# Patient Record
Sex: FEMALE | Race: WHITE | NOT HISPANIC OR LATINO | Employment: FULL TIME | URBAN - METROPOLITAN AREA
[De-identification: names, ages, dates, MRNs, and addresses within clinical notes are randomized per-mention and may not be internally consistent; named-entity substitution may affect disease eponyms.]

---

## 2017-03-30 ENCOUNTER — LAB VISIT (OUTPATIENT)
Dept: LAB | Facility: HOSPITAL | Age: 37
End: 2017-03-30
Attending: INTERNAL MEDICINE
Payer: COMMERCIAL

## 2017-03-30 DIAGNOSIS — E23.7: Primary | ICD-10-CM

## 2017-03-30 DIAGNOSIS — E27.9 UNSPECIFIED DISORDER OF ADRENAL GLANDS: ICD-10-CM

## 2017-03-30 LAB
ALBUMIN SERPL BCP-MCNC: 3.5 G/DL
ALP SERPL-CCNC: 83 U/L
ALT SERPL W/O P-5'-P-CCNC: 15 U/L
ANION GAP SERPL CALC-SCNC: 12 MMOL/L
AST SERPL-CCNC: 15 U/L
BILIRUB SERPL-MCNC: 0.3 MG/DL
BUN SERPL-MCNC: 11 MG/DL
CALCIUM SERPL-MCNC: 9.1 MG/DL
CHLORIDE SERPL-SCNC: 109 MMOL/L
CO2 SERPL-SCNC: 21 MMOL/L
CREAT SERPL-MCNC: 0.8 MG/DL
DHEA-S SERPL-MCNC: 190.3 UG/DL
EST. GFR  (AFRICAN AMERICAN): >60 ML/MIN/1.73 M^2
EST. GFR  (NON AFRICAN AMERICAN): >60 ML/MIN/1.73 M^2
GLUCOSE SERPL-MCNC: 81 MG/DL
POTASSIUM SERPL-SCNC: 4.2 MMOL/L
PROGEST SERPL-MCNC: 0.4 NG/ML
PROLACTIN SERPL IA-MCNC: 23.6 NG/ML
PROT SERPL-MCNC: 7.5 G/DL
SODIUM SERPL-SCNC: 142 MMOL/L
T3 SERPL-MCNC: 114 NG/DL
T4 FREE SERPL-MCNC: 0.99 NG/DL
TSH SERPL DL<=0.005 MIU/L-ACNC: 2.29 UIU/ML

## 2017-03-30 PROCEDURE — 83003 ASSAY GROWTH HORMONE (HGH): CPT

## 2017-03-30 PROCEDURE — 84144 ASSAY OF PROGESTERONE: CPT

## 2017-03-30 PROCEDURE — 82627 DEHYDROEPIANDROSTERONE: CPT

## 2017-03-30 PROCEDURE — 84443 ASSAY THYROID STIM HORMONE: CPT

## 2017-03-30 PROCEDURE — 84480 ASSAY TRIIODOTHYRONINE (T3): CPT

## 2017-03-30 PROCEDURE — 36415 COLL VENOUS BLD VENIPUNCTURE: CPT

## 2017-03-30 PROCEDURE — 84439 ASSAY OF FREE THYROXINE: CPT

## 2017-03-30 PROCEDURE — 84146 ASSAY OF PROLACTIN: CPT

## 2017-03-30 PROCEDURE — 82024 ASSAY OF ACTH: CPT

## 2017-03-30 PROCEDURE — 80053 COMPREHEN METABOLIC PANEL: CPT

## 2017-03-31 LAB
ACTH PLAS-MCNC: 17 PG/ML
GH SERPL-MCNC: 0.5 NG/ML

## 2017-11-06 ENCOUNTER — LAB VISIT (OUTPATIENT)
Dept: LAB | Facility: OTHER | Age: 37
End: 2017-11-06
Attending: OBSTETRICS & GYNECOLOGY
Payer: COMMERCIAL

## 2017-11-06 DIAGNOSIS — Z11.9 SCREENING EXAMINATION FOR INFECTIOUS DISEASE: ICD-10-CM

## 2017-11-06 DIAGNOSIS — Z01.812 ENCOUNTER FOR PREPROCEDURAL LABORATORY EXAMINATION: Primary | ICD-10-CM

## 2017-11-06 DIAGNOSIS — Z11.8 SPECIAL SCREENING EXAMINATION FOR CHLAMYDIAL DISEASE: ICD-10-CM

## 2017-11-06 DIAGNOSIS — Z11.3 SCREENING EXAMINATION FOR VENEREAL DISEASE: ICD-10-CM

## 2017-11-06 DIAGNOSIS — Z11.59 SPECIAL SCREENING EXAMINATION FOR VIRAL DISEASE: ICD-10-CM

## 2017-11-06 DIAGNOSIS — Z01.812 ENCOUNTER FOR PREPROCEDURAL LABORATORY EXAMINATION: ICD-10-CM

## 2017-11-06 DIAGNOSIS — Z31.49 INVESTIGATION AND TESTING FOR PROCREATION MANAGEMENT: ICD-10-CM

## 2017-11-06 LAB
ABO + RH BLD: NORMAL
BASOPHILS # BLD AUTO: 0.04 K/UL
BASOPHILS NFR BLD: 0.6 %
BLD GP AB SCN CELLS X3 SERPL QL: NORMAL
DIFFERENTIAL METHOD: ABNORMAL
EOSINOPHIL # BLD AUTO: 0 K/UL
EOSINOPHIL NFR BLD: 0.6 %
ERYTHROCYTE [DISTWIDTH] IN BLOOD BY AUTOMATED COUNT: 14.6 %
HCT VFR BLD AUTO: 38.9 %
HGB BLD-MCNC: 11.8 G/DL
LYMPHOCYTES # BLD AUTO: 2.1 K/UL
LYMPHOCYTES NFR BLD: 29.9 %
MCH RBC QN AUTO: 25.5 PG
MCHC RBC AUTO-ENTMCNC: 30.3 G/DL
MCV RBC AUTO: 84 FL
MONOCYTES # BLD AUTO: 0.4 K/UL
MONOCYTES NFR BLD: 5.5 %
NEUTROPHILS # BLD AUTO: 4.4 K/UL
NEUTROPHILS NFR BLD: 63.3 %
PLATELET # BLD AUTO: 370 K/UL
PMV BLD AUTO: 10.2 FL
PROLACTIN SERPL IA-MCNC: 7.4 NG/ML
RBC # BLD AUTO: 4.62 M/UL
TSH SERPL DL<=0.005 MIU/L-ACNC: 1.17 UIU/ML
WBC # BLD AUTO: 6.93 K/UL

## 2017-11-06 PROCEDURE — 83520 IMMUNOASSAY QUANT NOS NONAB: CPT

## 2017-11-06 PROCEDURE — 86703 HIV-1/HIV-2 1 RESULT ANTBDY: CPT

## 2017-11-06 PROCEDURE — 86704 HEP B CORE ANTIBODY TOTAL: CPT

## 2017-11-06 PROCEDURE — 87591 N.GONORRHOEAE DNA AMP PROB: CPT

## 2017-11-06 PROCEDURE — 85025 COMPLETE CBC W/AUTO DIFF WBC: CPT

## 2017-11-06 PROCEDURE — 84443 ASSAY THYROID STIM HORMONE: CPT

## 2017-11-06 PROCEDURE — 86762 RUBELLA ANTIBODY: CPT

## 2017-11-06 PROCEDURE — 87340 HEPATITIS B SURFACE AG IA: CPT

## 2017-11-06 PROCEDURE — 86592 SYPHILIS TEST NON-TREP QUAL: CPT

## 2017-11-06 PROCEDURE — 86803 HEPATITIS C AB TEST: CPT

## 2017-11-06 PROCEDURE — 36415 COLL VENOUS BLD VENIPUNCTURE: CPT

## 2017-11-06 PROCEDURE — 86901 BLOOD TYPING SEROLOGIC RH(D): CPT

## 2017-11-06 PROCEDURE — 86900 BLOOD TYPING SEROLOGIC ABO: CPT

## 2017-11-06 PROCEDURE — 86787 VARICELLA-ZOSTER ANTIBODY: CPT

## 2017-11-06 PROCEDURE — 84146 ASSAY OF PROLACTIN: CPT

## 2017-11-07 LAB
C TRACH DNA SPEC QL NAA+PROBE: NOT DETECTED
HBV CORE AB SERPL QL IA: NEGATIVE
HBV SURFACE AG SERPL QL IA: NEGATIVE
HCV AB SERPL QL IA: NEGATIVE
HIV 1+2 AB+HIV1 P24 AG SERPL QL IA: NEGATIVE
N GONORRHOEA DNA SPEC QL NAA+PROBE: NOT DETECTED
RPR SER QL: NORMAL
RUBV IGG SER-ACNC: 32.2 IU/ML
RUBV IGG SER-IMP: REACTIVE
VARICELLA INTERPRETATION: POSITIVE
VARICELLA ZOSTER IGG: 4.38 ISR

## 2017-11-09 LAB — MIS SERPL-MCNC: 2.1 NG/ML (ref 0.9–9.5)

## 2018-01-04 ENCOUNTER — PATIENT MESSAGE (OUTPATIENT)
Dept: OBSTETRICS AND GYNECOLOGY | Facility: CLINIC | Age: 38
End: 2018-01-04

## 2018-01-23 ENCOUNTER — OFFICE VISIT (OUTPATIENT)
Dept: OBSTETRICS AND GYNECOLOGY | Facility: CLINIC | Age: 38
End: 2018-01-23
Payer: COMMERCIAL

## 2018-01-23 VITALS
DIASTOLIC BLOOD PRESSURE: 80 MMHG | WEIGHT: 182.44 LBS | BODY MASS INDEX: 31.15 KG/M2 | HEIGHT: 64 IN | SYSTOLIC BLOOD PRESSURE: 130 MMHG

## 2018-01-23 DIAGNOSIS — Z01.419 ENCOUNTER FOR GYNECOLOGICAL EXAMINATION WITHOUT ABNORMAL FINDING: Primary | ICD-10-CM

## 2018-01-23 DIAGNOSIS — Z31.9 PROCREATIVE MANAGEMENT: ICD-10-CM

## 2018-01-23 DIAGNOSIS — F43.21 SITUATIONAL DEPRESSION: ICD-10-CM

## 2018-01-23 DIAGNOSIS — F41.9 ANXIETY: ICD-10-CM

## 2018-01-23 PROCEDURE — 99395 PREV VISIT EST AGE 18-39: CPT | Mod: S$GLB,,, | Performed by: OBSTETRICS & GYNECOLOGY

## 2018-01-23 PROCEDURE — 99999 PR PBB SHADOW E&M-EST. PATIENT-LVL III: CPT | Mod: PBBFAC,,, | Performed by: OBSTETRICS & GYNECOLOGY

## 2018-01-23 RX ORDER — SERTRALINE HYDROCHLORIDE 50 MG/1
50 TABLET, FILM COATED ORAL DAILY
Qty: 30 TABLET | Refills: 12 | Status: SHIPPED | OUTPATIENT
Start: 2018-01-23 | End: 2018-09-06 | Stop reason: ALTCHOICE

## 2018-01-23 RX ORDER — CLOMIPHENE CITRATE 50 MG/1
50 TABLET ORAL DAILY
Qty: 5 TABLET | Refills: 3 | Status: SHIPPED | OUTPATIENT
Start: 2018-01-23 | End: 2018-01-28

## 2018-01-23 NOTE — PROGRESS NOTES
"CC: Well woman exam    Angel Mosquera is a 37 y.o. female  presents for a well woman exam.  She has been trying to conceive with donor sperm and IUI with Dr Perez/ and one session with Dr Quispe.  She is dealing with the loss of her mother in Oct.  She is requesting medication    Past Medical History:   Diagnosis Date    Rosacea        Past Surgical History:   Procedure Laterality Date    Breast Augmentation      mischarr         OB History    Para Term  AB Living   2       2 0   SAB TAB Ectopic Multiple Live Births   1              # Outcome Date GA Lbr Unruly/2nd Weight Sex Delivery Anes PTL Lv   2 SAB            1 AB                   Family History   Problem Relation Age of Onset    Diabetes Mother     Hypertension Mother     Heart failure Mother      viral?    Cancer Neg Hx     Ovarian cancer Neg Hx     Melanoma Neg Hx     Heart attack Neg Hx     Stroke Neg Hx     Colon cancer Neg Hx     Breast cancer Neg Hx     Blindness Neg Hx     Amblyopia Neg Hx     Cataracts Neg Hx     Glaucoma Neg Hx     Macular degeneration Neg Hx     Retinal detachment Neg Hx     Strabismus Neg Hx        Social History   Substance Use Topics    Smoking status: Never Smoker    Smokeless tobacco: Never Used    Alcohol use No       /80   Ht 5' 4" (1.626 m)   Wt 82.7 kg (182 lb 6.9 oz)   LMP 2018 (Approximate)   BMI 31.31 kg/m²     ROS:  GENERAL: Denies weight gain or weight loss. Feeling well overall.   SKIN: Denies rash or lesions.   HEAD: Denies head injury or headache.   NODES: Denies enlarged lymph nodes.   CHEST: Denies chest pain or shortness of breath.   CARDIOVASCULAR: Denies palpitations or left sided chest pain.   ABDOMEN: No abdominal pain, constipation, diarrhea, nausea, vomiting or rectal bleeding.   URINARY: No frequency, dysuria, hematuria, or burning on urination.  REPRODUCTIVE: See HPI.   BREASTS: The patient performs breast self-examination and denies pain, " lumps, or nipple discharge.   HEMATOLOGIC: No easy bruisability or excessive bleeding.  MUSCULOSKELETAL: Denies joint pain or swelling.   NEUROLOGIC: Denies syncope or weakness.   PSYCHIATRIC: Denies depression, anxiety or mood swings.    Physical Exam:    APPEARANCE: Well nourished, well developed, in no acute distress.  AFFECT: WNL, alert and oriented x 3  SKIN: No acne or hirsutism  NECK: Neck symmetric without masses or thyromegaly  NODES: No inguinal, cervical, axillary, or femoral lymph node enlargement  CHEST: Good respiratory effect  ABDOMEN: Soft.  No tenderness or masses.  No hepatosplenomegaly.  No hernias.  BREASTS: Symmetrical, no skin changes or visible lesions.  No palpable masses, nipple discharge bilaterally.  PELVIC: Normal external genitalia without lesions.  Normal hair distribution.  Adequate perineal body, normal urethral meatus.  Vagina moist and well rugated without lesions or discharge.  Cervix pink, without lesions, discharge or tenderness.  No significant cystocele or rectocele.  Bimanual exam shows uterus to be normal size, regular, mobile and nontender.  Adnexa without masses or tenderness.    EXTREMITIES: No edema.      ASSESSMENT AND PLAN  1. Encounter for gynecological examination without abnormal finding     2. Procreative management  clomiPHENE (CLOMID) 50 mg tablet   3. Anxiety  sertraline (ZOLOFT) 50 MG tablet   4. Situational depression  sertraline (ZOLOFT) 50 MG tablet     offerred therapy referral   discussed grief counseling.  Meditation and other spiritual guidance given  Will start on zoloft.  Consider pregnancy in a few months  PNV daily    Patient was counseled today on A.C.S. Pap guidelines and recommendations for yearly pelvic exams, mammograms and monthly self breast exams; to see her PCP for other health maintenance.     F/U PRN

## 2018-02-10 ENCOUNTER — TELEPHONE (OUTPATIENT)
Dept: OBSTETRICS AND GYNECOLOGY | Facility: HOSPITAL | Age: 38
End: 2018-02-10

## 2018-02-10 RX ORDER — METRONIDAZOLE 500 MG/1
500 TABLET ORAL 2 TIMES DAILY
Qty: 14 TABLET | Refills: 0 | Status: SHIPPED | OUTPATIENT
Start: 2018-02-10 | End: 2018-02-24

## 2018-02-15 ENCOUNTER — TELEPHONE (OUTPATIENT)
Dept: OBSTETRICS AND GYNECOLOGY | Facility: CLINIC | Age: 38
End: 2018-02-15

## 2018-02-15 NOTE — TELEPHONE ENCOUNTER
----- Message from Natalia Rosario sent at 2/15/2018 11:20 AM CST -----  Contact: pt  x_ 1st Request  _ 2nd Request  _ 3rd Request    Who: pt    Why: is returning a call    What Number to Call Back: [phone]    When to Expect a call back: (Before the end of the day)  -- if call after 3:00 call back will be tomorrow.

## 2018-02-15 NOTE — TELEPHONE ENCOUNTER
Patient advised that the call was only to notify her of her prescription. Patient stated that she was aware and is almost done.

## 2018-09-06 ENCOUNTER — OFFICE VISIT (OUTPATIENT)
Dept: DERMATOLOGY | Facility: CLINIC | Age: 38
End: 2018-09-06
Payer: COMMERCIAL

## 2018-09-06 DIAGNOSIS — L71.9 ROSACEA: Primary | ICD-10-CM

## 2018-09-06 DIAGNOSIS — D22.9 NEVUS: ICD-10-CM

## 2018-09-06 PROCEDURE — 99999 PR PBB SHADOW E&M-EST. PATIENT-LVL II: CPT | Mod: PBBFAC,,, | Performed by: DERMATOLOGY

## 2018-09-06 PROCEDURE — 99202 OFFICE O/P NEW SF 15 MIN: CPT | Mod: S$GLB,,, | Performed by: DERMATOLOGY

## 2018-09-06 RX ORDER — SODIUM SULFACETAMIDE AND SULFUR 80; 40 MG/ML; MG/ML
SOLUTION TOPICAL
Qty: 473 ML | Refills: 3 | Status: ON HOLD | OUTPATIENT
Start: 2018-09-06 | End: 2019-10-19 | Stop reason: HOSPADM

## 2018-09-06 RX ORDER — IVERMECTIN 10 MG/G
CREAM TOPICAL
Qty: 45 G | Refills: 3 | Status: SHIPPED | OUTPATIENT
Start: 2018-09-06 | End: 2019-06-19 | Stop reason: SDUPTHER

## 2018-09-06 NOTE — PROGRESS NOTES
Subjective:       Patient ID:  Angel Mosquera is a 38 y.o. female who presents for   Chief Complaint   Patient presents with    Lesion    Rosacea     Pt presents for lesion to nasal bridge x 2 months.  Was growing but has stopped growing.  Denies pain or bleeding. No tx.  Defers check of other areas.  Also c/o rosacea, very frustrated with it.  Feels she gets pustules and has background redness.  Sun and heat flare as well as alcohol.   Has seen many derms that have not helped.  Has tried many different things that she finds are all too harsh.  Does get pustules associated.     oracea gave pt rash. finacea burned her skin       Lesion     Rosacea         Review of Systems   Constitutional: Negative for fever, chills, fatigue and malaise.   Skin: Positive for daily sunscreen use and activity-related sunscreen use.   Hematologic/Lymphatic: Does not bruise/bleed easily.        Objective:    Physical Exam   Constitutional: She appears well-developed and well-nourished. No distress.   Eyes:       Neurological: She is alert and oriented to person, place, and time. She is not disoriented.   Psychiatric: She has a normal mood and affect.   Skin:   Areas Examined (abnormalities noted in diagram):   Head / Face Inspection Performed  Neck Inspection Performed              Diagram Legend     Erythematous scaling macule/papule c/w actinic keratosis       Vascular papule c/w angioma      Pigmented verrucoid papule/plaque c/w seborrheic keratosis      Yellow umbilicated papule c/w sebaceous hyperplasia      Irregularly shaped tan macule c/w lentigo     1-2 mm smooth white papules consistent with Milia      Movable subcutaneous cyst with punctum c/w epidermal inclusion cyst      Subcutaneous movable cyst c/w pilar cyst      Firm pink to brown papule c/w dermatofibroma      Pedunculated fleshy papule(s) c/w skin tag(s)      Evenly pigmented macule c/w junctional nevus     Mildly variegated pigmented, slightly  irregular-bordered macule c/w mildly atypical nevus      Flesh colored to evenly pigmented papule c/w intradermal nevus       Pink pearly papule/plaque c/w basal cell carcinoma      Erythematous hyperkeratotic cursted plaque c/w SCC      Surgical scar with no sign of skin cancer recurrence      Open and closed comedones      Inflammatory papules and pustules      Verrucoid papule consistent consistent with wart     Erythematous eczematous patches and plaques     Dystrophic onycholytic nail with subungual debris c/w onychomycosis     Umbilicated papule    Erythematous-base heme-crusted tan verrucoid plaque consistent with inflamed seborrheic keratosis     Erythematous Silvery Scaling Plaque c/w Psoriasis     See annotation      Assessment / Plan:        Rosacea  -     ivermectin (SOOLANTRA) 1 % Crea; Apply thin film to face qhs  Dispense: 45 g; Refill: 3  -     sulfacetamide sodium-sulfur (SULFACLEANSE 8-4) 8-4 % Susp; Wash face qhs  Dispense: 473 mL; Refill: 3  Cetaphil redness wash qam   cerave am and pm or elta daily    Nevus  Discussed ABCDE's of nevi.  Monitor for new mole or moles that are becoming bigger, darker, irritated, or developing irregular borders. Brochure provided.             Follow-up in about 2 months (around 11/6/2018).

## 2019-05-02 ENCOUNTER — LAB VISIT (OUTPATIENT)
Dept: LAB | Facility: HOSPITAL | Age: 39
End: 2019-05-02
Attending: OBSTETRICS & GYNECOLOGY
Payer: COMMERCIAL

## 2019-05-02 ENCOUNTER — OFFICE VISIT (OUTPATIENT)
Dept: OBSTETRICS AND GYNECOLOGY | Facility: CLINIC | Age: 39
End: 2019-05-02
Payer: COMMERCIAL

## 2019-05-02 VITALS
WEIGHT: 199.44 LBS | BODY MASS INDEX: 34.05 KG/M2 | SYSTOLIC BLOOD PRESSURE: 126 MMHG | HEIGHT: 64 IN | DIASTOLIC BLOOD PRESSURE: 84 MMHG

## 2019-05-02 DIAGNOSIS — Z31.9 PROCREATIVE MANAGEMENT: ICD-10-CM

## 2019-05-02 DIAGNOSIS — Z01.419 ENCOUNTER FOR GYNECOLOGICAL EXAMINATION WITHOUT ABNORMAL FINDING: Primary | ICD-10-CM

## 2019-05-02 LAB
ABO + RH BLD: NORMAL
BASOPHILS # BLD AUTO: 0.07 K/UL (ref 0–0.2)
BASOPHILS NFR BLD: 0.7 % (ref 0–1.9)
BLD GP AB SCN CELLS X3 SERPL QL: NORMAL
DIFFERENTIAL METHOD: ABNORMAL
EOSINOPHIL # BLD AUTO: 0.1 K/UL (ref 0–0.5)
EOSINOPHIL NFR BLD: 0.7 % (ref 0–8)
ERYTHROCYTE [DISTWIDTH] IN BLOOD BY AUTOMATED COUNT: 13.3 % (ref 11.5–14.5)
HCT VFR BLD AUTO: 37.4 % (ref 37–48.5)
HGB BLD-MCNC: 11.2 G/DL (ref 12–16)
IMM GRANULOCYTES # BLD AUTO: 0.04 K/UL (ref 0–0.04)
IMM GRANULOCYTES NFR BLD AUTO: 0.4 % (ref 0–0.5)
LYMPHOCYTES # BLD AUTO: 2.9 K/UL (ref 1–4.8)
LYMPHOCYTES NFR BLD: 29.3 % (ref 18–48)
MCH RBC QN AUTO: 25.7 PG (ref 27–31)
MCHC RBC AUTO-ENTMCNC: 29.9 G/DL (ref 32–36)
MCV RBC AUTO: 86 FL (ref 82–98)
MONOCYTES # BLD AUTO: 0.6 K/UL (ref 0.3–1)
MONOCYTES NFR BLD: 6.1 % (ref 4–15)
NEUTROPHILS # BLD AUTO: 6.2 K/UL (ref 1.8–7.7)
NEUTROPHILS NFR BLD: 62.8 % (ref 38–73)
NRBC BLD-RTO: 0 /100 WBC
PLATELET # BLD AUTO: 411 K/UL (ref 150–350)
PMV BLD AUTO: 10.3 FL (ref 9.2–12.9)
PROLACTIN SERPL IA-MCNC: 11.4 NG/ML (ref 5.2–26.5)
RBC # BLD AUTO: 4.36 M/UL (ref 4–5.4)
TSH SERPL DL<=0.005 MIU/L-ACNC: 1.25 UIU/ML (ref 0.4–4)
WBC # BLD AUTO: 9.81 K/UL (ref 3.9–12.7)

## 2019-05-02 PROCEDURE — 84443 ASSAY THYROID STIM HORMONE: CPT

## 2019-05-02 PROCEDURE — 86703 HIV-1/HIV-2 1 RESULT ANTBDY: CPT

## 2019-05-02 PROCEDURE — 36415 COLL VENOUS BLD VENIPUNCTURE: CPT | Mod: PO

## 2019-05-02 PROCEDURE — 80074 ACUTE HEPATITIS PANEL: CPT

## 2019-05-02 PROCEDURE — 83520 IMMUNOASSAY QUANT NOS NONAB: CPT

## 2019-05-02 PROCEDURE — 99999 PR PBB SHADOW E&M-EST. PATIENT-LVL III: ICD-10-PCS | Mod: PBBFAC,,, | Performed by: OBSTETRICS & GYNECOLOGY

## 2019-05-02 PROCEDURE — 86592 SYPHILIS TEST NON-TREP QUAL: CPT

## 2019-05-02 PROCEDURE — 86901 BLOOD TYPING SEROLOGIC RH(D): CPT

## 2019-05-02 PROCEDURE — 88175 CYTOPATH C/V AUTO FLUID REDO: CPT

## 2019-05-02 PROCEDURE — 99395 PREV VISIT EST AGE 18-39: CPT | Mod: S$GLB,,, | Performed by: OBSTETRICS & GYNECOLOGY

## 2019-05-02 PROCEDURE — 86762 RUBELLA ANTIBODY: CPT

## 2019-05-02 PROCEDURE — 85025 COMPLETE CBC W/AUTO DIFF WBC: CPT

## 2019-05-02 PROCEDURE — 84146 ASSAY OF PROLACTIN: CPT

## 2019-05-02 PROCEDURE — 86645 CMV ANTIBODY IGM: CPT

## 2019-05-02 PROCEDURE — 99395 PR PREVENTIVE VISIT,EST,18-39: ICD-10-PCS | Mod: S$GLB,,, | Performed by: OBSTETRICS & GYNECOLOGY

## 2019-05-02 PROCEDURE — 86644 CMV ANTIBODY: CPT

## 2019-05-02 PROCEDURE — 99999 PR PBB SHADOW E&M-EST. PATIENT-LVL III: CPT | Mod: PBBFAC,,, | Performed by: OBSTETRICS & GYNECOLOGY

## 2019-05-02 PROCEDURE — 86631 CHLAMYDIA ANTIBODY: CPT

## 2019-05-02 NOTE — PROGRESS NOTES
"CC: Well woman exam    Angel Mosquera is a 39 y.o. female  presents for a well woman exam  Normal cycles.   Cramps are increasing and more painful.  Slightly heavier.  US scheduled with Dr Bridges.  Planning stimulation and IVF with donor sperm on May 29..         Past Medical History:   Diagnosis Date    Rosacea        Past Surgical History:   Procedure Laterality Date    Breast Augmentation      mischarr         OB History    Para Term  AB Living   2       2 0   SAB TAB Ectopic Multiple Live Births   2              # Outcome Date GA Lbr Unruly/2nd Weight Sex Delivery Anes PTL Lv   2 SAB 2008           1 SAB                Family History   Problem Relation Age of Onset    Diabetes Mother     Hypertension Mother     Heart failure Mother         viral?    Cancer Neg Hx     Ovarian cancer Neg Hx     Melanoma Neg Hx     Heart attack Neg Hx     Stroke Neg Hx     Colon cancer Neg Hx     Breast cancer Neg Hx     Blindness Neg Hx     Amblyopia Neg Hx     Cataracts Neg Hx     Glaucoma Neg Hx     Macular degeneration Neg Hx     Retinal detachment Neg Hx     Strabismus Neg Hx        Social History     Tobacco Use    Smoking status: Never Smoker    Smokeless tobacco: Never Used   Substance Use Topics    Alcohol use: No    Drug use: No       /84 (BP Location: Left arm, Patient Position: Sitting)   Ht 5' 4" (1.626 m)   Wt 90.5 kg (199 lb 6.5 oz)   LMP 2019 (Exact Date)   BMI 34.23 kg/m²     ROS:  GENERAL: Denies weight gain or weight loss. Feeling well overall.   SKIN: Denies rash or lesions.   HEAD: Denies head injury or headache.   NODES: Denies enlarged lymph nodes.   CHEST: Denies chest pain or shortness of breath.   CARDIOVASCULAR: Denies palpitations or left sided chest pain.   ABDOMEN: No abdominal pain, constipation, diarrhea, nausea, vomiting or rectal bleeding.   URINARY: No frequency, dysuria, hematuria, or burning on urination.  REPRODUCTIVE: See HPI. "   BREASTS: The patient performs breast self-examination and denies pain, lumps, or nipple discharge.   HEMATOLOGIC: No easy bruisability or excessive bleeding.  MUSCULOSKELETAL: Denies joint pain or swelling.   NEUROLOGIC: Denies syncope or weakness.   PSYCHIATRIC: Denies depression, anxiety or mood swings.    Physical Exam:    APPEARANCE: Well nourished, well developed, in no acute distress.  AFFECT: WNL, alert and oriented x 3  SKIN: No acne or hirsutism  NECK: Neck symmetric without masses or thyromegaly  NODES: No inguinal, cervical, axillary, or femoral lymph node enlargement  CHEST: Good respiratory effect  ABDOMEN: Soft.  No tenderness or masses.  No hepatosplenomegaly.  No hernias.  BREASTS: Symmetrical, no skin changes or visible lesions.  No palpable masses, nipple discharge bilaterally.  PELVIC: Normal external genitalia without lesions.  Normal hair distribution.  Adequate perineal body, normal urethral meatus.  Vagina moist and well rugated without lesions or discharge.  Cervix pink, without lesions, discharge or tenderness.  No significant cystocele or rectocele.  Bimanual exam shows uterus to be normal size, regular, mobile and nontender.  Adnexa without masses or tenderness.    EXTREMITIES: No edema.    ASSESSMENT AND PLAN  1. Encounter for gynecological examination without abnormal finding  Liquid-based pap smear, screening    Liquid-based pap smear, screening   2. Procreative management  Cytomegalovirus (Cmv) Ab, Igm    CYTOMEGALOVIRUS ANTIBODY, IGG    HIV 1/2 Ag/Ab (4th Gen)    Hepatitis panel, acute    HEPATITIS B CORE ANTIBODY, IGM    RPR    Chlamydia Serology, Serum    ABO/Rh    Post Partum Type and Screen    CBC auto differential    Rubella antibody, IgG    Antimullerian hormone (AMH)    Prolactin    TSH     Patient will plan for IVF with donor sperm at the end of May    Patient was counseled today on A.C.S. Pap guidelines and recommendations for yearly pelvic exams, mammograms and monthly  self breast exams; to see her PCP for other health maintenance.     Follow up in about 1 year (around 5/2/2020).

## 2019-05-03 LAB
C PNEUM IGG TITR SER IF: NORMAL TITER
C PNEUM IGM TITR SER IF: NORMAL TITER
C PSITTACI IGG TITR SER IF: NORMAL TITER
C PSITTACI IGM TITR SER IF: NORMAL TITER
C TRACH IGG TITR SER IF: NORMAL TITER
C TRACH IGM TITR SER IF: NORMAL TITER
CMV IGG SERPL QL IA: REACTIVE
HAV IGM SERPL QL IA: NEGATIVE
HBV CORE IGM SERPL QL IA: NEGATIVE
HBV CORE IGM SERPL QL IA: NEGATIVE
HBV SURFACE AG SERPL QL IA: NEGATIVE
HCV AB SERPL QL IA: NEGATIVE
HIV 1+2 AB+HIV1 P24 AG SERPL QL IA: NEGATIVE
RPR SER QL: NORMAL
RUBV IGG SER-ACNC: 30.1 IU/ML
RUBV IGG SER-IMP: REACTIVE

## 2019-05-06 LAB
CMV IGM SERPL IA-ACNC: <8 U/ML
MIS SERPL-MCNC: 2.5 NG/ML (ref 0.9–9.5)

## 2019-06-19 ENCOUNTER — OFFICE VISIT (OUTPATIENT)
Dept: DERMATOLOGY | Facility: CLINIC | Age: 39
End: 2019-06-19
Payer: COMMERCIAL

## 2019-06-19 DIAGNOSIS — L71.9 ROSACEA: Primary | ICD-10-CM

## 2019-06-19 PROCEDURE — 99213 OFFICE O/P EST LOW 20 MIN: CPT | Mod: S$GLB,,, | Performed by: DERMATOLOGY

## 2019-06-19 PROCEDURE — 99999 PR PBB SHADOW E&M-EST. PATIENT-LVL II: ICD-10-PCS | Mod: PBBFAC,,, | Performed by: DERMATOLOGY

## 2019-06-19 PROCEDURE — 99213 PR OFFICE/OUTPT VISIT, EST, LEVL III, 20-29 MIN: ICD-10-PCS | Mod: S$GLB,,, | Performed by: DERMATOLOGY

## 2019-06-19 PROCEDURE — 99999 PR PBB SHADOW E&M-EST. PATIENT-LVL II: CPT | Mod: PBBFAC,,, | Performed by: DERMATOLOGY

## 2019-06-19 RX ORDER — IVERMECTIN 10 MG/G
CREAM TOPICAL
Qty: 45 G | Refills: 3 | Status: ON HOLD | OUTPATIENT
Start: 2019-06-19 | End: 2019-10-19 | Stop reason: HOSPADM

## 2019-06-19 NOTE — PROGRESS NOTES
Subjective:       Patient ID:  Angel Mosquera is a 39 y.o. female who presents for   Chief Complaint   Patient presents with    Rosacea     Pt here today for a follow up on rosacea tx with soolantra and sulfa wash. Tx helps. Uses sunscreen as well.  No longer gets the pus bumps .  Used to have many more.  Uses cetaphil redness wash helps as well.       Review of Systems   HENT: Negative for headaches.    Eyes: Negative for itching, eye watering, eye irritation and eyelid inflammation.   Gastrointestinal: Negative for nausea, vomiting, diarrhea and Sensitivity to oral antibiotics.        Vascular instability syndrome: rosacea associated with GI sx and HA's   Skin: Positive for daily sunscreen use and activity-related sunscreen use. Negative for recent sunburn.   Neurological: Negative for headaches.        Objective:    Physical Exam   Constitutional: She appears well-developed and well-nourished. No distress.   Neurological: She is alert and oriented to person, place, and time. She is not disoriented.   Psychiatric: She has a normal mood and affect.   Skin:   Areas Examined (abnormalities noted in diagram):   Head / Face Inspection Performed              Diagram Legend     Erythematous scaling macule/papule c/w actinic keratosis       Vascular papule c/w angioma      Pigmented verrucoid papule/plaque c/w seborrheic keratosis      Yellow umbilicated papule c/w sebaceous hyperplasia      Irregularly shaped tan macule c/w lentigo     1-2 mm smooth white papules consistent with Milia      Movable subcutaneous cyst with punctum c/w epidermal inclusion cyst      Subcutaneous movable cyst c/w pilar cyst      Firm pink to brown papule c/w dermatofibroma      Pedunculated fleshy papule(s) c/w skin tag(s)      Evenly pigmented macule c/w junctional nevus     Mildly variegated pigmented, slightly irregular-bordered macule c/w mildly atypical nevus      Flesh colored to evenly pigmented papule c/w intradermal nevus        Pink pearly papule/plaque c/w basal cell carcinoma      Erythematous hyperkeratotic cursted plaque c/w SCC      Surgical scar with no sign of skin cancer recurrence      Open and closed comedones      Inflammatory papules and pustules      Verrucoid papule consistent consistent with wart     Erythematous eczematous patches and plaques     Dystrophic onycholytic nail with subungual debris c/w onychomycosis     Umbilicated papule    Erythematous-base heme-crusted tan verrucoid plaque consistent with inflamed seborrheic keratosis     Erythematous Silvery Scaling Plaque c/w Psoriasis     See annotation      Assessment / Plan:        Rosacea  Sulfacleanser,  cerave am and pm   cetaphil antiredness wash   -     ivermectin (SOOLANTRA) 1 % Crea; Apply thin film to face qhs  Dispense: 45 g; Refill: 3             Follow up in about 1 year (around 6/19/2020).

## 2019-10-08 ENCOUNTER — TELEPHONE (OUTPATIENT)
Dept: OBSTETRICS AND GYNECOLOGY | Facility: CLINIC | Age: 39
End: 2019-10-08

## 2019-10-11 ENCOUNTER — PATIENT MESSAGE (OUTPATIENT)
Dept: DERMATOLOGY | Facility: CLINIC | Age: 39
End: 2019-10-11

## 2019-10-19 ENCOUNTER — ANESTHESIA EVENT (OUTPATIENT)
Dept: SURGERY | Facility: OTHER | Age: 39
End: 2019-10-19
Payer: COMMERCIAL

## 2019-10-19 ENCOUNTER — ANESTHESIA (OUTPATIENT)
Dept: SURGERY | Facility: OTHER | Age: 39
End: 2019-10-19
Payer: COMMERCIAL

## 2019-10-19 ENCOUNTER — HOSPITAL ENCOUNTER (EMERGENCY)
Facility: OTHER | Age: 39
Discharge: HOME | End: 2019-10-19
Attending: EMERGENCY MEDICINE
Payer: COMMERCIAL

## 2019-10-19 VITALS
SYSTOLIC BLOOD PRESSURE: 110 MMHG | WEIGHT: 204 LBS | TEMPERATURE: 98 F | DIASTOLIC BLOOD PRESSURE: 76 MMHG | OXYGEN SATURATION: 97 % | RESPIRATION RATE: 16 BRPM | BODY MASS INDEX: 33.99 KG/M2 | HEART RATE: 80 BPM | HEIGHT: 65 IN

## 2019-10-19 DIAGNOSIS — Z98.890 STATUS POST DILATION AND CURETTAGE: ICD-10-CM

## 2019-10-19 DIAGNOSIS — O02.1 MISSED ABORTION: ICD-10-CM

## 2019-10-19 DIAGNOSIS — O03.4 INCOMPLETE ABORTION: Primary | ICD-10-CM

## 2019-10-19 LAB
ALBUMIN SERPL BCP-MCNC: 3.5 G/DL (ref 3.5–5.2)
ALP SERPL-CCNC: 65 U/L (ref 55–135)
ALT SERPL W/O P-5'-P-CCNC: 17 U/L (ref 10–44)
ANION GAP SERPL CALC-SCNC: 12 MMOL/L (ref 8–16)
AST SERPL-CCNC: 17 U/L (ref 10–40)
B-HCG UR QL: POSITIVE
BASOPHILS # BLD AUTO: 0.08 K/UL (ref 0–0.2)
BASOPHILS NFR BLD: 0.5 % (ref 0–1.9)
BILIRUB SERPL-MCNC: 0.2 MG/DL (ref 0.1–1)
BUN SERPL-MCNC: 12 MG/DL (ref 6–20)
CALCIUM SERPL-MCNC: 9.5 MG/DL (ref 8.7–10.5)
CHLORIDE SERPL-SCNC: 105 MMOL/L (ref 95–110)
CO2 SERPL-SCNC: 22 MMOL/L (ref 23–29)
CREAT SERPL-MCNC: 0.8 MG/DL (ref 0.5–1.4)
CTP QC/QA: YES
DIFFERENTIAL METHOD: ABNORMAL
EOSINOPHIL # BLD AUTO: 0.4 K/UL (ref 0–0.5)
EOSINOPHIL NFR BLD: 2.6 % (ref 0–8)
ERYTHROCYTE [DISTWIDTH] IN BLOOD BY AUTOMATED COUNT: 14 % (ref 11.5–14.5)
EST. GFR  (AFRICAN AMERICAN): >60 ML/MIN/1.73 M^2
EST. GFR  (NON AFRICAN AMERICAN): >60 ML/MIN/1.73 M^2
GLUCOSE SERPL-MCNC: 106 MG/DL (ref 70–110)
HCG INTACT+B SERPL-ACNC: NORMAL MIU/ML
HCT VFR BLD AUTO: 38.9 % (ref 37–48.5)
HGB BLD-MCNC: 12.3 G/DL (ref 12–16)
IMM GRANULOCYTES # BLD AUTO: 0.04 K/UL (ref 0–0.04)
IMM GRANULOCYTES NFR BLD AUTO: 0.3 % (ref 0–0.5)
LYMPHOCYTES # BLD AUTO: 3.4 K/UL (ref 1–4.8)
LYMPHOCYTES NFR BLD: 21.5 % (ref 18–48)
MCH RBC QN AUTO: 28 PG (ref 27–31)
MCHC RBC AUTO-ENTMCNC: 31.6 G/DL (ref 32–36)
MCV RBC AUTO: 88 FL (ref 82–98)
MONOCYTES # BLD AUTO: 0.9 K/UL (ref 0.3–1)
MONOCYTES NFR BLD: 5.9 % (ref 4–15)
NEUTROPHILS # BLD AUTO: 10.8 K/UL (ref 1.8–7.7)
NEUTROPHILS NFR BLD: 69.2 % (ref 38–73)
NRBC BLD-RTO: 0 /100 WBC
PLATELET # BLD AUTO: 383 K/UL (ref 150–350)
PMV BLD AUTO: 9.6 FL (ref 9.2–12.9)
POTASSIUM SERPL-SCNC: 3.5 MMOL/L (ref 3.5–5.1)
PROT SERPL-MCNC: 7.6 G/DL (ref 6–8.4)
RBC # BLD AUTO: 4.4 M/UL (ref 4–5.4)
SODIUM SERPL-SCNC: 139 MMOL/L (ref 136–145)
WBC # BLD AUTO: 15.63 K/UL (ref 3.9–12.7)

## 2019-10-19 PROCEDURE — 76998 PR  ULTRASONIC GUIDANCE, INTRAOPERATIVE: ICD-10-PCS | Mod: 26,51,, | Performed by: OBSTETRICS & GYNECOLOGY

## 2019-10-19 PROCEDURE — 63600175 PHARM REV CODE 636 W HCPCS: Performed by: EMERGENCY MEDICINE

## 2019-10-19 PROCEDURE — 96374 THER/PROPH/DIAG INJ IV PUSH: CPT

## 2019-10-19 PROCEDURE — 88305 TISSUE SPECIMEN TO PATHOLOGY - SURGERY: ICD-10-PCS | Mod: 26,,, | Performed by: PATHOLOGY

## 2019-10-19 PROCEDURE — 81025 URINE PREGNANCY TEST: CPT | Performed by: EMERGENCY MEDICINE

## 2019-10-19 PROCEDURE — 63600175 PHARM REV CODE 636 W HCPCS: Performed by: ANESTHESIOLOGY

## 2019-10-19 PROCEDURE — 76998 US GUIDE INTRAOP: CPT | Mod: 26,51,, | Performed by: OBSTETRICS & GYNECOLOGY

## 2019-10-19 PROCEDURE — 85025 COMPLETE CBC W/AUTO DIFF WBC: CPT

## 2019-10-19 PROCEDURE — 84702 CHORIONIC GONADOTROPIN TEST: CPT

## 2019-10-19 PROCEDURE — 71000033 HC RECOVERY, INTIAL HOUR: Performed by: OBSTETRICS & GYNECOLOGY

## 2019-10-19 PROCEDURE — 63600175 PHARM REV CODE 636 W HCPCS: Performed by: NURSE ANESTHETIST, CERTIFIED REGISTERED

## 2019-10-19 PROCEDURE — 59812 PR SURG RX INCOMPLETE ABORTN: ICD-10-PCS | Mod: ,,, | Performed by: OBSTETRICS & GYNECOLOGY

## 2019-10-19 PROCEDURE — 25000003 PHARM REV CODE 250: Performed by: NURSE ANESTHETIST, CERTIFIED REGISTERED

## 2019-10-19 PROCEDURE — 36000704 HC OR TIME LEV I 1ST 15 MIN: Performed by: OBSTETRICS & GYNECOLOGY

## 2019-10-19 PROCEDURE — 96361 HYDRATE IV INFUSION ADD-ON: CPT

## 2019-10-19 PROCEDURE — 88305 TISSUE EXAM BY PATHOLOGIST: CPT | Mod: 26,,, | Performed by: PATHOLOGY

## 2019-10-19 PROCEDURE — 99283 EMERGENCY DEPT VISIT LOW MDM: CPT | Mod: ,,, | Performed by: OBSTETRICS & GYNECOLOGY

## 2019-10-19 PROCEDURE — 71000016 HC POSTOP RECOV ADDL HR: Performed by: OBSTETRICS & GYNECOLOGY

## 2019-10-19 PROCEDURE — 96375 TX/PRO/DX INJ NEW DRUG ADDON: CPT

## 2019-10-19 PROCEDURE — 99283 PR EMERGENCY DEPT VISIT,LEVEL III: ICD-10-PCS | Mod: ,,, | Performed by: OBSTETRICS & GYNECOLOGY

## 2019-10-19 PROCEDURE — 37000009 HC ANESTHESIA EA ADD 15 MINS: Performed by: OBSTETRICS & GYNECOLOGY

## 2019-10-19 PROCEDURE — 88305 TISSUE EXAM BY PATHOLOGIST: CPT | Performed by: PATHOLOGY

## 2019-10-19 PROCEDURE — 37000008 HC ANESTHESIA 1ST 15 MINUTES: Performed by: OBSTETRICS & GYNECOLOGY

## 2019-10-19 PROCEDURE — 59812 TREATMENT OF MISCARRIAGE: CPT | Mod: ,,, | Performed by: OBSTETRICS & GYNECOLOGY

## 2019-10-19 PROCEDURE — 71000015 HC POSTOP RECOV 1ST HR: Performed by: OBSTETRICS & GYNECOLOGY

## 2019-10-19 PROCEDURE — 36000705 HC OR TIME LEV I EA ADD 15 MIN: Performed by: OBSTETRICS & GYNECOLOGY

## 2019-10-19 PROCEDURE — 80053 COMPREHEN METABOLIC PANEL: CPT

## 2019-10-19 PROCEDURE — 99285 EMERGENCY DEPT VISIT HI MDM: CPT | Mod: 25

## 2019-10-19 RX ORDER — MUPIROCIN 20 MG/G
OINTMENT TOPICAL
Status: DISCONTINUED | OUTPATIENT
Start: 2019-10-19 | End: 2019-10-19 | Stop reason: HOSPADM

## 2019-10-19 RX ORDER — SODIUM CHLORIDE, SODIUM LACTATE, POTASSIUM CHLORIDE, CALCIUM CHLORIDE 600; 310; 30; 20 MG/100ML; MG/100ML; MG/100ML; MG/100ML
INJECTION, SOLUTION INTRAVENOUS CONTINUOUS
Status: DISCONTINUED | OUTPATIENT
Start: 2019-10-19 | End: 2019-10-19 | Stop reason: HOSPADM

## 2019-10-19 RX ORDER — ONDANSETRON 2 MG/ML
INJECTION INTRAMUSCULAR; INTRAVENOUS
Status: DISCONTINUED | OUTPATIENT
Start: 2019-10-19 | End: 2019-10-19

## 2019-10-19 RX ORDER — SUCCINYLCHOLINE CHLORIDE 20 MG/ML
INJECTION INTRAMUSCULAR; INTRAVENOUS
Status: DISCONTINUED | OUTPATIENT
Start: 2019-10-19 | End: 2019-10-19

## 2019-10-19 RX ORDER — HYDROCODONE BITARTRATE AND ACETAMINOPHEN 5; 325 MG/1; MG/1
1 TABLET ORAL EVERY 6 HOURS PRN
Qty: 8 TABLET | Refills: 0 | Status: SHIPPED | OUTPATIENT
Start: 2019-10-19 | End: 2019-10-23

## 2019-10-19 RX ORDER — DEXAMETHASONE SODIUM PHOSPHATE 4 MG/ML
INJECTION, SOLUTION INTRA-ARTICULAR; INTRALESIONAL; INTRAMUSCULAR; INTRAVENOUS; SOFT TISSUE
Status: DISCONTINUED | OUTPATIENT
Start: 2019-10-19 | End: 2019-10-19

## 2019-10-19 RX ORDER — OXYCODONE HYDROCHLORIDE 5 MG/1
5 TABLET ORAL
Status: DISCONTINUED | OUTPATIENT
Start: 2019-10-19 | End: 2019-10-19 | Stop reason: HOSPADM

## 2019-10-19 RX ORDER — PROPOFOL 10 MG/ML
VIAL (ML) INTRAVENOUS
Status: DISCONTINUED | OUTPATIENT
Start: 2019-10-19 | End: 2019-10-19

## 2019-10-19 RX ORDER — IBUPROFEN 800 MG/1
800 TABLET ORAL EVERY 8 HOURS PRN
Qty: 30 TABLET | Refills: 1 | Status: SHIPPED | OUTPATIENT
Start: 2019-10-19 | End: 2019-10-23

## 2019-10-19 RX ORDER — SODIUM CHLORIDE 0.9 % (FLUSH) 0.9 %
3 SYRINGE (ML) INJECTION
Status: DISCONTINUED | OUTPATIENT
Start: 2019-10-19 | End: 2019-10-19 | Stop reason: HOSPADM

## 2019-10-19 RX ORDER — ROCURONIUM BROMIDE 10 MG/ML
INJECTION, SOLUTION INTRAVENOUS
Status: DISCONTINUED | OUTPATIENT
Start: 2019-10-19 | End: 2019-10-19

## 2019-10-19 RX ORDER — ONDANSETRON 2 MG/ML
4 INJECTION INTRAMUSCULAR; INTRAVENOUS
Status: COMPLETED | OUTPATIENT
Start: 2019-10-19 | End: 2019-10-19

## 2019-10-19 RX ORDER — DIPHENHYDRAMINE HCL 25 MG
25 CAPSULE ORAL EVERY 4 HOURS PRN
Status: CANCELLED | OUTPATIENT
Start: 2019-10-19

## 2019-10-19 RX ORDER — GLYCOPYRROLATE 0.2 MG/ML
INJECTION INTRAMUSCULAR; INTRAVENOUS
Status: DISCONTINUED | OUTPATIENT
Start: 2019-10-19 | End: 2019-10-19

## 2019-10-19 RX ORDER — FENTANYL CITRATE 50 UG/ML
INJECTION, SOLUTION INTRAMUSCULAR; INTRAVENOUS
Status: DISCONTINUED | OUTPATIENT
Start: 2019-10-19 | End: 2019-10-19

## 2019-10-19 RX ORDER — HYDROMORPHONE HYDROCHLORIDE 2 MG/ML
0.4 INJECTION, SOLUTION INTRAMUSCULAR; INTRAVENOUS; SUBCUTANEOUS EVERY 5 MIN PRN
Status: DISCONTINUED | OUTPATIENT
Start: 2019-10-19 | End: 2019-10-19 | Stop reason: HOSPADM

## 2019-10-19 RX ORDER — KETOROLAC TROMETHAMINE 30 MG/ML
10 INJECTION, SOLUTION INTRAMUSCULAR; INTRAVENOUS
Status: COMPLETED | OUTPATIENT
Start: 2019-10-19 | End: 2019-10-19

## 2019-10-19 RX ORDER — ONDANSETRON 2 MG/ML
4 INJECTION INTRAMUSCULAR; INTRAVENOUS DAILY PRN
Status: DISCONTINUED | OUTPATIENT
Start: 2019-10-19 | End: 2019-10-19 | Stop reason: HOSPADM

## 2019-10-19 RX ORDER — MORPHINE SULFATE 4 MG/ML
4 INJECTION, SOLUTION INTRAMUSCULAR; INTRAVENOUS
Status: COMPLETED | OUTPATIENT
Start: 2019-10-19 | End: 2019-10-19

## 2019-10-19 RX ORDER — KETOROLAC TROMETHAMINE 30 MG/ML
INJECTION, SOLUTION INTRAMUSCULAR; INTRAVENOUS
Status: DISCONTINUED | OUTPATIENT
Start: 2019-10-19 | End: 2019-10-19

## 2019-10-19 RX ORDER — SODIUM CHLORIDE, SODIUM LACTATE, POTASSIUM CHLORIDE, CALCIUM CHLORIDE 600; 310; 30; 20 MG/100ML; MG/100ML; MG/100ML; MG/100ML
INJECTION, SOLUTION INTRAVENOUS CONTINUOUS PRN
Status: DISCONTINUED | OUTPATIENT
Start: 2019-10-19 | End: 2019-10-19

## 2019-10-19 RX ORDER — ONDANSETRON 8 MG/1
8 TABLET, ORALLY DISINTEGRATING ORAL EVERY 8 HOURS PRN
Status: DISCONTINUED | OUTPATIENT
Start: 2019-10-19 | End: 2019-10-19 | Stop reason: HOSPADM

## 2019-10-19 RX ORDER — MEPERIDINE HYDROCHLORIDE 25 MG/ML
12.5 INJECTION INTRAMUSCULAR; INTRAVENOUS; SUBCUTANEOUS ONCE AS NEEDED
Status: DISCONTINUED | OUTPATIENT
Start: 2019-10-19 | End: 2019-10-19 | Stop reason: HOSPADM

## 2019-10-19 RX ORDER — LIDOCAINE HCL/PF 100 MG/5ML
SYRINGE (ML) INTRAVENOUS
Status: DISCONTINUED | OUTPATIENT
Start: 2019-10-19 | End: 2019-10-19

## 2019-10-19 RX ORDER — SODIUM CHLORIDE 9 MG/ML
125 INJECTION, SOLUTION INTRAVENOUS CONTINUOUS
Status: DISCONTINUED | OUTPATIENT
Start: 2019-10-19 | End: 2019-10-19 | Stop reason: HOSPADM

## 2019-10-19 RX ADMIN — HYDROMORPHONE HYDROCHLORIDE 0.4 MG: 2 INJECTION INTRAMUSCULAR; INTRAVENOUS; SUBCUTANEOUS at 12:10

## 2019-10-19 RX ADMIN — KETOROLAC TROMETHAMINE 30 MG: 30 INJECTION, SOLUTION INTRAMUSCULAR; INTRAVENOUS at 11:10

## 2019-10-19 RX ADMIN — SODIUM CHLORIDE, SODIUM LACTATE, POTASSIUM CHLORIDE, AND CALCIUM CHLORIDE: 600; 310; 30; 20 INJECTION, SOLUTION INTRAVENOUS at 10:10

## 2019-10-19 RX ADMIN — GLYCOPYRROLATE 0.2 MG: 0.2 INJECTION, SOLUTION INTRAMUSCULAR; INTRAVENOUS at 11:10

## 2019-10-19 RX ADMIN — MORPHINE SULFATE 4 MG: 4 INJECTION INTRAVENOUS at 06:10

## 2019-10-19 RX ADMIN — CARBOXYMETHYLCELLULOSE SODIUM 2 DROP: 2.5 SOLUTION/ DROPS OPHTHALMIC at 11:10

## 2019-10-19 RX ADMIN — SODIUM CHLORIDE 1000 ML: 0.9 INJECTION, SOLUTION INTRAVENOUS at 04:10

## 2019-10-19 RX ADMIN — PROPOFOL 250 MG: 10 INJECTION, EMULSION INTRAVENOUS at 11:10

## 2019-10-19 RX ADMIN — ONDANSETRON 4 MG: 2 INJECTION INTRAMUSCULAR; INTRAVENOUS at 11:10

## 2019-10-19 RX ADMIN — DOXYCYCLINE 100 MG: 100 INJECTION, POWDER, LYOPHILIZED, FOR SOLUTION INTRAVENOUS at 11:10

## 2019-10-19 RX ADMIN — LIDOCAINE HYDROCHLORIDE 100 MG: 20 INJECTION, SOLUTION INTRAVENOUS at 11:10

## 2019-10-19 RX ADMIN — ROCURONIUM BROMIDE 5 MG: 10 INJECTION, SOLUTION INTRAVENOUS at 11:10

## 2019-10-19 RX ADMIN — ONDANSETRON 4 MG: 2 INJECTION INTRAMUSCULAR; INTRAVENOUS at 06:10

## 2019-10-19 RX ADMIN — SUCCINYLCHOLINE CHLORIDE 120 MG: 20 INJECTION, SOLUTION INTRAMUSCULAR; INTRAVENOUS at 11:10

## 2019-10-19 RX ADMIN — FENTANYL CITRATE 100 MCG: 50 INJECTION, SOLUTION INTRAMUSCULAR; INTRAVENOUS at 11:10

## 2019-10-19 RX ADMIN — HYDROMORPHONE HYDROCHLORIDE 0.4 MG: 2 INJECTION INTRAMUSCULAR; INTRAVENOUS; SUBCUTANEOUS at 11:10

## 2019-10-19 RX ADMIN — DEXAMETHASONE SODIUM PHOSPHATE 12 MG: 4 INJECTION, SOLUTION INTRAMUSCULAR; INTRAVENOUS at 11:10

## 2019-10-19 RX ADMIN — KETOROLAC TROMETHAMINE 10 MG: 30 INJECTION, SOLUTION INTRAMUSCULAR; INTRAVENOUS at 09:10

## 2019-10-19 NOTE — CONSULTS
[facility]  Obstetrics & Gynecology  Consult Note    Patient Name: Angel Mosquera  MRN: [id number]  Admission Date: 10/19/2019  Hospital Length of Stay: 0 days  Code Status: Full Code  Primary Care Provider: Vesta Muniz MD  Principal Problem: Missed     Inpatient consult to Obstetrics / Gynecology  Consult performed by: Willa Willard MD  Consult ordered by: Artemio Blackmon II, MD        Subjective:     Chief Complaint: Vaginal Bleeding and Abdominal Cramping.     History of Present Illness:  Angel Mosquera is a 39 y.o.  at roughly 9 weeks gestation.  This is an IVF pregnancy with an embryo transfer date of 2019.  The patient has seen Dr. Bridges for her prenatal care up to this in pregnancy.  She was to establish care with Dr. Aguero in the coming weeks.  The patient presented to the ER after experiencing a gush of fluid/blood around 3:00 a.m. this morning since that time she has experienced abdominal cramping that is predominantly localized to her back.  She has also experienced intermittent bleeding from the vagina since that time.  In the ED, her vital signs were stable and there were no acute abnormalities noted on her laboratory workup.  Her beta HCG resulted as greater than 22,000. A transvaginal ultrasound demonstrated no evidence of an intrauterine pregnancy and was also significant for uterine fibroids.  Importantly, the patient has received numerous transvaginal ultrasounds throughout the course of this pregnancy demonstrating an intrauterine pregnancy.  Most recently she had an ultrasound demonstrating an IUP with fetal cardiac activity and a crown-rump length consistent with her gestational age.    This IUP is complicated by recurrence pregnancy loss, uterine fibroids and ART.        OB History    Para Term  AB Living   2 0 0 0 2 0   SAB TAB Ectopic Multiple Live Births   2 0 0 0 0      # Outcome Date GA Lbr Unruly/2nd Weight Sex  Delivery Anes PTL Lv   2 SAB 2008           1 SAB              Past Medical History:   Diagnosis Date    Rosacea      Past Surgical History:   Procedure Laterality Date    Breast Augmentation  2005    mischarr           (Not in a hospital admission)    Review of patient's allergies indicates:  No Known Allergies     Family History     Problem Relation (Age of Onset)    Diabetes Mother    Heart failure Mother    Hypertension Mother        Tobacco Use    Smoking status: Never Smoker    Smokeless tobacco: Never Used   Substance and Sexual Activity    Alcohol use: No    Drug use: No    Sexual activity: Yes     Partners: Male     Birth control/protection: None     Comment: Vasectomy     Review of Systems   Constitutional: Negative for chills and fever.   Eyes: Negative for visual disturbance.   Respiratory: Negative for shortness of breath.    Cardiovascular: Negative for chest pain.   Gastrointestinal: Positive for abdominal pain. Negative for diarrhea, nausea and vomiting.   Endocrine: Negative for diabetes.   Genitourinary: Positive for vaginal bleeding. Negative for dysuria, hematuria and vaginal discharge.   Musculoskeletal: Positive for back pain.   Integumentary:  Negative for rash.   Neurological: Negative for headaches.   Psychiatric/Behavioral: The patient is nervous/anxious.    Breast: negative.       Objective:     Vital Signs (Most Recent):  Temp: 98 °F (36.7 °C) (10/19/19 0355)  Pulse: 79 (10/19/19 0831)  Resp: 16 (10/19/19 0831)  BP: 134/76 (10/19/19 0831)  SpO2: 96 % (10/19/19 0831) Vital Signs (24h Range):  Temp:  [98 °F (36.7 °C)] 98 °F (36.7 °C)  Pulse:  [] 79  Resp:  [16-18] 16  SpO2:  [96 %-98 %] 96 %  BP: (134-143)/(76-90) 134/76     Weight: 92.5 kg (204 lb)  Body mass index is 33.95 kg/m².    No LMP recorded.    Physical Exam:   Constitutional: She is oriented to person, place, and time. She appears well-developed and well-nourished. No distress.   Patient appears appropriately sad  but in no acute distress.     HENT:   Head: Normocephalic and atraumatic.    Eyes: Conjunctivae are normal.    Neck: Neck supple.    Cardiovascular: Normal rate, regular rhythm and intact distal pulses.     Pulmonary/Chest: Effort normal. No respiratory distress.        Abdominal: Soft. She exhibits no distension. There is no tenderness. There is no rebound and no guarding.     Genitourinary: Vagina normal.   Genitourinary Comments: Normal external genitalia. Cervical OS closed.  Minimal old blood in the vault. No POC visualized. Large posterior cervical fibroid palpated on BME. No CMT.                Neurological: She is alert and oriented to person, place, and time.    Skin: Skin is warm and dry. She is not diaphoretic.    Psychiatric: She has a normal mood and affect. Her behavior is normal. Judgment and thought content normal.       Laboratory:  CBC:   Recent Labs   Lab 10/19/19  0431   WBC 15.63*   RBC 4.40   HGB 12.3   HCT 38.9   *   MCV 88   MCH 28.0   MCHC 31.6*     B-HCG - > 22,000    I have personallly reviewed all pertinent lab results from the last 24 hours.    Diagnostic Results:    TVUS:     Impression       Complex uterine mass lesions are noted, likely representing fibroids for which clinical and historical correlation is needed.    A normal appearing intrauterine pregnancy is not identified however there are 2 cystic appearing areas that are somewhat complex, there is a complex cystic finding with irregular crenulated walls that could potentially represent an abnormal irregular gestational sac however would be somewhat atypical, and is thought potentially associated with uterine mass, potentially fibroid and therefore may relate to an area of cystic degenerative change or necrosis.  A smaller area of complex cystic appearance is noted that could represent a gestational sac not optimally evaluated on this exam, there is a finding within it that may relate to complex material although the  possibility that this represents a fetal pole is a consideration a cannot be determined as a fetal pole at this time.    The left ovary is not visualized, the right ovary appears unremarkable on this exam, there is no additional sonographic evidence for extra ovarian or extra uterine pelvic mass or cystic collection or abnormal free fluid and no additional finding to specifically suggest extra uterine pregnancy.    At this time the differential would include early intrauterine pregnancy not well delineated on this exam, the possibility of ectopic pregnancy and miscarriage remain in the differential, given the aforementioned findings close clinical and historical correlation is needed, correlation with beta HCG level, serial beta HCG follow-up, correlation with prior examination and report if at all possible, and follow-up ultrasound is recommended.    This report was flagged in Epic as abnormal.         Assessment/Plan:     * Missed   - Patient presents with vaginal bleeding and abdominal cramping  - B-HCG > 22,000  - IVF pregnancy, embryo transfer date 19, Dr. Bridges  - Previously visualized IUP per Dr Bridges. Most recently 10/15/19  - No signs of viable IUP on Radiology TVUS or bedside TVUS per GYN staff  - Consistent with Missed AB  - RH Positive  - Patient counseled on medical vs. surgical management. Patient adamantly declines medical management and desires suction D&C.  - Discussed R/B/A. Consents signed. To OR for suction D&C.   - 200 mg Doxycycline on call to OR  - House Supervisor Notified. Surgery team to be called in.       Thank you for your consult. I will follow-up with patient. Please contact us if you have any additional questions.    Willa Willard MD  Obstetrics & Gynecology  [facility]

## 2019-10-19 NOTE — DISCHARGE SUMMARY
Discharge Note    SUMMARY     Admit Date: 10/19/2019    Discharge Date and Time:  10/19/2019 12:00 PM    Hospital Course (synopsis of major diagnoses, care, treatment, and services provided during the course of the hospital stay): Patient presented to ED and was diagnosed with incomplete AB, confirmed by ultrasound. She was offered medical vs. surgical management for incomplete AB and elected for surgical management. Consents for procedure were discussed and signed. Patient was passed back to OR for suction D&C. Please see OP note for further details. Tolerated procedure well and patient was taken to recovery in a stable condition. Prior to discharge patient was able to void, ambulate, tolerate PO and pain was well controlled with PO meds. Patient was given routine post-op instructions for which patient voiced understanding. Patient was subsequently discharged home.     Final Diagnosis: Post-Op Diagnosis Codes:     * Incomplete  [O03.4]    Disposition: Home or Self Care    Follow Up/Patient Instructions:   Patient will plan to follow up with Primary OB in 2 weeks.    Medications:  Reconciled Home Medications:      Medication List      START taking these medications    HYDROcodone-acetaminophen 5-325 mg per tablet  Commonly known as:  NORCO  Take 1 tablet by mouth every 6 (six) hours as needed for Pain.     ibuprofen 800 MG tablet  Commonly known as:  ADVIL,MOTRIN  Take 1 tablet (800 mg total) by mouth every 8 (eight) hours as needed for Pain.        CONTINUE taking these medications    prenatal vit 7-iron-folic-dha 28-1. mg Cap  Take by mouth. OTC        STOP taking these medications    ivermectin 1 % Crea  Commonly known as:  SOOLANTRA     sulfacetamide sodium-sulfur 8-4 % Susp  Commonly known as:  SULFACLEANSE 8-4          Discharge Procedure Orders   Diet general     Other restrictions (specify):   Order Comments: Continue pelvic rest (nothing in vagina) until seen in clinic for follow up     Call  MD for:  temperature >100.4     Call MD for:  persistent nausea and vomiting     Call MD for:  severe uncontrolled pain     Call MD for:  difficulty breathing, headache or visual disturbances     Call MD for:  redness, tenderness, or signs of infection (pain, swelling, redness, odor or green/yellow discharge around incision site)     Call MD for:  hives     Call MD for:  persistent dizziness or light-headedness     Call MD for:  extreme fatigue     Call MD for:   Order Comments: Persistent heavy vaginal bleeding (saturating >2 pads per hour for >2 hours)     Activity as tolerated     Follow-up Information     Carla Aguero MD. Schedule an appointment as soon as possible for a visit in 2 weeks.    Specialties:  Obstetrics, Obstetrics and Gynecology  Why:  Post-Op Appointment  Contact information:  [address] [id number] [phone]                   Margareth Novoa M.D.  OB/GYN PGY-1

## 2019-10-19 NOTE — OP NOTE
OPERATIVE REPORT    Surgery Date: 10/19/2019     SURGEON: Frances Culp    ASSISTANT: Dr. Margareth Novoa    PREOP DIAGNOSIS:   1. Incomplete   2. Uterine fibroids    POSTOP DIAGNOSIS:    1. Incomplete   2. Uterine fibroids    PROCEDURES:   Suction D&C under ultrasound guidance    ANESTHESIA: general    FINDINGS/KEY COMPONENTS: On ultrasound, uterus with large posterior fibroid, irregular gestational sac with no evidence of an embryo.    ESTIMATED BLOOD LOSS: 50 cc    COMPLICATIONS: None    IV FLUIDS: 200  cc    PROCEDURE: Patient was taking to the operating room where general anesthesia was administered and found to be adequate.  She was prepped and draped in the dorsal lithotomy position.  A weighted sterile speculum was placed in the vagina.  The anterior lip of the cervix was grasped with a single tooth tenaculum.  Under ultrasound guidance, the uterus was sounded to approximately 12 cm.  Suction was performed under ultrasound guidance with an 8 suction tip at 40mmHg.  Sharp curettage was performed under ultrasound guidance until a gritty texture was noted in all four quadrants.  Suction was again performed with no evidence of remaining products of conception on ultrasound.      All instruments were removed.   Excellent hemostasis was noted.    Sponge, lap, needle counts were correct x 2.    The patient was taken to the recovery room awake and in stable condition

## 2019-10-19 NOTE — ED PROVIDER NOTES
"Encounter Date: 10/19/2019    SCRIBE #1 NOTE: I, Roselyn Benson, am scribing for, and in the presence of, Dr. Salgado .       History     Chief Complaint   Patient presents with    Vaginal Bleeding     pt reports gush of bright red blood PTa, denies clots or tissue, reports back pain, denies use of pain medications     Time seen by provider: 4:00 AM    This is a 39 y.o. female who is A2 and 8 weeks pregnant who presents with complaint of vaginal bleeding since today. She reports just seeing her OBGYN last week and everything was good with the baby and the baby's heartbeat. After the US she did have some brown spotting but she was told by her OBGYN that this was normal after having a US. Today at work she stood up and felt a "gush" come from her vagina. She went to the bathroom and saw bright red blood. She also had some back pain which usually gets better over time but it has not improved. She denies abdominal cramping but she does report some constipation and mentions that it may be due to the progesterone that she has been taking. She mentions that she experiences nausea daily after eating. She denies lightheadedness or weakness and there is no burning with urination.       The history is provided by the patient.     Review of patient's allergies indicates:  No Known Allergies  Past Medical History:   Diagnosis Date    Rosacea      Past Surgical History:   Procedure Laterality Date    Breast Augmentation      mischarr       Family History   Problem Relation Age of Onset    Diabetes Mother     Hypertension Mother     Heart failure Mother         viral?    Cancer Neg Hx     Ovarian cancer Neg Hx     Melanoma Neg Hx     Heart attack Neg Hx     Stroke Neg Hx     Colon cancer Neg Hx     Breast cancer Neg Hx     Blindness Neg Hx     Amblyopia Neg Hx     Cataracts Neg Hx     Glaucoma Neg Hx     Macular degeneration Neg Hx     Retinal detachment Neg Hx     Strabismus Neg Hx      Social History "     Tobacco Use    Smoking status: Never Smoker    Smokeless tobacco: Never Used   Substance Use Topics    Alcohol use: No    Drug use: No     Review of Systems   Constitutional: Negative for chills and fever.   HENT: Negative for congestion, rhinorrhea and sore throat.    Eyes: Negative for visual disturbance.   Respiratory: Negative for cough and shortness of breath.    Cardiovascular: Negative for chest pain.   Gastrointestinal: Positive for constipation and nausea. Negative for abdominal pain, diarrhea and vomiting.   Genitourinary: Positive for vaginal bleeding. Negative for dysuria.   Musculoskeletal: Positive for back pain.   Skin: Negative for rash.   Neurological: Negative for dizziness, weakness and light-headedness.   Psychiatric/Behavioral: Negative for confusion.       Physical Exam     Initial Vitals [10/19/19 0355]   BP Pulse Resp Temp SpO2   (!) 143/90 (!) 130 18 98 °F (36.7 °C) 98 %      MAP       --         Physical Exam    Nursing note and vitals reviewed.  Constitutional: She appears well-developed and well-nourished. She is not diaphoretic. No distress.   HENT:   Head: Normocephalic and atraumatic.   Right Ear: External ear normal.   Left Ear: External ear normal.   Eyes: Right eye exhibits no discharge. Left eye exhibits no discharge.   Neck: Normal range of motion. Neck supple.   Cardiovascular: Regular rhythm and normal heart sounds. Tachycardia present.  Exam reveals no gallop and no friction rub.    No murmur heard.  Pulmonary/Chest: Breath sounds normal. No respiratory distress. She has no wheezes. She has no rhonchi. She has no rales.   Abdominal: Soft. Bowel sounds are normal. She exhibits no distension. There is no tenderness. There is no rebound and no guarding.   Musculoskeletal: Normal range of motion. She exhibits no edema or tenderness.   Lymphadenopathy:     She has no cervical adenopathy.   Neurological: She is alert and oriented to person, place, and time. She has normal  strength. No sensory deficit.   Skin: Skin is warm and dry. No rash noted. No erythema.   Psychiatric: She has a normal mood and affect. Her behavior is normal.         ED Course   Procedures  Labs Reviewed   CBC W/ AUTO DIFFERENTIAL - Abnormal; Notable for the following components:       Result Value    WBC 15.63 (*)     Mean Corpuscular Hemoglobin Conc 31.6 (*)     Platelets 383 (*)     Gran # (ANC) 10.8 (*)     All other components within normal limits   COMPREHENSIVE METABOLIC PANEL - Abnormal; Notable for the following components:    CO2 22 (*)     All other components within normal limits   POCT URINE PREGNANCY - Abnormal; Notable for the following components:    POC Preg Test, Ur Positive (*)     All other components within normal limits   HCG, QUANTITATIVE, PREGNANCY          Imaging Results           US OB Less Than 14 Wks with Transvag(xpd (Final result)  Result time 10/19/19 06:00:03    Final result by Tristan Booth MD (10/19/19 06:00:03)                 Impression:      Complex uterine mass lesions are noted, likely representing fibroids for which clinical and historical correlation is needed.    A normal appearing intrauterine pregnancy is not identified however there are 2 cystic appearing areas that are somewhat complex, there is a complex cystic finding with irregular crenulated walls that could potentially represent an abnormal irregular gestational sac however would be somewhat atypical, and is thought potentially associated with uterine mass, potentially fibroid and therefore may relate to an area of cystic degenerative change or necrosis.  A smaller area of complex cystic appearance is noted that could represent a gestational sac not optimally evaluated on this exam, there is a finding within it that may relate to complex material although the possibility that this represents a fetal pole is a consideration a cannot be determined as a fetal pole at this time.    The left ovary is not  visualized, the right ovary appears unremarkable on this exam, there is no additional sonographic evidence for extra ovarian or extra uterine pelvic mass or cystic collection or abnormal free fluid and no additional finding to specifically suggest extra uterine pregnancy.    At this time the differential would include early intrauterine pregnancy not well delineated on this exam, the possibility of ectopic pregnancy and miscarriage remain in the differential, given the aforementioned findings close clinical and historical correlation is needed, correlation with beta HCG level, serial beta HCG follow-up, correlation with prior examination and report if at all possible, and follow-up ultrasound is recommended.    This report was flagged in Epic as abnormal.      Electronically signed by: Tristan Booth  Date:    10/19/2019  Time:    06:00             Narrative:    EXAMINATION:  US OB LESS THAN 14 WKS WITH TRANSVAGINAL (XPD)    CLINICAL HISTORY:  Vag Bleeding;    TECHNIQUE:  Transabdominal sonography of the pelvis was performed, followed by transvaginal sonography to better evaluate the uterus and ovaries.    COMPARISON:  None.    FINDINGS:  Transabdominal and transvaginal obstetrical ultrasound was performed.  The patient reports to the technologist of having a recent ultrasound at an outside facility demonstrating an intrauterine pregnancy with cardiac activity however that examination and report are not available for comparison, clinical and historical correlation is needed obtaining the prior examination and report may be helpful.    The transvaginal uterine measurement length is 10.1 cm in the uterus appears retroflexed, mild hypoechoic character along the cervical canal may relate to mild fluid along the cervical canal.  Heterogeneous uterine mass lesions are noted likely representing fibroids, the most prominent measures 7.1 x 6.5 x 7.2 cm in size.    There is an irregular complex cystic appearing finding  associated with the uterus, this does not have the regular appearance of a gestational sac, it has irregular margins and a somewhat crenulated appearance, possibility that this represents an abnormal gestational sac is a consideration however it appears to potentially be associated with the fibroid may relate to an area of degenerative cystic change or necrotic change.    There is an additional cystic finding that appears somewhat complex that also does not have the typical appearance of gestational sac although could represent a gestational sac not optimally visualized, there is an area of oval complexity within it the possibility that this relates to a fetal pole is a consideration however cannot be definitively determined as a fetal pole versus complex material at this time.  There is no additional sonographic evidence for intrauterine pregnancy identified.  Close clinical and historical correlation, correlation with beta HCG level, the prior examination and report if at all possible, serial beta HCG follow-up and follow-up ultrasound is recommended.    The right ovary measures approximately 2.4 x 2.5 x 2.8 cm in size.  Color Doppler imaging demonstrates flow to the right ovary.  The left ovary is not identified sonographically.  There is no additional sonographic evidence for pelvic mass or cystic collection or abnormal free fluid.                                 Medical Decision Making:   History:   Old Medical Records: I decided to obtain old medical records.  Clinical Tests:   Lab Tests: Ordered and Reviewed  Radiological Study: Ordered and Reviewed            Scribe Attestation:   Scribe #1: I performed the above scribed service and the documentation accurately describes the services I performed. I attest to the accuracy of the note.    Attending Attestation:           Physician Attestation for Scribe:  Physician Attestation Statement for Scribe #1: I, Dr. Salgado, reviewed documentation, as scribed by Roselyn  Marcelino in my presence, and it is both accurate and complete.                 ED Course as of Oct 19 1023   Sat Oct 19, 2019   0616 Updated patient on her ultrasound.  Given that she had known IUP with fetal heart tones 72 hr ago, and now an ultrasound without any obvious IUP and fetal heart tones, she has likely miscarried.  Case discussed with OBGYN who will review her case    [MA]      ED Course User Index  [MA] Regino Salgado MD     Received patient's care at 7:00 a.m..  Pending evaluation recommendations by gyn.  After their review of ultrasound, examination the patient decision is made to admit to observation with a plan to go to OR for suction D and C due to a missed AB.    Clinical Impression:     1. Incomplete     2. Missed                                    Artemio Blackmon II, MD  10/19/19 1024

## 2019-10-19 NOTE — H&P
[facility]  Obstetrics & Gynecology  Consult Note     Patient Name: Angel Mosquera  MRN: [id number]  Admission Date: 10/19/2019  Hospital Length of Stay: 0 days  Code Status: Full Code  Primary Care Provider: Vesta Muniz MD  Principal Problem: Missed         Subjective:      Chief Complaint: Vaginal Bleeding and Abdominal Cramping.      History of Present Illness:  Angel Mosquera is a 39 y.o.  at roughly 9 weeks gestation.  This is an IVF pregnancy with an embryo transfer date of 2019.  The patient has seen Dr. Bridges for her prenatal care up to this in pregnancy.  She was to establish care with Dr. Aguero in the coming weeks.  The patient presented to the ER after experiencing a gush of fluid/blood around 3:00 a.m. this morning since that time she has experienced abdominal cramping that is predominantly localized to her back.  She has also experienced intermittent bleeding from the vagina since that time.  In the ED, her vital signs were stable and there were no acute abnormalities noted on her laboratory workup.  Her beta HCG resulted as greater than 22,000. A transvaginal ultrasound demonstrated no evidence of an intrauterine pregnancy and was also significant for uterine fibroids.  Importantly, the patient has received numerous transvaginal ultrasounds throughout the course of this pregnancy demonstrating an intrauterine pregnancy.  Most recently she had an ultrasound demonstrating an IUP with fetal cardiac activity and a crown-rump length consistent with her gestational age.     This IUP is complicated by recurrence pregnancy loss, uterine fibroids and ART.                            OB History    Para Term  AB Living   2 0 0 0 2 0   SAB TAB Ectopic Multiple Live Births      2 0 0 0 0          # Outcome Date GA Lbr Unruly/2nd Weight Sex Delivery Anes PTL Lv   2 SAB 2008                   1 SAB                             Past Medical History:    Diagnosis Date    Rosacea              Past Surgical History:   Procedure Laterality Date    Breast Augmentation   2005    mischarr                (Not in a hospital admission)     Review of patient's allergies indicates:  No Known Allergies           Family History      Problem Relation (Age of Onset)     Diabetes Mother     Heart failure Mother     Hypertension Mother                Tobacco Use    Smoking status: Never Smoker    Smokeless tobacco: Never Used   Substance and Sexual Activity    Alcohol use: No    Drug use: No    Sexual activity: Yes       Partners: Male       Birth control/protection: None       Comment: Vasectomy      Review of Systems   Constitutional: Negative for chills and fever.   Eyes: Negative for visual disturbance.   Respiratory: Negative for shortness of breath.    Cardiovascular: Negative for chest pain.   Gastrointestinal: Positive for abdominal pain. Negative for diarrhea, nausea and vomiting.   Endocrine: Negative for diabetes.   Genitourinary: Positive for vaginal bleeding. Negative for dysuria, hematuria and vaginal discharge.   Musculoskeletal: Positive for back pain.   Integumentary:  Negative for rash.   Neurological: Negative for headaches.   Psychiatric/Behavioral: The patient is nervous/anxious.    Breast: negative.       Objective:      Vital Signs (Most Recent):  Temp: 98 °F (36.7 °C) (10/19/19 0355)  Pulse: 79 (10/19/19 0831)  Resp: 16 (10/19/19 0831)  BP: 134/76 (10/19/19 0831)  SpO2: 96 % (10/19/19 0831) Vital Signs (24h Range):  Temp:  [98 °F (36.7 °C)] 98 °F (36.7 °C)  Pulse:  [] 79  Resp:  [16-18] 16  SpO2:  [96 %-98 %] 96 %  BP: (134-143)/(76-90) 134/76      Weight: 92.5 kg (204 lb)  Body mass index is 33.95 kg/m².     No LMP recorded.     Physical Exam:   Constitutional: She is oriented to person, place, and time. She appears well-developed and well-nourished. No distress.   Patient appears appropriately sad but in no acute distress.     HENT:   Head:  Normocephalic and atraumatic.    Eyes: Conjunctivae are normal.    Neck: Neck supple.    Cardiovascular: Normal rate, regular rhythm and intact distal pulses.     Pulmonary/Chest: Effort normal. No respiratory distress.         Abdominal: Soft. She exhibits no distension. There is no tenderness. There is no rebound and no guarding.     Genitourinary: Vagina normal.   Genitourinary Comments: Normal external genitalia. Cervical OS closed.  Minimal old blood in the vault. No POC visualized. Large posterior cervical fibroid palpated on BME. No CMT.                Neurological: She is alert and oriented to person, place, and time.    Skin: Skin is warm and dry. She is not diaphoretic.    Psychiatric: She has a normal mood and affect. Her behavior is normal. Judgment and thought content normal.         Laboratory:  CBC:       Recent Labs   Lab 10/19/19  0431   WBC 15.63*   RBC 4.40   HGB 12.3   HCT 38.9   *   MCV 88   MCH 28.0   MCHC 31.6*      B-HCG - > 22,000     I have personallly reviewed all pertinent lab results from the last 24 hours.     Diagnostic Results:     TVUS:      Impression         Complex uterine mass lesions are noted, likely representing fibroids for which clinical and historical correlation is needed.    A normal appearing intrauterine pregnancy is not identified however there are 2 cystic appearing areas that are somewhat complex, there is a complex cystic finding with irregular crenulated walls that could potentially represent an abnormal irregular gestational sac however would be somewhat atypical, and is thought potentially associated with uterine mass, potentially fibroid and therefore may relate to an area of cystic degenerative change or necrosis.  A smaller area of complex cystic appearance is noted that could represent a gestational sac not optimally evaluated on this exam, there is a finding within it that may relate to complex material although the possibility that this represents a  fetal pole is a consideration a cannot be determined as a fetal pole at this time.    The left ovary is not visualized, the right ovary appears unremarkable on this exam, there is no additional sonographic evidence for extra ovarian or extra uterine pelvic mass or cystic collection or abnormal free fluid and no additional finding to specifically suggest extra uterine pregnancy.    At this time the differential would include early intrauterine pregnancy not well delineated on this exam, the possibility of ectopic pregnancy and miscarriage remain in the differential, given the aforementioned findings close clinical and historical correlation is needed, correlation with beta HCG level, serial beta HCG follow-up, correlation with prior examination and report if at all possible, and follow-up ultrasound is recommended.    This report was flagged in Epic as abnormal.            Assessment/Plan:      * Missed   - Patient presents with vaginal bleeding and abdominal cramping  - B-HCG > 22,000  - IVF pregnancy, embryo transfer date 19, Dr. Bridges  - Previously visualized IUP per Dr rBidges. Most recently 10/15/19  - No signs of viable IUP on Radiology TVUS or bedside TVUS per GYN staff  - Consistent with Missed AB  - RH Positive  - Patient counseled on medical vs. surgical management. Patient adamantly declines medical management and desires suction D&C.  - Discussed R/B/A. Consents signed. To OR for suction D&C.   - 200 mg Doxycycline on call to OR  - House Supervisor Notified. Surgery team to be called in.            Willa Willard MD  Obstetrics & Gynecology  [facility]

## 2019-10-19 NOTE — OR NURSING
IV D/C'ed without event. Dressing placed. Pt dressed with assistance of . Pt placed in wheelchair and brought down to first floor for placement in car to car with .

## 2019-10-19 NOTE — OR NURSING
Pt states pain tolerable. No change from previous assessment. Prepared for Phase 2.  Taj brought to bedside. Pt released form anesthesia.

## 2019-10-19 NOTE — ANESTHESIA PREPROCEDURE EVALUATION
10/19/2019  Angel Mosquera is a 39 y.o., female.    Anesthesia Evaluation    I have reviewed the Patient Summary Reports.    I have reviewed the Nursing Notes.   I have reviewed the Medications.     Review of Systems  Anesthesia Hx:  Denies Family Hx of Anesthesia complications.  Personal Hx of Anesthesia complications, Post-Operative Nausea/Vomiting   Social:  Non-Smoker    Hematology/Oncology:  Hematology Normal   Oncology Normal     EENT/Dental:EENT/Dental Normal   Cardiovascular:  Cardiovascular Normal     Pulmonary:  Pulmonary Normal    Renal/:  Renal/ Normal     Hepatic/GI:  Hepatic/GI Normal    Musculoskeletal:  Musculoskeletal Normal    Neurological:  Neurology Normal    Endocrine:  Endocrine Normal    Dermatological:  Skin Normal    Psych:  Psychiatric Normal  Psychiatric history: Incomplete AB.          Physical Exam  General:  Obesity    Airway/Jaw/Neck:  Airway Findings: Mouth Opening: Normal Tongue: Normal  General Airway Assessment: Adult  Mallampati: II  TM Distance: Normal, at least 6 cm      Dental:  Dental Findings:   Chest/Lungs:  Chest/Lungs Clear    Heart/Vascular:  Heart Findings: Normal       Mental Status:  Mental Status Findings:  Alert and Oriented, Cooperative         Anesthesia Plan  Type of Anesthesia, risks & benefits discussed:  Anesthesia Type:  general  Patient's Preference:   Intra-op Monitoring Plan: standard ASA monitors  Intra-op Monitoring Plan Comments:   Post Op Pain Control Plan: multimodal analgesia  Post Op Pain Control Plan Comments:   Induction:   IV and rapid sequence  Beta Blocker:         Informed Consent: Patient understands risks and agrees with Anesthesia plan.  Questions answered. Anesthesia consent signed with patient.  ASA Score: 2  emergent   Day of Surgery Review of History & Physical:    H&P update referred to the surgeon.         Ready For  Surgery From Anesthesia Perspective.

## 2019-10-19 NOTE — ED NOTES
Pt updated on plan of care, family remains at bedside. Pt requesting something else for pain that is a nonnarcotic.

## 2019-10-19 NOTE — TRANSFER OF CARE
"Anesthesia Transfer of Care Note    Patient: Angel Mosquera    Procedure(s) Performed: Procedure(s) (LRB):  DILATION AND CURETTAGE, UTERUS (N/A)    Patient location: PACU    Anesthesia Type: general    Transport from OR: Transported from OR on 2-3 L/min O2 by NC with adequate spontaneous ventilation    Post pain: adequate analgesia    Post assessment: no apparent anesthetic complications    Post vital signs: stable    Level of consciousness: awake    Nausea/Vomiting: no nausea/vomiting    Complications: none    Transfer of care protocol was followed      Last vitals:   Visit Vitals  /76   Pulse 79   Temp 36.7 °C (98 °F) (Oral)   Resp 16   Ht 5' 5" (1.651 m)   Wt 92.5 kg (204 lb)   SpO2 96%   BMI 33.95 kg/m²     "

## 2019-10-19 NOTE — ANESTHESIA POSTPROCEDURE EVALUATION
Anesthesia Post Evaluation    Patient: Angel Mosquera    Procedure(s) Performed: Procedure(s) (LRB):  DILATION AND CURETTAGE, UTERUS (N/A)    Final Anesthesia Type: general  Patient location during evaluation: PACU  Patient participation: Yes- Able to Participate  Level of consciousness: awake and alert  Post-procedure vital signs: reviewed and stable  Pain management: adequate  Airway patency: patent  PONV status at discharge: No PONV  Anesthetic complications: no      Cardiovascular status: blood pressure returned to baseline  Respiratory status: unassisted and spontaneous ventilation  Hydration status: euvolemic  Follow-up not needed.          Vitals Value Taken Time   /57 10/19/2019 12:06 PM   Temp 36.6 °C (97.9 °F) 10/19/2019 11:46 AM   Pulse 69 10/19/2019 12:18 PM   Resp 16 10/19/2019 11:46 AM   SpO2 100 % 10/19/2019 12:18 PM   Vitals shown include unvalidated device data.      No case tracking events are documented in the log.      Pain/Madai Score: Pain Rating Prior to Med Admin: 8 (10/19/2019  9:37 AM)  Madai Score: 8 (10/19/2019 11:46 AM)

## 2019-10-19 NOTE — OR NURSING
Pt  at bedside. Pt and  given discharge instructions verbally and printed. Verballize understanding of all. Pt now drinking ice water. Attempt to urinate unsuccessful.

## 2019-10-19 NOTE — BRIEF OP NOTE
[facility]  Brief Operative Note     SUMMARY   BRIEF OPERATIVE NOTE       SUMMARY      Surgery Date: 10/19/2019     SURGEON: Frances Culp    ASSISTANT: Dr. Margareth Novoa    PREOP DIAGNOSIS:   1. Incomplete   2. Uterine fibroids    POSTOP DIAGNOSIS:    1. Incomplete   2. Uterine fibroids    PROCEDURES:   Suction D&C under ultrasound guidance    ANESTHESIA: general    FINDINGS/KEY COMPONENTS: On ultrasound, uterus with large posterior fibroid, irregular gestational sac with no evidence of an embryo.    ESTIMATED BLOOD LOSS: 50 cc    COMPLICATIONS: None    PATHOLOGY:   Specimens (From admission, onward)     Start     Ordered    10/19/19 1128  Specimen to Pathology - Surgery  Once      10/19/19 1128

## 2019-10-19 NOTE — SUBJECTIVE & OBJECTIVE
OB History    Para Term  AB Living   2 0 0 0 2 0   SAB TAB Ectopic Multiple Live Births   2 0 0 0 0      # Outcome Date GA Lbr Unruly/2nd Weight Sex Delivery Anes PTL Lv   2 SAB            1 SAB              Past Medical History:   Diagnosis Date    Rosacea      Past Surgical History:   Procedure Laterality Date    Breast Augmentation      mischarr           (Not in a hospital admission)    Review of patient's allergies indicates:  No Known Allergies     Family History     Problem Relation (Age of Onset)    Diabetes Mother    Heart failure Mother    Hypertension Mother        Tobacco Use    Smoking status: Never Smoker    Smokeless tobacco: Never Used   Substance and Sexual Activity    Alcohol use: No    Drug use: No    Sexual activity: Yes     Partners: Male     Birth control/protection: None     Comment: Vasectomy     Review of Systems   Constitutional: Negative for chills and fever.   Eyes: Negative for visual disturbance.   Respiratory: Negative for shortness of breath.    Cardiovascular: Negative for chest pain.   Gastrointestinal: Positive for abdominal pain. Negative for diarrhea, nausea and vomiting.   Endocrine: Negative for diabetes.   Genitourinary: Positive for vaginal bleeding. Negative for dysuria, hematuria and vaginal discharge.   Musculoskeletal: Positive for back pain.   Integumentary:  Negative for rash.   Neurological: Negative for headaches.   Psychiatric/Behavioral: The patient is nervous/anxious.    Breast: negative.       Objective:     Vital Signs (Most Recent):  Temp: 98 °F (36.7 °C) (10/19/19 0355)  Pulse: 79 (10/19/19 08)  Resp: 16 (10/19/19 0831)  BP: 134/76 (10/19/19 0831)  SpO2: 96 % (10/19/19 0831) Vital Signs (24h Range):  Temp:  [98 °F (36.7 °C)] 98 °F (36.7 °C)  Pulse:  [] 79  Resp:  [16-18] 16  SpO2:  [96 %-98 %] 96 %  BP: (134-143)/(76-90) 134/76     Weight: 92.5 kg (204 lb)  Body mass index is 33.95 kg/m².    No LMP recorded.    Physical  Exam:   Constitutional: She is oriented to person, place, and time. She appears well-developed and well-nourished. No distress.   Patient appears appropriately sad but in no acute distress.     HENT:   Head: Normocephalic and atraumatic.    Eyes: Conjunctivae are normal.    Neck: Neck supple.    Cardiovascular: Normal rate, regular rhythm and intact distal pulses.     Pulmonary/Chest: Effort normal. No respiratory distress.        Abdominal: Soft. She exhibits no distension. There is no tenderness. There is no rebound and no guarding.     Genitourinary: Vagina normal.   Genitourinary Comments: Normal external genitalia. Cervical OS closed.  Minimal old blood in the vault. No POC visualized. Large posterior cervical fibroid palpated on BME. No CMT.                Neurological: She is alert and oriented to person, place, and time.    Skin: Skin is warm and dry. She is not diaphoretic.    Psychiatric: She has a normal mood and affect. Her behavior is normal. Judgment and thought content normal.       Laboratory:  CBC:   Recent Labs   Lab 10/19/19  0431   WBC 15.63*   RBC 4.40   HGB 12.3   HCT 38.9   *   MCV 88   MCH 28.0   MCHC 31.6*     B-HCG - > 22,000    I have personallly reviewed all pertinent lab results from the last 24 hours.    Diagnostic Results:    TVUS:     Impression       Complex uterine mass lesions are noted, likely representing fibroids for which clinical and historical correlation is needed.    A normal appearing intrauterine pregnancy is not identified however there are 2 cystic appearing areas that are somewhat complex, there is a complex cystic finding with irregular crenulated walls that could potentially represent an abnormal irregular gestational sac however would be somewhat atypical, and is thought potentially associated with uterine mass, potentially fibroid and therefore may relate to an area of cystic degenerative change or necrosis.  A smaller area of complex cystic appearance is  noted that could represent a gestational sac not optimally evaluated on this exam, there is a finding within it that may relate to complex material although the possibility that this represents a fetal pole is a consideration a cannot be determined as a fetal pole at this time.    The left ovary is not visualized, the right ovary appears unremarkable on this exam, there is no additional sonographic evidence for extra ovarian or extra uterine pelvic mass or cystic collection or abnormal free fluid and no additional finding to specifically suggest extra uterine pregnancy.    At this time the differential would include early intrauterine pregnancy not well delineated on this exam, the possibility of ectopic pregnancy and miscarriage remain in the differential, given the aforementioned findings close clinical and historical correlation is needed, correlation with beta HCG level, serial beta HCG follow-up, correlation with prior examination and report if at all possible, and follow-up ultrasound is recommended.    This report was flagged in Epic as abnormal.

## 2019-10-19 NOTE — ASSESSMENT & PLAN NOTE
- Patient presents with vaginal bleeding and abdominal cramping  - B-HCG > 22,000  - IVF pregnancy, embryo transfer date 9/12/19, Dr. Bridges  - Previously visualized IUP per Dr Bridges. Most recently 10/15/19  - No signs of viable IUP on Radiology TVUS or bedside TVUS per GYN staff  - Consistent with Missed AB  - RH Positive  - Patient counseled on medical vs. surgical management. Patient adamantly declines medical management and desires suction D&C.  - Discussed R/B/A. Consents signed. To OR for suction D&C.   - 200 mg Doxycycline on call to OR  - House Supervisor Notified. Surgery team to be called in.

## 2019-10-19 NOTE — HPI
Angel Mosquera is a 39 y.o.  at roughly 9 weeks gestation.  This is an IVF pregnancy with an embryo transfer date of 2019.  The patient has seen Dr. Bridges for her prenatal care up to this in pregnancy.  She was to establish care with Dr. Aguero in the coming weeks.  The patient presented to the ER after experiencing a gush of fluid/blood around 3:00 a.m. this morning since that time she has experienced abdominal cramping that is predominantly localized to her back.  She has also experienced intermittent bleeding from the vagina since that time.  In the ED, her vital signs were stable and there were no acute abnormalities noted on her laboratory workup.  Her beta HCG resulted as greater than 22,000. A transvaginal ultrasound demonstrated no evidence of an intrauterine pregnancy and was also significant for uterine fibroids.  Importantly, the patient has received numerous transvaginal ultrasounds throughout the course of this pregnancy demonstrating an intrauterine pregnancy.  Most recently she had an ultrasound demonstrating an IUP with fetal cardiac activity and a crown-rump length consistent with her gestational age.    This IUP is complicated by recurrence pregnancy loss, uterine fibroids and ART.

## 2019-10-19 NOTE — ED NOTES
Adelfo with surgery ready for pt. Antolin, tech will transport pt. Consents remained with Dr. Culp

## 2019-10-23 ENCOUNTER — INITIAL PRENATAL (OUTPATIENT)
Dept: OBSTETRICS AND GYNECOLOGY | Facility: CLINIC | Age: 39
End: 2019-10-23
Payer: COMMERCIAL

## 2019-10-23 VITALS
DIASTOLIC BLOOD PRESSURE: 86 MMHG | BODY MASS INDEX: 33.75 KG/M2 | SYSTOLIC BLOOD PRESSURE: 124 MMHG | WEIGHT: 202.81 LBS

## 2019-10-23 DIAGNOSIS — O02.1 MISSED ABORTION: Primary | ICD-10-CM

## 2019-10-23 PROCEDURE — 3008F BODY MASS INDEX DOCD: CPT | Mod: CPTII,S$GLB,, | Performed by: OBSTETRICS & GYNECOLOGY

## 2019-10-23 PROCEDURE — 3008F PR BODY MASS INDEX (BMI) DOCUMENTED: ICD-10-PCS | Mod: CPTII,S$GLB,, | Performed by: OBSTETRICS & GYNECOLOGY

## 2019-10-23 PROCEDURE — 99999 PR PBB SHADOW E&M-EST. PATIENT-LVL III: CPT | Mod: PBBFAC,,, | Performed by: OBSTETRICS & GYNECOLOGY

## 2019-10-23 PROCEDURE — 99024 POSTOP FOLLOW-UP VISIT: CPT | Mod: S$GLB,,, | Performed by: OBSTETRICS & GYNECOLOGY

## 2019-10-23 PROCEDURE — 99024 PR POST-OP FOLLOW-UP VISIT: ICD-10-PCS | Mod: S$GLB,,, | Performed by: OBSTETRICS & GYNECOLOGY

## 2019-10-23 PROCEDURE — 99999 PR PBB SHADOW E&M-EST. PATIENT-LVL III: ICD-10-PCS | Mod: PBBFAC,,, | Performed by: OBSTETRICS & GYNECOLOGY

## 2019-10-24 NOTE — PROGRESS NOTES
CC: MIssed    S/ P D & C     Angel Mosquera is a 39 y.o. female  presents S/P D & C for incomplete  on Saturday and underwent D &  C with Dr Culp.  She denies nausea/vomIting/abdominal pain/moderate bleeding.   BhCG is trending down- last bhcg was [facility] . It was down to 1083.        Past Medical History:   Diagnosis Date    Rosacea      Past Surgical History:   Procedure Laterality Date    Breast Augmentation      DILATION AND CURETTAGE OF UTERUS N/A 10/19/2019    Procedure: DILATION AND CURETTAGE, UTERUS;  Surgeon: Frances Culp MD;  Location: [facility];  Service: OB/GYN;  Laterality: N/A;  with suction    mischarr       Social History     Socioeconomic History    Marital status:      Spouse name: Not on file    Number of children: Not on file    Years of education: Not on file    Highest education level: Not on file   Occupational History    Occupation: Nurse     Employer: [employer]     Comment: Oncology   Social Needs    Financial resource strain: Not on file    Food insecurity:     Worry: Not on file     Inability: Not on file    Transportation needs:     Medical: Not on file     Non-medical: Not on file   Tobacco Use    Smoking status: Never Smoker    Smokeless tobacco: Never Used   Substance and Sexual Activity    Alcohol use: No    Drug use: No    Sexual activity: Yes     Partners: Male     Birth control/protection: None     Comment: Vasectomy   Lifestyle    Physical activity:     Days per week: Not on file     Minutes per session: Not on file    Stress: Not on file   Relationships    Social connections:     Talks on phone: Not on file     Gets together: Not on file     Attends Mu-ism service: Not on file     Active member of club or organization: Not on file     Attends meetings of clubs or organizations: Not on file     Relationship status: Not on file   Other Topics Concern    Are you  pregnant or think you may be? No    Breast-feeding No   Social History Narrative    Not on file     Family History   Problem Relation Age of Onset    Diabetes Mother     Hypertension Mother     Heart failure Mother         viral?    Cancer Neg Hx     Ovarian cancer Neg Hx     Melanoma Neg Hx     Heart attack Neg Hx     Stroke Neg Hx     Colon cancer Neg Hx     Breast cancer Neg Hx     Blindness Neg Hx     Amblyopia Neg Hx     Cataracts Neg Hx     Glaucoma Neg Hx     Macular degeneration Neg Hx     Retinal detachment Neg Hx     Strabismus Neg Hx      OB History    Para Term  AB Living   2       2 0   SAB TAB Ectopic Multiple Live Births   2              # Outcome Date GA Lbr Unruly/2nd Weight Sex Delivery Anes PTL Lv   2 SAB            1 SAB                /86   Wt 92 kg (202 lb 13.2 oz)   BMI 33.75 kg/m²     ROS:  GENERAL: Denies weight gain or weight loss. Feeling well overall.   SKIN: Denies rash or lesions.   HEAD: Denies head injury or headache.   NODES: Denies enlarged lymph nodes.   CHEST: Denies chest pain or shortness of breath.   CARDIOVASCULAR: Denies palpitations or left sided chest pain.   ABDOMEN: No abdominal pain, constipation, diarrhea, nausea, vomiting or rectal bleeding.   URINARY: No frequency, dysuria, hematuria, or burning on urination.  REPRODUCTIVE: See HPI.   BREASTS: The patient performs breast self-examination and denies pain, lumps, or nipple discharge.   HEMATOLOGIC: No easy bruisability or excessive bleeding.   MUSCULOSKELETAL: Denies joint pain or swelling.   NEUROLOGIC: Denies syncope or weakness.   PSYCHIATRIC: Denies depression, anxiety or mood swings.    PE:   APPEARANCE: Well nourished, well developed, in no acute distress.  AFFECT: WNL, alert and oriented x 3.  SKIN: No acne or hirsutism.  NECK: Neck symmetric without masses or thyromegaly.  NODES: No inguinal, cervical, axillary or femoral lymph node enlargement.  CHEST: Good respiratory  effort.   ABDOMEN: Soft. No tenderness or masses. No hepatosplenomegaly. No hernias.  PELVIC: Normal external female genitalia without lesions. Normal hair distribution. Adequate perineal body, normal urethral meatus. Vagina moist and well rugated without lesions or discharge. Cervix pink, without lesions, discharge or tenderness. No significant cystocele or rectocele. Bimanual exam shows uterus is 12 weeks, irregular, mobile and tender. Firm fibroid noted in the posterior cul de sac.  Adnexa without masses or tenderness.  EXTREMITIES: No edema.          ASSESSMENT and PLAN:    ICD-10-CM ICD-9-CM    1. Missed  O02.1 632 hCG, quantitative   2.     Situational depression- refer over to walk and talk DARREL      Patient was counseled today on proper weight gain based on the [place] [employer]'s recommendations based on her pre-pregnancy weight. Discussed foods to avoid in pregnancy (i.e. sushi, fish that are high in mercury, deli meat, and unpasteurized cheeses). Discussed prenatal vitamin options (i.e. stool softener, DHA).     F/U as needed   Will trend down BHCG.  Will follow up with Dr Culp. May consider possible myomectomy.    Pain and bleeding precautiions

## 2019-10-25 ENCOUNTER — TELEPHONE (OUTPATIENT)
Dept: OBSTETRICS AND GYNECOLOGY | Facility: HOSPITAL | Age: 39
End: 2019-10-25

## 2019-10-25 DIAGNOSIS — N39.0 URINARY TRACT INFECTION WITHOUT HEMATURIA, SITE UNSPECIFIED: Primary | ICD-10-CM

## 2019-10-25 RX ORDER — NITROFURANTOIN 25; 75 MG/1; MG/1
100 CAPSULE ORAL 2 TIMES DAILY
Qty: 10 CAPSULE | Refills: 0 | Status: SHIPPED | OUTPATIENT
Start: 2019-10-25 | End: 2019-10-30

## 2019-10-28 ENCOUNTER — TELEPHONE (OUTPATIENT)
Dept: OBSTETRICS AND GYNECOLOGY | Facility: CLINIC | Age: 39
End: 2019-10-28

## 2019-10-28 NOTE — TELEPHONE ENCOUNTER
Patient requested that the date on her return to work letter be changed to 10/29/19. Date has been change. Letter released to portal.

## 2019-10-28 NOTE — TELEPHONE ENCOUNTER
----- Message from Paige Cronin sent at 10/28/2019  2:07 PM CDT -----  Contact: DONNA VEGAS [[id number]]  Name of Who is Calling: DONNA VEGAS [[id number]]      What is the request in detail: Pt would like to speak with staff in regards to a return to work not stating that she can return to work on 10/29. Please call to further assist .      Can the clinic reply by [facility]:Y       What Number to Call Back [facility] not [facility]: [phone]

## 2019-11-05 ENCOUNTER — LAB VISIT (OUTPATIENT)
Dept: LAB | Facility: HOSPITAL | Age: 39
End: 2019-11-05
Attending: OBSTETRICS & GYNECOLOGY
Payer: COMMERCIAL

## 2019-11-05 DIAGNOSIS — O02.1 MISSED ABORTION: ICD-10-CM

## 2019-11-05 LAB — HCG INTACT+B SERPL-ACNC: 6.6 MIU/ML

## 2019-11-05 PROCEDURE — 84702 CHORIONIC GONADOTROPIN TEST: CPT

## 2019-11-05 PROCEDURE — 36415 COLL VENOUS BLD VENIPUNCTURE: CPT

## 2019-11-06 ENCOUNTER — PATIENT MESSAGE (OUTPATIENT)
Dept: OBSTETRICS AND GYNECOLOGY | Facility: CLINIC | Age: 39
End: 2019-11-06

## 2019-11-12 ENCOUNTER — OFFICE VISIT (OUTPATIENT)
Dept: OPTOMETRY | Facility: CLINIC | Age: 39
End: 2019-11-12
Payer: COMMERCIAL

## 2019-11-12 DIAGNOSIS — H52.13 MYOPIA OF BOTH EYES: Primary | ICD-10-CM

## 2019-11-12 PROCEDURE — 92015 DETERMINE REFRACTIVE STATE: CPT | Mod: S$GLB,,, | Performed by: OPTOMETRIST

## 2019-11-12 PROCEDURE — 92015 PR REFRACTION: ICD-10-PCS | Mod: S$GLB,,, | Performed by: OPTOMETRIST

## 2019-11-12 PROCEDURE — 92004 PR EYE EXAM, NEW PATIENT,COMPREHESV: ICD-10-PCS | Mod: S$GLB,,, | Performed by: OPTOMETRIST

## 2019-11-12 PROCEDURE — 99999 PR PBB SHADOW E&M-EST. PATIENT-LVL II: CPT | Mod: PBBFAC,,, | Performed by: OPTOMETRIST

## 2019-11-12 PROCEDURE — 99999 PR PBB SHADOW E&M-EST. PATIENT-LVL II: ICD-10-PCS | Mod: PBBFAC,,, | Performed by: OPTOMETRIST

## 2019-11-12 PROCEDURE — 92004 COMPRE OPH EXAM NEW PT 1/>: CPT | Mod: S$GLB,,, | Performed by: OPTOMETRIST

## 2019-11-12 NOTE — PROGRESS NOTES
HPI     DLS; 5/16/16  Pt states she is squinting a lot with her dist. States she is getting a   lot of headaches states she used to having glasses for night driving  she   states she has not been wearing them.pt states she is still having an   issue with the glare states it has gotten worse since last time.   No f/f  No gtts     Last edited by Jesusita Doherty MA on 11/12/2019 10:45 AM. (History)          ROS     Negative for: Constitutional, Gastrointestinal, Neurological, Skin,   Genitourinary, Musculoskeletal, HENT, Endocrine, Cardiovascular, Eyes,   Respiratory, Psychiatric, Allergic/Imm, Heme/Lymph    Last edited by Omega Montesinos, RONAN on 11/12/2019 10:54 AM. (History)        Assessment /Plan     For exam results, see Encounter Report.    Myopia of both eyes      1. Myopia OU.  Wrote spex Rx for night driving.  Discussed CRIZAL for glare  2. Discussed impending presby  3. Rosacea hx    PLAN:    rtc 1 yr

## 2019-11-20 ENCOUNTER — OFFICE VISIT (OUTPATIENT)
Dept: URGENT CARE | Facility: CLINIC | Age: 39
End: 2019-11-20
Payer: COMMERCIAL

## 2019-11-20 VITALS
SYSTOLIC BLOOD PRESSURE: 133 MMHG | TEMPERATURE: 98 F | WEIGHT: 200 LBS | OXYGEN SATURATION: 100 % | RESPIRATION RATE: 17 BRPM | HEART RATE: 76 BPM | BODY MASS INDEX: 33.32 KG/M2 | HEIGHT: 65 IN | DIASTOLIC BLOOD PRESSURE: 91 MMHG

## 2019-11-20 DIAGNOSIS — J06.9 UPPER RESPIRATORY TRACT INFECTION, UNSPECIFIED TYPE: Primary | ICD-10-CM

## 2019-11-20 PROCEDURE — 3008F PR BODY MASS INDEX (BMI) DOCUMENTED: ICD-10-PCS | Mod: CPTII,S$GLB,, | Performed by: FAMILY MEDICINE

## 2019-11-20 PROCEDURE — 99214 OFFICE O/P EST MOD 30 MIN: CPT | Mod: S$GLB,,, | Performed by: FAMILY MEDICINE

## 2019-11-20 PROCEDURE — 99214 PR OFFICE/OUTPT VISIT, EST, LEVL IV, 30-39 MIN: ICD-10-PCS | Mod: S$GLB,,, | Performed by: FAMILY MEDICINE

## 2019-11-20 PROCEDURE — 3008F BODY MASS INDEX DOCD: CPT | Mod: CPTII,S$GLB,, | Performed by: FAMILY MEDICINE

## 2019-11-20 RX ORDER — BENZONATATE 100 MG/1
100 CAPSULE ORAL EVERY 6 HOURS PRN
Qty: 30 CAPSULE | Refills: 1 | Status: SHIPPED | OUTPATIENT
Start: 2019-11-20 | End: 2019-12-31 | Stop reason: CLARIF

## 2019-11-20 RX ORDER — FLUTICASONE PROPIONATE 50 MCG
1 SPRAY, SUSPENSION (ML) NASAL DAILY
Qty: 9.9 ML | Refills: 0 | Status: SHIPPED | OUTPATIENT
Start: 2019-11-20 | End: 2019-12-31 | Stop reason: CLARIF

## 2019-11-20 NOTE — PATIENT INSTRUCTIONS
Viral Upper Respiratory Illness (Adult)  You have a viral upper respiratory illness (URI), which is another term for the common cold. This illness is contagious during the first few days. It is spread through the air by coughing and sneezing. It may also be spread by direct contact (touching the sick person and then touching your own eyes, nose, or mouth). Frequent handwashing will decrease risk of spread. Most viral illnesses go away within 7 to 10 days with rest and simple home remedies. Sometimes the illness may last for several weeks. Antibiotics will not kill a virus, and they are generally not prescribed for this condition.    Home care  · If symptoms are severe, rest at home for the first 2 to 3 days. When you resume activity, don't let yourself get too tired.  · Avoid being exposed to cigarette smoke (yours or others).  · You may use acetaminophen or ibuprofen to control pain and fever, unless another medicine was prescribed. (Note: If you have chronic liver or kidney disease, have ever had a stomach ulcer or gastrointestinal bleeding, or are taking blood-thinning medicines, talk with your healthcare provider before using these medicines.) Aspirin should never be given to anyone under 18 years of age who is ill with a viral infection or fever. It may cause severe liver or brain damage.  · Your appetite may be poor, so a light diet is fine. Avoid dehydration by drinking 6 to 8 glasses of fluids per day (water, soft drinks, juices, tea, or soup). Extra fluids will help loosen secretions in the nose and lungs.  · Over-the-counter cold medicines will not shorten the length of time youre sick, but they may be helpful for the following symptoms: cough, sore throat, and nasal and sinus congestion. (Note: Do not use decongestants if you have high blood pressure.)  Follow-up care  Follow up with your healthcare provider, or as advised.  When to seek medical advice  Call your healthcare provider right away if any  of these occur:  · Cough with lots of colored sputum (mucus)  · Severe headache; face, neck, or ear pain  · Difficulty swallowing due to throat pain  · Fever of 100.4°F (38°C)  Call 911, or get immediate medical care  Call emergency services right away if any of these occur:  · Chest pain, shortness of breath, wheezing, or difficulty breathing  · Coughing up blood  · Inability to swallow due to throat pain  Date Last Reviewed: 9/13/2015  © 5121-5361 [employer]. [address] [place] [id number]. All rights reserved. This information is not intended as a substitute for professional medical care. Always follow your healthcare professional's instructions.

## 2019-11-20 NOTE — PROGRESS NOTES
"Subjective:       Patient ID: Angel Mosquera is a 39 y.o. female.    Vitals:  height is 5' 5" (1.651 m) and weight is 90.7 kg (200 lb). Her oral temperature is 97.9 °F (36.6 °C). Her blood pressure is 133/91 (abnormal) and her pulse is 76. Her respiration is 17 and oxygen saturation is 100%.     Chief Complaint: Sinus Problem    This is a 39 y.o. female who presents today with a chief complaint of \  Sinus head congestion, lots of pressure in ears and they are clogged up x 1 week. Taking Daquil, Nyquil, and zyrtec D no relief    Sinus Problem   This is a new problem. The current episode started 1 to 4 weeks ago. The problem has been gradually worsening since onset. There has been no fever. She is experiencing no pain. Associated symptoms include congestion, coughing and sinus pressure. Pertinent negatives include no chills, diaphoresis, ear pain, shortness of breath or sore throat. (Ear pressure) Past treatments include acetaminophen (daquil.nyquil. zyrtec d ). The treatment provided no relief.       Constitution: Negative for chills, sweating, fatigue and fever.   HENT: Positive for congestion, postnasal drip and sinus pressure. Negative for ear pain, sinus pain, sore throat and voice change.    Neck: Negative for painful lymph nodes.   Eyes: Negative for eye redness.   Respiratory: Positive for cough. Negative for chest tightness, sputum production, bloody sputum, COPD, shortness of breath, stridor, wheezing and asthma.    Gastrointestinal: Negative for nausea and vomiting.   Musculoskeletal: Negative for muscle ache.   Skin: Negative for rash.   Allergic/Immunologic: Negative for seasonal allergies and asthma.   Hematologic/Lymphatic: Negative for swollen lymph nodes.       Objective:      Physical Exam   Constitutional: She appears well-developed and well-nourished.   HENT:   Head: Normocephalic and atraumatic.   Right Ear: Ear canal normal. Tympanic membrane is bulging. Tympanic membrane is not injected and " not erythematous.   Left Ear: Ear canal normal. Tympanic membrane is bulging. Tympanic membrane is not injected and not erythematous.   Nose: Mucosal edema and rhinorrhea present.   Mouth/Throat: Posterior oropharyngeal erythema present. No oropharyngeal exudate.   Eyes: Pupils are equal, round, and reactive to light. EOM are normal.   Neck: Normal range of motion. Neck supple.   Cardiovascular: Normal rate, regular rhythm and normal heart sounds.   Pulmonary/Chest: Effort normal and breath sounds normal.   Nursing note and vitals reviewed.        Assessment:       1. Upper respiratory tract infection, unspecified type        Plan:         Upper respiratory tract infection, unspecified type  -     fluticasone propionate (FLONASE) 50 mcg/actuation nasal spray; 1 spray (50 mcg total) by Each Nostril route once daily.  Dispense: 9.9 mL; Refill: 0  -     benzonatate (TESSALON PERLES) 100 MG capsule; Take 1 capsule (100 mg total) by mouth every 6 (six) hours as needed for Cough.  Dispense: 30 capsule; Refill: 1    advised patient that probable viral in nature and antibiotics would not be effective. Patient declines steroids due to route of administration. Expressed upset that their were not any other treatments for her URI symptoms and the ear discomfort and its ongoing nature     Recommended sinus irrigation system and steam. OTC decongestants and Mucinex

## 2019-12-09 ENCOUNTER — OFFICE VISIT (OUTPATIENT)
Dept: OBSTETRICS AND GYNECOLOGY | Facility: CLINIC | Age: 39
End: 2019-12-09
Attending: OBSTETRICS & GYNECOLOGY
Payer: COMMERCIAL

## 2019-12-09 VITALS
BODY MASS INDEX: 34.6 KG/M2 | DIASTOLIC BLOOD PRESSURE: 82 MMHG | WEIGHT: 207.69 LBS | SYSTOLIC BLOOD PRESSURE: 124 MMHG | HEIGHT: 65 IN

## 2019-12-09 DIAGNOSIS — D25.1 INTRAMURAL AND SUBSEROUS LEIOMYOMA OF UTERUS: ICD-10-CM

## 2019-12-09 DIAGNOSIS — D25.2 INTRAMURAL AND SUBSEROUS LEIOMYOMA OF UTERUS: ICD-10-CM

## 2019-12-09 PROCEDURE — 99999 PR PBB SHADOW E&M-EST. PATIENT-LVL IV: ICD-10-PCS | Mod: PBBFAC,,, | Performed by: OBSTETRICS & GYNECOLOGY

## 2019-12-09 PROCEDURE — 99244 OFF/OP CNSLTJ NEW/EST MOD 40: CPT | Mod: S$GLB,,, | Performed by: OBSTETRICS & GYNECOLOGY

## 2019-12-09 PROCEDURE — 99999 PR PBB SHADOW E&M-EST. PATIENT-LVL IV: CPT | Mod: PBBFAC,,, | Performed by: OBSTETRICS & GYNECOLOGY

## 2019-12-09 PROCEDURE — 99244 PR OFFICE CONSULTATION,LEVEL IV: ICD-10-PCS | Mod: S$GLB,,, | Performed by: OBSTETRICS & GYNECOLOGY

## 2019-12-09 NOTE — PROGRESS NOTES
CC: Symptoms related to fibroids    Angel Mosquera is a 39 y.o. female  presents for a consultation from Dr. Carla Aguero for management of fibroids.      Patient is known to me as she presented to the ED with a missed  in October.  After discussion of treatment options, she desired surgical management.  D&C was performed without complication.  Patient was known to have fibroids.  Pregnancy was the result of IVF.    She reports cycles are heavy and painful.  She reports back and pelvic pain that worsened with pregnancy.  She also reports pain with intercourse.      MRI Shows:    FINDINGS:  Uterus is anteverted and anteflexed. It measures 9.5 x 8.3 x 8.7 cm.  Endometrium has uniform signal and has a maximal thickness of 6 mm, which is within normal limits for a female with menstrual cycles. Junctional zone is normal.    Multiple fibroids are identified.    The largest is approximately 5.2 cm, intramural, within the left posterior uterus with heterogenous post-contrast and.    There is a 1.7 cm left posterior fibroid which abuts the submucosa.    There is a 3.0 cm subserosal fibroid at the right posterior uterus.    Two subserosal fibroids at the fundus measure roughly 3.1 cm and 3.0 cm in maximum diameter, respectively.    Bilateral ovaries are normal in size and appearance with normal appearing physiologic cyst.  No adnexal or pelvic masses.    Bladder is relatively decompressed and has normal appearance. No enlarged lymph nodes. There is trace free fluid within the pelvis, likely physiologic in a female patient in this age.    Osseous structures have normal marrow signal characteristics.    She has embryo frozen and plans to proceed with an embryo transfer but desires to have fibroids removed priro to transfer.      Past Medical History:   Diagnosis Date    Rosacea        Past Surgical History:   Procedure Laterality Date    Breast Augmentation      DILATION AND CURETTAGE OF UTERUS N/A  "10/19/2019    Procedure: DILATION AND CURETTAGE, UTERUS;  Surgeon: Frances Culp MD;  Location: [facility] OR;  Service: OB/GYN;  Laterality: N/A;  with suction    mischarr         Family History   Problem Relation Age of Onset    Diabetes Mother     Hypertension Mother     Heart failure Mother         viral?    Cancer Neg Hx     Ovarian cancer Neg Hx     Melanoma Neg Hx     Heart attack Neg Hx     Stroke Neg Hx     Colon cancer Neg Hx     Breast cancer Neg Hx     Blindness Neg Hx     Amblyopia Neg Hx     Cataracts Neg Hx     Glaucoma Neg Hx     Macular degeneration Neg Hx     Retinal detachment Neg Hx     Strabismus Neg Hx        Social History     Tobacco Use    Smoking status: Never Smoker    Smokeless tobacco: Never Used   Substance Use Topics    Alcohol use: No    Drug use: No       OB History    Para Term  AB Living   2       2 0   SAB TAB Ectopic Multiple Live Births   2              # Outcome Date GA Lbr Unruly/2nd Weight Sex Delivery Anes PTL Lv   2 SAB            1 SAB                /82   Ht 5' 5" (1.651 m)   Wt 94.2 kg (207 lb 10.8 oz)   LMP 2019   BMI 34.56 kg/m²     ROS:  GENERAL: Denies weight gain or weight loss. Feeling well overall.   SKIN: Denies rash or lesions.   HEAD: Denies head injury or headache.   NODES: Denies enlarged lymph nodes.   CHEST: Denies chest pain or shortness of breath.   CARDIOVASCULAR: Denies palpitations or left sided chest pain.   ABDOMEN: No abdominal pain, constipation, diarrhea, nausea, vomiting or rectal bleeding.   URINARY: No frequency, dysuria, hematuria, or burning on urination.  REPRODUCTIVE: See HPI.   HEMATOLOGIC: No easy bruisability or excessive bleeding with the exception of menstrual cycles.  MUSCULOSKELETAL: Denies joint pain or swelling.   NEUROLOGIC: Denies syncope or weakness.   PSYCHIATRIC: Denies depression, anxiety or mood swings.    PHYSICAL EXAM:  APPEARANCE: Well nourished, well developed, in " no acute distress.  AFFECT: WNL, alert and oriented x 3  SKIN: No acne or hirsutism  NECK: Neck symmetric without masses or thyromegaly  NODES: No inguinal, cervical, axillary, or femoral lymph node enlargement  CHEST: Good respiratory effect  ABDOMEN: Soft.  No tenderness or masses.  No hepatosplenomegaly.  No hernias.  PELVIC: Normal external genitalia without lesions.  Normal hair distribution.  Adequate perineal body, normal urethral meatus.  Vagina moist and well rugated without lesions or discharge.  Cervix pink, without lesions, discharge or tenderness.  No significant cystocele or rectocele.  Bimanual exam shows uterus to be slightly enlarged, irregular with posterior fullness, mobile and nontender.  Adnexa without masses or tenderness.    EXTREMITIES: No edema.      ICD-10-CM ICD-9-CM    1. Intramural and subserous leiomyoma of uterus D25.1 218.1 MRI Pelvis W WO Contrast    D25.2 218.2 Case Request Operating Room: ROBOTIC MYOMECTOMY, UTERUS      Case Request Operating Room: ROBOTIC MYOMECTOMY, UTERUS      Full code      Vital signs      [place] to [place]      Insert peripheral IV      POCT glucose      Notify physician if BS > 180 for hysterectomy patients      Chlorhexidine (CHG) 2% Wipes      POCT urine pregnancy      Notify Physician/Vital Signs Parameters Urine output less than 0.5mL/kg/hr (with indwelling catheter) or 30 mL/hr (without indwelling catheter) or blood glucose greater than 200 mg/dL      Notify physician      Diet NPO Except for: Medication      Place sequential compression device      Chlorohexidine Gluconate Bath      Type & Screen      CBC auto differential      Transfer patient      Admit to Phase I PACU, transfer to phase II level of care when Madai score is 9 out of 10      Vital signs      Straight Cath      Intake and output Per protocol      Apply warming blanket      POCT glucose      Notify Physician (specify)      Notify Physician      Notify Physician (specify)      Notify  Anesthesiologist      Oxygen Continuous      Pulse Oximetry Continuous      Vital signs      Pulse Oximetry Q12H PRN      Remove dressing      [place] catheter - discontinue      Diet Adult Regular (IDDSI Level 7) [facility]      DISCHARGE PATIENT 1) Tolerating PO, No emesis x 2 hours 2) Ambulates with assistance appropriate to age and health status (to baseline ability) 3) Voided or has been instructed on how to respond if difficulty voiding 4) Vital signs stable (within ...      Place in Outpatient         Patient was counseled today on  treatment options for fibroids.  Discussed that removal of fibroids may not exclude the risk of miscarriage.  However, would recommend removal prior to conceiving, especially as one of the fibroids abuts the endometrial cavity.      Discussed the risk of recurrence of fibroids, the need to delay conception for 4 months, and the need for  for delivery.  Patient and  state understanding.  Discussed routes of myomectomy - laparoscopic vs. Abdominal and risks/benefits of each route.  In review of MRI, she is a candidate for a laparoscopic approach.      Surgery scheduled for

## 2019-12-13 ENCOUNTER — PATIENT MESSAGE (OUTPATIENT)
Dept: OBSTETRICS AND GYNECOLOGY | Facility: CLINIC | Age: 39
End: 2019-12-13

## 2019-12-13 RX ORDER — IBUPROFEN 200 MG
800 TABLET ORAL
Status: CANCELLED | OUTPATIENT
Start: 2019-12-14

## 2019-12-13 RX ORDER — DIPHENHYDRAMINE HCL 25 MG
25 CAPSULE ORAL EVERY 4 HOURS PRN
Status: CANCELLED | OUTPATIENT
Start: 2019-12-13

## 2019-12-13 RX ORDER — KETOROLAC TROMETHAMINE 30 MG/ML
30 INJECTION, SOLUTION INTRAMUSCULAR; INTRAVENOUS
Status: CANCELLED | OUTPATIENT
Start: 2019-12-13 | End: 2019-12-14

## 2019-12-13 RX ORDER — PREGABALIN 25 MG/1
75 CAPSULE ORAL
Status: CANCELLED | OUTPATIENT
Start: 2019-12-13

## 2019-12-13 RX ORDER — AMOXICILLIN 250 MG
1 CAPSULE ORAL 2 TIMES DAILY
Status: CANCELLED | OUTPATIENT
Start: 2019-12-13

## 2019-12-13 RX ORDER — CELECOXIB 100 MG/1
400 CAPSULE ORAL
Status: CANCELLED | OUTPATIENT
Start: 2019-12-13

## 2019-12-13 RX ORDER — ACETAMINOPHEN 325 MG/1
650 TABLET ORAL EVERY 4 HOURS PRN
Status: CANCELLED | OUTPATIENT
Start: 2019-12-13

## 2019-12-13 RX ORDER — OXYCODONE AND ACETAMINOPHEN 10; 325 MG/1; MG/1
1 TABLET ORAL EVERY 4 HOURS PRN
Status: CANCELLED | OUTPATIENT
Start: 2019-12-13

## 2019-12-13 RX ORDER — DIPHENHYDRAMINE HYDROCHLORIDE 50 MG/ML
25 INJECTION INTRAMUSCULAR; INTRAVENOUS EVERY 4 HOURS PRN
Status: CANCELLED | OUTPATIENT
Start: 2019-12-13

## 2019-12-13 RX ORDER — MUPIROCIN 20 MG/G
OINTMENT TOPICAL
Status: CANCELLED | OUTPATIENT
Start: 2019-12-13

## 2019-12-13 RX ORDER — LIDOCAINE HYDROCHLORIDE 10 MG/ML
0.5 INJECTION, SOLUTION EPIDURAL; INFILTRATION; INTRACAUDAL; PERINEURAL
Status: CANCELLED | OUTPATIENT
Start: 2019-12-13

## 2019-12-13 RX ORDER — HYDROMORPHONE HYDROCHLORIDE 2 MG/ML
1 INJECTION, SOLUTION INTRAMUSCULAR; INTRAVENOUS; SUBCUTANEOUS
Status: CANCELLED | OUTPATIENT
Start: 2019-12-13

## 2019-12-13 RX ORDER — SODIUM CHLORIDE, SODIUM LACTATE, POTASSIUM CHLORIDE, CALCIUM CHLORIDE 600; 310; 30; 20 MG/100ML; MG/100ML; MG/100ML; MG/100ML
INJECTION, SOLUTION INTRAVENOUS CONTINUOUS
Status: CANCELLED | OUTPATIENT
Start: 2019-12-13

## 2019-12-13 RX ORDER — FAMOTIDINE 20 MG/1
20 TABLET, FILM COATED ORAL
Status: CANCELLED | OUTPATIENT
Start: 2019-12-13

## 2019-12-13 RX ORDER — ONDANSETRON 4 MG/1
8 TABLET, ORALLY DISINTEGRATING ORAL EVERY 8 HOURS PRN
Status: CANCELLED | OUTPATIENT
Start: 2019-12-13

## 2019-12-13 RX ORDER — HYDROMORPHONE HYDROCHLORIDE 2 MG/ML
0.2 INJECTION, SOLUTION INTRAMUSCULAR; INTRAVENOUS; SUBCUTANEOUS EVERY 5 MIN PRN
Status: CANCELLED | OUTPATIENT
Start: 2019-12-13

## 2019-12-13 RX ORDER — ACETAMINOPHEN 500 MG
1000 TABLET ORAL
Status: CANCELLED | OUTPATIENT
Start: 2019-12-13

## 2019-12-13 RX ORDER — OXYCODONE HYDROCHLORIDE 5 MG/1
5 TABLET ORAL
Status: CANCELLED | OUTPATIENT
Start: 2019-12-13

## 2019-12-13 RX ORDER — POLYETHYLENE GLYCOL 3350 17 G/17G
17 POWDER, FOR SOLUTION ORAL DAILY
Status: CANCELLED | OUTPATIENT
Start: 2019-12-13

## 2019-12-13 RX ORDER — OXYCODONE AND ACETAMINOPHEN 5; 325 MG/1; MG/1
1 TABLET ORAL EVERY 4 HOURS PRN
Status: CANCELLED | OUTPATIENT
Start: 2019-12-13

## 2019-12-13 RX ORDER — ONDANSETRON 2 MG/ML
4 INJECTION INTRAMUSCULAR; INTRAVENOUS DAILY PRN
Status: CANCELLED | OUTPATIENT
Start: 2019-12-13

## 2019-12-13 RX ORDER — SODIUM CHLORIDE 0.9 % (FLUSH) 0.9 %
3 SYRINGE (ML) INJECTION
Status: CANCELLED | OUTPATIENT
Start: 2019-12-13

## 2019-12-17 ENCOUNTER — HOSPITAL ENCOUNTER (OUTPATIENT)
Dept: RADIOLOGY | Facility: OTHER | Age: 39
Discharge: HOME | End: 2019-12-17
Attending: OBSTETRICS & GYNECOLOGY
Payer: COMMERCIAL

## 2019-12-17 DIAGNOSIS — D25.2 INTRAMURAL AND SUBSEROUS LEIOMYOMA OF UTERUS: ICD-10-CM

## 2019-12-17 DIAGNOSIS — D25.1 INTRAMURAL AND SUBSEROUS LEIOMYOMA OF UTERUS: ICD-10-CM

## 2019-12-17 PROCEDURE — 72197 MRI PELVIS W/O & W/DYE: CPT | Mod: 26,,, | Performed by: RADIOLOGY

## 2019-12-17 PROCEDURE — 72197 MRI PELVIS W/O & W/DYE: CPT | Mod: TC

## 2019-12-17 PROCEDURE — 72197 MRI PELVIS W WO CONTRAST: ICD-10-PCS | Mod: 26,,, | Performed by: RADIOLOGY

## 2019-12-17 PROCEDURE — 25500020 PHARM REV CODE 255: Performed by: OBSTETRICS & GYNECOLOGY

## 2019-12-17 PROCEDURE — A9585 GADOBUTROL INJECTION: HCPCS | Performed by: OBSTETRICS & GYNECOLOGY

## 2019-12-17 RX ORDER — GADOBUTROL 604.72 MG/ML
9 INJECTION INTRAVENOUS
Status: COMPLETED | OUTPATIENT
Start: 2019-12-17 | End: 2019-12-17

## 2019-12-17 RX ADMIN — GADOBUTROL 9 ML: 604.72 INJECTION INTRAVENOUS at 10:12

## 2019-12-18 ENCOUNTER — PATIENT MESSAGE (OUTPATIENT)
Dept: OBSTETRICS AND GYNECOLOGY | Facility: CLINIC | Age: 39
End: 2019-12-18

## 2019-12-24 ENCOUNTER — PATIENT MESSAGE (OUTPATIENT)
Dept: OBSTETRICS AND GYNECOLOGY | Facility: CLINIC | Age: 39
End: 2019-12-24

## 2019-12-30 ENCOUNTER — OFFICE VISIT (OUTPATIENT)
Dept: OBSTETRICS AND GYNECOLOGY | Facility: CLINIC | Age: 39
End: 2019-12-30
Attending: OBSTETRICS & GYNECOLOGY
Payer: COMMERCIAL

## 2019-12-30 VITALS
SYSTOLIC BLOOD PRESSURE: 122 MMHG | HEIGHT: 65 IN | WEIGHT: 211 LBS | BODY MASS INDEX: 35.16 KG/M2 | DIASTOLIC BLOOD PRESSURE: 82 MMHG

## 2019-12-30 DIAGNOSIS — Z01.818 PREOPERATIVE EXAM FOR GYNECOLOGIC SURGERY: Primary | ICD-10-CM

## 2019-12-30 PROCEDURE — 99999 PR PBB SHADOW E&M-EST. PATIENT-LVL III: CPT | Mod: PBBFAC,,, | Performed by: OBSTETRICS & GYNECOLOGY

## 2019-12-30 PROCEDURE — 99499 NO LOS: ICD-10-PCS | Mod: S$GLB,,, | Performed by: OBSTETRICS & GYNECOLOGY

## 2019-12-30 PROCEDURE — 99999 PR PBB SHADOW E&M-EST. PATIENT-LVL III: ICD-10-PCS | Mod: PBBFAC,,, | Performed by: OBSTETRICS & GYNECOLOGY

## 2019-12-30 PROCEDURE — 99499 UNLISTED E&M SERVICE: CPT | Mod: S$GLB,,, | Performed by: OBSTETRICS & GYNECOLOGY

## 2019-12-30 NOTE — PROGRESS NOTES
CC: Preop exam    Angel Mosquera is a 39 y.o. female  presents for a pre-op exam for a DVM scheduled on .      Patient is known to me as she presented to the ED with a missed  in October.  After discussion of treatment options, she desired surgical management.  D&C was performed without complication.  Patient was known to have fibroids.  Pregnancy was the result of IVF.     She reports cycles are heavy and painful.  She reports back and pelvic pain that worsened with pregnancy.  She also reports pain with intercourse.       MRI Shows:     FINDINGS:  Uterus is anteverted and anteflexed. It measures 9.5 x 8.3 x 8.7 cm.  Endometrium has uniform signal and has a maximal thickness of 6 mm, which is within normal limits for a female with menstrual cycles. Junctional zone is normal.    Multiple fibroids are identified.    The largest is approximately 5.2 cm, intramural, within the left posterior uterus with heterogenous post-contrast and.    There is a 1.7 cm left posterior fibroid which abuts the submucosa.    There is a 3.0 cm subserosal fibroid at the right posterior uterus.    Two subserosal fibroids at the fundus measure roughly 3.1 cm and 3.0 cm in maximum diameter, respectively.    Bilateral ovaries are normal in size and appearance with normal appearing physiologic cyst.  No adnexal or pelvic masses.    Bladder is relatively decompressed and has normal appearance. No enlarged lymph nodes. There is trace free fluid within the pelvis, likely physiologic in a female patient in this age.    Osseous structures have normal marrow signal characteristics.     She has embryo frozen and plans to proceed with an embryo transfer but desires to have fibroids removed priro to transfer.             Past Medical History:   Diagnosis Date    Rosacea        Past Surgical History:   Procedure Laterality Date    Breast Augmentation      DILATION AND CURETTAGE OF UTERUS N/A 10/19/2019     "Procedure: DILATION AND CURETTAGE, UTERUS;  Surgeon: Frances Culp MD;  Location: [facility] OR;  Service: OB/GYN;  Laterality: N/A;  with suction    mischarr         OB History    Para Term  AB Living   2       2 0   SAB TAB Ectopic Multiple Live Births   2              # Outcome Date GA Lbr Unruly/2nd Weight Sex Delivery Anes PTL Lv   2 SAB 2008           1 SAB                Family History   Problem Relation Age of Onset    Diabetes Mother     Hypertension Mother     Heart failure Mother         viral?    Cancer Neg Hx     Ovarian cancer Neg Hx     Melanoma Neg Hx     Heart attack Neg Hx     Stroke Neg Hx     Colon cancer Neg Hx     Breast cancer Neg Hx     Blindness Neg Hx     Amblyopia Neg Hx     Cataracts Neg Hx     Glaucoma Neg Hx     Macular degeneration Neg Hx     Retinal detachment Neg Hx     Strabismus Neg Hx        Social History     Tobacco Use    Smoking status: Never Smoker    Smokeless tobacco: Never Used   Substance Use Topics    Alcohol use: No    Drug use: No       /82   Ht 5' 5" (1.651 m)   Wt 95.7 kg (210 lb 15.7 oz)   LMP 2019 (Exact Date)   BMI 35.11 kg/m²     ROS:  GENERAL: Denies weight gain or weight loss. Feeling well overall.   SKIN: Denies rash or lesions.   HEAD: Denies head injury or headache.   NODES: Denies enlarged lymph nodes.   CHEST: Denies chest pain or shortness of breath.   CARDIOVASCULAR: Denies palpitations or left sided chest pain.   ABDOMEN: No abdominal pain, constipation, diarrhea, nausea, vomiting or rectal bleeding.   URINARY: No frequency, dysuria, hematuria, or burning on urination.  REPRODUCTIVE: See HPI.   HEMATOLOGIC: No easy bruisability or excessive bleeding.  MUSCULOSKELETAL: Denies joint pain or swelling.   NEUROLOGIC: Denies syncope or weakness.   PSYCHIATRIC: Denies depression, anxiety or mood swings.    PHYSICAL EXAM:  APPEARANCE: Well nourished, well developed, in no acute distress.  AFFECT: WNL, alert " and oriented x 3  SKIN: No acne or hirsutism  NECK: Neck symmetric without masses or thyromegaly  NODES: No inguinal, cervical, axillary, or femoral lymph node enlargement  CHEST: Good respiratory effect  ABDOMEN: Soft.  No tenderness or masses.  No hepatosplenomegaly.  No hernias.  PELVIC: Deferred  EXTREMITIES: No edema.      ICD-10-CM ICD-9-CM    1. Preoperative exam for gynecologic surgery Z01.818 V72.83          Patient was counseled today on  treatment options for fibroids.  Discussed that removal of fibroids may not exclude the risk of miscarriage.  However, would recommend removal prior to conceiving, especially as one of the fibroids abuts the endometrial cavity.       Discussed the risk of recurrence of fibroids, the need to delay conception for 4 months, and the need for  for delivery.  Patient and  state understanding.  Discussed routes of myomectomy - laparoscopic vs. Abdominal and risks/benefits of each route.  In review of MRI, she is a candidate for a laparoscopic approach- 5 fibroids identified.       Surgical risks discussed including but not limited to inability to remove all fibroids, need for laparotomy, risks of power morcellation.    Surgery scheduled for       I have discussed the risks, benefits, indications, and alternatives of the procedure in detail.  The patient verbalizes her understanding.  All questions answered.  Consents signed.  The patient agrees to proceed to proceed as planned.

## 2019-12-30 NOTE — H&P (VIEW-ONLY)
CC: Preop exam    Angel Mosquera is a 39 y.o. female  presents for a pre-op exam for a DVM scheduled on .      Patient is known to me as she presented to the ED with a missed  in October.  After discussion of treatment options, she desired surgical management.  D&C was performed without complication.  Patient was known to have fibroids.  Pregnancy was the result of IVF.     She reports cycles are heavy and painful.  She reports back and pelvic pain that worsened with pregnancy.  She also reports pain with intercourse.       MRI Shows:     FINDINGS:  Uterus is anteverted and anteflexed. It measures 9.5 x 8.3 x 8.7 cm.  Endometrium has uniform signal and has a maximal thickness of 6 mm, which is within normal limits for a female with menstrual cycles. Junctional zone is normal.    Multiple fibroids are identified.    The largest is approximately 5.2 cm, intramural, within the left posterior uterus with heterogenous post-contrast and.    There is a 1.7 cm left posterior fibroid which abuts the submucosa.    There is a 3.0 cm subserosal fibroid at the right posterior uterus.    Two subserosal fibroids at the fundus measure roughly 3.1 cm and 3.0 cm in maximum diameter, respectively.    Bilateral ovaries are normal in size and appearance with normal appearing physiologic cyst.  No adnexal or pelvic masses.    Bladder is relatively decompressed and has normal appearance. No enlarged lymph nodes. There is trace free fluid within the pelvis, likely physiologic in a female patient in this age.    Osseous structures have normal marrow signal characteristics.     She has embryo frozen and plans to proceed with an embryo transfer but desires to have fibroids removed priro to transfer.             Past Medical History:   Diagnosis Date    Rosacea        Past Surgical History:   Procedure Laterality Date    Breast Augmentation      DILATION AND CURETTAGE OF UTERUS N/A 10/19/2019     "Procedure: DILATION AND CURETTAGE, UTERUS;  Surgeon: Frances Culp MD;  Location: [facility] OR;  Service: OB/GYN;  Laterality: N/A;  with suction    mischarr         OB History    Para Term  AB Living   2       2 0   SAB TAB Ectopic Multiple Live Births   2              # Outcome Date GA Lbr Unruly/2nd Weight Sex Delivery Anes PTL Lv   2 SAB 2008           1 SAB                Family History   Problem Relation Age of Onset    Diabetes Mother     Hypertension Mother     Heart failure Mother         viral?    Cancer Neg Hx     Ovarian cancer Neg Hx     Melanoma Neg Hx     Heart attack Neg Hx     Stroke Neg Hx     Colon cancer Neg Hx     Breast cancer Neg Hx     Blindness Neg Hx     Amblyopia Neg Hx     Cataracts Neg Hx     Glaucoma Neg Hx     Macular degeneration Neg Hx     Retinal detachment Neg Hx     Strabismus Neg Hx        Social History     Tobacco Use    Smoking status: Never Smoker    Smokeless tobacco: Never Used   Substance Use Topics    Alcohol use: No    Drug use: No       /82   Ht 5' 5" (1.651 m)   Wt 95.7 kg (210 lb 15.7 oz)   LMP 2019 (Exact Date)   BMI 35.11 kg/m²     ROS:  GENERAL: Denies weight gain or weight loss. Feeling well overall.   SKIN: Denies rash or lesions.   HEAD: Denies head injury or headache.   NODES: Denies enlarged lymph nodes.   CHEST: Denies chest pain or shortness of breath.   CARDIOVASCULAR: Denies palpitations or left sided chest pain.   ABDOMEN: No abdominal pain, constipation, diarrhea, nausea, vomiting or rectal bleeding.   URINARY: No frequency, dysuria, hematuria, or burning on urination.  REPRODUCTIVE: See HPI.   HEMATOLOGIC: No easy bruisability or excessive bleeding.  MUSCULOSKELETAL: Denies joint pain or swelling.   NEUROLOGIC: Denies syncope or weakness.   PSYCHIATRIC: Denies depression, anxiety or mood swings.    PHYSICAL EXAM:  APPEARANCE: Well nourished, well developed, in no acute distress.  AFFECT: WNL, alert " and oriented x 3  SKIN: No acne or hirsutism  NECK: Neck symmetric without masses or thyromegaly  NODES: No inguinal, cervical, axillary, or femoral lymph node enlargement  CHEST: Good respiratory effect  ABDOMEN: Soft.  No tenderness or masses.  No hepatosplenomegaly.  No hernias.  PELVIC: Deferred  EXTREMITIES: No edema.      ICD-10-CM ICD-9-CM    1. Preoperative exam for gynecologic surgery Z01.818 V72.83          Patient was counseled today on  treatment options for fibroids.  Discussed that removal of fibroids may not exclude the risk of miscarriage.  However, would recommend removal prior to conceiving, especially as one of the fibroids abuts the endometrial cavity.       Discussed the risk of recurrence of fibroids, the need to delay conception for 4 months, and the need for  for delivery.  Patient and  state understanding.  Discussed routes of myomectomy - laparoscopic vs. Abdominal and risks/benefits of each route.  In review of MRI, she is a candidate for a laparoscopic approach- 5 fibroids identified.       Surgical risks discussed including but not limited to inability to remove all fibroids, need for laparotomy, risks of power morcellation.    Surgery scheduled for       I have discussed the risks, benefits, indications, and alternatives of the procedure in detail.  The patient verbalizes her understanding.  All questions answered.  Consents signed.  The patient agrees to proceed to proceed as planned.

## 2019-12-31 ENCOUNTER — HOSPITAL ENCOUNTER (OUTPATIENT)
Dept: PREADMISSION TESTING | Facility: OTHER | Age: 39
Discharge: HOME | End: 2019-12-31
Attending: OBSTETRICS & GYNECOLOGY
Payer: COMMERCIAL

## 2019-12-31 ENCOUNTER — ANESTHESIA EVENT (OUTPATIENT)
Dept: SURGERY | Facility: OTHER | Age: 39
End: 2019-12-31
Payer: COMMERCIAL

## 2019-12-31 VITALS
HEIGHT: 65 IN | WEIGHT: 210 LBS | SYSTOLIC BLOOD PRESSURE: 123 MMHG | OXYGEN SATURATION: 100 % | DIASTOLIC BLOOD PRESSURE: 73 MMHG | TEMPERATURE: 99 F | BODY MASS INDEX: 34.99 KG/M2 | HEART RATE: 72 BPM

## 2019-12-31 PROBLEM — Z11.3 SCREENING EXAMINATION FOR VENEREAL DISEASE: Status: RESOLVED | Noted: 2017-11-06 | Resolved: 2019-12-31

## 2019-12-31 PROBLEM — Z11.59 SPECIAL SCREENING EXAMINATION FOR VIRAL DISEASE: Status: RESOLVED | Noted: 2017-11-06 | Resolved: 2019-12-31

## 2019-12-31 PROBLEM — Z31.49: Status: RESOLVED | Noted: 2017-11-06 | Resolved: 2019-12-31

## 2019-12-31 PROBLEM — O02.1 MISSED ABORTION: Status: RESOLVED | Noted: 2019-10-19 | Resolved: 2019-12-31

## 2019-12-31 PROBLEM — Z11.8 SPECIAL SCREENING EXAMINATION FOR CHLAMYDIAL DISEASE: Status: RESOLVED | Noted: 2017-11-06 | Resolved: 2019-12-31

## 2019-12-31 PROBLEM — Z01.812 ENCOUNTER FOR PREPROCEDURAL LABORATORY EXAMINATION: Status: RESOLVED | Noted: 2017-11-06 | Resolved: 2019-12-31

## 2019-12-31 PROBLEM — Z11.9 SCREENING EXAMINATION FOR INFECTIOUS DISEASE: Status: RESOLVED | Noted: 2017-11-06 | Resolved: 2019-12-31

## 2019-12-31 PROBLEM — Z98.890 STATUS POST DILATION AND CURETTAGE: Status: RESOLVED | Noted: 2019-10-19 | Resolved: 2019-12-31

## 2019-12-31 RX ORDER — SODIUM CHLORIDE, SODIUM LACTATE, POTASSIUM CHLORIDE, CALCIUM CHLORIDE 600; 310; 30; 20 MG/100ML; MG/100ML; MG/100ML; MG/100ML
INJECTION, SOLUTION INTRAVENOUS CONTINUOUS
Status: CANCELLED | OUTPATIENT
Start: 2019-12-31

## 2019-12-31 RX ORDER — SCOLOPAMINE TRANSDERMAL SYSTEM 1 MG/1
1 PATCH, EXTENDED RELEASE TRANSDERMAL ONCE
Status: CANCELLED | OUTPATIENT
Start: 2019-12-31 | End: 2019-12-31

## 2019-12-31 RX ORDER — ACETAMINOPHEN 500 MG
1000 TABLET ORAL
Status: CANCELLED | OUTPATIENT
Start: 2019-12-31 | End: 2019-12-31

## 2019-12-31 RX ORDER — PREGABALIN 75 MG/1
75 CAPSULE ORAL
Status: CANCELLED | OUTPATIENT
Start: 2019-12-31 | End: 2019-12-31

## 2019-12-31 RX ORDER — FAMOTIDINE 20 MG/1
20 TABLET, FILM COATED ORAL
Status: CANCELLED | OUTPATIENT
Start: 2019-12-31 | End: 2019-12-31

## 2019-12-31 NOTE — ANESTHESIA PREPROCEDURE EVALUATION
12/31/2019  Angel Mosquera is a 39 y.o., female.    Anesthesia Evaluation    I have reviewed the Patient Summary Reports.    I have reviewed the Nursing Notes.   I have reviewed the Medications.     Review of Systems  Anesthesia Hx:  Hx of Anesthetic complications PONV Denies Family Hx of Anesthesia complications.  Personal Hx of Anesthesia complications, Post-Operative Nausea/Vomiting   Social:  Non-Smoker    Hematology/Oncology:  Hematology Normal   Oncology Normal     EENT/Dental:EENT/Dental Normal   Cardiovascular:  Cardiovascular Normal     Pulmonary:  Pulmonary Normal    Renal/:  Renal/ Normal     Hepatic/GI:  Hepatic/GI Normal    Musculoskeletal:  Musculoskeletal Normal    Neurological:  Neurology Normal    Endocrine:  Endocrine Normal    Dermatological:  Skin Normal    Psych:  Psychiatric Normal  Psychiatric history: Incomplete AB.          Physical Exam  General:  Obesity    Airway/Jaw/Neck:  Airway Findings: Mouth Opening: Normal Tongue: Normal  General Airway Assessment: Adult  Mallampati: II  TM Distance: Normal, at least 6 cm      Dental:  Dental Findings: In tact   Chest/Lungs:  Chest/Lungs Clear    Heart/Vascular:  Heart Findings: Normal       Mental Status:  Mental Status Findings:  Alert and Oriented, Cooperative         Anesthesia Plan  Type of Anesthesia, risks & benefits discussed:  Anesthesia Type:  general  Patient's Preference:   Intra-op Monitoring Plan: standard ASA monitors  Intra-op Monitoring Plan Comments:   Post Op Pain Control Plan: multimodal analgesia and per primary service following discharge from PACU  Post Op Pain Control Plan Comments:   Induction:   IV  Beta Blocker:         Informed Consent: Patient understands risks and agrees with Anesthesia plan.  Questions answered. Anesthesia consent signed with patient.  ASA Score: 2  emergent   Day of Surgery Review of  History & Physical:    H&P update referred to the surgeon.         Ready For Surgery From Anesthesia Perspective.

## 2019-12-31 NOTE — DISCHARGE INSTRUCTIONS
PRE-ADMIT TESTING -  [phone]    [address]  [place] [facility]          Your surgery has been scheduled at [facility]. We are pleased to have the opportunity to serve you. For Further Information please call [phone].    On the day of surgery please report to the Information Desk on the 1st floor.    · CONTACT YOUR PHYSICIAN'S OFFICE THE DAY PRIOR TO YOUR SURGERY TO OBTAIN YOUR ARRIVAL TIME.     · The evening before surgery do not eat anything after 9 p.m. ( this includes hard candy, chewing gum and mints).  You may only have GATORADE, POWERADE AND WATER  from 9 p.m. until you leave your home.   DO NOT DRINK ANY LIQUIDS ON THE WAY TO THE HOSPITAL.      SPECIAL MEDICATION INSTRUCTIONS: TAKE medications checked off by the Anesthesiologist on your Medication List.    Angiogram Patients: Take medications as instructed by your physician, including aspirin.     Surgery Patients:    If you take ASPIRIN - Your PHYSICIAN/SURGEON will need to inform you IF/OR when you need to stop taking aspirin prior to your surgery.     Do Not take any medications containing IBUPROFEN.  Do Not Wear any make-up or dark nail polish   (especially eye make-up) to surgery. If you come to surgery with makeup on you will be required to remove the makeup or nail polish.    Do not shave your surgical area at least 5 days prior to your surgery. The surgical prep will be performed at the hospital according to Infection Control regulations.    Leave all valuables at home.   Do Not wear any jewelry or watches, including any metal in body piercings. Jewelry must be removed prior to coming to the hospital.  There is a possibility that rings that are unable to be removed may be cut off if they are on the surgical extremity.    Contact Lens must be removed before surgery. Either do not wear the contact lens or bring a case and solution for storage.  Please bring a container for eyeglasses or dentures as required.  Bring  any paperwork your physician has provided, such as consent forms,  history and physicals, doctor's orders, etc.   Bring comfortable clothes that are loose fitting to wear upon discharge. Take into consideration the type of surgery being performed.  Maintain your diet as advised per your physician the day prior to surgery.      Adequate rest the night before surgery is advised.   Park in the Parking lot behind the hospital or in the [facility] across the street from the parking lot. Parking is complimentary.  If you will be discharged the same day as your procedure, please arrange for a responsible adult to drive you home or to accompany you if traveling by taxi.   YOU WILL NOT BE PERMITTED TO DRIVE OR TO LEAVE THE HOSPITAL ALONE AFTER SURGERY.   It is strongly recommended that you arrange for someone to remain with you for the first 24 hrs following your surgery.    The Surgeon will speak to your family/visitor after your surgery regarding the outcome of your surgery and post op care.  The Surgeon may speak to you after your surgery, but there is a possibility you may not remember the details.  Please check with your family members regarding the conversation with the Surgeon.    We strongly recommend whoever is bringing you home be present for discharge instructions.  This will ensure a thorough understanding for your post op home care.    EACH PATIENT IS ALLOWED TWO FAMILY MEMBERS OR VISITORS IN THE ROOM AND IN THE WAITING ROOMS WHILE YOU ARE IN SURGERY. ALL CHILDREN MUST ALWAYS BE ACCOMPANIED BY AN ADULT.    Thank you for your cooperation.  The Staff of [facility].                Bathing Instructions with Hibiclens     Shower the evening before and morning of your procedure with Hibiclens:   Wash your face with water and your regular face wash/soap   Apply Hibiclens directly on your skin or on a wet washcloth and wash gently. When showering: Move away from the shower stream when  applying Hibiclens to avoid rinsing off too soon.   Rinse thoroughly with warm water   Do not dilute Hibiclens         Dry off as usual, do not use any deodorant, powder, body lotions, perfume, after shave or cologne.

## 2020-01-07 ENCOUNTER — ANESTHESIA (OUTPATIENT)
Dept: SURGERY | Facility: OTHER | Age: 40
End: 2020-01-07
Payer: COMMERCIAL

## 2020-01-07 ENCOUNTER — HOSPITAL ENCOUNTER (OUTPATIENT)
Facility: OTHER | Age: 40
Discharge: HOME | End: 2020-01-07
Attending: OBSTETRICS & GYNECOLOGY | Admitting: OBSTETRICS & GYNECOLOGY
Payer: COMMERCIAL

## 2020-01-07 VITALS
TEMPERATURE: 99 F | BODY MASS INDEX: 34.99 KG/M2 | RESPIRATION RATE: 16 BRPM | HEIGHT: 65 IN | HEART RATE: 60 BPM | SYSTOLIC BLOOD PRESSURE: 124 MMHG | OXYGEN SATURATION: 95 % | WEIGHT: 210 LBS | DIASTOLIC BLOOD PRESSURE: 68 MMHG

## 2020-01-07 DIAGNOSIS — D25.2 INTRAMURAL AND SUBSEROUS LEIOMYOMA OF UTERUS: ICD-10-CM

## 2020-01-07 DIAGNOSIS — D25.1 INTRAMURAL AND SUBSEROUS LEIOMYOMA OF UTERUS: ICD-10-CM

## 2020-01-07 LAB
ABO + RH BLD: NORMAL
B-HCG UR QL: NEGATIVE
BASOPHILS # BLD AUTO: 0.08 K/UL (ref 0–0.2)
BASOPHILS NFR BLD: 0.9 % (ref 0–1.9)
BLD GP AB SCN CELLS X3 SERPL QL: NORMAL
CTP QC/QA: YES
DIFFERENTIAL METHOD: ABNORMAL
EOSINOPHIL # BLD AUTO: 0.1 K/UL (ref 0–0.5)
EOSINOPHIL NFR BLD: 1.3 % (ref 0–8)
ERYTHROCYTE [DISTWIDTH] IN BLOOD BY AUTOMATED COUNT: 12.8 % (ref 11.5–14.5)
HCT VFR BLD AUTO: 39.6 % (ref 37–48.5)
HGB BLD-MCNC: 12 G/DL (ref 12–16)
IMM GRANULOCYTES # BLD AUTO: 0.02 K/UL (ref 0–0.04)
IMM GRANULOCYTES NFR BLD AUTO: 0.2 % (ref 0–0.5)
LYMPHOCYTES # BLD AUTO: 3.4 K/UL (ref 1–4.8)
LYMPHOCYTES NFR BLD: 36.5 % (ref 18–48)
MCH RBC QN AUTO: 26.6 PG (ref 27–31)
MCHC RBC AUTO-ENTMCNC: 30.3 G/DL (ref 32–36)
MCV RBC AUTO: 88 FL (ref 82–98)
MONOCYTES # BLD AUTO: 0.7 K/UL (ref 0.3–1)
MONOCYTES NFR BLD: 7.4 % (ref 4–15)
NEUTROPHILS # BLD AUTO: 5 K/UL (ref 1.8–7.7)
NEUTROPHILS NFR BLD: 53.7 % (ref 38–73)
NRBC BLD-RTO: 0 /100 WBC
PLATELET # BLD AUTO: 382 K/UL (ref 150–350)
PMV BLD AUTO: 9.6 FL (ref 9.2–12.9)
POCT GLUCOSE: 90 MG/DL (ref 70–110)
RBC # BLD AUTO: 4.51 M/UL (ref 4–5.4)
WBC # BLD AUTO: 9.34 K/UL (ref 3.9–12.7)

## 2020-01-07 PROCEDURE — 82962 GLUCOSE BLOOD TEST: CPT | Performed by: OBSTETRICS & GYNECOLOGY

## 2020-01-07 PROCEDURE — 36000710: Performed by: OBSTETRICS & GYNECOLOGY

## 2020-01-07 PROCEDURE — 81025 URINE PREGNANCY TEST: CPT | Performed by: ANESTHESIOLOGY

## 2020-01-07 PROCEDURE — 71000039 HC RECOVERY, EACH ADD'L HOUR: Performed by: OBSTETRICS & GYNECOLOGY

## 2020-01-07 PROCEDURE — 58546 LAPARO-MYOMECTOMY COMPLEX: CPT | Mod: AS,,, | Performed by: NURSE PRACTITIONER

## 2020-01-07 PROCEDURE — 86901 BLOOD TYPING SEROLOGIC RH(D): CPT

## 2020-01-07 PROCEDURE — 88305 TISSUE EXAM BY PATHOLOGIST: CPT | Performed by: PATHOLOGY

## 2020-01-07 PROCEDURE — 63600175 PHARM REV CODE 636 W HCPCS: Performed by: OBSTETRICS & GYNECOLOGY

## 2020-01-07 PROCEDURE — 58546 PR LAP,MYOMECTOMY 5/>,TOTAL WT >250 GMS: ICD-10-PCS | Mod: AS,,, | Performed by: NURSE PRACTITIONER

## 2020-01-07 PROCEDURE — 58546 PR LAP,MYOMECTOMY 5/>,TOTAL WT >250 GMS: ICD-10-PCS | Mod: ,,, | Performed by: OBSTETRICS & GYNECOLOGY

## 2020-01-07 PROCEDURE — 63600175 PHARM REV CODE 636 W HCPCS: Performed by: SPECIALIST

## 2020-01-07 PROCEDURE — C1782 MORCELLATOR: HCPCS | Performed by: OBSTETRICS & GYNECOLOGY

## 2020-01-07 PROCEDURE — 63600175 PHARM REV CODE 636 W HCPCS: Performed by: ANESTHESIOLOGY

## 2020-01-07 PROCEDURE — 36415 COLL VENOUS BLD VENIPUNCTURE: CPT

## 2020-01-07 PROCEDURE — 25000003 PHARM REV CODE 250: Performed by: OBSTETRICS & GYNECOLOGY

## 2020-01-07 PROCEDURE — 88305 TISSUE EXAM BY PATHOLOGIST: ICD-10-PCS | Mod: 26,,, | Performed by: PATHOLOGY

## 2020-01-07 PROCEDURE — 27201423 OPTIME MED/SURG SUP & DEVICES STERILE SUPPLY: Performed by: OBSTETRICS & GYNECOLOGY

## 2020-01-07 PROCEDURE — 58546 LAPARO-MYOMECTOMY COMPLEX: CPT | Mod: ,,, | Performed by: OBSTETRICS & GYNECOLOGY

## 2020-01-07 PROCEDURE — 37000009 HC ANESTHESIA EA ADD 15 MINS: Performed by: OBSTETRICS & GYNECOLOGY

## 2020-01-07 PROCEDURE — 37000008 HC ANESTHESIA 1ST 15 MINUTES: Performed by: OBSTETRICS & GYNECOLOGY

## 2020-01-07 PROCEDURE — 88305 TISSUE EXAM BY PATHOLOGIST: CPT | Mod: 26,,, | Performed by: PATHOLOGY

## 2020-01-07 PROCEDURE — 71000033 HC RECOVERY, INTIAL HOUR: Performed by: OBSTETRICS & GYNECOLOGY

## 2020-01-07 PROCEDURE — 85025 COMPLETE CBC W/AUTO DIFF WBC: CPT

## 2020-01-07 PROCEDURE — P9045 ALBUMIN (HUMAN), 5%, 250 ML: HCPCS | Mod: JG | Performed by: NURSE ANESTHETIST, CERTIFIED REGISTERED

## 2020-01-07 PROCEDURE — 25000003 PHARM REV CODE 250: Performed by: NURSE ANESTHETIST, CERTIFIED REGISTERED

## 2020-01-07 PROCEDURE — 63600175 PHARM REV CODE 636 W HCPCS: Performed by: NURSE ANESTHETIST, CERTIFIED REGISTERED

## 2020-01-07 PROCEDURE — 25000003 PHARM REV CODE 250: Performed by: SPECIALIST

## 2020-01-07 PROCEDURE — 25000003 PHARM REV CODE 250: Performed by: ANESTHESIOLOGY

## 2020-01-07 PROCEDURE — 71000016 HC POSTOP RECOV ADDL HR: Performed by: OBSTETRICS & GYNECOLOGY

## 2020-01-07 PROCEDURE — 36000711: Performed by: OBSTETRICS & GYNECOLOGY

## 2020-01-07 PROCEDURE — 71000015 HC POSTOP RECOV 1ST HR: Performed by: OBSTETRICS & GYNECOLOGY

## 2020-01-07 RX ORDER — ONDANSETRON 2 MG/ML
4 INJECTION INTRAMUSCULAR; INTRAVENOUS DAILY PRN
Status: DISCONTINUED | OUTPATIENT
Start: 2020-01-07 | End: 2020-01-07 | Stop reason: HOSPADM

## 2020-01-07 RX ORDER — DIPHENHYDRAMINE HYDROCHLORIDE 50 MG/ML
25 INJECTION INTRAMUSCULAR; INTRAVENOUS EVERY 4 HOURS PRN
Status: DISCONTINUED | OUTPATIENT
Start: 2020-01-07 | End: 2020-01-07 | Stop reason: HOSPADM

## 2020-01-07 RX ORDER — SODIUM CHLORIDE, SODIUM LACTATE, POTASSIUM CHLORIDE, CALCIUM CHLORIDE 600; 310; 30; 20 MG/100ML; MG/100ML; MG/100ML; MG/100ML
INJECTION, SOLUTION INTRAVENOUS CONTINUOUS
Status: DISCONTINUED | OUTPATIENT
Start: 2020-01-07 | End: 2020-01-07 | Stop reason: HOSPADM

## 2020-01-07 RX ORDER — DIPHENHYDRAMINE HYDROCHLORIDE 50 MG/ML
INJECTION INTRAMUSCULAR; INTRAVENOUS
Status: DISCONTINUED | OUTPATIENT
Start: 2020-01-07 | End: 2020-01-07

## 2020-01-07 RX ORDER — IBUPROFEN 400 MG/1
800 TABLET ORAL
Status: DISCONTINUED | OUTPATIENT
Start: 2020-01-08 | End: 2020-01-07 | Stop reason: HOSPADM

## 2020-01-07 RX ORDER — IBUPROFEN 600 MG/1
600 TABLET ORAL EVERY 6 HOURS PRN
Qty: 30 TABLET | Refills: 0 | Status: SHIPPED | OUTPATIENT
Start: 2020-01-07 | End: 2020-04-28

## 2020-01-07 RX ORDER — ACETAMINOPHEN 500 MG
1000 TABLET ORAL
Status: DISCONTINUED | OUTPATIENT
Start: 2020-01-07 | End: 2020-01-07 | Stop reason: HOSPADM

## 2020-01-07 RX ORDER — PREGABALIN 75 MG/1
75 CAPSULE ORAL
Status: DISCONTINUED | OUTPATIENT
Start: 2020-01-07 | End: 2020-01-07 | Stop reason: HOSPADM

## 2020-01-07 RX ORDER — ALBUMIN HUMAN 50 G/1000ML
SOLUTION INTRAVENOUS CONTINUOUS PRN
Status: DISCONTINUED | OUTPATIENT
Start: 2020-01-07 | End: 2020-01-07

## 2020-01-07 RX ORDER — SCOLOPAMINE TRANSDERMAL SYSTEM 1 MG/1
1 PATCH, EXTENDED RELEASE TRANSDERMAL ONCE
Status: COMPLETED | OUTPATIENT
Start: 2020-01-07 | End: 2020-01-07

## 2020-01-07 RX ORDER — OXYCODONE HYDROCHLORIDE 5 MG/1
5 TABLET ORAL
Status: DISCONTINUED | OUTPATIENT
Start: 2020-01-07 | End: 2020-01-07 | Stop reason: HOSPADM

## 2020-01-07 RX ORDER — HYDROCODONE BITARTRATE AND ACETAMINOPHEN 5; 325 MG/1; MG/1
1 TABLET ORAL EVERY 6 HOURS PRN
Qty: 20 TABLET | Refills: 0 | Status: SHIPPED | OUTPATIENT
Start: 2020-01-07 | End: 2020-04-28

## 2020-01-07 RX ORDER — PROPOFOL 10 MG/ML
VIAL (ML) INTRAVENOUS
Status: DISCONTINUED | OUTPATIENT
Start: 2020-01-07 | End: 2020-01-07

## 2020-01-07 RX ORDER — OXYCODONE AND ACETAMINOPHEN 10; 325 MG/1; MG/1
1 TABLET ORAL EVERY 4 HOURS PRN
Status: DISCONTINUED | OUTPATIENT
Start: 2020-01-07 | End: 2020-01-07 | Stop reason: HOSPADM

## 2020-01-07 RX ORDER — FAMOTIDINE 20 MG/1
20 TABLET, FILM COATED ORAL
Status: COMPLETED | OUTPATIENT
Start: 2020-01-07 | End: 2020-01-07

## 2020-01-07 RX ORDER — ONDANSETRON HYDROCHLORIDE 2 MG/ML
INJECTION, SOLUTION INTRAMUSCULAR; INTRAVENOUS
Status: DISCONTINUED | OUTPATIENT
Start: 2020-01-07 | End: 2020-01-07

## 2020-01-07 RX ORDER — OXYCODONE HYDROCHLORIDE 5 MG/1
5 TABLET ORAL
Status: DISCONTINUED | OUTPATIENT
Start: 2020-01-07 | End: 2020-01-07

## 2020-01-07 RX ORDER — ACETAMINOPHEN 500 MG
1000 TABLET ORAL
Status: COMPLETED | OUTPATIENT
Start: 2020-01-07 | End: 2020-01-07

## 2020-01-07 RX ORDER — ONDANSETRON 8 MG/1
8 TABLET, ORALLY DISINTEGRATING ORAL EVERY 8 HOURS PRN
Status: DISCONTINUED | OUTPATIENT
Start: 2020-01-07 | End: 2020-01-07 | Stop reason: HOSPADM

## 2020-01-07 RX ORDER — ONDANSETRON 2 MG/ML
4 INJECTION INTRAMUSCULAR; INTRAVENOUS DAILY PRN
Status: DISCONTINUED | OUTPATIENT
Start: 2020-01-07 | End: 2020-01-07

## 2020-01-07 RX ORDER — LIDOCAINE HYDROCHLORIDE 10 MG/ML
0.5 INJECTION, SOLUTION EPIDURAL; INFILTRATION; INTRACAUDAL; PERINEURAL
Status: DISCONTINUED | OUTPATIENT
Start: 2020-01-07 | End: 2020-01-07 | Stop reason: HOSPADM

## 2020-01-07 RX ORDER — SODIUM CHLORIDE 0.9 % (FLUSH) 0.9 %
3 SYRINGE (ML) INJECTION
Status: DISCONTINUED | OUTPATIENT
Start: 2020-01-07 | End: 2020-01-07

## 2020-01-07 RX ORDER — VASOPRESSIN 20 [USP'U]/ML
INJECTION, SOLUTION INTRAMUSCULAR; SUBCUTANEOUS
Status: DISCONTINUED | OUTPATIENT
Start: 2020-01-07 | End: 2020-01-07 | Stop reason: HOSPADM

## 2020-01-07 RX ORDER — FENTANYL CITRATE 50 UG/ML
INJECTION, SOLUTION INTRAMUSCULAR; INTRAVENOUS
Status: DISCONTINUED | OUTPATIENT
Start: 2020-01-07 | End: 2020-01-07

## 2020-01-07 RX ORDER — OXYCODONE AND ACETAMINOPHEN 5; 325 MG/1; MG/1
1 TABLET ORAL EVERY 4 HOURS PRN
Status: DISCONTINUED | OUTPATIENT
Start: 2020-01-07 | End: 2020-01-07 | Stop reason: HOSPADM

## 2020-01-07 RX ORDER — ROCURONIUM BROMIDE 10 MG/ML
INJECTION, SOLUTION INTRAVENOUS
Status: DISCONTINUED | OUTPATIENT
Start: 2020-01-07 | End: 2020-01-07

## 2020-01-07 RX ORDER — MUPIROCIN 20 MG/G
OINTMENT TOPICAL
Status: COMPLETED | OUTPATIENT
Start: 2020-01-07 | End: 2020-01-07

## 2020-01-07 RX ORDER — CELECOXIB 200 MG/1
400 CAPSULE ORAL
Status: COMPLETED | OUTPATIENT
Start: 2020-01-07 | End: 2020-01-07

## 2020-01-07 RX ORDER — POLYETHYLENE GLYCOL 3350 17 G/17G
17 POWDER, FOR SOLUTION ORAL DAILY
Status: DISCONTINUED | OUTPATIENT
Start: 2020-01-07 | End: 2020-01-07 | Stop reason: HOSPADM

## 2020-01-07 RX ORDER — HYDROMORPHONE HYDROCHLORIDE 2 MG/ML
0.2 INJECTION, SOLUTION INTRAMUSCULAR; INTRAVENOUS; SUBCUTANEOUS EVERY 5 MIN PRN
Status: DISCONTINUED | OUTPATIENT
Start: 2020-01-07 | End: 2020-01-07

## 2020-01-07 RX ORDER — BUPIVACAINE HYDROCHLORIDE 2.5 MG/ML
INJECTION, SOLUTION EPIDURAL; INFILTRATION; INTRACAUDAL
Status: DISCONTINUED | OUTPATIENT
Start: 2020-01-07 | End: 2020-01-07 | Stop reason: HOSPADM

## 2020-01-07 RX ORDER — FAMOTIDINE 20 MG/1
20 TABLET, FILM COATED ORAL
Status: DISCONTINUED | OUTPATIENT
Start: 2020-01-07 | End: 2020-01-07 | Stop reason: HOSPADM

## 2020-01-07 RX ORDER — DIPHENHYDRAMINE HCL 25 MG
25 CAPSULE ORAL EVERY 4 HOURS PRN
Status: DISCONTINUED | OUTPATIENT
Start: 2020-01-07 | End: 2020-01-07 | Stop reason: HOSPADM

## 2020-01-07 RX ORDER — DIPHENHYDRAMINE HYDROCHLORIDE 50 MG/ML
25 INJECTION INTRAMUSCULAR; INTRAVENOUS EVERY 6 HOURS PRN
Status: DISCONTINUED | OUTPATIENT
Start: 2020-01-07 | End: 2020-01-07 | Stop reason: HOSPADM

## 2020-01-07 RX ORDER — HYDROMORPHONE HYDROCHLORIDE 2 MG/ML
0.4 INJECTION, SOLUTION INTRAMUSCULAR; INTRAVENOUS; SUBCUTANEOUS EVERY 5 MIN PRN
Status: DISCONTINUED | OUTPATIENT
Start: 2020-01-07 | End: 2020-01-07 | Stop reason: HOSPADM

## 2020-01-07 RX ORDER — HYDROMORPHONE HYDROCHLORIDE 2 MG/ML
1 INJECTION, SOLUTION INTRAMUSCULAR; INTRAVENOUS; SUBCUTANEOUS
Status: DISCONTINUED | OUTPATIENT
Start: 2020-01-07 | End: 2020-01-07 | Stop reason: HOSPADM

## 2020-01-07 RX ORDER — AMOXICILLIN 250 MG
1 CAPSULE ORAL 2 TIMES DAILY
Status: DISCONTINUED | OUTPATIENT
Start: 2020-01-07 | End: 2020-01-07 | Stop reason: HOSPADM

## 2020-01-07 RX ORDER — CEFAZOLIN SODIUM 1 G/3ML
2 INJECTION, POWDER, FOR SOLUTION INTRAMUSCULAR; INTRAVENOUS
Status: COMPLETED | OUTPATIENT
Start: 2020-01-07 | End: 2020-01-07

## 2020-01-07 RX ORDER — SODIUM CHLORIDE 0.9 % (FLUSH) 0.9 %
3 SYRINGE (ML) INJECTION
Status: DISCONTINUED | OUTPATIENT
Start: 2020-01-07 | End: 2020-01-07 | Stop reason: HOSPADM

## 2020-01-07 RX ORDER — MIDAZOLAM HYDROCHLORIDE 1 MG/ML
INJECTION INTRAMUSCULAR; INTRAVENOUS
Status: DISCONTINUED | OUTPATIENT
Start: 2020-01-07 | End: 2020-01-07

## 2020-01-07 RX ORDER — DEXAMETHASONE SODIUM PHOSPHATE 4 MG/ML
INJECTION, SOLUTION INTRA-ARTICULAR; INTRALESIONAL; INTRAMUSCULAR; INTRAVENOUS; SOFT TISSUE
Status: DISCONTINUED | OUTPATIENT
Start: 2020-01-07 | End: 2020-01-07

## 2020-01-07 RX ORDER — SODIUM CHLORIDE, SODIUM LACTATE, POTASSIUM CHLORIDE, CALCIUM CHLORIDE 600; 310; 30; 20 MG/100ML; MG/100ML; MG/100ML; MG/100ML
INJECTION, SOLUTION INTRAVENOUS CONTINUOUS
Status: DISCONTINUED | OUTPATIENT
Start: 2020-01-07 | End: 2020-01-07

## 2020-01-07 RX ORDER — LIDOCAINE HCL/PF 100 MG/5ML
SYRINGE (ML) INTRAVENOUS
Status: DISCONTINUED | OUTPATIENT
Start: 2020-01-07 | End: 2020-01-07

## 2020-01-07 RX ORDER — MEPERIDINE HYDROCHLORIDE 25 MG/ML
12.5 INJECTION INTRAMUSCULAR; INTRAVENOUS; SUBCUTANEOUS ONCE AS NEEDED
Status: DISCONTINUED | OUTPATIENT
Start: 2020-01-07 | End: 2020-01-07 | Stop reason: HOSPADM

## 2020-01-07 RX ORDER — PREGABALIN 75 MG/1
75 CAPSULE ORAL
Status: COMPLETED | OUTPATIENT
Start: 2020-01-07 | End: 2020-01-07

## 2020-01-07 RX ORDER — ACETAMINOPHEN 325 MG/1
650 TABLET ORAL EVERY 4 HOURS PRN
Status: DISCONTINUED | OUTPATIENT
Start: 2020-01-07 | End: 2020-01-07 | Stop reason: HOSPADM

## 2020-01-07 RX ORDER — PROPOFOL 10 MG/ML
VIAL (ML) INTRAVENOUS CONTINUOUS PRN
Status: DISCONTINUED | OUTPATIENT
Start: 2020-01-07 | End: 2020-01-07

## 2020-01-07 RX ORDER — KETOROLAC TROMETHAMINE 30 MG/ML
30 INJECTION, SOLUTION INTRAMUSCULAR; INTRAVENOUS
Status: DISCONTINUED | OUTPATIENT
Start: 2020-01-07 | End: 2020-01-07 | Stop reason: HOSPADM

## 2020-01-07 RX ORDER — ONDANSETRON 8 MG/1
8 TABLET, ORALLY DISINTEGRATING ORAL EVERY 12 HOURS PRN
Qty: 20 TABLET | Refills: 0 | Status: SHIPPED | OUTPATIENT
Start: 2020-01-07 | End: 2020-04-28

## 2020-01-07 RX ADMIN — CEFAZOLIN 2 G: 330 INJECTION, POWDER, FOR SOLUTION INTRAMUSCULAR; INTRAVENOUS at 07:01

## 2020-01-07 RX ADMIN — FENTANYL CITRATE 100 MCG: 50 INJECTION, SOLUTION INTRAMUSCULAR; INTRAVENOUS at 07:01

## 2020-01-07 RX ADMIN — DIPHENHYDRAMINE HYDROCHLORIDE 6.25 MG: 50 INJECTION, SOLUTION INTRAMUSCULAR; INTRAVENOUS at 07:01

## 2020-01-07 RX ADMIN — HYDROMORPHONE HYDROCHLORIDE 0.4 MG: 2 INJECTION, SOLUTION INTRAMUSCULAR; INTRAVENOUS; SUBCUTANEOUS at 10:01

## 2020-01-07 RX ADMIN — FENTANYL CITRATE 50 MCG: 50 INJECTION, SOLUTION INTRAMUSCULAR; INTRAVENOUS at 09:01

## 2020-01-07 RX ADMIN — FENTANYL CITRATE 50 MCG: 50 INJECTION, SOLUTION INTRAMUSCULAR; INTRAVENOUS at 07:01

## 2020-01-07 RX ADMIN — ALBUMIN (HUMAN): 12.5 SOLUTION INTRAVENOUS at 07:01

## 2020-01-07 RX ADMIN — KETOROLAC TROMETHAMINE 30 MG: 30 INJECTION, SOLUTION INTRAMUSCULAR; INTRAVENOUS at 01:01

## 2020-01-07 RX ADMIN — PROPOFOL 40 MG: 10 INJECTION, EMULSION INTRAVENOUS at 09:01

## 2020-01-07 RX ADMIN — MUPIROCIN: 20 OINTMENT TOPICAL at 05:01

## 2020-01-07 RX ADMIN — PROPOFOL 150 MCG/KG/MIN: 10 INJECTION, EMULSION INTRAVENOUS at 07:01

## 2020-01-07 RX ADMIN — PROPOFOL 200 MG: 10 INJECTION, EMULSION INTRAVENOUS at 07:01

## 2020-01-07 RX ADMIN — PROPOFOL 40 MG: 10 INJECTION, EMULSION INTRAVENOUS at 07:01

## 2020-01-07 RX ADMIN — PREGABALIN 75 MG: 75 CAPSULE ORAL at 05:01

## 2020-01-07 RX ADMIN — ROCURONIUM BROMIDE 50 MG: 10 INJECTION, SOLUTION INTRAVENOUS at 07:01

## 2020-01-07 RX ADMIN — CARBOXYMETHYLCELLULOSE SODIUM 2 DROP: 2.5 SOLUTION/ DROPS OPHTHALMIC at 07:01

## 2020-01-07 RX ADMIN — SCOPALAMINE 1 PATCH: 1 PATCH, EXTENDED RELEASE TRANSDERMAL at 05:01

## 2020-01-07 RX ADMIN — PROMETHAZINE HYDROCHLORIDE 6.25 MG: 25 INJECTION INTRAMUSCULAR; INTRAVENOUS at 02:01

## 2020-01-07 RX ADMIN — FAMOTIDINE 20 MG: 20 TABLET ORAL at 05:01

## 2020-01-07 RX ADMIN — OXYCODONE HYDROCHLORIDE 5 MG: 5 TABLET ORAL at 10:01

## 2020-01-07 RX ADMIN — LIDOCAINE HYDROCHLORIDE 50 MG: 20 INJECTION, SOLUTION INTRAVENOUS at 07:01

## 2020-01-07 RX ADMIN — DEXAMETHASONE SODIUM PHOSPHATE 8 MG: 4 INJECTION, SOLUTION INTRAMUSCULAR; INTRAVENOUS at 07:01

## 2020-01-07 RX ADMIN — SODIUM CHLORIDE, SODIUM LACTATE, POTASSIUM CHLORIDE, AND CALCIUM CHLORIDE: 600; 310; 30; 20 INJECTION, SOLUTION INTRAVENOUS at 09:01

## 2020-01-07 RX ADMIN — MIDAZOLAM HYDROCHLORIDE 2 MG: 1 INJECTION, SOLUTION INTRAMUSCULAR; INTRAVENOUS at 06:01

## 2020-01-07 RX ADMIN — ONDANSETRON 4 MG: 2 INJECTION, SOLUTION INTRAMUSCULAR; INTRAVENOUS at 07:01

## 2020-01-07 RX ADMIN — Medication 10 MG: at 09:01

## 2020-01-07 RX ADMIN — ACETAMINOPHEN 1000 MG: 500 TABLET, FILM COATED ORAL at 05:01

## 2020-01-07 RX ADMIN — PROPOFOL 40 MG: 10 INJECTION, EMULSION INTRAVENOUS at 08:01

## 2020-01-07 RX ADMIN — SODIUM CHLORIDE, SODIUM LACTATE, POTASSIUM CHLORIDE, AND CALCIUM CHLORIDE: 600; 310; 30; 20 INJECTION, SOLUTION INTRAVENOUS at 06:01

## 2020-01-07 RX ADMIN — CELECOXIB 400 MG: 200 CAPSULE ORAL at 05:01

## 2020-01-07 NOTE — ANESTHESIA PROCEDURE NOTES
Intubation  Performed by: Brenda Simons CRNA  Authorized by: Dennis Villarreal MD     Intubation:     Induction:  Intravenous    Intubated:  Postinduction    Mask Ventilation:  Easy mask    Attempts:  1    Attempted By:  CRNA    Method of Intubation:  Video laryngoscopy    Blade:  Miller 3    Laryngeal View Grade: Grade I - full view of chords      Difficult Airway Encountered?: No      Complications:  None    Airway Device Size:  7.5    Style/Cuff Inflation:  Cuffed    Inflation Amount (mL):  4    Tube secured:  23    Placement Verified By:  Capnometry    Complicating Factors:  None    Findings Post-Intubation:  BS equal bilateral and atraumatic/condition of teeth unchanged

## 2020-01-07 NOTE — OPERATIVE NOTE ADDENDUM
Certification of Assistant at Surgery       Surgery Date: 1/7/2020     Participating Surgeons:  Surgeon(s) and Role:     * Frances Culp MD - Primary     * Mehrdad Minor MD - Resident - Assisting    Procedures:  Procedure(s) (LRB):  ROBOTIC MYOMECTOMY, UTERUS (N/A)    Assistant Surgeon's Certification of Necessity:  I understand that section 1842 (b) (6) (d) of the Social Security Act generally prohibits Medicare Part B reasonable charge payment for the services of assistants at surgery in teaching hospitals when qualified residents are available to furnish such services. I certify that the services for which payment is claimed were medically necessary, and that no qualified resident was available to perform the services. I further understand that these services are subject to post-payment review by the Medicare carrier.      RANJAN Rowland    01/07/2020  10:09 AM

## 2020-01-07 NOTE — DISCHARGE SUMMARY
[facility]  Discharge Note  Short Stay    Admit Date: 1/7/2020    Discharge Date: 01/07/2020    Attending Physician: Frances Culp MD     Surgery Date: 1/7/2020     Surgeon(s) and Role:     * Frances Culp MD - Primary     * Mehrdad Minor MD - Resident - Assisting    Pre-op Diagnosis:  Intramural and subserous leiomyoma of uterus [D25.1, D25.2]    Post-op Diagnosis:  Post-Op Diagnosis Codes:     * Intramural and subserous leiomyoma of uterus [D25.1, D25.2]    Procedure(s) (LRB):  ROBOTIC MYOMECTOMY, UTERUS (N/A)    Anesthesia: General    Discharge Provider: Mehrdad Minor    Diagnoses:  Active Hospital Problems    Diagnosis  POA    Intramural and subserous leiomyoma of uterus [D25.1, D25.2]  Yes      Resolved Hospital Problems   No resolved problems to display.       Discharged Condition: good    Hospital Course:   Patient was admitted for outpatient procedure as above, and tolerated the procedure well with no complications. Please see operative report for further details. Following the procedure, the patient was awakened from anesthesia and transferred to the recovery area in stable condition. She was discharged to home once ambulating, voiding, tolerating PO intake, and pain was well-controlled. Patient was given routine post-op instructions and prescriptions for pain medication to take as needed. Patient instructed to follow up with Dr. Culp in 6 weeks.    Final Diagnoses: Same as principal problem.    Disposition: Home or Self Care    Follow up/Patient Instructions:    Medications:  Reconciled Home Medications:      Medication List      START taking these medications    HYDROcodone-acetaminophen 5-325 mg per tablet  Commonly known as:  NORCO  Take 1 tablet by mouth every 6 (six) hours as needed for Pain.     ibuprofen 600 MG tablet  Commonly known as:  ADVIL,MOTRIN  Take 1 tablet (600 mg total) by mouth every 6 (six) hours as needed for Pain.          Discharge Procedure Orders    Diet Adult Regular     Notify your health care provider if you experience any of the following:  temperature >100.4     Notify your health care provider if you experience any of the following:  persistent nausea and vomiting or diarrhea     Notify your health care provider if you experience any of the following:  severe uncontrolled pain     Notify your health care provider if you experience any of the following:  redness, tenderness, or signs of infection (pain, swelling, redness, odor or green/yellow discharge around incision site)     Notify your health care provider if you experience any of the following:  increased confusion or weakness     Notify your health care provider if you experience any of the following:  persistent dizziness, light-headedness, or visual disturbances     Notify your health care provider if you experience any of the following:  worsening rash     Notify your health care provider if you experience any of the following:  severe persistent headache     Notify your health care provider if you experience any of the following:  difficulty breathing or increased cough     No dressing needed   Order Comments: --okay to shower/run soapy water over incision sites  --keep incision sites clean and dry  --steri strips will fall off on their own with time  --if steri strips are still on in 1 week, you may gently remove from the skin     Activity as tolerated     Follow-up Information     Frances Culp MD. Schedule an appointment as soon as possible for a visit in 6 weeks.    Specialties:  Obstetrics, Obstetrics and Gynecology  Why:  Post Op Check  Contact information:  [address] [address]  [phone]                 Mehrdad Minor MD  PGY3, OBGYN  [facility]

## 2020-01-07 NOTE — OP NOTE
OPERATIVE REPORT     Surgery Date: 01/07/2020     SURGEON: Frances Culp    ASSISTANT: Dr. Mehrdad Minor; Cheyenne Diaz NP (for the portion of the procedure when there was no qualified resident)    PREOP DIAGNOSIS:   1. Pelvic pain  2. Fibroids  3. Obesity (BMI 35)    POSTOP DIAGNOSIS:    1. Pelvic pain  2. Fibroids  3. Obesity (BMI 35)    PROCEDURES: Robotic assisted laparoscopic myomectomy (7 fibroids)    ANESTHESIA: epidural, general    FINDINGS/KEY COMPONENTS: Multiple fibroids - several pedunculated.  2 large intramural posterior fibroids.  Normal tubes and ovaries.  Patient is not a candidate for a vaginal delivery    ESTIMATED BLOOD LOSS: 75 cc    COMPLICATIONS: None    IV FLUIDS: 1000 cc LR, 500 cc albumin    URINE OUTPUT: 400 cc    PROCEDURE: Patient was taking to the operating room where general anesthesia was administered and found to be adequate.  She was prepped and draped in the dorsal lithotomy position.  A weighted sterile speculum was placed in the vagina.  The anterior lip of the cervix was grasped with a single tooth tenaculum.  The uterus was sounded to approximately 8 cm.  A RONALD manipulator was placed inside the uterine cavity.  A chavira catheter was inserted into the bladder.    Gloves were changed.  A Veress needle was inserted into the umbilicus under tenting of the anterior abdominal wall.  Placement into the peritoneal cavity was confirmed via saline drop test.  The abdomen was insufflated to 15mm Hg using Carbon dioxide.  A 8 mm supraumbilical skin incision was made with the scalpel.  A 8 mm trocar was advanced through this incision.  The camera was placed through the trocar.  Excellent hemostasis was noted.  The patient was placed in deep Trendelenburg.  An 8 mm left lateral skin incision was made with a scalpel and an 8 mm trocar was advanced through this incision under visualization of the camera.  A 12 mm left lateral incision was made with the scalpel.  A 12 mm AirSeal trocar was  advanced through the incision under visualization of the camera.  An 8 mm right lateral skin incision was made with the scalpel.  An 8 mm trocar was advanced through this incision under visualization of the camera.  An 8 mm more lateral skin incision was made with the scalpel and an 8 mm trocar was advanced through this incision under visualization of the camera.  Excellent hemostasis was noted.  The robot was docked into place.  Instruments were placed.  The surgeon moved to the console for the procedure.      The above findings were noted.  All fibroids were removed in the following fashion: 20 Units of Pitressin diluted in 100cc of Normal saline was injected into the serosa overlying the fibroid.  An incision was made with the monopolar scissors over the serosa of the fibroid.  This incision was carried down to the fibroid with the scissors.  The fibroid was grasped with a laparoscopic single tooth tenaculum.  Using the bipolar [place] and the laser, the fibroid was enucleated from the underlying myometrium.  Hemostasis was maintained utilizing the scissors.  Once the fibroid was completely enucleated, it was placed along a suture placed in the anterior cul-de-sac for removal later.  The deepest layer of myometrium was reapproximated with 180 V-Lock suture in a running fashion.  The next layer of myometrium was reapproximated with 180 V-Lock suture in a running fashion.  The serosa was reapproximated with 180 V-Lock in a baseball stitch fashion.  The subserosal fibroids were repaired in a single baseball stitch layer.  Excellent hemostasis was noted.      The uterus was copiously irrigated.  Excellent hemostasis was noted.  The 12 mm umbilical trocar was removed and a morcellator was advanced through this incision under visualization of the camera.  The suture with fibroids was grasped.  The suture was cut and the needle was removed.  Each fibroid was grasped with a Tenaculum and using morcellation, the  fibroids were removed from the abdominal cavity.  The abdomen was inspected and no pieces of fibroid remained and the abdomen was found to be free of injury.  The uterus was copiously irrigated and suctioned.  Hemoblast was placed over the uterus.  The fascia of the 12 mm incisions was re-approximated with 0-Vicryl in a Red-Jesenia fashion.  The abdomen was deflated and all trocars were removed.  The skin was closed with 4-0 Monocryl in a subcuticular fashion.  The incisions were injected with .25% Marcaine.  The RONALD and chavira were removed.  Sponge, lap, and needle counts were correct x 2.  The patient was taken to the recovery room in stable condition.

## 2020-01-07 NOTE — INTERVAL H&P NOTE
The patient has been examined and the H&P has been reviewed:    I concur with the findings and no changes have occurred since H&P was written.    Surgery risks, benefits and alternative options discussed and understood by patient/family.          Active Hospital Problems    Diagnosis  POA    Intramural and subserous leiomyoma of uterus [D25.1, D25.2]  Yes      Resolved Hospital Problems   No resolved problems to display.

## 2020-01-07 NOTE — TRANSFER OF CARE
"Anesthesia Transfer of Care Note    Patient: Angel Mosquera    Procedure(s) Performed: Procedure(s) (LRB):  ROBOTIC MYOMECTOMY, UTERUS (N/A)    Patient location: PACU    Anesthesia Type: general    Transport from OR: Transported from OR on 2-3 L/min O2 by NC with adequate spontaneous ventilation    Post pain: adequate analgesia    Post assessment: no apparent anesthetic complications    Post vital signs: stable    Level of consciousness: awake    Nausea/Vomiting: no nausea/vomiting    Complications: none    Transfer of care protocol was followed      Last vitals:   Visit Vitals  BP (!) 128/92 (BP Location: Right arm, Patient Position: Sitting)   Pulse 77   Temp 37 °C (98.6 °F) (Oral)   Resp 18   Ht 5' 5" (1.651 m)   Wt 95.3 kg (210 lb)   LMP 12/21/2019 (Exact Date)   SpO2 99%   Breastfeeding? No   BMI 34.95 kg/m²     "

## 2020-01-07 NOTE — PLAN OF CARE
Angel JOSEPHINE Mosquera has met all discharge criteria from Phase II. Vital Signs are stable, ambulating  without difficulty. Discharge instructions given, patient verbalized understanding. Discharged from facility via wheelchair in stable condition.

## 2020-01-07 NOTE — DISCHARGE INSTRUCTIONS
Abdominal Laparoscopy Discharge Instructions      Activity  · Limit your activity for 4-6 weeks.  · Dont lift anything heavier than 5-10 pounds.  · Avoid strenuous activities, such as mowing the lawn, vacuuming, or playing sports.  · Limit your activity to short, slow walks. Gradually increase your pace and distance as you feel able.  · Listen to your body. If an activity causes pain, stop.  · Rest when you are tired.  · Dont have sexual intercourse or use tampons or douches until your doctor says its safe to do so.    Home Care  · Always keep your incision clean and dry.@[id number](Error - No data available.)@  · Shower as instructed in 24 hours. You may wash your incision gently with mild soap and warm water and pat dry.  Avoid tub baths, hot tubs and swimming pools until seen by your physician for a post-op follow up.  · If you have steri strips they should fall off in 7-10 days.    · If you have band aids covering your incisions change daily or when soiled.  The band aids may be removed post op day 1 if they are clean and dry.  · Take your medication exactly as instructed by your doctor.  · Return to your diet as you feel able. Eat a healthy, well-balanced diet.  · Avoid constipation.  ¨ Use laxatives, stool softeners, or enemas as directed by your doctor.  ¨ Eat more high-fiber foods.  ¨ Drink 6-8 glasses of water every day, unless directed otherwise.  Follow-Up  Make a follow-up appointment as directed by our staff.       When to Call Your Doctor  Call your doctor right away if you have any of the following:  · Fever above 101.5°F  (38.6°C) or chills  · Bright red vaginal bleeding or a foul-smelling discharge  · Vaginal bleeding that soaks more than one sanitary pad per hour  · Trouble urinating or burning sensation when you urinate  · Severe abdominal pain or bloating  · Redness, swelling, or drainage at your incision site  · Shortness of breath         ________________________________________________________________  FOR EMERGENCIES:  If any unusual problems or difficulties occur, contact                Dr. ________________________ or the resident at [phone] (page ) or at the Clinic office, [phone].

## 2020-01-07 NOTE — ANESTHESIA POSTPROCEDURE EVALUATION
Anesthesia Post Evaluation    Patient: Angel Mosquera    Procedure(s) Performed: Procedure(s) (LRB):  ROBOTIC MYOMECTOMY, UTERUS (N/A)    Final Anesthesia Type: general    Patient location during evaluation: PACU  Patient participation: Yes- Able to Participate  Level of consciousness: awake and alert  Post-procedure vital signs: reviewed and stable  Pain management: adequate  Airway patency: patent    PONV status at discharge: No PONV  Anesthetic complications: no      Cardiovascular status: blood pressure returned to baseline  Respiratory status: unassisted, spontaneous ventilation and room air  Hydration status: euvolemic  Follow-up not needed.          Vitals Value Taken Time   /61 1/7/2020 11:06 AM   Temp 36.7 °C (98.1 °F) 1/7/2020  9:48 AM   Pulse 66 1/7/2020 11:16 AM   Resp 16 1/7/2020 10:50 AM   SpO2 97 % 1/7/2020 11:16 AM   Vitals shown include unvalidated device data.      No case tracking events are documented in the log.      Pain/Madai Score: Pain Rating Prior to Med Admin: 7 (1/7/2020 10:54 AM)  Pain Rating Post Med Admin: 7 (1/7/2020 10:50 AM)  Madai Score: 9 (1/7/2020 10:50 AM)

## 2020-01-07 NOTE — BRIEF OP NOTE
BRIEF OPERATIVE NOTE       SUMMARY      Surgery Date: 01/07/2020     SURGEON: Frances Culp    ASSISTANT: Dr. Mehrdad Minor; Cheyenne Diaz NP (for the portion of the procedure when there was no qualified resident)    PREOP DIAGNOSIS:   1. Pelvic pain  2. Fibroids  3. Obesity (BMI 35)    POSTOP DIAGNOSIS:    1. Pelvic pain  2. Fibroids  3. Obesity (BMI 35)    PROCEDURES: Robotic assisted laparoscopic myomectomy (7 fibroids)    ANESTHESIA: epidural, general    FINDINGS/KEY COMPONENTS: Multiple fibroids - several pedunculated.  2 large intramural posterior fibroids.  Normal tubes and ovaries.  Patient is not a candidate for a vaginal delivery    ESTIMATED BLOOD LOSS: 75 cc    COMPLICATIONS: None    PATHOLOGY:   Specimens (From admission, onward)    None

## 2020-01-09 ENCOUNTER — PATIENT MESSAGE (OUTPATIENT)
Dept: OBSTETRICS AND GYNECOLOGY | Facility: CLINIC | Age: 40
End: 2020-01-09

## 2020-01-09 ENCOUNTER — TELEPHONE (OUTPATIENT)
Dept: OBSTETRICS AND GYNECOLOGY | Facility: CLINIC | Age: 40
End: 2020-01-09

## 2020-01-09 NOTE — TELEPHONE ENCOUNTER
I called patient to see how she is doing postop.  She is doing well.  Taking Ibuprofen only.  No issues or problems.    Precautions given. RTC as scheduled.

## 2020-01-12 ENCOUNTER — PATIENT MESSAGE (OUTPATIENT)
Dept: OBSTETRICS AND GYNECOLOGY | Facility: CLINIC | Age: 40
End: 2020-01-12

## 2020-01-13 ENCOUNTER — PATIENT MESSAGE (OUTPATIENT)
Dept: OBSTETRICS AND GYNECOLOGY | Facility: CLINIC | Age: 40
End: 2020-01-13

## 2020-01-16 LAB
FINAL PATHOLOGIC DIAGNOSIS: NORMAL
GROSS: NORMAL

## 2020-01-24 ENCOUNTER — PATIENT MESSAGE (OUTPATIENT)
Dept: OBSTETRICS AND GYNECOLOGY | Facility: CLINIC | Age: 40
End: 2020-01-24

## 2020-02-17 ENCOUNTER — OFFICE VISIT (OUTPATIENT)
Dept: OBSTETRICS AND GYNECOLOGY | Facility: CLINIC | Age: 40
End: 2020-02-17
Attending: OBSTETRICS & GYNECOLOGY
Payer: COMMERCIAL

## 2020-02-17 VITALS
SYSTOLIC BLOOD PRESSURE: 130 MMHG | WEIGHT: 212.94 LBS | HEIGHT: 65 IN | BODY MASS INDEX: 35.48 KG/M2 | DIASTOLIC BLOOD PRESSURE: 75 MMHG

## 2020-02-17 DIAGNOSIS — Z09 POSTOP CHECK: Primary | ICD-10-CM

## 2020-02-17 PROCEDURE — 99024 POSTOP FOLLOW-UP VISIT: CPT | Mod: S$GLB,,, | Performed by: OBSTETRICS & GYNECOLOGY

## 2020-02-17 PROCEDURE — 99024 PR POST-OP FOLLOW-UP VISIT: ICD-10-PCS | Mod: S$GLB,,, | Performed by: OBSTETRICS & GYNECOLOGY

## 2020-02-17 NOTE — PROGRESS NOTES
CC: Postoperative visit    Agnel Mosquera is a 39 y.o. female  presents for a postoperative visit s/p DVM on 2020.  Her postoperative course was uncomplicated.  She is doing well postoperative.    Pathology showed:  Pathologic Diagnosis APS DIAGNOSIS:   A. Uterine Fibroids , Excision:   - Leiomyomas, 6 cm in greatest dimension (total weight 107 grams).   - Negative for malignancy.          There were no vitals taken for this visit.    ROS:  GENERAL: No fever, chills, fatigability or weight loss.  VULVAR: No pain, no lesions and no itching.  VAGINAL: No relaxation, no itching, no discharge, no abnormal bleeding and no lesions.  ABDOMEN: No abdominal pain. Denies nausea. Denies vomiting. No diarrhea. No constipation  BREAST: Denies pain. No lumps. No discharge.  URINARY: No incontinence, no nocturia, no frequency and no dysuria.  CARDIOVASCULAR: No chest pain. No shortness of breath. No leg cramps.  NEUROLOGICAL: No headaches. No vision changes.    Physical Exam    INCISIONS: Clean, dry, intact.  Well healed.  PELVIC: Normal external genitalia without lesions.  Normal hair distribution.  Adequate perineal body, normal urethral meatus.  Vagina moist and well rugated without lesions or discharge.  Cervix pink, without lesions, discharge or tenderness.  No significant cystocele or rectocele.  Bimanual exam shows uterus to be normal size, regular, mobile and nontender.  Adnexa without masses or tenderness.      1. Postop check         Patient can return to normal activities.  Delay conception for 3-4 months.  Will need a  for delivery.    Return to clinic in 1 year for well woman exam.

## 2020-02-25 ENCOUNTER — OFFICE VISIT (OUTPATIENT)
Dept: URGENT CARE | Facility: CLINIC | Age: 40
End: 2020-02-25
Payer: COMMERCIAL

## 2020-02-25 VITALS
HEIGHT: 65 IN | HEART RATE: 115 BPM | DIASTOLIC BLOOD PRESSURE: 80 MMHG | WEIGHT: 212 LBS | BODY MASS INDEX: 35.32 KG/M2 | OXYGEN SATURATION: 100 % | SYSTOLIC BLOOD PRESSURE: 132 MMHG | TEMPERATURE: 102 F | RESPIRATION RATE: 16 BRPM

## 2020-02-25 DIAGNOSIS — R50.9 FEVER, UNSPECIFIED: ICD-10-CM

## 2020-02-25 DIAGNOSIS — R61 DIAPHORESIS: ICD-10-CM

## 2020-02-25 DIAGNOSIS — R51.9 HEADACHE, UNSPECIFIED HEADACHE TYPE: ICD-10-CM

## 2020-02-25 DIAGNOSIS — J02.9 SORE THROAT: ICD-10-CM

## 2020-02-25 DIAGNOSIS — R68.83 CHILLS: ICD-10-CM

## 2020-02-25 DIAGNOSIS — R59.1 LYMPHADENOPATHY: ICD-10-CM

## 2020-02-25 DIAGNOSIS — H93.8X3 EAR PRESSURE, BILATERAL: ICD-10-CM

## 2020-02-25 DIAGNOSIS — J02.0 STREP THROAT: Primary | ICD-10-CM

## 2020-02-25 DIAGNOSIS — R49.0 HOARSE VOICE QUALITY: ICD-10-CM

## 2020-02-25 DIAGNOSIS — R52 BODY ACHES: ICD-10-CM

## 2020-02-25 LAB
CTP QC/QA: YES
CTP QC/QA: YES
FLUAV AG NPH QL: NEGATIVE
FLUBV AG NPH QL: NEGATIVE
MOLECULAR STREP A: POSITIVE

## 2020-02-25 PROCEDURE — 99214 PR OFFICE/OUTPT VISIT, EST, LEVL IV, 30-39 MIN: ICD-10-PCS | Mod: 25,S$GLB,, | Performed by: NURSE PRACTITIONER

## 2020-02-25 PROCEDURE — 87804 INFLUENZA ASSAY W/OPTIC: CPT | Mod: QW,S$GLB,, | Performed by: NURSE PRACTITIONER

## 2020-02-25 PROCEDURE — 87651 STREP A DNA AMP PROBE: CPT | Mod: QW,S$GLB,, | Performed by: NURSE PRACTITIONER

## 2020-02-25 PROCEDURE — 87804 POCT INFLUENZA A/B: ICD-10-PCS | Mod: 59,QW,S$GLB, | Performed by: NURSE PRACTITIONER

## 2020-02-25 PROCEDURE — 99214 OFFICE O/P EST MOD 30 MIN: CPT | Mod: 25,S$GLB,, | Performed by: NURSE PRACTITIONER

## 2020-02-25 PROCEDURE — 87651 POCT STREP A MOLECULAR: ICD-10-PCS | Mod: QW,S$GLB,, | Performed by: NURSE PRACTITIONER

## 2020-02-25 RX ORDER — MELOXICAM 15 MG/1
15 TABLET ORAL DAILY
Qty: 14 TABLET | Refills: 0 | Status: SHIPPED | OUTPATIENT
Start: 2020-02-25 | End: 2020-03-10

## 2020-02-25 RX ORDER — IBUPROFEN 200 MG
400 TABLET ORAL
Status: COMPLETED | OUTPATIENT
Start: 2020-02-25 | End: 2020-02-25

## 2020-02-25 RX ORDER — OSELTAMIVIR PHOSPHATE 75 MG/1
75 CAPSULE ORAL 2 TIMES DAILY
Qty: 10 CAPSULE | Refills: 0 | Status: SHIPPED | OUTPATIENT
Start: 2020-02-25 | End: 2020-02-25 | Stop reason: CLARIF

## 2020-02-25 RX ORDER — AMOXICILLIN AND CLAVULANATE POTASSIUM 875; 125 MG/1; MG/1
1 TABLET, FILM COATED ORAL 2 TIMES DAILY
Qty: 20 TABLET | Refills: 0 | Status: SHIPPED | OUTPATIENT
Start: 2020-02-25 | End: 2020-03-06

## 2020-02-25 RX ORDER — PROMETHAZINE HYDROCHLORIDE AND DEXTROMETHORPHAN HYDROBROMIDE 6.25; 15 MG/5ML; MG/5ML
5 SYRUP ORAL EVERY 6 HOURS PRN
Qty: 100 ML | Refills: 0 | Status: SHIPPED | OUTPATIENT
Start: 2020-02-25 | End: 2020-03-03

## 2020-02-25 RX ADMIN — Medication 400 MG: at 10:02

## 2020-02-25 NOTE — PATIENT INSTRUCTIONS
Always follow your healthcare professional's instructions.    You have received urgent care diagnosis and treatment and you may be released before all of your medical problems are known or treated. Unless you have been given a referral, you (the patient), will arrange for follow-up care as instructed.     If you have been given a referral, [facility] will contact you (their phone number is [phone]). If they do NOT call you by the end of the business day, please call them the following day.      Strep Throat  Strep throat is a throat infection caused by a bacteria called group A Streptococcus bacteria (group A strep). The bacteria live in the nose and throat. Strep throat is contagious and spreads easily from person to person through airborne droplets when an infected person coughs, sneezes, or talks. Good hand washing is important to help prevent the spread of this illness.  Children diagnosed with strep throat should not attend school or  until they have been taking antibiotics and had no fever for 24 hours.  Strep throat mainly affects school-aged children between 5 and 15 years of age, but can affect adults too. When it isn't treated, it can lead to serious problems including rheumatic fever (an inflammation of the joints and heart) and kidney damage.    How is strep throat spread?  Strep throat can be easily spread from an infected person's saliva by:  · Drinking and eating after them  · Sharing a straw, cup, toothbrushes, and eating utensils  When to go to the emergency room (ER)  Call 911 if your child has trouble breathing or swallowing. Call your healthcare provider about other symptoms of strep throat, such as:  · Throat pain, especially when swallowing  · Red, swollen tonsils  · Swollen lymph glands  · Stomachache; sometimes, vomiting in younger children  · Pus in the back of the throat  What to expect in the ER  · Your child will be examined and the healthcare provider will ask about his or her  medical history.  · The child's tonsils will be examined. A sample of fluid may be taken from the back of the throat using a soft swab. The sample can be checked right away for the bacteria that cause strep throat. Another sample may also be sent to a lab for testing.  · An antibiotic is usually prescribed to kill the bacteria. Be sure your child takes all the medicine, even if he or she starts to feel better. (Note that antibiotics will not help a viral throat infection.)  · If swallowing is very painful, painkilling medicine may also be prescribed.  When to call your healthcare provider  Call your healthcare provider if your otherwise healthy child has finished the treatment for strep throat and has:  · Joint pain or swelling  · Shortness of breath  · Signs of dehydration (no tears when crying and not urinating for more than 8 hours)  · Ear pain or pressure  · Headaches  · Rash  · Fever (see Fever and children, below)  Fever and children  Always use a digital thermometer to check your childs temperature. Never use a mercury thermometer.  For infants and toddlers, be sure to use a rectal thermometer correctly. A rectal thermometer may accidentally poke a hole in (perforate) the rectum. It may also pass on germs from the stool. Always follow the product makers directions for proper use. If you dont feel comfortable taking a rectal temperature, use another method. When you talk to your childs healthcare provider, tell him or her which method you used to take your childs temperature.  Here are guidelines for fever temperature. Ear temperatures arent accurate before 6 months of age. Dont take an oral temperature until your child is at least 4 years old.  Infant under 3 months old:  · Ask your childs healthcare provider how you should take the temperature.  · Rectal or forehead (temporal artery) temperature of 100.4°F (38°C) or higher, or as directed by the provider  · Armpit temperature of 99°F (37.2°C) or  higher, or as directed by the provider  Child age 3 to 36 months:  · Rectal, forehead (temporal artery), or ear temperature of 102°F (38.9°C) or higher, or as directed by the provider  · Armpit temperature of 101°F (38.3°C) or higher, or as directed by the provider  Child of any age:  · Repeated temperature of 104°F (40°C) or higher, or as directed by the provider  · Fever that lasts more than 24 hours in a child under 2 years old. Or a fever that lasts for 3 days in a child 2 years or older.   Easing strep throat symptoms  These tips can help ease your child's symptoms:  · Offer easy-to-swallow foods, such as soup, applesauce, popsicles, cold drinks, milk shakes, and yogurt.  · Provide a soft diet and avoid spicy or acidic foods.  · Use a cool-mist humidifier in the child's bedroom.  · Gargle with saltwater (for older children and adults only). Mix 1/4 teaspoon salt in 1 cup (8 oz) of warm water.   Date Last Reviewed: 1/1/2017  © 5253-3055 [employer]. [address] [place] [address]. All rights reserved. This information is not intended as a substitute for professional medical care. Always follow your healthcare professional's instructions.        You have been given an antibiotic to treat your condition today.  Even if your symptoms improve, please complete the medication as directed on the bottle.     Antibiotics work to destroy bacteria. They may also alter the good bacteria in your gut. Use probiotics and/or high culture yogurt about two hours apart from the antibiotic and about one week after the antibiotic to replace the good bacteria and prevent negative gastro intestinal consequences.    If you have questions about whether you should take your medications with food, you should read the medication instructions provided to you with your medication, or contact your pharmacy.    If you are female and on BCP use additional methods to prevent pregnancy while on antibiotics and for one  cycle after.     Your provider discussed your plan of care with you during your physical exam. It was reviewed once more by the provider when giving you an after visit summary. If the patient is a minor, the discharge instructions were discussed with an adult and that adult acknowledge their understanding of the provider's teaching.

## 2020-02-25 NOTE — PROGRESS NOTES
"Subjective:       Patient ID: Angel Mosquera is a 39 y.o. female.    Vitals:  height is 5' 5" (1.651 m) and weight is 96.2 kg (212 lb). Her oral temperature is 101.5 °F (38.6 °C) (abnormal). Her blood pressure is 132/80 and her pulse is 115 (abnormal). Her respiration is 16 and oxygen saturation is 100%.     Chief Complaint: Sore Throat    Sore Throat    This is a new problem. The current episode started yesterday. The problem has been gradually worsening. The maximum temperature recorded prior to her arrival was 101 - 101.9 F. The pain is at a severity of 7/10. The pain is moderate. Associated symptoms include headaches, a hoarse voice, a plugged ear sensation, neck pain, swollen glands and trouble swallowing. Pertinent negatives include no abdominal pain, congestion, coughing, diarrhea, drooling, ear discharge, ear pain, shortness of breath, stridor or vomiting. She has had no exposure to strep or mono. Treatments tried: tylenol- 2 hrs. ago. The treatment provided no relief.       Constitution: Negative for chills, fatigue and fever.   HENT: Positive for sore throat and trouble swallowing. Negative for ear pain, ear discharge, drooling and congestion.    Neck: Positive for neck pain. Negative for painful lymph nodes.   Cardiovascular: Negative for chest pain and leg swelling.   Eyes: Negative for double vision and blurred vision.   Respiratory: Negative for cough, shortness of breath and stridor.    Gastrointestinal: Negative for abdominal pain, nausea, vomiting and diarrhea.   Genitourinary: Negative for dysuria, frequency, urgency and history of kidney stones.   Musculoskeletal: Negative for joint pain, joint swelling, muscle cramps and muscle ache.   Skin: Negative for color change, pale, rash and bruising.   Allergic/Immunologic: Negative for seasonal allergies.   Neurological: Positive for headaches. Negative for dizziness, history of vertigo, light-headedness and passing out.   Hematologic/Lymphatic: " Negative for swollen lymph nodes.   Psychiatric/Behavioral: Negative for nervous/anxious, sleep disturbance and depression. The patient is not nervous/anxious.        Objective:      Physical Exam   Constitutional: She is oriented to person, place, and time. She appears well-developed and well-nourished. She is cooperative.  Non-toxic appearance. She does not have a sickly appearance. She does not appear ill. No distress.   HENT:   Head: Normocephalic and atraumatic.   Right Ear: Hearing, external ear and ear canal normal. A middle ear effusion is present.   Left Ear: Hearing, external ear and ear canal normal. A middle ear effusion is present.   Nose: Mucosal edema present. No rhinorrhea or nasal deformity. No epistaxis. Right sinus exhibits maxillary sinus tenderness. Right sinus exhibits no frontal sinus tenderness. Left sinus exhibits maxillary sinus tenderness. Left sinus exhibits no frontal sinus tenderness.   Mouth/Throat: Uvula is midline and mucous membranes are normal. No trismus in the jaw. Normal dentition. No uvula swelling. Posterior oropharyngeal erythema present. No oropharyngeal exudate or posterior oropharyngeal edema.   Eyes: Conjunctivae and lids are normal. No scleral icterus.   Neck: Trachea normal, full passive range of motion without pain and phonation normal. Neck supple. No neck rigidity. No edema and no erythema present.   Cardiovascular: Normal rate, regular rhythm, normal heart sounds, intact distal pulses and normal pulses.   Pulmonary/Chest: Effort normal and breath sounds normal. No respiratory distress. She has no decreased breath sounds. She has no rhonchi.   Abdominal: Normal appearance.   Musculoskeletal: Normal range of motion. She exhibits no edema or deformity.   Lymphadenopathy:     She has cervical adenopathy.        Right cervical: Superficial cervical adenopathy present.        Left cervical: Superficial cervical adenopathy present.   Neurological: She is alert and oriented  to person, place, and time. She exhibits normal muscle tone. Coordination normal.   Skin: Skin is warm, dry, intact, not diaphoretic and not pale.   Psychiatric: She has a normal mood and affect. Her speech is normal and behavior is normal. Judgment and thought content normal. Cognition and memory are normal.   Nursing note and vitals reviewed.        Assessment:       1. Viral syndrome    2. Sore throat    3. Headache, unspecified headache type    4. Hoarse voice quality    5. Ear pressure, bilateral    6. Chills    7. Body aches    8. Diaphoresis    9. Fever, unspecified        Plan:         Viral syndrome  -     promethazine-dextromethorphan (PROMETHAZINE-DM) 6.25-15 mg/5 mL Syrp; Take 5 mLs by mouth every 6 (six) hours as needed (May take every 4 hours, PRN. DoNOT take more than 30 mL in 24 hours.).  Dispense: 100 mL; Refill: 0  -     oseltamivir (TAMIFLU) 75 MG capsule; Take 1 capsule (75 mg total) by mouth 2 (two) times daily. for 5 days  Dispense: 10 capsule; Refill: 0    Sore throat  -     POCT Strep A, Molecular  -     diphenhydrAMINE-aluminum-magnesium hydroxide-simethicone-lidocaine HCl 2%; Swish and swallow 10 mLs every 4 (four) hours as needed.  Dispense: 100 mL; Refill: 0    Headache, unspecified headache type  -     meloxicam (MOBIC) 15 MG tablet; Take 1 tablet (15 mg total) by mouth once daily. for 14 days  Dispense: 14 tablet; Refill: 0    Hoarse voice quality    Ear pressure, bilateral    Chills    Body aches  -     meloxicam (MOBIC) 15 MG tablet; Take 1 tablet (15 mg total) by mouth once daily. for 14 days  Dispense: 14 tablet; Refill: 0    Diaphoresis    Fever, unspecified  -     POCT Influenza A/B  -     ibuprofen tablet 400 mg      Results for orders placed or performed in visit on 02/25/20   POCT Strep A, Molecular   Result Value Ref Range    Molecular Strep A, POC Positive (A) Negative     Acceptable Yes    POCT Influenza A/B   Result Value Ref Range    Rapid Influenza A Ag  Negative Negative    Rapid Influenza B Ag Negative Negative     Acceptable Yes            Patient Instructions     Always follow your healthcare professional's instructions.    You have received urgent care diagnosis and treatment and you may be released before all of your medical problems are known or treated. Unless you have been given a referral, you (the patient), will arrange for follow-up care as instructed.     If you have been given a referral, [facility] will contact you (their phone number is [phone]). If they do NOT call you by the end of the business day, please call them the following day.      Strep Throat  Strep throat is a throat infection caused by a bacteria called group A Streptococcus bacteria (group A strep). The bacteria live in the nose and throat. Strep throat is contagious and spreads easily from person to person through airborne droplets when an infected person coughs, sneezes, or talks. Good hand washing is important to help prevent the spread of this illness.  Children diagnosed with strep throat should not attend school or  until they have been taking antibiotics and had no fever for 24 hours.  Strep throat mainly affects school-aged children between 5 and 15 years of age, but can affect adults too. When it isn't treated, it can lead to serious problems including rheumatic fever (an inflammation of the joints and heart) and kidney damage.    How is strep throat spread?  Strep throat can be easily spread from an infected person's saliva by:  · Drinking and eating after them  · Sharing a straw, cup, toothbrushes, and eating utensils  When to go to the emergency room (ER)  Call 911 if your child has trouble breathing or swallowing. Call your healthcare provider about other symptoms of strep throat, such as:  · Throat pain, especially when swallowing  · Red, swollen tonsils  · Swollen lymph glands  · Stomachache; sometimes, vomiting in younger children  · Pus in the  back of the throat  What to expect in the ER  · Your child will be examined and the healthcare provider will ask about his or her medical history.  · The child's tonsils will be examined. A sample of fluid may be taken from the back of the throat using a soft swab. The sample can be checked right away for the bacteria that cause strep throat. Another sample may also be sent to a lab for testing.  · An antibiotic is usually prescribed to kill the bacteria. Be sure your child takes all the medicine, even if he or she starts to feel better. (Note that antibiotics will not help a viral throat infection.)  · If swallowing is very painful, painkilling medicine may also be prescribed.  When to call your healthcare provider  Call your healthcare provider if your otherwise healthy child has finished the treatment for strep throat and has:  · Joint pain or swelling  · Shortness of breath  · Signs of dehydration (no tears when crying and not urinating for more than 8 hours)  · Ear pain or pressure  · Headaches  · Rash  · Fever (see Fever and children, below)  Fever and children  Always use a digital thermometer to check your childs temperature. Never use a mercury thermometer.  For infants and toddlers, be sure to use a rectal thermometer correctly. A rectal thermometer may accidentally poke a hole in (perforate) the rectum. It may also pass on germs from the stool. Always follow the product makers directions for proper use. If you dont feel comfortable taking a rectal temperature, use another method. When you talk to your childs healthcare provider, tell him or her which method you used to take your childs temperature.  Here are guidelines for fever temperature. Ear temperatures arent accurate before 6 months of age. Dont take an oral temperature until your child is at least 4 years old.  Infant under 3 months old:  · Ask your childs healthcare provider how you should take the temperature.  · Rectal or forehead  (temporal artery) temperature of 100.4°F (38°C) or higher, or as directed by the provider  · Armpit temperature of 99°F (37.2°C) or higher, or as directed by the provider  Child age 3 to 36 months:  · Rectal, forehead (temporal artery), or ear temperature of 102°F (38.9°C) or higher, or as directed by the provider  · Armpit temperature of 101°F (38.3°C) or higher, or as directed by the provider  Child of any age:  · Repeated temperature of 104°F (40°C) or higher, or as directed by the provider  · Fever that lasts more than 24 hours in a child under 2 years old. Or a fever that lasts for 3 days in a child 2 years or older.   Easing strep throat symptoms  These tips can help ease your child's symptoms:  · Offer easy-to-swallow foods, such as soup, applesauce, popsicles, cold drinks, milk shakes, and yogurt.  · Provide a soft diet and avoid spicy or acidic foods.  · Use a cool-mist humidifier in the child's bedroom.  · Gargle with saltwater (for older children and adults only). Mix 1/4 teaspoon salt in 1 cup (8 oz) of warm water.   Date Last Reviewed: 1/1/2017  © 6428-7539 [employer]. [address] [place] [address]. All rights reserved. This information is not intended as a substitute for professional medical care. Always follow your healthcare professional's instructions.        You have been given an antibiotic to treat your condition today.  Even if your symptoms improve, please complete the medication as directed on the bottle.     Antibiotics work to destroy bacteria. They may also alter the good bacteria in your gut. Use probiotics and/or high culture yogurt about two hours apart from the antibiotic and about one week after the antibiotic to replace the good bacteria and prevent negative gastro intestinal consequences.    If you have questions about whether you should take your medications with food, you should read the medication instructions provided to you with your medication, or  contact your pharmacy.    If you are female and on BCP use additional methods to prevent pregnancy while on antibiotics and for one cycle after.     Your provider discussed your plan of care with you during your physical exam. It was reviewed once more by the provider when giving you an after visit summary. If the patient is a minor, the discharge instructions were discussed with an adult and that adult acknowledge their understanding of the provider's teaching.

## 2020-02-25 NOTE — LETTER
February 25, 2020      [facility]  [address]  [place] [address]  Phone: [phone]  Fax: [phone]       Patient: Angel Mosquera   YOB: 1980  Date of Visit: 02/25/2020    To Whom It May Concern:    Dixie Mosquera  was at [facility] on 02/25/2020. She may return to work/school on 2/28/20 with no restrictions. If you have any questions or concerns, or if I can be of further assistance, please do not hesitate to contact me.    Sincerely,    Kimberly LEESA Finley NP

## 2020-02-28 ENCOUNTER — TELEPHONE (OUTPATIENT)
Dept: URGENT CARE | Facility: CLINIC | Age: 40
End: 2020-02-28

## 2020-03-09 ENCOUNTER — PATIENT MESSAGE (OUTPATIENT)
Dept: OBSTETRICS AND GYNECOLOGY | Facility: CLINIC | Age: 40
End: 2020-03-09

## 2020-04-13 ENCOUNTER — PATIENT MESSAGE (OUTPATIENT)
Dept: OBSTETRICS AND GYNECOLOGY | Facility: CLINIC | Age: 40
End: 2020-04-13

## 2020-04-21 DIAGNOSIS — Z01.84 ANTIBODY RESPONSE EXAMINATION: ICD-10-CM

## 2020-04-22 ENCOUNTER — PATIENT MESSAGE (OUTPATIENT)
Dept: FAMILY MEDICINE | Facility: CLINIC | Age: 40
End: 2020-04-22

## 2020-04-23 ENCOUNTER — LAB VISIT (OUTPATIENT)
Dept: LAB | Facility: HOSPITAL | Age: 40
End: 2020-04-23
Attending: INTERNAL MEDICINE
Payer: COMMERCIAL

## 2020-04-23 DIAGNOSIS — Z01.84 ANTIBODY RESPONSE EXAMINATION: ICD-10-CM

## 2020-04-23 LAB — SARS-COV-2 IGG SERPL QL IA: NEGATIVE

## 2020-04-23 PROCEDURE — 36415 COLL VENOUS BLD VENIPUNCTURE: CPT

## 2020-04-23 PROCEDURE — 86769 SARS-COV-2 COVID-19 ANTIBODY: CPT

## 2020-04-28 ENCOUNTER — PATIENT MESSAGE (OUTPATIENT)
Dept: FAMILY MEDICINE | Facility: CLINIC | Age: 40
End: 2020-04-28

## 2020-04-28 ENCOUNTER — OFFICE VISIT (OUTPATIENT)
Dept: FAMILY MEDICINE | Facility: CLINIC | Age: 40
End: 2020-04-28
Payer: COMMERCIAL

## 2020-04-28 DIAGNOSIS — Z00.00 ANNUAL PHYSICAL EXAM: ICD-10-CM

## 2020-04-28 DIAGNOSIS — M25.552 BILATERAL HIP PAIN: Primary | ICD-10-CM

## 2020-04-28 DIAGNOSIS — M25.551 BILATERAL HIP PAIN: Primary | ICD-10-CM

## 2020-04-28 DIAGNOSIS — F41.9 ANXIETY: Primary | ICD-10-CM

## 2020-04-28 DIAGNOSIS — N97.9 FEMALE INFERTILITY: ICD-10-CM

## 2020-04-28 DIAGNOSIS — M25.50 POLYARTHRALGIA: ICD-10-CM

## 2020-04-28 PROBLEM — D25.2 INTRAMURAL AND SUBSEROUS LEIOMYOMA OF UTERUS: Status: RESOLVED | Noted: 2020-01-07 | Resolved: 2020-04-28

## 2020-04-28 PROBLEM — D25.1 INTRAMURAL AND SUBSEROUS LEIOMYOMA OF UTERUS: Status: RESOLVED | Noted: 2020-01-07 | Resolved: 2020-04-28

## 2020-04-28 PROCEDURE — 99203 OFFICE O/P NEW LOW 30 MIN: CPT | Mod: 95,,, | Performed by: INTERNAL MEDICINE

## 2020-04-28 PROCEDURE — 99203 PR OFFICE/OUTPT VISIT, NEW, LEVL III, 30-44 MIN: ICD-10-PCS | Mod: 95,,, | Performed by: INTERNAL MEDICINE

## 2020-04-28 RX ORDER — BUPROPION HYDROCHLORIDE 150 MG/1
150 TABLET ORAL DAILY
Qty: 30 TABLET | Refills: 11 | Status: SHIPPED | OUTPATIENT
Start: 2020-04-28 | End: 2020-07-28 | Stop reason: SDUPTHER

## 2020-04-28 NOTE — PROGRESS NOTES
[facility] Virtual Visit Note    The patient location is: Home  The chief complaint leading to consultation is: Establish care, hip pain  Visit type: Virtual visit with synchronous audio and video  Total time spent with patient: 20 min  Each patient to whom he or she provides medical services by telemedicine is:  (1) informed of the relationship between the physician and patient and the respective role of any other health care provider with respect to management of the patient; and (2) notified that he or she may decline to receive medical services by telemedicine and may withdraw from such care at any time.      Chief Complaint      Chief Complaint   Patient presents with    Establish Care    Hip Pain       History of Present Illness      Angel Mosquera is a 40 y.o. female with chronic conditions of female infertility who presents today for: establish care and complaints bilateral hip pain.    Reports starting with bilateral hip after taking hormones for fertility.  Present since 8/2019.  Worst with sit to stand and first few steps.  Improved with walking and moving.  Not worse with lying down or climbing stairs. Not reproduced by palpation or lying on side. Has not tried NSAIDs.  No family history of autoimmune disease.  For infertility, has been following with Dr. Bridges at [facility].  Had first attempt at IVF which was lost at 9 weeks.  Had subsequent myoctomy with Dr. Culp in 1/2020.  Will be planning to try IVF again soon (possibly August-September).  Flu shot UTD.  TdAP 2019.   PAP smear 2019 with Dr. Aguero.    Past Medical History:  Past Medical History:   Diagnosis Date    Rosacea        Past Surgical History:   has a past surgical history that includes Breast Augmentation (2005); mischarr; Dilation and curettage of uterus (N/A, 10/19/2019); and Robot-assisted laparoscopic uterine myomectomy (N/A, 1/7/2020).    Family History:  family history includes Diabetes in her mother;  Heart failure in her mother; Hypertension in her mother.     Social History:  Social History     Tobacco Use    Smoking status: Never Smoker    Smokeless tobacco: Never Used   Substance Use Topics    Alcohol use: No    Drug use: No       Review of Systems   Constitutional: Negative for chills, fever and malaise/fatigue.   Respiratory: Negative for cough, sputum production and shortness of breath.    Cardiovascular: Negative for chest pain.   Gastrointestinal: Positive for constipation. Negative for diarrhea, nausea and vomiting.   Musculoskeletal: Positive for joint pain (Bilateral hips).   Skin: Negative for rash.   Neurological: Negative for weakness and headaches.        Medications:  Outpatient Encounter Medications as of 4/28/2020   Medication Sig Dispense Refill    [DISCONTINUED] diphenhydrAMINE-aluminum-magnesium hydroxide-simethicone-lidocaine HCl 2% Swish and swallow 10 mLs every 4 (four) hours as needed. 100 mL 0    [DISCONTINUED] HYDROcodone-acetaminophen (NORCO) 5-325 mg per tablet Take 1 tablet by mouth every 6 (six) hours as needed for Pain. (Patient not taking: Reported on 2/25/2020) 20 tablet 0    [DISCONTINUED] ibuprofen (ADVIL,MOTRIN) 600 MG tablet Take 1 tablet (600 mg total) by mouth every 6 (six) hours as needed for Pain. (Patient not taking: Reported on 2/25/2020) 30 tablet 0    [DISCONTINUED] ondansetron (ZOFRAN-ODT) 8 MG TbDL Dissolve 1 tablet (8 mg total) by mouth every 12 (twelve) hours as needed. (Patient not taking: Reported on 2/25/2020) 20 tablet 0     No facility-administered encounter medications on file as of 4/28/2020.        Allergies:  Review of patient's allergies indicates:  No Known Allergies    Health Maintenance:  Immunization History   Administered Date(s) Administered    Meningococcal Conjugate 05/20/2013      Health Maintenance   Topic Date Due    TETANUS VACCINE  04/16/1998    Mammogram  04/16/2020    Pap Smear with HPV Cotest  05/02/2022    Lipid Panel   Completed        Physical Exam       ]    Physical Exam   Constitutional: She is oriented to person, place, and time. She appears well-developed and well-nourished. No distress.   Eyes: Conjunctivae and EOM are normal. Right eye exhibits no discharge. Left eye exhibits no discharge. No scleral icterus.   Pulmonary/Chest: Effort normal. No respiratory distress.   Neurological: She is alert and oriented to person, place, and time.   Skin: She is not diaphoretic.   Psychiatric: She has a normal mood and affect. Her behavior is normal. Judgment and thought content normal.        Laboratory:  CBC:  Recent Labs   Lab 05/02/19  1205 10/19/19  0431 01/07/20  0623   WBC 9.81 15.63 H 9.34   RBC 4.36 4.40 4.51   Hemoglobin 11.2 L 12.3 12.0   Hematocrit 37.4 38.9 39.6   Platelets 411 H 383 H 382 H   Mean Corpuscular Volume 86 88 88   Mean Corpuscular Hemoglobin 25.7 L 28.0 26.6 L   Mean Corpuscular Hemoglobin Conc 29.9 L 31.6 L 30.3 L     CMP:  Recent Labs   Lab 10/19/19  0431   Glucose 106   Calcium 9.5   Albumin 3.5   Total Protein 7.6   Sodium 139   Potassium 3.5   CO2 22 L   Chloride 105   BUN, Bld 12   Alkaline Phosphatase 65   ALT 17   AST 17   Total Bilirubin 0.2     URINALYSIS:       LIPIDS:  Recent Labs   Lab 11/06/17  1230 05/02/19  1205   TSH 1.172 1.251     TSH:  Recent Labs   Lab 11/06/17  1230 05/02/19  1205   TSH 1.172 1.251     A1C:        Assessment/Plan     Angel Mosquera is a 40 y.o.female with:    1. Bilateral hip pain  - CBC auto differential; Future  - Comprehensive metabolic panel; Future  - Lipid panel; Future  - TSH; Future  - T4, free; Future  - PRABHAKAR Screen w/Reflex; Future  - C-reactive protein; Future  - Sedimentation rate; Future  - Rheumatoid factor; Future  - X-Ray Hips Bilateral 2 View Incl AP Pelvis; Future  Will check labs to rule out inflammatory arthritis.  Will also check xrays.    2. Female infertility  - CBC auto differential; Future  - Comprehensive metabolic panel; Future  - Lipid  panel; Future  - TSH; Future  - T4, free; Future  - PRABHAKAR Screen w/Reflex; Future  - C-reactive protein; Future  - Sedimentation rate; Future  - Rheumatoid factor; Future  F/U with Dr. Bridges as scheduled.  3. Polyarthralgia  - CBC auto differential; Future  - Comprehensive metabolic panel; Future  - Lipid panel; Future  - TSH; Future  - T4, free; Future  - PRABHAKAR Screen w/Reflex; Future  - C-reactive protein; Future  - Sedimentation rate; Future  - Rheumatoid factor; Future  - X-Ray Hips Bilateral 2 View Incl AP Pelvis; Future       Chronic conditions status updated as per HPI.  Other than changes above, cont current medications and maintain follow up with specialists.  Return to clinic in 3 months.    Tato Ramachandran MD  [facility]

## 2020-04-29 ENCOUNTER — PATIENT MESSAGE (OUTPATIENT)
Dept: FAMILY MEDICINE | Facility: CLINIC | Age: 40
End: 2020-04-29

## 2020-04-30 ENCOUNTER — PATIENT MESSAGE (OUTPATIENT)
Dept: FAMILY MEDICINE | Facility: CLINIC | Age: 40
End: 2020-04-30

## 2020-04-30 DIAGNOSIS — M25.552 BILATERAL HIP PAIN: Primary | ICD-10-CM

## 2020-04-30 DIAGNOSIS — M25.551 BILATERAL HIP PAIN: Primary | ICD-10-CM

## 2020-05-01 ENCOUNTER — TELEPHONE (OUTPATIENT)
Dept: FAMILY MEDICINE | Facility: CLINIC | Age: 40
End: 2020-05-01

## 2020-05-04 NOTE — PROGRESS NOTES
"Subjective:      Patient ID: Angel Mosquera is a 40 y.o. female.    Chief Complaint: Pain of the Left Knee and Pain of the Right Knee      HPI  (Shikha)    She complains of bilateral hip pain that started after starting fertility treatments in August of 2019. Known uterine fibroids that were removed by Dr. Culp on 1/7/20.     She started with bilateral hip pain (right = left) over her ASIS when she started with hormones for fertility. Was told it would get better, but it has persisted. Pain is a constant "awareness" and minimal discomfort most of the time but is severe when she stands up from seated position to walk. Once she is walking for awhile, pain improves however it is stabbing/sharp and a 10 on a scale of 1-10 when she first gets up. No pain with sitting or laying flat. Current pain is a 0 on a scale of 1-10. No LBP. No lateral hip pain. No groin pain or radicular pain. No known injury. No previous hip pain.     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     She has noted some improvement with motrin, but pain has never gone away. No PT, injections, or surgery on her hips. No change in symptoms after fibroids were removed.       Past Medical History:   Diagnosis Date    Rosacea          Current Outpatient Medications:     buPROPion (WELLBUTRIN XL) 150 MG TB24 tablet, Take 1 tablet (150 mg total) by mouth once daily., Disp: 30 tablet, Rfl: 11    ibuprofen (ADVIL,MOTRIN) 600 MG tablet, Take 1 tablet (600 mg total) by mouth 3 (three) times daily with meals., Disp: 90 tablet, Rfl: 2    Review of patient's allergies indicates:  No Known Allergies      Review of Systems   Constitution: Negative for fever, malaise/fatigue, night sweats, weight gain and weight loss.   HENT: Negative for hearing loss, nosebleeds and odynophagia.    Eyes: Negative for blurred vision and double vision.   Cardiovascular: Negative for chest pain, irregular heartbeat and palpitations.   Respiratory: Negative for cough, hemoptysis, " shortness of breath and wheezing.    Endocrine: Negative for cold intolerance and polydipsia.   Hematologic/Lymphatic: Does not bruise/bleed easily.   Skin: Negative for dry skin, poor wound healing, rash and suspicious lesions.   Musculoskeletal:        See HPI for pertinent positives.   Gastrointestinal: Negative for bloating, abdominal pain, constipation, diarrhea, hematochezia, melena, nausea and vomiting.   Genitourinary: Negative for bladder incontinence, dysuria, hematuria, hesitancy and incomplete emptying.   Neurological: Negative for disturbances in coordination, dizziness, focal weakness, headaches, loss of balance, numbness, paresthesias, seizures and weakness.   Psychiatric/Behavioral: Negative for depression and hallucinations. The patient is not nervous/anxious.          Objective:        There were no vitals taken for this visit.    General    Vitals reviewed.  Constitutional: She is oriented to person, place, and time. She appears well-developed and well-nourished.   Pulmonary/Chest: Effort normal.   Abdominal: She exhibits no distension.   Neurological: She is alert and oriented to person, place, and time.   Psychiatric: She has a normal mood and affect. Her behavior is normal. Judgment and thought content normal.           Body habitus is::normal.   The patient walks without a limp.     RIGHT HIP EXAM:    There is::minimal tenderness over ASIS. No tenderness over GTB.  Range of motion- Flexion full, External rotation full, internal rotation full.  Resisted SLR negative.  Pain with rotation negative  Sciatic tension findings negative.  Shortening/lengthening compared to the contralateral side absent.  Pulses DP present, PT present.  Motor normal 5/5 strength in all tested muscle groups.   Sensory normal.      LEFT HIP EXAM:    There is::minimal tenderness over ASIS. No tenderness over GTB.  Range of motion- Flexion full, External rotation full, internal rotation full.  Resisted SLR negative.  Pain  "with rotation negative  Sciatic tension findings negative.  Shortening/lengthening compared to the contralateral side absent.  Pulses DP present, PT present.  Motor normal 5/5 strength in all tested muscle groups.   Sensory normal.      XRAY INTERPRETATION:   X-rays of bilateral hips dated 4/28/20 are personally reviewed and show no fracture, dislocation, or signs of AVN. Per radiology, small probable bone island within the inferior medial margin right iliac bone.     MRI of pelvis dated 12/17/19 is personally reviewed and shows normal marrow signal in osseous structures and uterine fibroids.         Assessment:       Encounter Diagnosis   Name Primary?    Pelvic pain Yes          Plan:       Angel was seen today for pain and pain.    Diagnoses and all orders for this visit:    Pelvic pain  -     Ambulatory referral/consult to Orthopedics  -     Ambulatory referral/consult to Physical/Occupational Therapy; Future  -     ibuprofen (ADVIL,MOTRIN) 600 MG tablet; Take 1 tablet (600 mg total) by mouth 3 (three) times daily with meals.      She started with bilateral hip pain (right = left) over her ASIS when she started with hormones for fertility. Was told it would get better, but it has persisted. Pain is a constant "awareness" and minimal discomfort most of the time but is severe when she stands up from seated position to walk.     XRs of bilateral hips look good and previous MRI of pelvis shows no bony abnormalities. No groin or lateral hip pain. Pain is more over bilateral ASIS with minimal tenderness. No pain with full ROM of both hips. I think this is more pelvic pain then actual pain from her hip joint.     Treatment options reviewed with patient along with above bilateral hip xrays. Following plan made:     - PT orders for pelvic pain sent to [facility].   - New prescription for motrin 600mg to take bid-tid. Reviewed dosing and side effects. Take with food.   - She has f/u with OB/GYN in June and will " discuss this pain with them as well.   - If no improvement with above, may consider referral to pain management.   - Will email her in 6 weeks to check on her progress.     Follow up in about 3 months (around 8/7/2020).

## 2020-05-07 ENCOUNTER — OFFICE VISIT (OUTPATIENT)
Dept: ORTHOPEDICS | Facility: CLINIC | Age: 40
End: 2020-05-07
Payer: COMMERCIAL

## 2020-05-07 DIAGNOSIS — R10.2 PELVIC PAIN: Primary | ICD-10-CM

## 2020-05-07 PROCEDURE — 99204 OFFICE O/P NEW MOD 45 MIN: CPT | Mod: S$GLB,,, | Performed by: PHYSICIAN ASSISTANT

## 2020-05-07 PROCEDURE — 99999 PR PBB SHADOW E&M-EST. PATIENT-LVL III: CPT | Mod: PBBFAC,,, | Performed by: PHYSICIAN ASSISTANT

## 2020-05-07 PROCEDURE — 99204 PR OFFICE/OUTPT VISIT, NEW, LEVL IV, 45-59 MIN: ICD-10-PCS | Mod: S$GLB,,, | Performed by: PHYSICIAN ASSISTANT

## 2020-05-07 PROCEDURE — 99999 PR PBB SHADOW E&M-EST. PATIENT-LVL III: ICD-10-PCS | Mod: PBBFAC,,, | Performed by: PHYSICIAN ASSISTANT

## 2020-05-07 RX ORDER — IBUPROFEN 600 MG/1
600 TABLET ORAL
Qty: 90 TABLET | Refills: 2 | Status: SHIPPED | OUTPATIENT
Start: 2020-05-07 | End: 2022-04-14 | Stop reason: SDUPTHER

## 2020-05-07 NOTE — PATIENT INSTRUCTIONS
It was nice to meet you today! I am sorry that you are hurting so much.     I think your pain is more from the pelvis then from your hips. Your hip xrays and previous MRI of your pelvis looked good from an orthopaedic standping.     I sent motrin 600mg to your pharmacy to help with pain/inflammation. Take as directed with food.     I sent physical therapy orders to [facility] in [place]. They should call you to set up, but if not you can call [phone].     I will see you back in 3 months and will check on you via [facility] in 6 weeks. Please stay in touch and call me if you need anything. You can also send me a message in [facility].     Debbi   [phone]

## 2020-05-08 ENCOUNTER — TELEPHONE (OUTPATIENT)
Dept: OBSTETRICS AND GYNECOLOGY | Facility: CLINIC | Age: 40
End: 2020-05-08

## 2020-05-08 DIAGNOSIS — Z12.39 BREAST CANCER SCREENING: ICD-10-CM

## 2020-05-08 DIAGNOSIS — R10.2 PELVIC PAIN IN FEMALE: Primary | ICD-10-CM

## 2020-05-08 NOTE — TELEPHONE ENCOUNTER
Patient has been scheduled for her pelvic ultrasound and her f/u. Patient verbalized understanding.

## 2020-05-12 ENCOUNTER — OFFICE VISIT (OUTPATIENT)
Dept: OBSTETRICS AND GYNECOLOGY | Facility: CLINIC | Age: 40
End: 2020-05-12
Payer: COMMERCIAL

## 2020-05-12 VITALS
BODY MASS INDEX: 35.45 KG/M2 | SYSTOLIC BLOOD PRESSURE: 120 MMHG | WEIGHT: 212.75 LBS | HEIGHT: 65 IN | DIASTOLIC BLOOD PRESSURE: 70 MMHG

## 2020-05-12 DIAGNOSIS — Z12.39 BREAST CANCER SCREENING: ICD-10-CM

## 2020-05-12 DIAGNOSIS — R10.2 PELVIC PAIN IN FEMALE: ICD-10-CM

## 2020-05-12 DIAGNOSIS — M25.551 PAIN OF BOTH HIP JOINTS: Primary | ICD-10-CM

## 2020-05-12 DIAGNOSIS — M25.552 PAIN OF BOTH HIP JOINTS: Primary | ICD-10-CM

## 2020-05-12 PROCEDURE — 99999 PR PBB SHADOW E&M-EST. PATIENT-LVL III: ICD-10-PCS | Mod: PBBFAC,,, | Performed by: OBSTETRICS & GYNECOLOGY

## 2020-05-12 PROCEDURE — 3008F PR BODY MASS INDEX (BMI) DOCUMENTED: ICD-10-PCS | Mod: CPTII,S$GLB,, | Performed by: OBSTETRICS & GYNECOLOGY

## 2020-05-12 PROCEDURE — 3008F BODY MASS INDEX DOCD: CPT | Mod: CPTII,S$GLB,, | Performed by: OBSTETRICS & GYNECOLOGY

## 2020-05-12 PROCEDURE — 99214 OFFICE O/P EST MOD 30 MIN: CPT | Mod: S$GLB,,, | Performed by: OBSTETRICS & GYNECOLOGY

## 2020-05-12 PROCEDURE — 99999 PR PBB SHADOW E&M-EST. PATIENT-LVL III: CPT | Mod: PBBFAC,,, | Performed by: OBSTETRICS & GYNECOLOGY

## 2020-05-12 PROCEDURE — 99214 PR OFFICE/OUTPT VISIT, EST, LEVL IV, 30-39 MIN: ICD-10-PCS | Mod: S$GLB,,, | Performed by: OBSTETRICS & GYNECOLOGY

## 2020-05-12 NOTE — PROGRESS NOTES
CC: Hip pain   S/P infertility/ hormone injections  S/P myomectomy   Fibroids    Angel Mosquera is a 40 y.o. female  presents for worsening bilateral deep pain/ joint pain.  The pain is worse when going from the sitting to the standing position.  Moving around causes more pain as well. She has some dysparunia.  S/P myomectomy and bleeding and pain improved but she notices some increase in blood loss and cramping again   US 2020  Uterus:    Size: 7.7 x 4.0 x 6.6 cm    Masses: Heterogeneous myometrium with at least 2 fundal fibroids measuring 2.6 cm and 1.5 cm respectively.  History of prior myomectomy for fibroids.    Endometrium: Normal in this pre menopausal patient, measuring 4.0 mm.    Right ovary:    Size: 3.0 x 1.7 x 2.9 cm    Appearance: Normal    Vascular flow: Normal.    Left ovary:    Size: 2.7 x 2.0 x 2.6 cm    Appearance: Normal    Vascular Flow: Normal.    Free Fluid:    Trace free fluid in the posterior cul-de-sac.      Impression       1. Uterine fibroids as noted.  2. Trace pelvic free fluid.         Past Medical History:   Diagnosis Date    Rosacea        Past Surgical History:   Procedure Laterality Date    Breast Augmentation      DILATION AND CURETTAGE OF UTERUS N/A 10/19/2019    Procedure: DILATION AND CURETTAGE, UTERUS;  Surgeon: Frances Culp MD;  Location: [facility] OR;  Service: OB/GYN;  Laterality: N/A;  with suction    mischarr      ROBOT-ASSISTED LAPAROSCOPIC UTERINE MYOMECTOMY N/A 2020    Procedure: ROBOTIC MYOMECTOMY, UTERUS;  Surgeon: Frances Culp MD;  Location: [facility] OR;  Service: OB/GYN;  Laterality: N/A;       OB History    Para Term  AB Living   3 0 0 0 3 0   SAB TAB Ectopic Multiple Live Births   3 0 0 0 0      # Outcome Date GA Lbr Unruly/2nd Weight Sex Delivery Anes PTL Lv   3 SAB 10/2019           2 SAB            1 SAB                Family History   Problem Relation Age of Onset    Diabetes Mother     Hypertension  "Mother     Heart failure Mother         viral?    Cancer Neg Hx     Ovarian cancer Neg Hx     Melanoma Neg Hx     Heart attack Neg Hx     Stroke Neg Hx     Colon cancer Neg Hx     Breast cancer Neg Hx     Blindness Neg Hx     Amblyopia Neg Hx     Cataracts Neg Hx     Glaucoma Neg Hx     Macular degeneration Neg Hx     Retinal detachment Neg Hx     Strabismus Neg Hx        Social History     Tobacco Use    Smoking status: Never Smoker    Smokeless tobacco: Never Used   Substance Use Topics    Alcohol use: No    Drug use: No       /70   Ht 5' 5" (1.651 m)   Wt 96.5 kg (212 lb 11.9 oz)   LMP 05/10/2020 (Exact Date)   BMI 35.40 kg/m²     ROS:  GENERAL: Denies weight gain or weight loss. Feeling well overall.   SKIN: Denies rash or lesions.   HEAD: Denies head injury or headache.   NODES: Denies enlarged lymph nodes.   CHEST: Denies chest pain or shortness of breath.   CARDIOVASCULAR: Denies palpitations or left sided chest pain.   ABDOMEN: No abdominal pain, constipation, diarrhea, nausea, vomiting or rectal bleeding.   URINARY: No frequency, dysuria, hematuria, or burning on urination.  REPRODUCTIVE: See HPI.   BREASTS: The patient performs breast self-examination and denies pain, lumps, or nipple discharge.   HEMATOLOGIC: No easy bruisability or excessive bleeding.  MUSCULOSKELETAL: Denies joint pain or swelling.   NEUROLOGIC: Denies syncope or weakness.   PSYCHIATRIC: Denies depression, anxiety or mood swings.    Physical Exam:    APPEARANCE: Well nourished, well developed, in no acute distress.  AFFECT: WNL, alert and oriented x 3  SKIN: No acne or hirsutism  NECK: Neck symmetric without masses or thyromegaly  NODES: No inguinal, cervical, axillary, or femoral lymph node enlargement  CHEST: Good respiratory effect  ABDOMEN: Soft.  No tenderness or masses.  No hepatosplenomegaly.  No hernias.  BREASTS: Symmetrical, no skin changes or visible lesions.  No palpable masses, nipple discharge " bilaterally.  PELVIC: Normal external genitalia without lesions.  Normal hair distribution.  Adequate perineal body, normal urethral meatus.  Vagina moist and well rugated without lesions or discharge.  Cervix pink, without lesions, discharge or tenderness.  No significant cystocele or rectocele.  Bimanual exam shows uterus to be normal size, regular, mobile and nontender.  Adnexa without masses or tenderness.    EXTREMITIES: No edema.    ASSESSMENT AND PLAN  1. Pain of both hip joints  Ambulatory referral/consult to Pain Clinic   2. Pelvic pain in female     3. Breast cancer screening  Mammo Digital Screening Bilat w/ Brian     PT, NSAID PRN   Exericise/ stretch  Chiropractor/ accupuncture  Pain management as well   Discussed possible change in ligament/ joints after fertility medications and progesterone   Discussed fibroids/ and possible growth  If the pain worsens do follow up pelvic US in 6 months    Patient was counseled today on A.C.S. Pap guidelines and recommendations for yearly pelvic exams, mammograms and monthly self breast exams; to see her PCP for other health maintenance.   BLeeding and pain precautions  She may consider surrogate for future embryo implantation

## 2020-05-17 ENCOUNTER — PATIENT MESSAGE (OUTPATIENT)
Dept: FAMILY MEDICINE | Facility: CLINIC | Age: 40
End: 2020-05-17

## 2020-05-19 PROBLEM — R10.2 PAIN IN PELVIS: Status: ACTIVE | Noted: 2020-05-19

## 2020-05-19 PROBLEM — R53.1 WEAKNESS: Status: ACTIVE | Noted: 2020-05-19

## 2020-05-29 ENCOUNTER — PATIENT MESSAGE (OUTPATIENT)
Dept: ORTHOPEDICS | Facility: CLINIC | Age: 40
End: 2020-05-29

## 2020-05-29 DIAGNOSIS — R10.2 PELVIC PAIN: Primary | ICD-10-CM

## 2020-05-29 RX ORDER — METHOCARBAMOL 500 MG/1
500 TABLET, FILM COATED ORAL 3 TIMES DAILY PRN
Qty: 30 TABLET | Refills: 0 | Status: SHIPPED | OUTPATIENT
Start: 2020-05-29 | End: 2020-06-08

## 2020-07-28 ENCOUNTER — PATIENT MESSAGE (OUTPATIENT)
Dept: FAMILY MEDICINE | Facility: CLINIC | Age: 40
End: 2020-07-28

## 2020-07-28 ENCOUNTER — OFFICE VISIT (OUTPATIENT)
Dept: FAMILY MEDICINE | Facility: CLINIC | Age: 40
End: 2020-07-28
Payer: COMMERCIAL

## 2020-07-28 ENCOUNTER — TELEPHONE (OUTPATIENT)
Dept: FAMILY MEDICINE | Facility: CLINIC | Age: 40
End: 2020-07-28

## 2020-07-28 VITALS
HEIGHT: 65 IN | BODY MASS INDEX: 35.68 KG/M2 | HEART RATE: 90 BPM | SYSTOLIC BLOOD PRESSURE: 112 MMHG | WEIGHT: 214.19 LBS | OXYGEN SATURATION: 98 % | TEMPERATURE: 98 F | DIASTOLIC BLOOD PRESSURE: 70 MMHG

## 2020-07-28 DIAGNOSIS — M25.552 BILATERAL HIP PAIN: Primary | ICD-10-CM

## 2020-07-28 DIAGNOSIS — E66.9 CLASS 2 OBESITY WITH BODY MASS INDEX (BMI) OF 35.0 TO 35.9 IN ADULT, UNSPECIFIED OBESITY TYPE, UNSPECIFIED WHETHER SERIOUS COMORBIDITY PRESENT: ICD-10-CM

## 2020-07-28 DIAGNOSIS — M25.551 BILATERAL HIP PAIN: Primary | ICD-10-CM

## 2020-07-28 DIAGNOSIS — M54.9 DORSALGIA, UNSPECIFIED: ICD-10-CM

## 2020-07-28 DIAGNOSIS — F41.9 ANXIETY: ICD-10-CM

## 2020-07-28 DIAGNOSIS — E66.9 CLASS 2 OBESITY WITH BODY MASS INDEX (BMI) OF 35.0 TO 35.9 IN ADULT, UNSPECIFIED OBESITY TYPE, UNSPECIFIED WHETHER SERIOUS COMORBIDITY PRESENT: Primary | ICD-10-CM

## 2020-07-28 PROBLEM — E66.812 CLASS 2 OBESITY WITH BODY MASS INDEX (BMI) OF 35.0 TO 35.9 IN ADULT: Status: ACTIVE | Noted: 2020-07-28

## 2020-07-28 PROCEDURE — 99999 PR PBB SHADOW E&M-EST. PATIENT-LVL III: ICD-10-PCS | Mod: PBBFAC,,, | Performed by: INTERNAL MEDICINE

## 2020-07-28 PROCEDURE — 99999 PR PBB SHADOW E&M-EST. PATIENT-LVL III: CPT | Mod: PBBFAC,,, | Performed by: INTERNAL MEDICINE

## 2020-07-28 PROCEDURE — 99214 PR OFFICE/OUTPT VISIT, EST, LEVL IV, 30-39 MIN: ICD-10-PCS | Mod: S$GLB,,, | Performed by: INTERNAL MEDICINE

## 2020-07-28 PROCEDURE — 3008F BODY MASS INDEX DOCD: CPT | Mod: CPTII,S$GLB,, | Performed by: INTERNAL MEDICINE

## 2020-07-28 PROCEDURE — 3008F PR BODY MASS INDEX (BMI) DOCUMENTED: ICD-10-PCS | Mod: CPTII,S$GLB,, | Performed by: INTERNAL MEDICINE

## 2020-07-28 PROCEDURE — 99214 OFFICE O/P EST MOD 30 MIN: CPT | Mod: S$GLB,,, | Performed by: INTERNAL MEDICINE

## 2020-07-28 RX ORDER — BUPROPION HYDROCHLORIDE 300 MG/1
300 TABLET ORAL DAILY
Qty: 30 TABLET | Refills: 11 | Status: SHIPPED | OUTPATIENT
Start: 2020-07-28 | End: 2021-04-23

## 2020-07-28 NOTE — TELEPHONE ENCOUNTER
Spoke with patient. Informed patient of recent xray results and Dr Ramachandran's recommendations. Pt verbalizes understanding.

## 2020-07-28 NOTE — PROGRESS NOTES
"[facility] Note    Chief Complaint      Chief Complaint   Patient presents with    Follow-up     hip pain       History of Present Illness      Angel Mosquera is a 40 y.o. female with chronic conditions of female infertility who presents today for: follow up hip pain.    From last visit 4/28/2020: "Reports starting with bilateral hip after taking hormones for fertility.  Present since 8/2019.  Worst with sit to stand and first few steps.  Improved with walking and moving.  Not worse with lying down or climbing stairs. Not reproduced by palpation or lying on side. Has not tried NSAIDs.  No family history of autoimmune disease."  Pt had normal hip xrays and bloodwork including inflammatory markers and PRABHAKAR and RF.   Pt went to ortho who thought hip/groin pains might be pelvic in origin.  Saw OBGYN Dr. Aguero afterwards and had pelvic ultrasound which showed two new fibroids.  Considering hysterectomy soon.  Has also been seeing PT which has improved left hip pain but right hip pain is still significant.  Has follow up with [facility] on 8/6/2020.  May need additional imaging of the right hip.    Has been taking wellbutrin for weight control and mild anxiety.  Has not seen a significant change.  Flu shot UTD.  TdAP 2019.   PAP smear 2019 with Dr. Aguero.    Past Medical History:  Past Medical History:   Diagnosis Date    Rosacea        Past Surgical History:   has a past surgical history that includes Breast Augmentation (2005); mischarr; Dilation and curettage of uterus (N/A, 10/19/2019); Robot-assisted laparoscopic uterine myomectomy (N/A, 1/7/2020); Augmentation of breast (Bilateral, 2005); and Cosmetic surgery (2005).    Family History:  family history includes Alcohol abuse in her father, maternal grandfather, and maternal uncle; Arthritis in her maternal grandmother; COPD in her maternal grandfather; Cancer in her paternal grandfather and paternal grandmother; Diabetes in her maternal " grandmother and mother; Heart disease in her mother; Heart failure in her mother; Hyperlipidemia in her mother; Hypertension in her mother.     Social History:  Social History     Tobacco Use    Smoking status: Never Smoker    Smokeless tobacco: Never Used   Substance Use Topics    Alcohol use: No     Frequency: Never     Binge frequency: Never    Drug use: No       Review of Systems   Constitutional: Negative for chills, fever and malaise/fatigue.   HENT: Negative for hearing loss.    Eyes: Negative for discharge.   Respiratory: Negative for shortness of breath and wheezing.    Cardiovascular: Negative for chest pain and palpitations.   Gastrointestinal: Negative for blood in stool, constipation, diarrhea, nausea and vomiting.   Genitourinary: Negative for dysuria and hematuria.   Musculoskeletal: Negative for neck pain.   Skin: Negative for rash.   Neurological: Negative for weakness and headaches.   Endo/Heme/Allergies: Negative for polydipsia.        Medications:  Outpatient Encounter Medications as of 7/28/2020   Medication Sig Dispense Refill    buPROPion (WELLBUTRIN XL) 300 MG 24 hr tablet Take 1 tablet (300 mg total) by mouth once daily. 30 tablet 11    ibuprofen (ADVIL,MOTRIN) 600 MG tablet Take 1 tablet (600 mg total) by mouth 3 (three) times daily with meals. 90 tablet 2    [DISCONTINUED] buPROPion (WELLBUTRIN XL) 150 MG TB24 tablet Take 1 tablet (150 mg total) by mouth once daily. 30 tablet 11     No facility-administered encounter medications on file as of 7/28/2020.        Allergies:  Review of patient's allergies indicates:  No Known Allergies    Health Maintenance:  Immunization History   Administered Date(s) Administered    Meningococcal Conjugate 05/20/2013      Health Maintenance   Topic Date Due    TETANUS VACCINE  04/16/1998    Mammogram  05/20/2022    Hepatitis C Screening  Completed    Lipid Panel  Completed        Physical Exam      Vital Signs  Temp: 97.7 °F (36.5 °C)  Temp src:  "Oral  Pulse: 90  SpO2: 98 %  BP: 112/70  BP Location: Right arm  Patient Position: Sitting  Pain Score:   4  Pain Loc: Hip  Height and Weight  Height: 5' 5" (165.1 cm)  Weight: 97.1 kg (214 lb 2.8 oz)  BSA (Calculated - sq m): 2.11 sq meters  BMI (Calculated): 35.6  Weight in (lb) to have BMI = 25: 149.9]    Physical Exam  Vitals signs reviewed.   Constitutional:       Appearance: She is well-developed.   HENT:      Head: Normocephalic and atraumatic.      Right Ear: External ear normal.      Left Ear: External ear normal.   Eyes:      Conjunctiva/sclera: Conjunctivae normal.      Pupils: Pupils are equal, round, and reactive to light.   Neck:      Vascular: No carotid bruit.   Cardiovascular:      Rate and Rhythm: Normal rate and regular rhythm.      Heart sounds: Normal heart sounds. No murmur.   Pulmonary:      Effort: Pulmonary effort is normal.      Breath sounds: Normal breath sounds. No wheezing or rales.   Abdominal:      General: Bowel sounds are normal. There is no distension.      Palpations: Abdomen is soft.      Tenderness: There is no abdominal tenderness.          Laboratory:  CBC:  Recent Labs   Lab 10/19/19  0431 01/07/20  0623 04/28/20  1202   WBC 15.63 H 9.34 8.56   RBC 4.40 4.51 4.45   Hemoglobin 12.3 12.0 11.5 L   Hematocrit 38.9 39.6 39.3   Platelets 383 H 382 H 370 H   Mean Corpuscular Volume 88 88 88   Mean Corpuscular Hemoglobin 28.0 26.6 L 25.8 L   Mean Corpuscular Hemoglobin Conc 31.6 L 30.3 L 29.3 L     CMP:  Recent Labs   Lab 10/19/19  0431 04/28/20  1202   Glucose 106 90   Calcium 9.5 9.1   Albumin 3.5 4.2   Total Protein 7.6 7.9   Sodium 139 139   Potassium 3.5 4.0   CO2 22 L 25   Chloride 105 105   BUN, Bld 12 11   Alkaline Phosphatase 65 82   ALT 17 18   AST 17 27   Total Bilirubin 0.2 0.3     URINALYSIS:       LIPIDS:  Recent Labs   Lab 11/06/17  1230 05/02/19  1205 04/28/20  1202   TSH 1.172 1.251 1.420   HDL  --   --  41   Cholesterol  --   --  160   Triglycerides  --   --  104 "   LDL Cholesterol  --   --  98.2   Hdl/Cholesterol Ratio  --   --  25.6   Non-HDL Cholesterol  --   --  119   Total Cholesterol/HDL Ratio  --   --  3.9     TSH:  Recent Labs   Lab 11/06/17  1230 05/02/19  1205 04/28/20  1202   TSH 1.172 1.251 1.420     A1C:        Assessment/Plan     Angel Mosquera is a 40 y.o.female with:    1. Bilateral hip pain  Continue current meds.  F/U with PT and ortho as scheduled  2. Class 2 obesity with body mass index (BMI) of 35.0 to 35.9 in adult, unspecified obesity type, unspecified whether serious comorbidity present  Increasing wellbutrin to see if appetite control is more effective.  Also discussed qsymia.  3. Dorsalgia, unspecified  - X-Ray Lumbar Spine Complete 5 View; Future    4. Anxiety  - buPROPion (WELLBUTRIN XL) 300 MG 24 hr tablet; Take 1 tablet (300 mg total) by mouth once daily.  Dispense: 30 tablet; Refill: 11  Increasing wellbutrin./    Chronic conditions status updated as per HPI.  Other than changes above, cont current medications and maintain follow up with specialists.  Return to clinic in 6 months.    Tato Ramachandran MD  [facility]                  Answers for HPI/ROS submitted by the patient on 7/27/2020   activity change: No  unexpected weight change: No  rhinorrhea: No  trouble swallowing: No  visual disturbance: No  chest tightness: No  polyuria: No  difficulty urinating: No  menstrual problem: No  joint swelling: No  arthralgias: Yes  confusion: No  dysphoric mood: No

## 2020-07-28 NOTE — TELEPHONE ENCOUNTER
----- Message from Tato Ramachandran MD sent at 7/28/2020 12:53 PM CDT -----  Multiple levels of mild degenerative changes.  I do not suspect that this is a prominent cause of her current symptoms.

## 2020-07-29 RX ORDER — PHENTERMINE AND TOPIRAMATE 3.75; 23 MG/1; MG/1
1 CAPSULE, EXTENDED RELEASE ORAL DAILY
Qty: 14 CAPSULE | Refills: 0 | Status: SHIPPED | OUTPATIENT
Start: 2020-07-29 | End: 2021-04-23

## 2020-07-29 RX ORDER — PHENTERMINE AND TOPIRAMATE 7.5; 46 MG/1; MG/1
1 CAPSULE, EXTENDED RELEASE ORAL DAILY
Qty: 30 CAPSULE | Refills: 2 | Status: SHIPPED | OUTPATIENT
Start: 2020-07-29 | End: 2021-05-04

## 2020-08-03 NOTE — PROGRESS NOTES
Subjective:      Patient ID: Angel Mosquera is a 40 y.o. female.    Chief Complaint: No chief complaint on file.      HPI  (Shikha)    Last seen by me on 5/7/20 for bilateral hip pain (right = left) over her ASIS when she started with hormones for fertility. XRs of bilateral hips looked good and previous MRI of pelvis showed no bony abnormalities.     Sent to PT after last visit and given motrin. Spoke with her over [facility] on 6/18/20 and she felt like she was doing a lot better with PT.     She is here for follow up.     As above, she has improved with PT and pain is at least 75% better. She continues with pain and tightness in both IT bands. This is intermittent. She rates her pain as a 2 on a scale of 1-10. Anterior hip/pelvic pain is better. 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 She continues on prn motrin.      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       Past Medical History:   Diagnosis Date    Rosacea          Current Outpatient Medications:     ibuprofen (ADVIL,MOTRIN) 600 MG tablet, Take 1 tablet (600 mg total) by mouth 3 (three) times daily with meals., Disp: 90 tablet, Rfl: 2    phentermine-topiramate (QSYMIA) 3.75-23 mg CM24, Take 1 capsule by mouth once daily., Disp: 14 capsule, Rfl: 0    phentermine-topiramate (QSYMIA) 7.5-46 mg CM24, Take 1 capsule by mouth once daily., Disp: 30 capsule, Rfl: 2    buPROPion (WELLBUTRIN XL) 300 MG 24 hr tablet, Take 1 tablet (300 mg total) by mouth once daily. (Patient not taking: Reported on 8/6/2020), Disp: 30 tablet, Rfl: 11    Review of patient's allergies indicates:  No Known Allergies      Review of Systems   Constitution: Negative for chills, fever, night sweats and weight gain.   Gastrointestinal: Negative for bowel incontinence, nausea and vomiting.   Genitourinary: Negative for bladder incontinence.   Neurological: Negative for disturbances in coordination and loss of balance.         Objective:        BP  112/70   Pulse 84   Resp 20   Wt 94.8 kg (209 lb)   BMI 34.78 kg/m²     Ortho/SPM Exam     Body habitus is::normal.   The patient walks without a limp.     RIGHT HIP EXAM:    There is:: no tenderness over ASIS or GTB. Mild tenderness over IT band.   Range of motion- Flexion full, External rotation full, internal rotation full.  Resisted SLR negative.  Pain with rotation negative  Sciatic tension findings negative.  Motor normal 5/5 strength in all tested muscle groups.   Sensory normal.      LEFT HIP EXAM:    There is::no tenderness over ASIS or GTB. Mild tenderness over IT band.   Range of motion- Flexion full, External rotation full, internal rotation full.  Resisted SLR negative.  Pain with rotation negative  Sciatic tension findings negative.  Motor normal 5/5 strength in all tested muscle groups.   Sensory normal.        Assessment:       Encounter Diagnosis   Name Primary?    Bilateral hip pain Yes          Plan:       Diagnoses and all orders for this visit:    Bilateral hip pain      Improvement in anterior hip pain over ASIS with PT. She is at least 75% better. She continues with bilateral hip pain in IT bands. No tenderness over GTB.  Previous XRs of bilateral hips looked good and previous MRI of pelvis showed no bony abnormalities. Treatment options reviewed with patient and following plan made:     - Finish one more session of PT and then transition to HEP.   - Continue prn motrin with food. Reviewed dosing and side effects.   - Follow up prn at her request, but I will email her to check on her in 6 weeks.     Follow up if symptoms worsen or fail to improve.

## 2020-08-05 ENCOUNTER — PATIENT OUTREACH (OUTPATIENT)
Dept: ADMINISTRATIVE | Facility: OTHER | Age: 40
End: 2020-08-05

## 2020-08-05 NOTE — PROGRESS NOTES
Requested updates within Care Everywhere.  Patient's chart was reviewed for overdue BIPIN topics.  Immunizations reconciled.    Orders placed:  Tasked appts:  Labs Linked:

## 2020-08-06 ENCOUNTER — OFFICE VISIT (OUTPATIENT)
Dept: ORTHOPEDICS | Facility: CLINIC | Age: 40
End: 2020-08-06
Payer: COMMERCIAL

## 2020-08-06 VITALS
WEIGHT: 209 LBS | SYSTOLIC BLOOD PRESSURE: 112 MMHG | RESPIRATION RATE: 20 BRPM | BODY MASS INDEX: 34.78 KG/M2 | DIASTOLIC BLOOD PRESSURE: 70 MMHG | HEART RATE: 84 BPM

## 2020-08-06 DIAGNOSIS — M25.552 BILATERAL HIP PAIN: Primary | ICD-10-CM

## 2020-08-06 DIAGNOSIS — M25.551 BILATERAL HIP PAIN: Primary | ICD-10-CM

## 2020-08-06 PROCEDURE — 3008F PR BODY MASS INDEX (BMI) DOCUMENTED: ICD-10-PCS | Mod: CPTII,S$GLB,, | Performed by: PHYSICIAN ASSISTANT

## 2020-08-06 PROCEDURE — 99999 PR PBB SHADOW E&M-EST. PATIENT-LVL III: ICD-10-PCS | Mod: PBBFAC,,, | Performed by: PHYSICIAN ASSISTANT

## 2020-08-06 PROCEDURE — 99214 PR OFFICE/OUTPT VISIT, EST, LEVL IV, 30-39 MIN: ICD-10-PCS | Mod: S$GLB,,, | Performed by: PHYSICIAN ASSISTANT

## 2020-08-06 PROCEDURE — 99214 OFFICE O/P EST MOD 30 MIN: CPT | Mod: S$GLB,,, | Performed by: PHYSICIAN ASSISTANT

## 2020-08-06 PROCEDURE — 3008F BODY MASS INDEX DOCD: CPT | Mod: CPTII,S$GLB,, | Performed by: PHYSICIAN ASSISTANT

## 2020-08-06 PROCEDURE — 99999 PR PBB SHADOW E&M-EST. PATIENT-LVL III: CPT | Mod: PBBFAC,,, | Performed by: PHYSICIAN ASSISTANT

## 2020-09-04 ENCOUNTER — OFFICE VISIT (OUTPATIENT)
Dept: FAMILY MEDICINE | Facility: CLINIC | Age: 40
End: 2020-09-04
Payer: COMMERCIAL

## 2020-09-04 VITALS
BODY MASS INDEX: 34.15 KG/M2 | SYSTOLIC BLOOD PRESSURE: 130 MMHG | OXYGEN SATURATION: 99 % | HEIGHT: 65 IN | WEIGHT: 204.94 LBS | RESPIRATION RATE: 18 BRPM | HEART RATE: 66 BPM | DIASTOLIC BLOOD PRESSURE: 88 MMHG | TEMPERATURE: 98 F

## 2020-09-04 DIAGNOSIS — H60.331 SWIMMER'S EAR OF RIGHT SIDE, UNSPECIFIED CHRONICITY: Primary | ICD-10-CM

## 2020-09-04 PROCEDURE — 99999 PR PBB SHADOW E&M-EST. PATIENT-LVL III: ICD-10-PCS | Mod: PBBFAC,,, | Performed by: INTERNAL MEDICINE

## 2020-09-04 PROCEDURE — 3008F BODY MASS INDEX DOCD: CPT | Mod: CPTII,S$GLB,, | Performed by: INTERNAL MEDICINE

## 2020-09-04 PROCEDURE — 3008F PR BODY MASS INDEX (BMI) DOCUMENTED: ICD-10-PCS | Mod: CPTII,S$GLB,, | Performed by: INTERNAL MEDICINE

## 2020-09-04 PROCEDURE — 99999 PR PBB SHADOW E&M-EST. PATIENT-LVL III: CPT | Mod: PBBFAC,,, | Performed by: INTERNAL MEDICINE

## 2020-09-04 PROCEDURE — 99214 OFFICE O/P EST MOD 30 MIN: CPT | Mod: S$GLB,,, | Performed by: INTERNAL MEDICINE

## 2020-09-04 PROCEDURE — 99214 PR OFFICE/OUTPT VISIT, EST, LEVL IV, 30-39 MIN: ICD-10-PCS | Mod: S$GLB,,, | Performed by: INTERNAL MEDICINE

## 2020-09-04 RX ORDER — NEOMYCIN SULFATE, POLYMYXIN B SULFATE AND HYDROCORTISONE 10; 3.5; 1 MG/ML; MG/ML; [USP'U]/ML
3 SUSPENSION/ DROPS AURICULAR (OTIC) 3 TIMES DAILY
Qty: 10 ML | Refills: 0 | Status: SHIPPED | OUTPATIENT
Start: 2020-09-04 | End: 2021-04-23

## 2020-09-04 NOTE — PROGRESS NOTES
[facility] Note    Chief Complaint      Chief Complaint   Patient presents with    Ear Fullness       History of Present Illness      Angel Mosquera is a 40 y.o. female who presents today for   Chief Complaint   Patient presents with    Ear Fullness   .  Patient comes to appointment here or acute visit related to above . Started 2 days ago with a fullness sensation in ear, mild pain , muffled hearing . She denies fever sore throat or nasal congestion    Problem List Items Addressed This Visit        ENT    Swimmer's ear of right side - Primary    Overview     Cortisporin otic                  Past Medical History:  Past Medical History:   Diagnosis Date    Rosacea        Past Surgical History:  Past Surgical History:   Procedure Laterality Date    AUGMENTATION OF BREAST Bilateral 2005    Breast Augmentation  2005    COSMETIC SURGERY  2005    Breast augmentation    DILATION AND CURETTAGE OF UTERUS N/A 10/19/2019    Procedure: DILATION AND CURETTAGE, UTERUS;  Surgeon: Frances Culp MD;  Location: [facility] OR;  Service: OB/GYN;  Laterality: N/A;  with suction    mischarr      ROBOT-ASSISTED LAPAROSCOPIC UTERINE MYOMECTOMY N/A 1/7/2020    Procedure: ROBOTIC MYOMECTOMY, UTERUS;  Surgeon: Frances Culp MD;  Location: [facility] OR;  Service: OB/GYN;  Laterality: N/A;       Family History:  family history includes Alcohol abuse in her father, maternal grandfather, and maternal uncle; Arthritis in her maternal grandmother; COPD in her maternal grandfather; Cancer in her paternal grandfather and paternal grandmother; Diabetes in her maternal grandmother and mother; Heart disease in her mother; Heart failure in her mother; Hyperlipidemia in her mother; Hypertension in her mother.    Social History:  Social History     Socioeconomic History    Marital status:      Spouse name: Not on file    Number of children: Not on file    Years of education: Not on file    Highest  education level: Not on file   Occupational History    Occupation: Nurse     Employer: [employer]     Comment: Oncology   Social Needs    Financial resource strain: Not hard at all    Food insecurity     Worry: Never true     Inability: Never true    Transportation needs     Medical: No     Non-medical: No   Tobacco Use    Smoking status: Never Smoker    Smokeless tobacco: Never Used   Substance and Sexual Activity    Alcohol use: No     Frequency: Never     Binge frequency: Never    Drug use: No    Sexual activity: Yes     Partners: Male     Birth control/protection: None     Comment: Vasectomy   Lifestyle    Physical activity     Days per week: 2 days     Minutes per session: 40 min    Stress: Not at all   Relationships    Social connections     Talks on phone: More than three times a week     Gets together: More than three times a week     Attends [employer] service: Not on file     Active member of club or organization: No     Attends meetings of clubs or organizations: Not on file     Relationship status:    Other Topics Concern    Are you pregnant or think you may be? No    Breast-feeding No   Social History Narrative    Not on file       Review of Systems:   Review of Systems   Constitutional: Negative for chills and fever.   HENT: Positive for ear pain. Negative for congestion and sore throat.    Respiratory: Negative for cough.    Cardiovascular: Negative for chest pain.   Musculoskeletal: Negative for myalgias.   Neurological: Negative for headaches.         Medications:  Outpatient Encounter Medications as of 9/4/2020   Medication Sig Dispense Refill    ibuprofen (ADVIL,MOTRIN) 600 MG tablet Take 1 tablet (600 mg total) by mouth 3 (three) times daily with meals. 90 tablet 2    phentermine-topiramate (QSYMIA) 7.5-46 mg CM24 Take 1 capsule by mouth once daily. 30 capsule 2    buPROPion (WELLBUTRIN XL) 300 MG 24 hr tablet Take 1 tablet (300 mg total) by mouth once  daily. (Patient not taking: Reported on 8/6/2020) 30 tablet 11    neomycin-polymyxin-hydrocortisone (CORTISPORIN) 3.5-10,000-1 mg/mL-unit/mL-% otic suspension Place 3 drops into the right ear 3 (three) times daily. 10 mL 0    phentermine-topiramate (QSYMIA) 3.75-23 mg CM24 Take 1 capsule by mouth once daily. (Patient not taking: Reported on 9/4/2020) 14 capsule 0     No facility-administered encounter medications on file as of 9/4/2020.         Allergies:  Review of patient's allergies indicates:  No Known Allergies      Physical Exam      Vitals:    09/04/20 0932   BP: 130/88   Pulse: 66   Resp: 18   Temp: 97.9 °F (36.6 °C)      Body mass index is 34.1 kg/m².    Physical Exam  Constitutional:       General: She is not in acute distress.     Appearance: Normal appearance. She is not ill-appearing.   HENT:      Right Ear: Swelling present.      Ears:      Comments: Right otitis externa   Cardiovascular:      Rate and Rhythm: Normal rate.   Pulmonary:      Effort: Pulmonary effort is normal.   Neurological:      General: No focal deficit present.      Mental Status: She is alert and oriented to person, place, and time.   Psychiatric:         Mood and Affect: Mood normal.          Laboratory:  CBC:  No results for input(s): WBC, RBC, HGB, HCT, PLT, MCV, MCH, MCHC in the last 2160 hours.  CMP:  No results for input(s): GLU, CALCIUM, ALBUMIN, PROT, NA, K, CO2, CL, BUN, ALKPHOS, ALT, AST, BILITOT in the last 2160 hours.    Invalid input(s): CREATININ  URINALYSIS:  No results for input(s): COLORU, CLARITYU, SPECGRAV, PHUR, PROTEINUA, GLUCOSEU, BILIRUBINCON, BLOODU, WBCU, RBCU, BACTERIA, MUCUS, NITRITE, LEUKOCYTESUR, UROBILINOGEN, HYALINECASTS in the last 2160 hours.   LIPIDS:  No results for input(s): TSH, HDL, CHOL, TRIG, LDLCALC, CHOLHDL, NONHDLCHOL, TOTALCHOLEST in the last 2160 hours.  TSH:  No results for input(s): TSH in the last 2160 hours.  A1C:  No results for input(s): HGBA1C in the last 2160  hours.    Radiology:        Assessment:     Angle Mosquera is a 40 y.o.female with:    Swimmer's ear of right side, unspecified chronicity  -     neomycin-polymyxin-hydrocortisone (CORTISPORIN) 3.5-10,000-1 mg/mL-unit/mL-% otic suspension; Place 3 drops into the right ear 3 (three) times daily.  Dispense: 10 mL; Refill: 0          Plan:     Problem List Items Addressed This Visit        ENT    Swimmer's ear of right side - Primary    Overview     Cortisporin otic                As above, continue current medications and maintain follow up with specialists.  Return to clinic as scheduled       Frederick W Dantagnan [facility] [place]

## 2020-09-17 ENCOUNTER — PATIENT MESSAGE (OUTPATIENT)
Dept: ORTHOPEDICS | Facility: CLINIC | Age: 40
End: 2020-09-17

## 2020-10-06 ENCOUNTER — PATIENT MESSAGE (OUTPATIENT)
Dept: FAMILY MEDICINE | Facility: CLINIC | Age: 40
End: 2020-10-06

## 2020-10-12 ENCOUNTER — PATIENT MESSAGE (OUTPATIENT)
Dept: OBSTETRICS AND GYNECOLOGY | Facility: CLINIC | Age: 40
End: 2020-10-12

## 2020-10-12 DIAGNOSIS — R10.2 PELVIC PAIN IN FEMALE: Primary | ICD-10-CM

## 2020-10-12 NOTE — TELEPHONE ENCOUNTER
"Pelvic ultrasound ordered per documentation. "If the pain worsens do follow up pelvic US in 6 months".   "

## 2020-12-19 ENCOUNTER — PATIENT MESSAGE (OUTPATIENT)
Dept: OBSTETRICS AND GYNECOLOGY | Facility: CLINIC | Age: 40
End: 2020-12-19

## 2020-12-21 ENCOUNTER — TELEPHONE (OUTPATIENT)
Dept: OBSTETRICS AND GYNECOLOGY | Facility: CLINIC | Age: 40
End: 2020-12-21

## 2020-12-21 DIAGNOSIS — Z30.9 ENCOUNTER FOR CONTRACEPTIVE MANAGEMENT, UNSPECIFIED TYPE: Primary | ICD-10-CM

## 2020-12-21 NOTE — TELEPHONE ENCOUNTER
Patient is scheduled with for a hysterectomy on 1/7 with Dr. Culp. She would like to know if there is anything that can help with the heavy bleeding while she waits for the procedure and if she can use FMLA for this issue.

## 2020-12-22 RX ORDER — DESOGESTREL AND ETHINYL ESTRADIOL 21-5 (28)
1 KIT ORAL DAILY
Qty: 28 TABLET | Refills: 11 | Status: SHIPPED | OUTPATIENT
Start: 2020-12-22 | End: 2021-09-16 | Stop reason: SDUPTHER

## 2020-12-30 ENCOUNTER — IMMUNIZATION (OUTPATIENT)
Dept: INTERNAL MEDICINE | Facility: CLINIC | Age: 40
End: 2020-12-30
Payer: COMMERCIAL

## 2020-12-30 DIAGNOSIS — Z23 NEED FOR VACCINATION: ICD-10-CM

## 2020-12-30 PROCEDURE — 91300 COVID-19, MRNA, LNP-S, PF, 30 MCG/0.3 ML DOSE VACCINE: ICD-10-PCS | Mod: ,,,

## 2020-12-30 PROCEDURE — 0001A COVID-19, MRNA, LNP-S, PF, 30 MCG/0.3 ML DOSE VACCINE: CPT | Mod: CV19,,,

## 2020-12-30 PROCEDURE — 0001A COVID-19, MRNA, LNP-S, PF, 30 MCG/0.3 ML DOSE VACCINE: ICD-10-PCS | Mod: CV19,,,

## 2020-12-30 PROCEDURE — 91300 COVID-19, MRNA, LNP-S, PF, 30 MCG/0.3 ML DOSE VACCINE: CPT | Mod: ,,,

## 2021-01-20 ENCOUNTER — IMMUNIZATION (OUTPATIENT)
Dept: INTERNAL MEDICINE | Facility: CLINIC | Age: 41
End: 2021-01-20
Payer: COMMERCIAL

## 2021-01-20 DIAGNOSIS — Z23 NEED FOR VACCINATION: Primary | ICD-10-CM

## 2021-01-20 PROCEDURE — 91300 COVID-19, MRNA, LNP-S, PF, 30 MCG/0.3 ML DOSE VACCINE: CPT | Mod: PBBFAC | Performed by: INTERNAL MEDICINE

## 2021-01-20 PROCEDURE — 0002A COVID-19, MRNA, LNP-S, PF, 30 MCG/0.3 ML DOSE VACCINE: CPT | Mod: PBBFAC | Performed by: INTERNAL MEDICINE

## 2021-04-23 ENCOUNTER — OFFICE VISIT (OUTPATIENT)
Dept: PODIATRY | Facility: CLINIC | Age: 41
End: 2021-04-23
Payer: COMMERCIAL

## 2021-04-23 VITALS
SYSTOLIC BLOOD PRESSURE: 118 MMHG | HEART RATE: 58 BPM | DIASTOLIC BLOOD PRESSURE: 78 MMHG | HEIGHT: 65 IN | WEIGHT: 204 LBS | BODY MASS INDEX: 33.99 KG/M2

## 2021-04-23 DIAGNOSIS — M24.572 EQUINUS CONTRACTURE OF LEFT ANKLE: ICD-10-CM

## 2021-04-23 DIAGNOSIS — M72.2 PLANTAR FASCIITIS: Primary | ICD-10-CM

## 2021-04-23 PROCEDURE — 20550 NJX 1 TENDON SHEATH/LIGAMENT: CPT | Mod: LT,S$GLB,, | Performed by: PODIATRIST

## 2021-04-23 PROCEDURE — 3008F BODY MASS INDEX DOCD: CPT | Mod: CPTII,S$GLB,, | Performed by: PODIATRIST

## 2021-04-23 PROCEDURE — 99999 PR PBB SHADOW E&M-EST. PATIENT-LVL III: ICD-10-PCS | Mod: PBBFAC,,, | Performed by: PODIATRIST

## 2021-04-23 PROCEDURE — 1125F PR PAIN SEVERITY QUANTIFIED, PAIN PRESENT: ICD-10-PCS | Mod: S$GLB,,, | Performed by: PODIATRIST

## 2021-04-23 PROCEDURE — 99999 PR PBB SHADOW E&M-EST. PATIENT-LVL III: CPT | Mod: PBBFAC,,, | Performed by: PODIATRIST

## 2021-04-23 PROCEDURE — 99203 PR OFFICE/OUTPT VISIT, NEW, LEVL III, 30-44 MIN: ICD-10-PCS | Mod: 25,S$GLB,, | Performed by: PODIATRIST

## 2021-04-23 PROCEDURE — 99203 OFFICE O/P NEW LOW 30 MIN: CPT | Mod: 25,S$GLB,, | Performed by: PODIATRIST

## 2021-04-23 PROCEDURE — 20550 PR INJECT TENDON SHEATH/LIGAMENT: ICD-10-PCS | Mod: LT,S$GLB,, | Performed by: PODIATRIST

## 2021-04-23 PROCEDURE — 3008F PR BODY MASS INDEX (BMI) DOCUMENTED: ICD-10-PCS | Mod: CPTII,S$GLB,, | Performed by: PODIATRIST

## 2021-04-23 PROCEDURE — 1125F AMNT PAIN NOTED PAIN PRSNT: CPT | Mod: S$GLB,,, | Performed by: PODIATRIST

## 2021-04-23 RX ORDER — TRIAMCINOLONE ACETONIDE 40 MG/ML
40 INJECTION, SUSPENSION INTRA-ARTICULAR; INTRAMUSCULAR
Status: COMPLETED | OUTPATIENT
Start: 2021-04-23 | End: 2021-04-23

## 2021-04-23 RX ADMIN — TRIAMCINOLONE ACETONIDE 40 MG: 40 INJECTION, SUSPENSION INTRA-ARTICULAR; INTRAMUSCULAR at 09:04

## 2021-04-29 ENCOUNTER — PATIENT MESSAGE (OUTPATIENT)
Dept: PODIATRY | Facility: CLINIC | Age: 41
End: 2021-04-29

## 2021-05-04 ENCOUNTER — PATIENT MESSAGE (OUTPATIENT)
Dept: FAMILY MEDICINE | Facility: CLINIC | Age: 41
End: 2021-05-04

## 2021-05-04 ENCOUNTER — OFFICE VISIT (OUTPATIENT)
Dept: FAMILY MEDICINE | Facility: CLINIC | Age: 41
End: 2021-05-04
Payer: COMMERCIAL

## 2021-05-04 VITALS
OXYGEN SATURATION: 98 % | TEMPERATURE: 98 F | SYSTOLIC BLOOD PRESSURE: 110 MMHG | BODY MASS INDEX: 33.46 KG/M2 | HEIGHT: 65 IN | DIASTOLIC BLOOD PRESSURE: 80 MMHG | WEIGHT: 200.81 LBS | HEART RATE: 94 BPM

## 2021-05-04 DIAGNOSIS — M79.672 LEFT FOOT PAIN: Primary | ICD-10-CM

## 2021-05-04 DIAGNOSIS — M72.2 PLANTAR FASCIITIS: ICD-10-CM

## 2021-05-04 PROCEDURE — 99214 PR OFFICE/OUTPT VISIT, EST, LEVL IV, 30-39 MIN: ICD-10-PCS | Mod: S$GLB,,, | Performed by: INTERNAL MEDICINE

## 2021-05-04 PROCEDURE — 99214 OFFICE O/P EST MOD 30 MIN: CPT | Mod: S$GLB,,, | Performed by: INTERNAL MEDICINE

## 2021-05-04 PROCEDURE — 99999 PR PBB SHADOW E&M-EST. PATIENT-LVL III: CPT | Mod: PBBFAC,,, | Performed by: INTERNAL MEDICINE

## 2021-05-04 PROCEDURE — 3008F PR BODY MASS INDEX (BMI) DOCUMENTED: ICD-10-PCS | Mod: CPTII,S$GLB,, | Performed by: INTERNAL MEDICINE

## 2021-05-04 PROCEDURE — 3008F BODY MASS INDEX DOCD: CPT | Mod: CPTII,S$GLB,, | Performed by: INTERNAL MEDICINE

## 2021-05-04 PROCEDURE — 1126F AMNT PAIN NOTED NONE PRSNT: CPT | Mod: S$GLB,,, | Performed by: INTERNAL MEDICINE

## 2021-05-04 PROCEDURE — 99999 PR PBB SHADOW E&M-EST. PATIENT-LVL III: ICD-10-PCS | Mod: PBBFAC,,, | Performed by: INTERNAL MEDICINE

## 2021-05-04 PROCEDURE — 1126F PR PAIN SEVERITY QUANTIFIED, NO PAIN PRESENT: ICD-10-PCS | Mod: S$GLB,,, | Performed by: INTERNAL MEDICINE

## 2021-05-04 RX ORDER — METHYLPREDNISOLONE 4 MG/1
TABLET ORAL
Qty: 1 PACKAGE | Refills: 0 | Status: SHIPPED | OUTPATIENT
Start: 2021-05-04 | End: 2021-05-25

## 2021-05-10 PROBLEM — Z74.09 IMPAIRED FUNCTIONAL MOBILITY AND ACTIVITY TOLERANCE: Status: ACTIVE | Noted: 2021-05-10

## 2021-05-26 DIAGNOSIS — Z12.31 OTHER SCREENING MAMMOGRAM: ICD-10-CM

## 2021-06-01 ENCOUNTER — PATIENT MESSAGE (OUTPATIENT)
Dept: FAMILY MEDICINE | Facility: CLINIC | Age: 41
End: 2021-06-01

## 2021-06-15 ENCOUNTER — TELEPHONE (OUTPATIENT)
Dept: DERMATOLOGY | Facility: CLINIC | Age: 41
End: 2021-06-15

## 2021-06-24 ENCOUNTER — OFFICE VISIT (OUTPATIENT)
Dept: OBSTETRICS AND GYNECOLOGY | Facility: CLINIC | Age: 41
End: 2021-06-24
Payer: COMMERCIAL

## 2021-06-24 VITALS
BODY MASS INDEX: 34.49 KG/M2 | SYSTOLIC BLOOD PRESSURE: 132 MMHG | DIASTOLIC BLOOD PRESSURE: 86 MMHG | WEIGHT: 207 LBS | HEIGHT: 65 IN

## 2021-06-24 DIAGNOSIS — Z01.419 ENCOUNTER FOR GYNECOLOGICAL EXAMINATION WITHOUT ABNORMAL FINDING: Primary | ICD-10-CM

## 2021-06-24 DIAGNOSIS — D21.9 FIBROIDS: ICD-10-CM

## 2021-06-24 PROCEDURE — 99396 PR PREVENTIVE VISIT,EST,40-64: ICD-10-PCS | Mod: S$GLB,,, | Performed by: OBSTETRICS & GYNECOLOGY

## 2021-06-24 PROCEDURE — 99999 PR PBB SHADOW E&M-EST. PATIENT-LVL III: CPT | Mod: PBBFAC,,, | Performed by: OBSTETRICS & GYNECOLOGY

## 2021-06-24 PROCEDURE — 3075F SYST BP GE 130 - 139MM HG: CPT | Mod: CPTII,S$GLB,, | Performed by: OBSTETRICS & GYNECOLOGY

## 2021-06-24 PROCEDURE — 3075F PR MOST RECENT SYSTOLIC BLOOD PRESS GE 130-139MM HG: ICD-10-PCS | Mod: CPTII,S$GLB,, | Performed by: OBSTETRICS & GYNECOLOGY

## 2021-06-24 PROCEDURE — 3008F PR BODY MASS INDEX (BMI) DOCUMENTED: ICD-10-PCS | Mod: CPTII,S$GLB,, | Performed by: OBSTETRICS & GYNECOLOGY

## 2021-06-24 PROCEDURE — 3079F PR MOST RECENT DIASTOLIC BLOOD PRESSURE 80-89 MM HG: ICD-10-PCS | Mod: CPTII,S$GLB,, | Performed by: OBSTETRICS & GYNECOLOGY

## 2021-06-24 PROCEDURE — 3079F DIAST BP 80-89 MM HG: CPT | Mod: CPTII,S$GLB,, | Performed by: OBSTETRICS & GYNECOLOGY

## 2021-06-24 PROCEDURE — 3008F BODY MASS INDEX DOCD: CPT | Mod: CPTII,S$GLB,, | Performed by: OBSTETRICS & GYNECOLOGY

## 2021-06-24 PROCEDURE — 99999 PR PBB SHADOW E&M-EST. PATIENT-LVL III: ICD-10-PCS | Mod: PBBFAC,,, | Performed by: OBSTETRICS & GYNECOLOGY

## 2021-06-24 PROCEDURE — 99396 PREV VISIT EST AGE 40-64: CPT | Mod: S$GLB,,, | Performed by: OBSTETRICS & GYNECOLOGY

## 2021-06-24 RX ORDER — AMOXICILLIN AND CLAVULANATE POTASSIUM 875; 125 MG/1; MG/1
1 TABLET, FILM COATED ORAL 2 TIMES DAILY
COMMUNITY
Start: 2021-06-20 | End: 2021-09-27

## 2021-06-25 ENCOUNTER — PATIENT MESSAGE (OUTPATIENT)
Dept: OBSTETRICS AND GYNECOLOGY | Facility: CLINIC | Age: 41
End: 2021-06-25

## 2021-06-28 ENCOUNTER — TELEPHONE (OUTPATIENT)
Dept: OBSTETRICS AND GYNECOLOGY | Facility: CLINIC | Age: 41
End: 2021-06-28

## 2021-06-28 DIAGNOSIS — D21.9 FIBROIDS: ICD-10-CM

## 2021-06-28 DIAGNOSIS — R10.2 PELVIC PAIN IN FEMALE: Primary | ICD-10-CM

## 2021-06-29 ENCOUNTER — PATIENT MESSAGE (OUTPATIENT)
Dept: FAMILY MEDICINE | Facility: CLINIC | Age: 41
End: 2021-06-29

## 2021-06-29 ENCOUNTER — TELEPHONE (OUTPATIENT)
Dept: FAMILY MEDICINE | Facility: CLINIC | Age: 41
End: 2021-06-29

## 2021-06-29 DIAGNOSIS — Z00.00 ANNUAL PHYSICAL EXAM: Primary | ICD-10-CM

## 2021-07-01 ENCOUNTER — OFFICE VISIT (OUTPATIENT)
Dept: FAMILY MEDICINE | Facility: CLINIC | Age: 41
End: 2021-07-01
Payer: COMMERCIAL

## 2021-07-01 VITALS
HEART RATE: 81 BPM | BODY MASS INDEX: 34.54 KG/M2 | HEIGHT: 65 IN | WEIGHT: 207.31 LBS | DIASTOLIC BLOOD PRESSURE: 70 MMHG | TEMPERATURE: 98 F | SYSTOLIC BLOOD PRESSURE: 108 MMHG | OXYGEN SATURATION: 99 %

## 2021-07-01 DIAGNOSIS — Z00.00 ANNUAL PHYSICAL EXAM: Primary | ICD-10-CM

## 2021-07-01 PROCEDURE — 3008F PR BODY MASS INDEX (BMI) DOCUMENTED: ICD-10-PCS | Mod: CPTII,S$GLB,, | Performed by: INTERNAL MEDICINE

## 2021-07-01 PROCEDURE — 99396 PR PREVENTIVE VISIT,EST,40-64: ICD-10-PCS | Mod: S$GLB,,, | Performed by: INTERNAL MEDICINE

## 2021-07-01 PROCEDURE — 99396 PREV VISIT EST AGE 40-64: CPT | Mod: S$GLB,,, | Performed by: INTERNAL MEDICINE

## 2021-07-01 PROCEDURE — 1126F AMNT PAIN NOTED NONE PRSNT: CPT | Mod: S$GLB,,, | Performed by: INTERNAL MEDICINE

## 2021-07-01 PROCEDURE — 3008F BODY MASS INDEX DOCD: CPT | Mod: CPTII,S$GLB,, | Performed by: INTERNAL MEDICINE

## 2021-07-01 PROCEDURE — 99999 PR PBB SHADOW E&M-EST. PATIENT-LVL III: CPT | Mod: PBBFAC,,, | Performed by: INTERNAL MEDICINE

## 2021-07-01 PROCEDURE — 99999 PR PBB SHADOW E&M-EST. PATIENT-LVL III: ICD-10-PCS | Mod: PBBFAC,,, | Performed by: INTERNAL MEDICINE

## 2021-07-01 PROCEDURE — 1126F PR PAIN SEVERITY QUANTIFIED, NO PAIN PRESENT: ICD-10-PCS | Mod: S$GLB,,, | Performed by: INTERNAL MEDICINE

## 2021-07-07 ENCOUNTER — PATIENT MESSAGE (OUTPATIENT)
Dept: FAMILY MEDICINE | Facility: CLINIC | Age: 41
End: 2021-07-07

## 2021-08-25 ENCOUNTER — PATIENT MESSAGE (OUTPATIENT)
Dept: FAMILY MEDICINE | Facility: CLINIC | Age: 41
End: 2021-08-25

## 2021-08-25 DIAGNOSIS — M79.671 BILATERAL FOOT PAIN: Primary | ICD-10-CM

## 2021-08-25 DIAGNOSIS — M79.672 BILATERAL FOOT PAIN: Primary | ICD-10-CM

## 2021-08-26 ENCOUNTER — PATIENT MESSAGE (OUTPATIENT)
Dept: FAMILY MEDICINE | Facility: CLINIC | Age: 41
End: 2021-08-26

## 2021-09-16 ENCOUNTER — PATIENT MESSAGE (OUTPATIENT)
Dept: FAMILY MEDICINE | Facility: CLINIC | Age: 41
End: 2021-09-16

## 2021-09-16 ENCOUNTER — PATIENT MESSAGE (OUTPATIENT)
Dept: OBSTETRICS AND GYNECOLOGY | Facility: CLINIC | Age: 41
End: 2021-09-16

## 2021-09-16 DIAGNOSIS — M79.671 BILATERAL FOOT PAIN: Primary | ICD-10-CM

## 2021-09-16 DIAGNOSIS — Z30.9 ENCOUNTER FOR CONTRACEPTIVE MANAGEMENT, UNSPECIFIED TYPE: ICD-10-CM

## 2021-09-16 DIAGNOSIS — M79.672 BILATERAL FOOT PAIN: Primary | ICD-10-CM

## 2021-09-16 RX ORDER — DESOGESTREL AND ETHINYL ESTRADIOL 21-5 (28)
1 KIT ORAL DAILY
Qty: 28 TABLET | Refills: 11 | Status: SHIPPED | OUTPATIENT
Start: 2021-09-16 | End: 2021-12-13

## 2021-09-20 ENCOUNTER — HOSPITAL ENCOUNTER (OUTPATIENT)
Dept: RADIOLOGY | Facility: HOSPITAL | Age: 41
Discharge: HOME | End: 2021-09-20
Attending: INTERNAL MEDICINE
Payer: COMMERCIAL

## 2021-09-20 DIAGNOSIS — M79.672 BILATERAL FOOT PAIN: ICD-10-CM

## 2021-09-20 DIAGNOSIS — M79.671 BILATERAL FOOT PAIN: ICD-10-CM

## 2021-09-20 PROCEDURE — 73630 XR FOOT COMPLETE 3 VIEW BILATERAL: ICD-10-PCS | Mod: 26,50,, | Performed by: RADIOLOGY

## 2021-09-20 PROCEDURE — 73630 X-RAY EXAM OF FOOT: CPT | Mod: TC,50,FY

## 2021-09-20 PROCEDURE — 73610 X-RAY EXAM OF ANKLE: CPT | Mod: TC,50,FY

## 2021-09-20 PROCEDURE — 73610 XR ANKLE COMPLETE 3 VIEW BILATERAL: ICD-10-PCS | Mod: 26,50,, | Performed by: RADIOLOGY

## 2021-09-20 PROCEDURE — 73630 X-RAY EXAM OF FOOT: CPT | Mod: 26,50,, | Performed by: RADIOLOGY

## 2021-09-20 PROCEDURE — 73610 X-RAY EXAM OF ANKLE: CPT | Mod: 26,50,, | Performed by: RADIOLOGY

## 2021-09-21 ENCOUNTER — OFFICE VISIT (OUTPATIENT)
Dept: FAMILY MEDICINE | Facility: CLINIC | Age: 41
End: 2021-09-21
Payer: COMMERCIAL

## 2021-09-21 VITALS — OXYGEN SATURATION: 98 % | DIASTOLIC BLOOD PRESSURE: 80 MMHG | HEART RATE: 74 BPM | SYSTOLIC BLOOD PRESSURE: 120 MMHG

## 2021-09-21 DIAGNOSIS — M79.672 BILATERAL FOOT PAIN: Primary | ICD-10-CM

## 2021-09-21 DIAGNOSIS — M79.671 BILATERAL FOOT PAIN: Primary | ICD-10-CM

## 2021-09-21 DIAGNOSIS — F41.9 ANXIETY: ICD-10-CM

## 2021-09-21 PROCEDURE — 99999 PR PBB SHADOW E&M-EST. PATIENT-LVL III: ICD-10-PCS | Mod: PBBFAC,,, | Performed by: INTERNAL MEDICINE

## 2021-09-21 PROCEDURE — 1160F PR REVIEW ALL MEDS BY PRESCRIBER/CLIN PHARMACIST DOCUMENTED: ICD-10-PCS | Mod: CPTII,S$GLB,, | Performed by: INTERNAL MEDICINE

## 2021-09-21 PROCEDURE — 1159F PR MEDICATION LIST DOCUMENTED IN MEDICAL RECORD: ICD-10-PCS | Mod: CPTII,S$GLB,, | Performed by: INTERNAL MEDICINE

## 2021-09-21 PROCEDURE — 3079F PR MOST RECENT DIASTOLIC BLOOD PRESSURE 80-89 MM HG: ICD-10-PCS | Mod: CPTII,S$GLB,, | Performed by: INTERNAL MEDICINE

## 2021-09-21 PROCEDURE — 99214 OFFICE O/P EST MOD 30 MIN: CPT | Mod: S$GLB,,, | Performed by: INTERNAL MEDICINE

## 2021-09-21 PROCEDURE — 1159F MED LIST DOCD IN RCRD: CPT | Mod: CPTII,S$GLB,, | Performed by: INTERNAL MEDICINE

## 2021-09-21 PROCEDURE — 3079F DIAST BP 80-89 MM HG: CPT | Mod: CPTII,S$GLB,, | Performed by: INTERNAL MEDICINE

## 2021-09-21 PROCEDURE — 1160F RVW MEDS BY RX/DR IN RCRD: CPT | Mod: CPTII,S$GLB,, | Performed by: INTERNAL MEDICINE

## 2021-09-21 PROCEDURE — 3074F PR MOST RECENT SYSTOLIC BLOOD PRESSURE < 130 MM HG: ICD-10-PCS | Mod: CPTII,S$GLB,, | Performed by: INTERNAL MEDICINE

## 2021-09-21 PROCEDURE — 3074F SYST BP LT 130 MM HG: CPT | Mod: CPTII,S$GLB,, | Performed by: INTERNAL MEDICINE

## 2021-09-21 PROCEDURE — 99999 PR PBB SHADOW E&M-EST. PATIENT-LVL III: CPT | Mod: PBBFAC,,, | Performed by: INTERNAL MEDICINE

## 2021-09-21 PROCEDURE — 99214 PR OFFICE/OUTPT VISIT, EST, LEVL IV, 30-39 MIN: ICD-10-PCS | Mod: S$GLB,,, | Performed by: INTERNAL MEDICINE

## 2021-09-21 RX ORDER — HYDROXYZINE PAMOATE 25 MG/1
25 CAPSULE ORAL EVERY 8 HOURS PRN
Qty: 30 CAPSULE | Refills: 2 | Status: SHIPPED | OUTPATIENT
Start: 2021-09-21 | End: 2021-09-21

## 2021-09-21 RX ORDER — HYDROXYZINE PAMOATE 25 MG/1
25 CAPSULE ORAL EVERY 8 HOURS PRN
Qty: 30 CAPSULE | Refills: 0 | Status: SHIPPED | OUTPATIENT
Start: 2021-09-21 | End: 2021-10-21

## 2021-09-21 RX ORDER — ESCITALOPRAM OXALATE 20 MG/1
20 TABLET ORAL DAILY
Qty: 30 TABLET | Refills: 0 | Status: SHIPPED | OUTPATIENT
Start: 2021-09-21 | End: 2021-10-21 | Stop reason: SDUPTHER

## 2021-09-23 ENCOUNTER — PATIENT OUTREACH (OUTPATIENT)
Dept: ADMINISTRATIVE | Facility: OTHER | Age: 41
End: 2021-09-23

## 2021-09-27 ENCOUNTER — OFFICE VISIT (OUTPATIENT)
Dept: PODIATRY | Facility: CLINIC | Age: 41
End: 2021-09-27
Attending: FAMILY MEDICINE
Payer: COMMERCIAL

## 2021-09-27 VITALS — HEART RATE: 67 BPM | DIASTOLIC BLOOD PRESSURE: 86 MMHG | SYSTOLIC BLOOD PRESSURE: 144 MMHG

## 2021-09-27 DIAGNOSIS — M79.673 CHRONIC HEEL PAIN, UNSPECIFIED LATERALITY: Primary | ICD-10-CM

## 2021-09-27 DIAGNOSIS — M77.30 HEEL SPUR, UNSPECIFIED LATERALITY: ICD-10-CM

## 2021-09-27 DIAGNOSIS — G89.29 CHRONIC HEEL PAIN, UNSPECIFIED LATERALITY: Primary | ICD-10-CM

## 2021-09-27 DIAGNOSIS — M72.2 PLANTAR FASCIITIS: ICD-10-CM

## 2021-09-27 PROCEDURE — 3079F PR MOST RECENT DIASTOLIC BLOOD PRESSURE 80-89 MM HG: ICD-10-PCS | Mod: CPTII,S$GLB,, | Performed by: PODIATRIST

## 2021-09-27 PROCEDURE — 99214 PR OFFICE/OUTPT VISIT, EST, LEVL IV, 30-39 MIN: ICD-10-PCS | Mod: S$GLB,,, | Performed by: PODIATRIST

## 2021-09-27 PROCEDURE — 1160F RVW MEDS BY RX/DR IN RCRD: CPT | Mod: CPTII,S$GLB,, | Performed by: PODIATRIST

## 2021-09-27 PROCEDURE — 99999 PR PBB SHADOW E&M-EST. PATIENT-LVL III: CPT | Mod: PBBFAC,,, | Performed by: PODIATRIST

## 2021-09-27 PROCEDURE — 1159F PR MEDICATION LIST DOCUMENTED IN MEDICAL RECORD: ICD-10-PCS | Mod: CPTII,S$GLB,, | Performed by: PODIATRIST

## 2021-09-27 PROCEDURE — 3077F SYST BP >= 140 MM HG: CPT | Mod: CPTII,S$GLB,, | Performed by: PODIATRIST

## 2021-09-27 PROCEDURE — 99214 OFFICE O/P EST MOD 30 MIN: CPT | Mod: S$GLB,,, | Performed by: PODIATRIST

## 2021-09-27 PROCEDURE — 3077F PR MOST RECENT SYSTOLIC BLOOD PRESSURE >= 140 MM HG: ICD-10-PCS | Mod: CPTII,S$GLB,, | Performed by: PODIATRIST

## 2021-09-27 PROCEDURE — 3079F DIAST BP 80-89 MM HG: CPT | Mod: CPTII,S$GLB,, | Performed by: PODIATRIST

## 2021-09-27 PROCEDURE — 1159F MED LIST DOCD IN RCRD: CPT | Mod: CPTII,S$GLB,, | Performed by: PODIATRIST

## 2021-09-27 PROCEDURE — 1160F PR REVIEW ALL MEDS BY PRESCRIBER/CLIN PHARMACIST DOCUMENTED: ICD-10-PCS | Mod: CPTII,S$GLB,, | Performed by: PODIATRIST

## 2021-09-27 PROCEDURE — 99999 PR PBB SHADOW E&M-EST. PATIENT-LVL III: ICD-10-PCS | Mod: PBBFAC,,, | Performed by: PODIATRIST

## 2021-10-04 ENCOUNTER — HOSPITAL ENCOUNTER (OUTPATIENT)
Dept: RADIOLOGY | Facility: HOSPITAL | Age: 41
Discharge: HOME | End: 2021-10-04
Attending: PODIATRIST
Payer: COMMERCIAL

## 2021-10-04 ENCOUNTER — OFFICE VISIT (OUTPATIENT)
Dept: DERMATOLOGY | Facility: CLINIC | Age: 41
End: 2021-10-04
Payer: COMMERCIAL

## 2021-10-04 DIAGNOSIS — D18.01 CHERRY ANGIOMA: ICD-10-CM

## 2021-10-04 DIAGNOSIS — D22.9 NEVUS: ICD-10-CM

## 2021-10-04 DIAGNOSIS — L71.9 ROSACEA: ICD-10-CM

## 2021-10-04 DIAGNOSIS — L91.8 SKIN TAG: ICD-10-CM

## 2021-10-04 DIAGNOSIS — M79.673 CHRONIC HEEL PAIN, UNSPECIFIED LATERALITY: ICD-10-CM

## 2021-10-04 DIAGNOSIS — G89.29 CHRONIC HEEL PAIN, UNSPECIFIED LATERALITY: ICD-10-CM

## 2021-10-04 DIAGNOSIS — M77.30 HEEL SPUR, UNSPECIFIED LATERALITY: ICD-10-CM

## 2021-10-04 DIAGNOSIS — D48.5 NEOPLASM OF UNCERTAIN BEHAVIOR OF SKIN: Primary | ICD-10-CM

## 2021-10-04 DIAGNOSIS — L81.4 LENTIGO: ICD-10-CM

## 2021-10-04 DIAGNOSIS — M72.2 PLANTAR FASCIITIS: ICD-10-CM

## 2021-10-04 PROCEDURE — 11103 TANGNTL BX SKIN EA SEP/ADDL: CPT | Mod: S$GLB,,, | Performed by: DERMATOLOGY

## 2021-10-04 PROCEDURE — 99999 PR PBB SHADOW E&M-EST. PATIENT-LVL III: CPT | Mod: PBBFAC,,, | Performed by: DERMATOLOGY

## 2021-10-04 PROCEDURE — 1160F PR REVIEW ALL MEDS BY PRESCRIBER/CLIN PHARMACIST DOCUMENTED: ICD-10-PCS | Mod: CPTII,S$GLB,, | Performed by: DERMATOLOGY

## 2021-10-04 PROCEDURE — 11102 TANGNTL BX SKIN SINGLE LES: CPT | Mod: S$GLB,,, | Performed by: DERMATOLOGY

## 2021-10-04 PROCEDURE — 1159F PR MEDICATION LIST DOCUMENTED IN MEDICAL RECORD: ICD-10-PCS | Mod: CPTII,S$GLB,, | Performed by: DERMATOLOGY

## 2021-10-04 PROCEDURE — 1160F RVW MEDS BY RX/DR IN RCRD: CPT | Mod: CPTII,S$GLB,, | Performed by: DERMATOLOGY

## 2021-10-04 PROCEDURE — 73721 MRI ANKLE WITHOUT CONTRAST LEFT: ICD-10-PCS | Mod: 26,LT,MC, | Performed by: RADIOLOGY

## 2021-10-04 PROCEDURE — 1159F MED LIST DOCD IN RCRD: CPT | Mod: CPTII,S$GLB,, | Performed by: DERMATOLOGY

## 2021-10-04 PROCEDURE — 73721 MRI JNT OF LWR EXTRE W/O DYE: CPT | Mod: TC,RT,MC

## 2021-10-04 PROCEDURE — 88305 TISSUE EXAM BY PATHOLOGIST: CPT | Mod: 59 | Performed by: PATHOLOGY

## 2021-10-04 PROCEDURE — 99214 PR OFFICE/OUTPT VISIT, EST, LEVL IV, 30-39 MIN: ICD-10-PCS | Mod: 25,S$GLB,, | Performed by: DERMATOLOGY

## 2021-10-04 PROCEDURE — 11103 PR TANGENTIAL BIOPSY, SKIN, EA ADDTL LESION: ICD-10-PCS | Mod: S$GLB,,, | Performed by: DERMATOLOGY

## 2021-10-04 PROCEDURE — 73721 MRI ANKLE WITHOUT CONTRAST RIGHT: ICD-10-PCS | Mod: 26,RT,MC, | Performed by: RADIOLOGY

## 2021-10-04 PROCEDURE — 11102 PR TANGENTIAL BIOPSY, SKIN, SINGLE LESION: ICD-10-PCS | Mod: S$GLB,,, | Performed by: DERMATOLOGY

## 2021-10-04 PROCEDURE — 99214 OFFICE O/P EST MOD 30 MIN: CPT | Mod: 25,S$GLB,, | Performed by: DERMATOLOGY

## 2021-10-04 PROCEDURE — 88305 TISSUE EXAM BY PATHOLOGIST: ICD-10-PCS | Mod: 26,,, | Performed by: PATHOLOGY

## 2021-10-04 PROCEDURE — 73721 MRI JNT OF LWR EXTRE W/O DYE: CPT | Mod: TC,LT,MC

## 2021-10-04 PROCEDURE — 73721 MRI JNT OF LWR EXTRE W/O DYE: CPT | Mod: 26,LT,MC, | Performed by: RADIOLOGY

## 2021-10-04 PROCEDURE — 88305 TISSUE EXAM BY PATHOLOGIST: CPT | Mod: 26,,, | Performed by: PATHOLOGY

## 2021-10-04 PROCEDURE — 73721 MRI JNT OF LWR EXTRE W/O DYE: CPT | Mod: 26,RT,MC, | Performed by: RADIOLOGY

## 2021-10-04 PROCEDURE — 99999 PR PBB SHADOW E&M-EST. PATIENT-LVL III: ICD-10-PCS | Mod: PBBFAC,,, | Performed by: DERMATOLOGY

## 2021-10-04 RX ORDER — AZELAIC ACID 0.15 G/G
GEL TOPICAL
Qty: 50 G | Refills: 3 | Status: SHIPPED | OUTPATIENT
Start: 2021-10-04 | End: 2024-01-08

## 2021-10-05 ENCOUNTER — PATIENT MESSAGE (OUTPATIENT)
Dept: PODIATRY | Facility: CLINIC | Age: 41
End: 2021-10-05

## 2021-10-06 ENCOUNTER — HOSPITAL ENCOUNTER (OUTPATIENT)
Dept: RADIOLOGY | Facility: HOSPITAL | Age: 41
Discharge: HOME | End: 2021-10-06
Attending: PODIATRIST
Payer: COMMERCIAL

## 2021-10-06 DIAGNOSIS — M77.30 HEEL SPUR, UNSPECIFIED LATERALITY: ICD-10-CM

## 2021-10-06 DIAGNOSIS — G89.29 CHRONIC HEEL PAIN, UNSPECIFIED LATERALITY: ICD-10-CM

## 2021-10-06 DIAGNOSIS — M72.2 PLANTAR FASCIITIS: ICD-10-CM

## 2021-10-06 DIAGNOSIS — M79.673 CHRONIC HEEL PAIN, UNSPECIFIED LATERALITY: ICD-10-CM

## 2021-10-06 PROCEDURE — 73721 MRI JNT OF LWR EXTRE W/O DYE: CPT | Mod: TC,RT,MC

## 2021-10-06 PROCEDURE — 73721 MRI ANKLE WITHOUT CONTRAST RIGHT: ICD-10-PCS | Mod: 26,RT,, | Performed by: RADIOLOGY

## 2021-10-06 PROCEDURE — 73721 MRI JNT OF LWR EXTRE W/O DYE: CPT | Mod: 26,RT,, | Performed by: RADIOLOGY

## 2021-10-11 LAB
FINAL PATHOLOGIC DIAGNOSIS: NORMAL
GROSS: NORMAL
Lab: NORMAL
MICROSCOPIC EXAM: NORMAL

## 2021-10-21 ENCOUNTER — PATIENT MESSAGE (OUTPATIENT)
Dept: FAMILY MEDICINE | Facility: CLINIC | Age: 41
End: 2021-10-21

## 2021-10-21 ENCOUNTER — OFFICE VISIT (OUTPATIENT)
Dept: FAMILY MEDICINE | Facility: CLINIC | Age: 41
End: 2021-10-21
Payer: COMMERCIAL

## 2021-10-21 DIAGNOSIS — F41.9 ANXIETY: ICD-10-CM

## 2021-10-21 DIAGNOSIS — F41.9 ANXIETY: Primary | ICD-10-CM

## 2021-10-21 DIAGNOSIS — F51.01 PRIMARY INSOMNIA: ICD-10-CM

## 2021-10-21 PROCEDURE — 1160F RVW MEDS BY RX/DR IN RCRD: CPT | Mod: CPTII,95,, | Performed by: INTERNAL MEDICINE

## 2021-10-21 PROCEDURE — 99214 PR OFFICE/OUTPT VISIT, EST, LEVL IV, 30-39 MIN: ICD-10-PCS | Mod: 95,,, | Performed by: INTERNAL MEDICINE

## 2021-10-21 PROCEDURE — 1159F PR MEDICATION LIST DOCUMENTED IN MEDICAL RECORD: ICD-10-PCS | Mod: CPTII,95,, | Performed by: INTERNAL MEDICINE

## 2021-10-21 PROCEDURE — 1160F PR REVIEW ALL MEDS BY PRESCRIBER/CLIN PHARMACIST DOCUMENTED: ICD-10-PCS | Mod: CPTII,95,, | Performed by: INTERNAL MEDICINE

## 2021-10-21 PROCEDURE — 99214 OFFICE O/P EST MOD 30 MIN: CPT | Mod: 95,,, | Performed by: INTERNAL MEDICINE

## 2021-10-21 PROCEDURE — 1159F MED LIST DOCD IN RCRD: CPT | Mod: CPTII,95,, | Performed by: INTERNAL MEDICINE

## 2021-10-21 RX ORDER — HYDROXYZINE HYDROCHLORIDE 25 MG/1
25 TABLET, FILM COATED ORAL NIGHTLY PRN
Qty: 30 TABLET | Refills: 3 | Status: SHIPPED | OUTPATIENT
Start: 2021-10-21 | End: 2023-09-10 | Stop reason: SDUPTHER

## 2021-10-21 RX ORDER — ESCITALOPRAM OXALATE 20 MG/1
20 TABLET ORAL DAILY
Qty: 90 TABLET | Refills: 3 | Status: SHIPPED | OUTPATIENT
Start: 2021-10-21 | End: 2022-07-14

## 2021-10-25 ENCOUNTER — OFFICE VISIT (OUTPATIENT)
Dept: PODIATRY | Facility: CLINIC | Age: 41
End: 2021-10-25
Payer: COMMERCIAL

## 2021-10-25 DIAGNOSIS — M72.2 PLANTAR FASCIITIS: ICD-10-CM

## 2021-10-25 DIAGNOSIS — G89.29 CHRONIC HEEL PAIN, UNSPECIFIED LATERALITY: Primary | ICD-10-CM

## 2021-10-25 DIAGNOSIS — M77.30 HEEL SPUR, UNSPECIFIED LATERALITY: ICD-10-CM

## 2021-10-25 DIAGNOSIS — M79.673 CHRONIC HEEL PAIN, UNSPECIFIED LATERALITY: Primary | ICD-10-CM

## 2021-10-25 PROCEDURE — 1159F MED LIST DOCD IN RCRD: CPT | Mod: CPTII,95,, | Performed by: PODIATRIST

## 2021-10-25 PROCEDURE — 1160F PR REVIEW ALL MEDS BY PRESCRIBER/CLIN PHARMACIST DOCUMENTED: ICD-10-PCS | Mod: CPTII,95,, | Performed by: PODIATRIST

## 2021-10-25 PROCEDURE — 99213 PR OFFICE/OUTPT VISIT, EST, LEVL III, 20-29 MIN: ICD-10-PCS | Mod: 95,,, | Performed by: PODIATRIST

## 2021-10-25 PROCEDURE — 1159F PR MEDICATION LIST DOCUMENTED IN MEDICAL RECORD: ICD-10-PCS | Mod: CPTII,95,, | Performed by: PODIATRIST

## 2021-10-25 PROCEDURE — 1160F RVW MEDS BY RX/DR IN RCRD: CPT | Mod: CPTII,95,, | Performed by: PODIATRIST

## 2021-10-25 PROCEDURE — 99213 OFFICE O/P EST LOW 20 MIN: CPT | Mod: 95,,, | Performed by: PODIATRIST

## 2021-11-22 ENCOUNTER — PATIENT MESSAGE (OUTPATIENT)
Dept: OBSTETRICS AND GYNECOLOGY | Facility: CLINIC | Age: 41
End: 2021-11-22
Payer: COMMERCIAL

## 2021-12-02 ENCOUNTER — PATIENT MESSAGE (OUTPATIENT)
Dept: DERMATOLOGY | Facility: CLINIC | Age: 41
End: 2021-12-02
Payer: COMMERCIAL

## 2021-12-10 ENCOUNTER — PATIENT MESSAGE (OUTPATIENT)
Dept: OBSTETRICS AND GYNECOLOGY | Facility: CLINIC | Age: 41
End: 2021-12-10
Payer: COMMERCIAL

## 2021-12-12 ENCOUNTER — PATIENT OUTREACH (OUTPATIENT)
Dept: ADMINISTRATIVE | Facility: OTHER | Age: 41
End: 2021-12-12
Payer: COMMERCIAL

## 2021-12-13 ENCOUNTER — HOSPITAL ENCOUNTER (OUTPATIENT)
Dept: PREADMISSION TESTING | Facility: OTHER | Age: 41
Discharge: HOME | End: 2021-12-13
Attending: OBSTETRICS & GYNECOLOGY
Payer: COMMERCIAL

## 2021-12-13 ENCOUNTER — ANESTHESIA EVENT (OUTPATIENT)
Dept: SURGERY | Facility: OTHER | Age: 41
End: 2021-12-13
Payer: COMMERCIAL

## 2021-12-13 ENCOUNTER — OFFICE VISIT (OUTPATIENT)
Dept: OBSTETRICS AND GYNECOLOGY | Facility: CLINIC | Age: 41
End: 2021-12-13
Payer: COMMERCIAL

## 2021-12-13 VITALS
BODY MASS INDEX: 37.1 KG/M2 | DIASTOLIC BLOOD PRESSURE: 86 MMHG | WEIGHT: 222.69 LBS | HEIGHT: 65 IN | SYSTOLIC BLOOD PRESSURE: 126 MMHG

## 2021-12-13 VITALS
SYSTOLIC BLOOD PRESSURE: 144 MMHG | HEIGHT: 65 IN | OXYGEN SATURATION: 100 % | BODY MASS INDEX: 36.99 KG/M2 | HEART RATE: 71 BPM | WEIGHT: 222 LBS | TEMPERATURE: 98 F | DIASTOLIC BLOOD PRESSURE: 87 MMHG

## 2021-12-13 DIAGNOSIS — D21.9 FIBROIDS: Primary | ICD-10-CM

## 2021-12-13 DIAGNOSIS — R10.2 PELVIC PAIN IN FEMALE: ICD-10-CM

## 2021-12-13 DIAGNOSIS — Z01.818 PREOP TESTING: Primary | ICD-10-CM

## 2021-12-13 DIAGNOSIS — N94.6 DYSMENORRHEA: ICD-10-CM

## 2021-12-13 LAB
ABO + RH BLD: NORMAL
BASOPHILS # BLD AUTO: 0.06 K/UL (ref 0–0.2)
BASOPHILS NFR BLD: 0.6 % (ref 0–1.9)
BLD GP AB SCN CELLS X3 SERPL QL: NORMAL
DIFFERENTIAL METHOD: ABNORMAL
EOSINOPHIL # BLD AUTO: 0.2 K/UL (ref 0–0.5)
EOSINOPHIL NFR BLD: 1.8 % (ref 0–8)
ERYTHROCYTE [DISTWIDTH] IN BLOOD BY AUTOMATED COUNT: 13.6 % (ref 11.5–14.5)
HCT VFR BLD AUTO: 40.7 % (ref 37–48.5)
HGB BLD-MCNC: 12.2 G/DL (ref 12–16)
IMM GRANULOCYTES # BLD AUTO: 0.04 K/UL (ref 0–0.04)
IMM GRANULOCYTES NFR BLD AUTO: 0.4 % (ref 0–0.5)
LYMPHOCYTES # BLD AUTO: 4.1 K/UL (ref 1–4.8)
LYMPHOCYTES NFR BLD: 37.4 % (ref 18–48)
MCH RBC QN AUTO: 25.6 PG (ref 27–31)
MCHC RBC AUTO-ENTMCNC: 30 G/DL (ref 32–36)
MCV RBC AUTO: 85 FL (ref 82–98)
MONOCYTES # BLD AUTO: 0.7 K/UL (ref 0.3–1)
MONOCYTES NFR BLD: 6.5 % (ref 4–15)
NEUTROPHILS # BLD AUTO: 5.8 K/UL (ref 1.8–7.7)
NEUTROPHILS NFR BLD: 53.3 % (ref 38–73)
NRBC BLD-RTO: 0 /100 WBC
PLATELET # BLD AUTO: 359 K/UL (ref 150–450)
PMV BLD AUTO: 10.1 FL (ref 9.2–12.9)
RBC # BLD AUTO: 4.77 M/UL (ref 4–5.4)
WBC # BLD AUTO: 10.85 K/UL (ref 3.9–12.7)

## 2021-12-13 PROCEDURE — 99999 PR PBB SHADOW E&M-EST. PATIENT-LVL III: CPT | Mod: PBBFAC,,, | Performed by: OBSTETRICS & GYNECOLOGY

## 2021-12-13 PROCEDURE — 99499 UNLISTED E&M SERVICE: CPT | Mod: S$GLB,,, | Performed by: OBSTETRICS & GYNECOLOGY

## 2021-12-13 PROCEDURE — 85025 COMPLETE CBC W/AUTO DIFF WBC: CPT | Performed by: ANESTHESIOLOGY

## 2021-12-13 PROCEDURE — 99999 PR PBB SHADOW E&M-EST. PATIENT-LVL III: ICD-10-PCS | Mod: PBBFAC,,, | Performed by: OBSTETRICS & GYNECOLOGY

## 2021-12-13 PROCEDURE — 86900 BLOOD TYPING SEROLOGIC ABO: CPT | Performed by: ANESTHESIOLOGY

## 2021-12-13 PROCEDURE — 36415 COLL VENOUS BLD VENIPUNCTURE: CPT | Performed by: ANESTHESIOLOGY

## 2021-12-13 PROCEDURE — 99499 NO LOS: ICD-10-PCS | Mod: S$GLB,,, | Performed by: OBSTETRICS & GYNECOLOGY

## 2021-12-13 RX ORDER — HYDROCODONE BITARTRATE AND ACETAMINOPHEN 5; 325 MG/1; MG/1
1 TABLET ORAL EVERY 6 HOURS PRN
Qty: 30 TABLET | Refills: 0 | Status: SHIPPED | OUTPATIENT
Start: 2021-12-13 | End: 2022-01-11

## 2021-12-13 RX ORDER — LIDOCAINE HYDROCHLORIDE 10 MG/ML
0.5 INJECTION, SOLUTION EPIDURAL; INFILTRATION; INTRACAUDAL; PERINEURAL ONCE
Status: CANCELLED | OUTPATIENT
Start: 2021-12-13 | End: 2021-12-13

## 2021-12-13 RX ORDER — SODIUM CHLORIDE, SODIUM LACTATE, POTASSIUM CHLORIDE, CALCIUM CHLORIDE 600; 310; 30; 20 MG/100ML; MG/100ML; MG/100ML; MG/100ML
INJECTION, SOLUTION INTRAVENOUS CONTINUOUS
Status: CANCELLED | OUTPATIENT
Start: 2021-12-13

## 2021-12-13 RX ORDER — ACETAMINOPHEN 500 MG
1000 TABLET ORAL
Status: CANCELLED | OUTPATIENT
Start: 2021-12-13 | End: 2021-12-13

## 2021-12-13 RX ORDER — MULTIVITAMIN
1 TABLET ORAL DAILY
COMMUNITY

## 2021-12-13 RX ORDER — FAMOTIDINE 20 MG/1
20 TABLET, FILM COATED ORAL
Status: CANCELLED | OUTPATIENT
Start: 2021-12-13 | End: 2021-12-13

## 2021-12-13 RX ORDER — ONDANSETRON 4 MG/1
4 TABLET, FILM COATED ORAL DAILY PRN
Qty: 30 TABLET | Refills: 1 | Status: SHIPPED | OUTPATIENT
Start: 2021-12-13 | End: 2022-01-11

## 2021-12-14 RX ORDER — LIDOCAINE HYDROCHLORIDE 10 MG/ML
0.5 INJECTION, SOLUTION EPIDURAL; INFILTRATION; INTRACAUDAL; PERINEURAL
Status: CANCELLED | OUTPATIENT
Start: 2021-12-14

## 2021-12-14 RX ORDER — MUPIROCIN 20 MG/G
OINTMENT TOPICAL
Status: CANCELLED | OUTPATIENT
Start: 2021-12-14

## 2021-12-14 RX ORDER — ONDANSETRON 2 MG/ML
4 INJECTION INTRAMUSCULAR; INTRAVENOUS DAILY PRN
Status: CANCELLED | OUTPATIENT
Start: 2021-12-14

## 2021-12-14 RX ORDER — PROCHLORPERAZINE EDISYLATE 5 MG/ML
5 INJECTION INTRAMUSCULAR; INTRAVENOUS EVERY 6 HOURS PRN
Status: CANCELLED | OUTPATIENT
Start: 2021-12-14

## 2021-12-14 RX ORDER — SODIUM CHLORIDE, SODIUM LACTATE, POTASSIUM CHLORIDE, CALCIUM CHLORIDE 600; 310; 30; 20 MG/100ML; MG/100ML; MG/100ML; MG/100ML
INJECTION, SOLUTION INTRAVENOUS CONTINUOUS
Status: CANCELLED | OUTPATIENT
Start: 2021-12-14

## 2021-12-14 RX ORDER — SODIUM CHLORIDE 0.9 % (FLUSH) 0.9 %
3 SYRINGE (ML) INJECTION
Status: CANCELLED | OUTPATIENT
Start: 2021-12-14

## 2021-12-14 RX ORDER — AMOXICILLIN 250 MG
1 CAPSULE ORAL 2 TIMES DAILY
Status: CANCELLED | OUTPATIENT
Start: 2021-12-14

## 2021-12-14 RX ORDER — PROCHLORPERAZINE EDISYLATE 5 MG/ML
5 INJECTION INTRAMUSCULAR; INTRAVENOUS EVERY 10 MIN PRN
Status: CANCELLED | OUTPATIENT
Start: 2021-12-14

## 2021-12-14 RX ORDER — KETOROLAC TROMETHAMINE 30 MG/ML
30 INJECTION, SOLUTION INTRAMUSCULAR; INTRAVENOUS
Status: CANCELLED | OUTPATIENT
Start: 2021-12-14 | End: 2021-12-15

## 2021-12-14 RX ORDER — ACETAMINOPHEN 325 MG/1
650 TABLET ORAL EVERY 4 HOURS PRN
Status: CANCELLED | OUTPATIENT
Start: 2021-12-14

## 2021-12-14 RX ORDER — HYDROCODONE BITARTRATE AND ACETAMINOPHEN 5; 325 MG/1; MG/1
1 TABLET ORAL EVERY 4 HOURS PRN
Status: CANCELLED | OUTPATIENT
Start: 2021-12-14

## 2021-12-14 RX ORDER — POLYETHYLENE GLYCOL 3350 17 G/17G
17 POWDER, FOR SOLUTION ORAL DAILY
Status: CANCELLED | OUTPATIENT
Start: 2021-12-14

## 2021-12-14 RX ORDER — IBUPROFEN 200 MG
800 TABLET ORAL
Status: CANCELLED | OUTPATIENT
Start: 2021-12-15

## 2021-12-14 RX ORDER — HYDROMORPHONE HYDROCHLORIDE 2 MG/ML
1 INJECTION, SOLUTION INTRAMUSCULAR; INTRAVENOUS; SUBCUTANEOUS EVERY 4 HOURS PRN
Status: CANCELLED | OUTPATIENT
Start: 2021-12-14

## 2021-12-14 RX ORDER — HYDROMORPHONE HYDROCHLORIDE 2 MG/ML
0.2 INJECTION, SOLUTION INTRAMUSCULAR; INTRAVENOUS; SUBCUTANEOUS EVERY 5 MIN PRN
Status: CANCELLED | OUTPATIENT
Start: 2021-12-14

## 2021-12-14 RX ORDER — MEPERIDINE HYDROCHLORIDE 100 MG/ML
12.5 INJECTION INTRAMUSCULAR; INTRAVENOUS; SUBCUTANEOUS ONCE AS NEEDED
Status: CANCELLED | OUTPATIENT
Start: 2021-12-14 | End: 2021-12-15

## 2021-12-14 RX ORDER — HYDROCODONE BITARTRATE AND ACETAMINOPHEN 10; 325 MG/1; MG/1
1 TABLET ORAL EVERY 4 HOURS PRN
Status: CANCELLED | OUTPATIENT
Start: 2021-12-14

## 2021-12-14 RX ORDER — CELECOXIB 100 MG/1
400 CAPSULE ORAL
Status: CANCELLED | OUTPATIENT
Start: 2021-12-14

## 2021-12-14 RX ORDER — DIPHENHYDRAMINE HCL 25 MG
25 CAPSULE ORAL EVERY 4 HOURS PRN
Status: CANCELLED | OUTPATIENT
Start: 2021-12-14

## 2021-12-14 RX ORDER — PREGABALIN 25 MG/1
50 CAPSULE ORAL
Status: CANCELLED | OUTPATIENT
Start: 2021-12-14

## 2021-12-14 RX ORDER — ACETAMINOPHEN 500 MG
1000 TABLET ORAL
Status: CANCELLED | OUTPATIENT
Start: 2021-12-14

## 2021-12-14 RX ORDER — ONDANSETRON 4 MG/1
8 TABLET, ORALLY DISINTEGRATING ORAL EVERY 8 HOURS PRN
Status: CANCELLED | OUTPATIENT
Start: 2021-12-14

## 2021-12-14 RX ORDER — DIPHENHYDRAMINE HYDROCHLORIDE 50 MG/ML
25 INJECTION INTRAMUSCULAR; INTRAVENOUS EVERY 4 HOURS PRN
Status: CANCELLED | OUTPATIENT
Start: 2021-12-14

## 2021-12-14 RX ORDER — FAMOTIDINE 20 MG/1
20 TABLET, FILM COATED ORAL
Status: CANCELLED | OUTPATIENT
Start: 2021-12-14

## 2021-12-15 ENCOUNTER — HOSPITAL ENCOUNTER (OUTPATIENT)
Facility: OTHER | Age: 41
Discharge: HOME | End: 2021-12-15
Attending: OBSTETRICS & GYNECOLOGY | Admitting: OBSTETRICS & GYNECOLOGY
Payer: COMMERCIAL

## 2021-12-15 ENCOUNTER — ANESTHESIA (OUTPATIENT)
Dept: SURGERY | Facility: OTHER | Age: 41
End: 2021-12-15
Payer: COMMERCIAL

## 2021-12-15 DIAGNOSIS — D21.9 FIBROIDS: ICD-10-CM

## 2021-12-15 DIAGNOSIS — R10.2 PELVIC PAIN IN FEMALE: ICD-10-CM

## 2021-12-15 DIAGNOSIS — Z90.710 HISTORY OF ROBOT-ASSISTED LAPAROSCOPIC HYSTERECTOMY: Primary | ICD-10-CM

## 2021-12-15 LAB
B-HCG UR QL: NEGATIVE
CTP QC/QA: YES
POCT GLUCOSE: 95 MG/DL (ref 70–110)

## 2021-12-15 PROCEDURE — 71000016 HC POSTOP RECOV ADDL HR: Performed by: OBSTETRICS & GYNECOLOGY

## 2021-12-15 PROCEDURE — 58571 PR LAPAROSCOPY W TOT HYSTERECTUTERUS <=250 GRAM  W TUBE/OVARY: ICD-10-PCS | Mod: AS,,, | Performed by: NURSE PRACTITIONER

## 2021-12-15 PROCEDURE — 88307 TISSUE EXAM BY PATHOLOGIST: CPT | Mod: 26,,, | Performed by: PATHOLOGY

## 2021-12-15 PROCEDURE — 71000039 HC RECOVERY, EACH ADD'L HOUR: Performed by: OBSTETRICS & GYNECOLOGY

## 2021-12-15 PROCEDURE — 37000008 HC ANESTHESIA 1ST 15 MINUTES: Performed by: OBSTETRICS & GYNECOLOGY

## 2021-12-15 PROCEDURE — 88307 PR  SURG PATH,LEVEL V: ICD-10-PCS | Mod: 26,,, | Performed by: PATHOLOGY

## 2021-12-15 PROCEDURE — 25000003 PHARM REV CODE 250: Performed by: OBSTETRICS & GYNECOLOGY

## 2021-12-15 PROCEDURE — 25000003 PHARM REV CODE 250: Performed by: ANESTHESIOLOGY

## 2021-12-15 PROCEDURE — 63600175 PHARM REV CODE 636 W HCPCS: Performed by: OBSTETRICS & GYNECOLOGY

## 2021-12-15 PROCEDURE — 27201423 OPTIME MED/SURG SUP & DEVICES STERILE SUPPLY: Performed by: OBSTETRICS & GYNECOLOGY

## 2021-12-15 PROCEDURE — 36000712 HC OR TIME LEV V 1ST 15 MIN: Performed by: OBSTETRICS & GYNECOLOGY

## 2021-12-15 PROCEDURE — 58571 PR LAPAROSCOPY W TOT HYSTERECTUTERUS <=250 GRAM  W TUBE/OVARY: ICD-10-PCS | Mod: ,,, | Performed by: OBSTETRICS & GYNECOLOGY

## 2021-12-15 PROCEDURE — P9045 ALBUMIN (HUMAN), 5%, 250 ML: HCPCS | Mod: JG | Performed by: STUDENT IN AN ORGANIZED HEALTH CARE EDUCATION/TRAINING PROGRAM

## 2021-12-15 PROCEDURE — 71000033 HC RECOVERY, INTIAL HOUR: Performed by: OBSTETRICS & GYNECOLOGY

## 2021-12-15 PROCEDURE — 63600175 PHARM REV CODE 636 W HCPCS: Mod: JG | Performed by: STUDENT IN AN ORGANIZED HEALTH CARE EDUCATION/TRAINING PROGRAM

## 2021-12-15 PROCEDURE — 81025 URINE PREGNANCY TEST: CPT | Performed by: OBSTETRICS & GYNECOLOGY

## 2021-12-15 PROCEDURE — 88307 TISSUE EXAM BY PATHOLOGIST: CPT | Performed by: PATHOLOGY

## 2021-12-15 PROCEDURE — 63600175 PHARM REV CODE 636 W HCPCS: Performed by: STUDENT IN AN ORGANIZED HEALTH CARE EDUCATION/TRAINING PROGRAM

## 2021-12-15 PROCEDURE — 58571 TLH W/T/O 250 G OR LESS: CPT | Mod: AS,,, | Performed by: NURSE PRACTITIONER

## 2021-12-15 PROCEDURE — 58571 TLH W/T/O 250 G OR LESS: CPT | Mod: ,,, | Performed by: OBSTETRICS & GYNECOLOGY

## 2021-12-15 PROCEDURE — 25000003 PHARM REV CODE 250: Performed by: STUDENT IN AN ORGANIZED HEALTH CARE EDUCATION/TRAINING PROGRAM

## 2021-12-15 PROCEDURE — 71000015 HC POSTOP RECOV 1ST HR: Performed by: OBSTETRICS & GYNECOLOGY

## 2021-12-15 PROCEDURE — 36000713 HC OR TIME LEV V EA ADD 15 MIN: Performed by: OBSTETRICS & GYNECOLOGY

## 2021-12-15 PROCEDURE — 37000009 HC ANESTHESIA EA ADD 15 MINS: Performed by: OBSTETRICS & GYNECOLOGY

## 2021-12-15 RX ORDER — HYDROCODONE BITARTRATE AND ACETAMINOPHEN 10; 325 MG/1; MG/1
1 TABLET ORAL EVERY 4 HOURS PRN
Status: DISCONTINUED | OUTPATIENT
Start: 2021-12-15 | End: 2021-12-15 | Stop reason: HOSPADM

## 2021-12-15 RX ORDER — FAMOTIDINE 20 MG/1
20 TABLET, FILM COATED ORAL
Status: COMPLETED | OUTPATIENT
Start: 2021-12-15 | End: 2021-12-15

## 2021-12-15 RX ORDER — MEPERIDINE HYDROCHLORIDE 25 MG/ML
12.5 INJECTION INTRAMUSCULAR; INTRAVENOUS; SUBCUTANEOUS ONCE AS NEEDED
Status: DISCONTINUED | OUTPATIENT
Start: 2021-12-15 | End: 2021-12-15 | Stop reason: HOSPADM

## 2021-12-15 RX ORDER — AMOXICILLIN 250 MG
1 CAPSULE ORAL 2 TIMES DAILY
Status: DISCONTINUED | OUTPATIENT
Start: 2021-12-15 | End: 2021-12-15 | Stop reason: HOSPADM

## 2021-12-15 RX ORDER — IBUPROFEN 400 MG/1
800 TABLET ORAL
Status: DISCONTINUED | OUTPATIENT
Start: 2021-12-16 | End: 2021-12-15 | Stop reason: HOSPADM

## 2021-12-15 RX ORDER — HYDROMORPHONE HYDROCHLORIDE 2 MG/ML
0.2 INJECTION, SOLUTION INTRAMUSCULAR; INTRAVENOUS; SUBCUTANEOUS
Status: DISCONTINUED | OUTPATIENT
Start: 2021-12-15 | End: 2021-12-15 | Stop reason: HOSPADM

## 2021-12-15 RX ORDER — ONDANSETRON 2 MG/ML
4 INJECTION INTRAMUSCULAR; INTRAVENOUS EVERY 4 HOURS PRN
Status: DISCONTINUED | OUTPATIENT
Start: 2021-12-15 | End: 2021-12-15 | Stop reason: HOSPADM

## 2021-12-15 RX ORDER — HYDROMORPHONE HYDROCHLORIDE 2 MG/ML
0.4 INJECTION, SOLUTION INTRAMUSCULAR; INTRAVENOUS; SUBCUTANEOUS EVERY 5 MIN PRN
Status: DISCONTINUED | OUTPATIENT
Start: 2021-12-15 | End: 2021-12-15 | Stop reason: HOSPADM

## 2021-12-15 RX ORDER — MIDAZOLAM HYDROCHLORIDE 1 MG/ML
INJECTION INTRAMUSCULAR; INTRAVENOUS
Status: DISCONTINUED | OUTPATIENT
Start: 2021-12-15 | End: 2021-12-15

## 2021-12-15 RX ORDER — DIPHENHYDRAMINE HYDROCHLORIDE 50 MG/ML
INJECTION INTRAMUSCULAR; INTRAVENOUS
Status: DISCONTINUED | OUTPATIENT
Start: 2021-12-15 | End: 2021-12-15

## 2021-12-15 RX ORDER — PROCHLORPERAZINE EDISYLATE 5 MG/ML
5 INJECTION INTRAMUSCULAR; INTRAVENOUS EVERY 30 MIN PRN
Status: DISCONTINUED | OUTPATIENT
Start: 2021-12-15 | End: 2021-12-15 | Stop reason: HOSPADM

## 2021-12-15 RX ORDER — PREGABALIN 50 MG/1
50 CAPSULE ORAL
Status: COMPLETED | OUTPATIENT
Start: 2021-12-15 | End: 2021-12-15

## 2021-12-15 RX ORDER — MUPIROCIN 20 MG/G
OINTMENT TOPICAL
Status: COMPLETED | OUTPATIENT
Start: 2021-12-15 | End: 2021-12-15

## 2021-12-15 RX ORDER — PROPOFOL 10 MG/ML
VIAL (ML) INTRAVENOUS
Status: DISCONTINUED | OUTPATIENT
Start: 2021-12-15 | End: 2021-12-15

## 2021-12-15 RX ORDER — DIPHENHYDRAMINE HYDROCHLORIDE 50 MG/ML
25 INJECTION INTRAMUSCULAR; INTRAVENOUS EVERY 4 HOURS PRN
Status: DISCONTINUED | OUTPATIENT
Start: 2021-12-15 | End: 2021-12-15 | Stop reason: HOSPADM

## 2021-12-15 RX ORDER — ALBUMIN HUMAN 50 G/1000ML
SOLUTION INTRAVENOUS CONTINUOUS PRN
Status: DISCONTINUED | OUTPATIENT
Start: 2021-12-15 | End: 2021-12-15

## 2021-12-15 RX ORDER — SODIUM CHLORIDE, SODIUM LACTATE, POTASSIUM CHLORIDE, CALCIUM CHLORIDE 600; 310; 30; 20 MG/100ML; MG/100ML; MG/100ML; MG/100ML
INJECTION, SOLUTION INTRAVENOUS CONTINUOUS
Status: DISCONTINUED | OUTPATIENT
Start: 2021-12-15 | End: 2021-12-15 | Stop reason: HOSPADM

## 2021-12-15 RX ORDER — HYDROCODONE BITARTRATE AND ACETAMINOPHEN 5; 325 MG/1; MG/1
1 TABLET ORAL EVERY 4 HOURS PRN
Status: DISCONTINUED | OUTPATIENT
Start: 2021-12-15 | End: 2021-12-15 | Stop reason: HOSPADM

## 2021-12-15 RX ORDER — PROCHLORPERAZINE EDISYLATE 5 MG/ML
5 INJECTION INTRAMUSCULAR; INTRAVENOUS EVERY 10 MIN PRN
Status: DISCONTINUED | OUTPATIENT
Start: 2021-12-15 | End: 2021-12-15 | Stop reason: HOSPADM

## 2021-12-15 RX ORDER — FAMOTIDINE 20 MG/1
20 TABLET, FILM COATED ORAL
Status: DISCONTINUED | OUTPATIENT
Start: 2021-12-15 | End: 2021-12-15

## 2021-12-15 RX ORDER — DIPHENHYDRAMINE HCL 25 MG
25 CAPSULE ORAL EVERY 4 HOURS PRN
Status: DISCONTINUED | OUTPATIENT
Start: 2021-12-15 | End: 2021-12-15 | Stop reason: HOSPADM

## 2021-12-15 RX ORDER — ACETAMINOPHEN 500 MG
1000 TABLET ORAL
Status: DISCONTINUED | OUTPATIENT
Start: 2021-12-15 | End: 2021-12-15

## 2021-12-15 RX ORDER — PHENYLEPHRINE HYDROCHLORIDE 10 MG/ML
INJECTION INTRAVENOUS
Status: DISCONTINUED | OUTPATIENT
Start: 2021-12-15 | End: 2021-12-15

## 2021-12-15 RX ORDER — FENTANYL CITRATE 50 UG/ML
INJECTION, SOLUTION INTRAMUSCULAR; INTRAVENOUS
Status: DISCONTINUED | OUTPATIENT
Start: 2021-12-15 | End: 2021-12-15

## 2021-12-15 RX ORDER — KETOROLAC TROMETHAMINE 30 MG/ML
15 INJECTION, SOLUTION INTRAMUSCULAR; INTRAVENOUS EVERY 6 HOURS PRN
Status: DISCONTINUED | OUTPATIENT
Start: 2021-12-15 | End: 2021-12-15 | Stop reason: HOSPADM

## 2021-12-15 RX ORDER — ACETAMINOPHEN 325 MG/1
650 TABLET ORAL EVERY 4 HOURS PRN
Status: DISCONTINUED | OUTPATIENT
Start: 2021-12-15 | End: 2021-12-15 | Stop reason: HOSPADM

## 2021-12-15 RX ORDER — ROCURONIUM BROMIDE 10 MG/ML
INJECTION, SOLUTION INTRAVENOUS
Status: DISCONTINUED | OUTPATIENT
Start: 2021-12-15 | End: 2021-12-15

## 2021-12-15 RX ORDER — LIDOCAINE HYDROCHLORIDE 10 MG/ML
0.5 INJECTION, SOLUTION EPIDURAL; INFILTRATION; INTRACAUDAL; PERINEURAL
Status: DISCONTINUED | OUTPATIENT
Start: 2021-12-15 | End: 2021-12-15 | Stop reason: HOSPADM

## 2021-12-15 RX ORDER — SODIUM CHLORIDE, SODIUM LACTATE, POTASSIUM CHLORIDE, CALCIUM CHLORIDE 600; 310; 30; 20 MG/100ML; MG/100ML; MG/100ML; MG/100ML
INJECTION, SOLUTION INTRAVENOUS CONTINUOUS
Status: DISCONTINUED | OUTPATIENT
Start: 2021-12-15 | End: 2021-12-15

## 2021-12-15 RX ORDER — CEFAZOLIN SODIUM 1 G/3ML
2 INJECTION, POWDER, FOR SOLUTION INTRAMUSCULAR; INTRAVENOUS
Status: COMPLETED | OUTPATIENT
Start: 2021-12-15 | End: 2021-12-15

## 2021-12-15 RX ORDER — ONDANSETRON 2 MG/ML
INJECTION INTRAMUSCULAR; INTRAVENOUS
Status: DISCONTINUED | OUTPATIENT
Start: 2021-12-15 | End: 2021-12-15

## 2021-12-15 RX ORDER — SODIUM CHLORIDE 0.9 % (FLUSH) 0.9 %
3 SYRINGE (ML) INJECTION
Status: DISCONTINUED | OUTPATIENT
Start: 2021-12-15 | End: 2021-12-15 | Stop reason: HOSPADM

## 2021-12-15 RX ORDER — HYDROMORPHONE HYDROCHLORIDE 2 MG/ML
1 INJECTION, SOLUTION INTRAMUSCULAR; INTRAVENOUS; SUBCUTANEOUS EVERY 4 HOURS PRN
Status: DISCONTINUED | OUTPATIENT
Start: 2021-12-15 | End: 2021-12-15 | Stop reason: HOSPADM

## 2021-12-15 RX ORDER — CELECOXIB 200 MG/1
400 CAPSULE ORAL
Status: COMPLETED | OUTPATIENT
Start: 2021-12-15 | End: 2021-12-15

## 2021-12-15 RX ORDER — PROCHLORPERAZINE EDISYLATE 5 MG/ML
5 INJECTION INTRAMUSCULAR; INTRAVENOUS EVERY 6 HOURS PRN
Status: DISCONTINUED | OUTPATIENT
Start: 2021-12-15 | End: 2021-12-15 | Stop reason: HOSPADM

## 2021-12-15 RX ORDER — DEXMEDETOMIDINE HYDROCHLORIDE 100 UG/ML
INJECTION, SOLUTION INTRAVENOUS
Status: DISCONTINUED | OUTPATIENT
Start: 2021-12-15 | End: 2021-12-15

## 2021-12-15 RX ORDER — OXYCODONE HYDROCHLORIDE 5 MG/1
5 TABLET ORAL
Status: DISCONTINUED | OUTPATIENT
Start: 2021-12-15 | End: 2021-12-15 | Stop reason: HOSPADM

## 2021-12-15 RX ORDER — DEXAMETHASONE SODIUM PHOSPHATE 4 MG/ML
INJECTION, SOLUTION INTRA-ARTICULAR; INTRALESIONAL; INTRAMUSCULAR; INTRAVENOUS; SOFT TISSUE
Status: DISCONTINUED | OUTPATIENT
Start: 2021-12-15 | End: 2021-12-15

## 2021-12-15 RX ORDER — METOCLOPRAMIDE HYDROCHLORIDE 5 MG/ML
INJECTION INTRAMUSCULAR; INTRAVENOUS
Status: DISCONTINUED | OUTPATIENT
Start: 2021-12-15 | End: 2021-12-15

## 2021-12-15 RX ORDER — OXYCODONE HYDROCHLORIDE 5 MG/1
5 TABLET ORAL ONCE
Status: COMPLETED | OUTPATIENT
Start: 2021-12-15 | End: 2021-12-15

## 2021-12-15 RX ORDER — OXYCODONE HYDROCHLORIDE 5 MG/1
5 TABLET ORAL EVERY 4 HOURS PRN
Status: DISCONTINUED | OUTPATIENT
Start: 2021-12-15 | End: 2021-12-15 | Stop reason: HOSPADM

## 2021-12-15 RX ORDER — ONDANSETRON 8 MG/1
8 TABLET, ORALLY DISINTEGRATING ORAL EVERY 8 HOURS PRN
Status: DISCONTINUED | OUTPATIENT
Start: 2021-12-15 | End: 2021-12-15 | Stop reason: HOSPADM

## 2021-12-15 RX ORDER — ACETAMINOPHEN 500 MG
1000 TABLET ORAL EVERY 6 HOURS PRN
Status: DISCONTINUED | OUTPATIENT
Start: 2021-12-15 | End: 2021-12-15 | Stop reason: HOSPADM

## 2021-12-15 RX ORDER — LIDOCAINE HYDROCHLORIDE 10 MG/ML
0.5 INJECTION, SOLUTION EPIDURAL; INFILTRATION; INTRACAUDAL; PERINEURAL ONCE
Status: DISCONTINUED | OUTPATIENT
Start: 2021-12-15 | End: 2021-12-15

## 2021-12-15 RX ORDER — LIDOCAINE HYDROCHLORIDE 20 MG/ML
INJECTION INTRAVENOUS
Status: DISCONTINUED | OUTPATIENT
Start: 2021-12-15 | End: 2021-12-15

## 2021-12-15 RX ORDER — POLYETHYLENE GLYCOL 3350 17 G/17G
17 POWDER, FOR SOLUTION ORAL DAILY
Status: DISCONTINUED | OUTPATIENT
Start: 2021-12-15 | End: 2021-12-15 | Stop reason: HOSPADM

## 2021-12-15 RX ORDER — HYDROMORPHONE HYDROCHLORIDE 2 MG/ML
0.2 INJECTION, SOLUTION INTRAMUSCULAR; INTRAVENOUS; SUBCUTANEOUS EVERY 5 MIN PRN
Status: DISCONTINUED | OUTPATIENT
Start: 2021-12-15 | End: 2021-12-15 | Stop reason: HOSPADM

## 2021-12-15 RX ORDER — KETOROLAC TROMETHAMINE 30 MG/ML
30 INJECTION, SOLUTION INTRAMUSCULAR; INTRAVENOUS
Status: DISCONTINUED | OUTPATIENT
Start: 2021-12-15 | End: 2021-12-15 | Stop reason: HOSPADM

## 2021-12-15 RX ORDER — ONDANSETRON 2 MG/ML
4 INJECTION INTRAMUSCULAR; INTRAVENOUS DAILY PRN
Status: DISCONTINUED | OUTPATIENT
Start: 2021-12-15 | End: 2021-12-15 | Stop reason: HOSPADM

## 2021-12-15 RX ORDER — ACETAMINOPHEN 500 MG
1000 TABLET ORAL
Status: COMPLETED | OUTPATIENT
Start: 2021-12-15 | End: 2021-12-15

## 2021-12-15 RX ADMIN — LIDOCAINE HYDROCHLORIDE 50 MG: 20 INJECTION, SOLUTION INTRAVENOUS at 07:12

## 2021-12-15 RX ADMIN — METOCLOPRAMIDE 10 MG: 5 INJECTION, SOLUTION INTRAMUSCULAR; INTRAVENOUS at 07:12

## 2021-12-15 RX ADMIN — PHENYLEPHRINE HYDROCHLORIDE 80 MCG: 10 INJECTION INTRAVENOUS at 07:12

## 2021-12-15 RX ADMIN — DEXAMETHASONE SODIUM PHOSPHATE 8 MG: 4 INJECTION, SOLUTION INTRAMUSCULAR; INTRAVENOUS at 07:12

## 2021-12-15 RX ADMIN — OXYCODONE 5 MG: 5 TABLET ORAL at 12:12

## 2021-12-15 RX ADMIN — SUGAMMADEX 400 MG: 100 INJECTION, SOLUTION INTRAVENOUS at 08:12

## 2021-12-15 RX ADMIN — DEXMEDETOMIDINE HYDROCHLORIDE 20 MCG: 100 INJECTION, SOLUTION, CONCENTRATE INTRAVENOUS at 07:12

## 2021-12-15 RX ADMIN — FENTANYL CITRATE 100 MCG: 50 INJECTION, SOLUTION INTRAMUSCULAR; INTRAVENOUS at 07:12

## 2021-12-15 RX ADMIN — MIDAZOLAM HYDROCHLORIDE 2 MG: 1 INJECTION, SOLUTION INTRAMUSCULAR; INTRAVENOUS at 07:12

## 2021-12-15 RX ADMIN — PHENYLEPHRINE HYDROCHLORIDE 0.2 MCG/KG/MIN: 10 INJECTION INTRAVENOUS at 07:12

## 2021-12-15 RX ADMIN — SODIUM CHLORIDE, SODIUM LACTATE, POTASSIUM CHLORIDE, AND CALCIUM CHLORIDE: 600; 310; 30; 20 INJECTION, SOLUTION INTRAVENOUS at 06:12

## 2021-12-15 RX ADMIN — ONDANSETRON HYDROCHLORIDE 4 MG: 2 INJECTION INTRAMUSCULAR; INTRAVENOUS at 07:12

## 2021-12-15 RX ADMIN — ESMOLOL HYDROCHLORIDE 20 MG: 10 INJECTION INTRAVENOUS at 08:12

## 2021-12-15 RX ADMIN — SODIUM CHLORIDE, SODIUM LACTATE, POTASSIUM CHLORIDE, AND CALCIUM CHLORIDE: 600; 310; 30; 20 INJECTION, SOLUTION INTRAVENOUS at 08:12

## 2021-12-15 RX ADMIN — MUPIROCIN: 20 OINTMENT TOPICAL at 05:12

## 2021-12-15 RX ADMIN — PHENYLEPHRINE HYDROCHLORIDE 160 MCG: 10 INJECTION INTRAVENOUS at 07:12

## 2021-12-15 RX ADMIN — DIPHENHYDRAMINE HYDROCHLORIDE 25 MG: 50 INJECTION, SOLUTION INTRAMUSCULAR; INTRAVENOUS at 07:12

## 2021-12-15 RX ADMIN — CELECOXIB 400 MG: 200 CAPSULE ORAL at 05:12

## 2021-12-15 RX ADMIN — ROCURONIUM BROMIDE 25 MG: 10 SOLUTION INTRAVENOUS at 08:12

## 2021-12-15 RX ADMIN — ROCURONIUM BROMIDE 50 MG: 10 SOLUTION INTRAVENOUS at 07:12

## 2021-12-15 RX ADMIN — ACETAMINOPHEN 1000 MG: 500 TABLET, FILM COATED ORAL at 05:12

## 2021-12-15 RX ADMIN — FAMOTIDINE 20 MG: 20 TABLET ORAL at 05:12

## 2021-12-15 RX ADMIN — CEFAZOLIN 2 G: 330 INJECTION, POWDER, FOR SOLUTION INTRAMUSCULAR; INTRAVENOUS at 07:12

## 2021-12-15 RX ADMIN — ALBUMIN (HUMAN): 2.5 SOLUTION INTRAVENOUS at 08:12

## 2021-12-15 RX ADMIN — PREGABALIN 50 MG: 50 CAPSULE ORAL at 05:12

## 2021-12-15 RX ADMIN — GLYCOPYRROLATE 0.2 MG: 0.2 INJECTION, SOLUTION INTRAMUSCULAR; INTRAVITREAL at 07:12

## 2021-12-15 RX ADMIN — OXYCODONE 5 MG: 5 TABLET ORAL at 09:12

## 2021-12-15 RX ADMIN — PROPOFOL 300 MG: 10 INJECTION, EMULSION INTRAVENOUS at 07:12

## 2021-12-15 RX ADMIN — KETOROLAC TROMETHAMINE 30 MG: 30 INJECTION, SOLUTION INTRAMUSCULAR at 09:12

## 2021-12-17 VITALS
OXYGEN SATURATION: 98 % | DIASTOLIC BLOOD PRESSURE: 68 MMHG | TEMPERATURE: 99 F | RESPIRATION RATE: 18 BRPM | SYSTOLIC BLOOD PRESSURE: 122 MMHG | WEIGHT: 222 LBS | HEART RATE: 74 BPM | BODY MASS INDEX: 36.94 KG/M2

## 2021-12-23 LAB
FINAL PATHOLOGIC DIAGNOSIS: NORMAL
Lab: NORMAL

## 2021-12-27 ENCOUNTER — OFFICE VISIT (OUTPATIENT)
Dept: INTERNAL MEDICINE | Facility: CLINIC | Age: 41
End: 2021-12-27
Payer: COMMERCIAL

## 2021-12-27 ENCOUNTER — LAB VISIT (OUTPATIENT)
Dept: LAB | Facility: HOSPITAL | Age: 41
End: 2021-12-27
Attending: INTERNAL MEDICINE
Payer: COMMERCIAL

## 2021-12-27 VITALS
OXYGEN SATURATION: 98 % | SYSTOLIC BLOOD PRESSURE: 120 MMHG | DIASTOLIC BLOOD PRESSURE: 88 MMHG | HEART RATE: 98 BPM | WEIGHT: 221.25 LBS | HEIGHT: 65 IN | BODY MASS INDEX: 36.86 KG/M2 | TEMPERATURE: 99 F

## 2021-12-27 DIAGNOSIS — R00.2 PALPITATIONS: ICD-10-CM

## 2021-12-27 DIAGNOSIS — R00.2 PALPITATIONS: Primary | ICD-10-CM

## 2021-12-27 PROCEDURE — 84439 ASSAY OF FREE THYROXINE: CPT | Performed by: INTERNAL MEDICINE

## 2021-12-27 PROCEDURE — 3074F SYST BP LT 130 MM HG: CPT | Mod: CPTII,S$GLB,, | Performed by: INTERNAL MEDICINE

## 2021-12-27 PROCEDURE — 84443 ASSAY THYROID STIM HORMONE: CPT | Performed by: INTERNAL MEDICINE

## 2021-12-27 PROCEDURE — 1159F PR MEDICATION LIST DOCUMENTED IN MEDICAL RECORD: ICD-10-PCS | Mod: CPTII,S$GLB,, | Performed by: INTERNAL MEDICINE

## 2021-12-27 PROCEDURE — 99214 OFFICE O/P EST MOD 30 MIN: CPT | Mod: S$GLB,,, | Performed by: INTERNAL MEDICINE

## 2021-12-27 PROCEDURE — 99214 PR OFFICE/OUTPT VISIT, EST, LEVL IV, 30-39 MIN: ICD-10-PCS | Mod: S$GLB,,, | Performed by: INTERNAL MEDICINE

## 2021-12-27 PROCEDURE — 80053 COMPREHEN METABOLIC PANEL: CPT | Performed by: INTERNAL MEDICINE

## 2021-12-27 PROCEDURE — 3008F PR BODY MASS INDEX (BMI) DOCUMENTED: ICD-10-PCS | Mod: CPTII,S$GLB,, | Performed by: INTERNAL MEDICINE

## 2021-12-27 PROCEDURE — 3074F PR MOST RECENT SYSTOLIC BLOOD PRESSURE < 130 MM HG: ICD-10-PCS | Mod: CPTII,S$GLB,, | Performed by: INTERNAL MEDICINE

## 2021-12-27 PROCEDURE — 1160F PR REVIEW ALL MEDS BY PRESCRIBER/CLIN PHARMACIST DOCUMENTED: ICD-10-PCS | Mod: CPTII,S$GLB,, | Performed by: INTERNAL MEDICINE

## 2021-12-27 PROCEDURE — 1159F MED LIST DOCD IN RCRD: CPT | Mod: CPTII,S$GLB,, | Performed by: INTERNAL MEDICINE

## 2021-12-27 PROCEDURE — 3079F PR MOST RECENT DIASTOLIC BLOOD PRESSURE 80-89 MM HG: ICD-10-PCS | Mod: CPTII,S$GLB,, | Performed by: INTERNAL MEDICINE

## 2021-12-27 PROCEDURE — 99999 PR PBB SHADOW E&M-EST. PATIENT-LVL V: CPT | Mod: PBBFAC,,, | Performed by: INTERNAL MEDICINE

## 2021-12-27 PROCEDURE — 1160F RVW MEDS BY RX/DR IN RCRD: CPT | Mod: CPTII,S$GLB,, | Performed by: INTERNAL MEDICINE

## 2021-12-27 PROCEDURE — 3079F DIAST BP 80-89 MM HG: CPT | Mod: CPTII,S$GLB,, | Performed by: INTERNAL MEDICINE

## 2021-12-27 PROCEDURE — 99999 PR PBB SHADOW E&M-EST. PATIENT-LVL V: ICD-10-PCS | Mod: PBBFAC,,, | Performed by: INTERNAL MEDICINE

## 2021-12-27 PROCEDURE — 36415 COLL VENOUS BLD VENIPUNCTURE: CPT | Performed by: INTERNAL MEDICINE

## 2021-12-27 PROCEDURE — 3008F BODY MASS INDEX DOCD: CPT | Mod: CPTII,S$GLB,, | Performed by: INTERNAL MEDICINE

## 2021-12-28 ENCOUNTER — CLINICAL SUPPORT (OUTPATIENT)
Dept: CARDIOLOGY | Facility: HOSPITAL | Age: 41
End: 2021-12-28
Attending: INTERNAL MEDICINE
Payer: COMMERCIAL

## 2021-12-28 DIAGNOSIS — R00.2 PALPITATIONS: ICD-10-CM

## 2021-12-28 LAB
ALBUMIN SERPL BCP-MCNC: 3.8 G/DL (ref 3.5–5.2)
ALP SERPL-CCNC: 74 U/L (ref 55–135)
ALT SERPL W/O P-5'-P-CCNC: 32 U/L (ref 10–44)
ANION GAP SERPL CALC-SCNC: 7 MMOL/L (ref 8–16)
AST SERPL-CCNC: 22 U/L (ref 10–40)
BILIRUB SERPL-MCNC: 0.2 MG/DL (ref 0.1–1)
BUN SERPL-MCNC: 13 MG/DL (ref 6–20)
CALCIUM SERPL-MCNC: 9.4 MG/DL (ref 8.7–10.5)
CHLORIDE SERPL-SCNC: 105 MMOL/L (ref 95–110)
CO2 SERPL-SCNC: 27 MMOL/L (ref 23–29)
CREAT SERPL-MCNC: 0.8 MG/DL (ref 0.5–1.4)
EST. GFR  (AFRICAN AMERICAN): >60 ML/MIN/1.73 M^2
EST. GFR  (NON AFRICAN AMERICAN): >60 ML/MIN/1.73 M^2
GLUCOSE SERPL-MCNC: 116 MG/DL (ref 70–110)
POTASSIUM SERPL-SCNC: 3.5 MMOL/L (ref 3.5–5.1)
PROT SERPL-MCNC: 7.9 G/DL (ref 6–8.4)
SODIUM SERPL-SCNC: 139 MMOL/L (ref 136–145)
T4 FREE SERPL-MCNC: 0.81 NG/DL (ref 0.71–1.51)
TSH SERPL DL<=0.005 MIU/L-ACNC: 0.72 UIU/ML (ref 0.4–4)

## 2021-12-28 PROCEDURE — 93227 HOLTER MONITOR - 24 HOUR (CUPID ONLY): ICD-10-PCS | Mod: ,,, | Performed by: INTERNAL MEDICINE

## 2021-12-28 PROCEDURE — 93226 XTRNL ECG REC<48 HR SCAN A/R: CPT

## 2021-12-28 PROCEDURE — 93227 XTRNL ECG REC<48 HR R&I: CPT | Mod: ,,, | Performed by: INTERNAL MEDICINE

## 2021-12-29 ENCOUNTER — PATIENT MESSAGE (OUTPATIENT)
Dept: INTERNAL MEDICINE | Facility: CLINIC | Age: 41
End: 2021-12-29
Payer: COMMERCIAL

## 2021-12-29 ENCOUNTER — OFFICE VISIT (OUTPATIENT)
Dept: CARDIOLOGY | Facility: CLINIC | Age: 41
End: 2021-12-29
Payer: COMMERCIAL

## 2021-12-29 ENCOUNTER — TELEPHONE (OUTPATIENT)
Dept: INTERNAL MEDICINE | Facility: CLINIC | Age: 41
End: 2021-12-29
Payer: COMMERCIAL

## 2021-12-29 VITALS
DIASTOLIC BLOOD PRESSURE: 76 MMHG | SYSTOLIC BLOOD PRESSURE: 150 MMHG | WEIGHT: 222 LBS | HEIGHT: 65 IN | HEART RATE: 86 BPM | BODY MASS INDEX: 36.99 KG/M2

## 2021-12-29 DIAGNOSIS — E66.09 CLASS 2 OBESITY DUE TO EXCESS CALORIES WITHOUT SERIOUS COMORBIDITY WITH BODY MASS INDEX (BMI) OF 35.0 TO 35.9 IN ADULT: ICD-10-CM

## 2021-12-29 DIAGNOSIS — R00.2 PALPITATIONS: Primary | ICD-10-CM

## 2021-12-29 LAB
OHS CV EVENT MONITOR DAY: 0
OHS CV HOLTER LENGTH DECIMAL HOURS: 24
OHS CV HOLTER LENGTH HOURS: 24
OHS CV HOLTER LENGTH MINUTES: 0
OHS CV HOLTER SINUS AVERAGE HR: 90
OHS CV HOLTER SINUS MAX HR: 141
OHS CV HOLTER SINUS MIN HR: 57

## 2021-12-29 PROCEDURE — 3008F BODY MASS INDEX DOCD: CPT | Mod: CPTII,S$GLB,, | Performed by: INTERNAL MEDICINE

## 2021-12-29 PROCEDURE — 3078F DIAST BP <80 MM HG: CPT | Mod: CPTII,S$GLB,, | Performed by: INTERNAL MEDICINE

## 2021-12-29 PROCEDURE — 3008F PR BODY MASS INDEX (BMI) DOCUMENTED: ICD-10-PCS | Mod: CPTII,S$GLB,, | Performed by: INTERNAL MEDICINE

## 2021-12-29 PROCEDURE — 99999 PR PBB SHADOW E&M-EST. PATIENT-LVL IV: ICD-10-PCS | Mod: PBBFAC,,, | Performed by: INTERNAL MEDICINE

## 2021-12-29 PROCEDURE — 1160F RVW MEDS BY RX/DR IN RCRD: CPT | Mod: CPTII,S$GLB,, | Performed by: INTERNAL MEDICINE

## 2021-12-29 PROCEDURE — 3077F PR MOST RECENT SYSTOLIC BLOOD PRESSURE >= 140 MM HG: ICD-10-PCS | Mod: CPTII,S$GLB,, | Performed by: INTERNAL MEDICINE

## 2021-12-29 PROCEDURE — 1159F PR MEDICATION LIST DOCUMENTED IN MEDICAL RECORD: ICD-10-PCS | Mod: CPTII,S$GLB,, | Performed by: INTERNAL MEDICINE

## 2021-12-29 PROCEDURE — 99204 OFFICE O/P NEW MOD 45 MIN: CPT | Mod: S$GLB,,, | Performed by: INTERNAL MEDICINE

## 2021-12-29 PROCEDURE — 99999 PR PBB SHADOW E&M-EST. PATIENT-LVL IV: CPT | Mod: PBBFAC,,, | Performed by: INTERNAL MEDICINE

## 2021-12-29 PROCEDURE — 3077F SYST BP >= 140 MM HG: CPT | Mod: CPTII,S$GLB,, | Performed by: INTERNAL MEDICINE

## 2021-12-29 PROCEDURE — 1159F MED LIST DOCD IN RCRD: CPT | Mod: CPTII,S$GLB,, | Performed by: INTERNAL MEDICINE

## 2021-12-29 PROCEDURE — 99204 PR OFFICE/OUTPT VISIT, NEW, LEVL IV, 45-59 MIN: ICD-10-PCS | Mod: S$GLB,,, | Performed by: INTERNAL MEDICINE

## 2021-12-29 PROCEDURE — 1160F PR REVIEW ALL MEDS BY PRESCRIBER/CLIN PHARMACIST DOCUMENTED: ICD-10-PCS | Mod: CPTII,S$GLB,, | Performed by: INTERNAL MEDICINE

## 2021-12-29 PROCEDURE — 3078F PR MOST RECENT DIASTOLIC BLOOD PRESSURE < 80 MM HG: ICD-10-PCS | Mod: CPTII,S$GLB,, | Performed by: INTERNAL MEDICINE

## 2021-12-29 RX ORDER — CARVEDILOL 3.12 MG/1
3.12 TABLET ORAL 2 TIMES DAILY WITH MEALS
Qty: 60 TABLET | Refills: 11 | Status: SHIPPED | OUTPATIENT
Start: 2021-12-29 | End: 2021-12-29

## 2021-12-29 RX ORDER — CARVEDILOL 3.12 MG/1
3.12 TABLET ORAL 2 TIMES DAILY WITH MEALS
Qty: 60 TABLET | Refills: 11 | Status: SHIPPED | OUTPATIENT
Start: 2021-12-29 | End: 2022-07-07

## 2021-12-30 ENCOUNTER — PATIENT MESSAGE (OUTPATIENT)
Dept: INTERNAL MEDICINE | Facility: CLINIC | Age: 41
End: 2021-12-30
Payer: COMMERCIAL

## 2022-01-04 ENCOUNTER — PATIENT MESSAGE (OUTPATIENT)
Dept: INTERNAL MEDICINE | Facility: CLINIC | Age: 42
End: 2022-01-04

## 2022-01-06 ENCOUNTER — PATIENT MESSAGE (OUTPATIENT)
Dept: INTERNAL MEDICINE | Facility: CLINIC | Age: 42
End: 2022-01-06

## 2022-01-11 ENCOUNTER — OFFICE VISIT (OUTPATIENT)
Dept: OBSTETRICS AND GYNECOLOGY | Facility: CLINIC | Age: 42
End: 2022-01-11
Payer: COMMERCIAL

## 2022-01-11 VITALS
BODY MASS INDEX: 37.35 KG/M2 | DIASTOLIC BLOOD PRESSURE: 84 MMHG | SYSTOLIC BLOOD PRESSURE: 124 MMHG | WEIGHT: 224.44 LBS

## 2022-01-11 DIAGNOSIS — Z90.710 S/P HYSTERECTOMY: Primary | ICD-10-CM

## 2022-01-11 PROCEDURE — 99999 PR PBB SHADOW E&M-EST. PATIENT-LVL III: CPT | Mod: PBBFAC,,, | Performed by: OBSTETRICS & GYNECOLOGY

## 2022-01-11 PROCEDURE — 3079F DIAST BP 80-89 MM HG: CPT | Mod: CPTII,S$GLB,, | Performed by: OBSTETRICS & GYNECOLOGY

## 2022-01-11 PROCEDURE — 99999 PR PBB SHADOW E&M-EST. PATIENT-LVL III: ICD-10-PCS | Mod: PBBFAC,,, | Performed by: OBSTETRICS & GYNECOLOGY

## 2022-01-11 PROCEDURE — 3008F PR BODY MASS INDEX (BMI) DOCUMENTED: ICD-10-PCS | Mod: CPTII,S$GLB,, | Performed by: OBSTETRICS & GYNECOLOGY

## 2022-01-11 PROCEDURE — 1159F MED LIST DOCD IN RCRD: CPT | Mod: CPTII,S$GLB,, | Performed by: OBSTETRICS & GYNECOLOGY

## 2022-01-11 PROCEDURE — 1159F PR MEDICATION LIST DOCUMENTED IN MEDICAL RECORD: ICD-10-PCS | Mod: CPTII,S$GLB,, | Performed by: OBSTETRICS & GYNECOLOGY

## 2022-01-11 PROCEDURE — 3074F PR MOST RECENT SYSTOLIC BLOOD PRESSURE < 130 MM HG: ICD-10-PCS | Mod: CPTII,S$GLB,, | Performed by: OBSTETRICS & GYNECOLOGY

## 2022-01-11 PROCEDURE — 3074F SYST BP LT 130 MM HG: CPT | Mod: CPTII,S$GLB,, | Performed by: OBSTETRICS & GYNECOLOGY

## 2022-01-11 PROCEDURE — 3079F PR MOST RECENT DIASTOLIC BLOOD PRESSURE 80-89 MM HG: ICD-10-PCS | Mod: CPTII,S$GLB,, | Performed by: OBSTETRICS & GYNECOLOGY

## 2022-01-11 PROCEDURE — 99213 PR OFFICE/OUTPT VISIT, EST, LEVL III, 20-29 MIN: ICD-10-PCS | Mod: S$GLB,,, | Performed by: OBSTETRICS & GYNECOLOGY

## 2022-01-11 PROCEDURE — 99213 OFFICE O/P EST LOW 20 MIN: CPT | Mod: S$GLB,,, | Performed by: OBSTETRICS & GYNECOLOGY

## 2022-01-11 PROCEDURE — 1160F RVW MEDS BY RX/DR IN RCRD: CPT | Mod: CPTII,S$GLB,, | Performed by: OBSTETRICS & GYNECOLOGY

## 2022-01-11 PROCEDURE — 1160F PR REVIEW ALL MEDS BY PRESCRIBER/CLIN PHARMACIST DOCUMENTED: ICD-10-PCS | Mod: CPTII,S$GLB,, | Performed by: OBSTETRICS & GYNECOLOGY

## 2022-01-11 PROCEDURE — 3008F BODY MASS INDEX DOCD: CPT | Mod: CPTII,S$GLB,, | Performed by: OBSTETRICS & GYNECOLOGY

## 2022-01-11 NOTE — PROGRESS NOTES
CC: Postoperative visit    Angel Mosquera is a 41 y.o. female  presents for a postoperative visit s/p XI ROBOTIC HYSTERECTOMY,WITH SALPINGO-OOPHORECTOMY ( on 12/15/2021.  Her postoperative course was uncomplicated.  She has constipation but having BM daily with stool softners .  Patient is having some abdominal pain and vaginal pressure.        Pathology showed:  RELIAPATH DIAGNOSIS:   UTERUS, CERVIX AND BILATERAL FALLOPIAN TUBE AND OVARIES, XI ROBOTIC   HYSTERECTOMY AND BILATERAL SALPINGO-OOPHORECTOMY:   - Cervix with benign Nabothian cysts.   - Proliferative phase endometrium with reparative changes, negative for   hyperplasia.   - Leiomyomata.   - Serosa, no pathologic alteration.   - Bilateral fallopian tubes, no pathologic alteration.   - Left ovary with benign hemorrhagic corpus luteal cyst, cystic  follicles   and corpora albicantia.   - Right ovary with benign cystic follicles and corpora albicantia.       /84   Wt 101.8 kg (224 lb 6.9 oz)   LMP 2021   BMI 37.35 kg/m²     ROS:  GENERAL: No fever, chills, fatigability or weight loss.  VULVAR: No pain, no lesions and no itching.  VAGINAL: No relaxation, no itching, no discharge, no abnormal bleeding and no lesions.  ABDOMEN: No abdominal pain. Denies nausea. Denies vomiting. No diarrhea. No constipation  BREAST: Denies pain. No lumps. No discharge.  URINARY: No incontinence, no nocturia, no frequency and no dysuria.  CARDIOVASCULAR: No chest pain. No shortness of breath. No leg cramps.  NEUROLOGICAL: No headaches. No vision changes.    Physical Exam      PELVIC: Normal external genitalia without lesions.  Normal hair distribution.  Adequate perineal body, normal urethral meatus.  Vagina moist and well rugated without lesions brown discharge.  Cervix  Removed with minimal discharge NO tenderness.  The vaginal cuff has some mucosal defects but intact all behind the mucosa.    No significant cystocele or rectocele.  Bimanual exam shows  the vaginal cuff to be intact  and nontender.  Adnexa without masses or tenderness.      Diagnosis    1. S/P hysterectomy       Will continue to monitor the cuff  Bleeding and pain precautions  Will not return to work until 8 wks due to the continued healing of the vaginal cuff  Will follow up on the vaginal cuff in three weeks and assess if return to work is appropriate

## 2022-01-14 ENCOUNTER — PATIENT MESSAGE (OUTPATIENT)
Dept: INTERNAL MEDICINE | Facility: CLINIC | Age: 42
End: 2022-01-14

## 2022-01-20 ENCOUNTER — PATIENT MESSAGE (OUTPATIENT)
Dept: OBSTETRICS AND GYNECOLOGY | Facility: CLINIC | Age: 42
End: 2022-01-20

## 2022-02-08 ENCOUNTER — OFFICE VISIT (OUTPATIENT)
Dept: OBSTETRICS AND GYNECOLOGY | Facility: CLINIC | Age: 42
End: 2022-02-08
Payer: COMMERCIAL

## 2022-02-08 VITALS
DIASTOLIC BLOOD PRESSURE: 92 MMHG | HEIGHT: 65 IN | WEIGHT: 224 LBS | BODY MASS INDEX: 37.32 KG/M2 | SYSTOLIC BLOOD PRESSURE: 122 MMHG

## 2022-02-08 DIAGNOSIS — Z98.890 POST-OPERATIVE STATE: Primary | ICD-10-CM

## 2022-02-08 PROCEDURE — 99999 PR PBB SHADOW E&M-EST. PATIENT-LVL III: ICD-10-PCS | Mod: PBBFAC,,, | Performed by: OBSTETRICS & GYNECOLOGY

## 2022-02-08 PROCEDURE — 1159F PR MEDICATION LIST DOCUMENTED IN MEDICAL RECORD: ICD-10-PCS | Mod: CPTII,S$GLB,, | Performed by: OBSTETRICS & GYNECOLOGY

## 2022-02-08 PROCEDURE — 1159F MED LIST DOCD IN RCRD: CPT | Mod: CPTII,S$GLB,, | Performed by: OBSTETRICS & GYNECOLOGY

## 2022-02-08 PROCEDURE — 99024 POSTOP FOLLOW-UP VISIT: CPT | Mod: S$GLB,,, | Performed by: OBSTETRICS & GYNECOLOGY

## 2022-02-08 PROCEDURE — 3080F DIAST BP >= 90 MM HG: CPT | Mod: CPTII,S$GLB,, | Performed by: OBSTETRICS & GYNECOLOGY

## 2022-02-08 PROCEDURE — 99024 PR POST-OP FOLLOW-UP VISIT: ICD-10-PCS | Mod: S$GLB,,, | Performed by: OBSTETRICS & GYNECOLOGY

## 2022-02-08 PROCEDURE — 3080F PR MOST RECENT DIASTOLIC BLOOD PRESSURE >= 90 MM HG: ICD-10-PCS | Mod: CPTII,S$GLB,, | Performed by: OBSTETRICS & GYNECOLOGY

## 2022-02-08 PROCEDURE — 99999 PR PBB SHADOW E&M-EST. PATIENT-LVL III: CPT | Mod: PBBFAC,,, | Performed by: OBSTETRICS & GYNECOLOGY

## 2022-02-08 PROCEDURE — 3074F SYST BP LT 130 MM HG: CPT | Mod: CPTII,S$GLB,, | Performed by: OBSTETRICS & GYNECOLOGY

## 2022-02-08 PROCEDURE — 3008F PR BODY MASS INDEX (BMI) DOCUMENTED: ICD-10-PCS | Mod: CPTII,S$GLB,, | Performed by: OBSTETRICS & GYNECOLOGY

## 2022-02-08 PROCEDURE — 3008F BODY MASS INDEX DOCD: CPT | Mod: CPTII,S$GLB,, | Performed by: OBSTETRICS & GYNECOLOGY

## 2022-02-08 PROCEDURE — 3074F PR MOST RECENT SYSTOLIC BLOOD PRESSURE < 130 MM HG: ICD-10-PCS | Mod: CPTII,S$GLB,, | Performed by: OBSTETRICS & GYNECOLOGY

## 2022-03-07 ENCOUNTER — PATIENT MESSAGE (OUTPATIENT)
Dept: BARIATRICS | Facility: CLINIC | Age: 42
End: 2022-03-07

## 2022-03-16 ENCOUNTER — PATIENT MESSAGE (OUTPATIENT)
Dept: INTERNAL MEDICINE | Facility: CLINIC | Age: 42
End: 2022-03-16

## 2022-03-22 NOTE — PROGRESS NOTES
"CC: Postoperative visit     Angel Mosquera is a 41 y.o. female  presents for a postoperative visit s/p XI ROBOTIC HYSTERECTOMY,WITH SALPINGO-OOPHORECTOMY ( on 12/15/2021.  Her postoperative course was uncomplicated.  She has constipation but having BM daily with stool softners .  Patient is having some abdominal pain and vaginal pressure.          Pathology showed:  RELIAPATH DIAGNOSIS:   UTERUS, CERVIX AND BILATERAL FALLOPIAN TUBE AND OVARIES, XI ROBOTIC   HYSTERECTOMY AND BILATERAL SALPINGO-OOPHORECTOMY:   - Cervix with benign Nabothian cysts.   - Proliferative phase endometrium with reparative changes, negative for   hyperplasia.   - Leiomyomata.   - Serosa, no pathologic alteration.   - Bilateral fallopian tubes, no pathologic alteration.   - Left ovary with benign hemorrhagic corpus luteal cyst, cystic  follicles   and corpora albicantia.   - Right ovary with benign cystic follicles and corpora albicantia.     BP (!) 122/92   Ht 5' 5" (1.651 m)   Wt 101.6 kg (223 lb 15.8 oz)   LMP 2021   BMI 37.27 kg/m²     ROS:  GENERAL: No fever, chills, fatigability or weight loss.  VULVAR: No pain, no lesions and no itching.  VAGINAL: No relaxation, no itching, no discharge, no abnormal bleeding and no lesions.  ABDOMEN: No abdominal pain. Denies nausea. Denies vomiting. No diarrhea. No constipation  BREAST: Denies pain. No lumps. No discharge.  URINARY: No incontinence, no nocturia, no frequency and no dysuria.  CARDIOVASCULAR: No chest pain. No shortness of breath. No leg cramps.  NEUROLOGICAL: No headaches. No vision changes.    Physical Exam      PELVIC: Normal external genitalia without lesions.  Normal hair distribution.  Adequate perineal body, normal urethral meatus.  Vagina moist and well rugated without lesions or discharge.  Cervix removed.  Vaginal cuff Is intact,  The cuff is still healing and has some granulation tissue at the cuff   No significant cystocele or rectocele.  Bimanual exam " shows HYST the vaginal cuff to be intact and  nontender.  Adnexa without masses or tenderness.      1. Post-operative state     S/P HYST- doing well    Patient can return to normal activities.  Return to clinic in 1 year for well woman exam.

## 2022-04-13 ENCOUNTER — PATIENT MESSAGE (OUTPATIENT)
Dept: INTERNAL MEDICINE | Facility: CLINIC | Age: 42
End: 2022-04-13
Payer: COMMERCIAL

## 2022-04-13 DIAGNOSIS — R10.2 PELVIC PAIN: ICD-10-CM

## 2022-04-14 ENCOUNTER — PATIENT MESSAGE (OUTPATIENT)
Dept: INTERNAL MEDICINE | Facility: CLINIC | Age: 42
End: 2022-04-14
Payer: COMMERCIAL

## 2022-04-14 RX ORDER — METHYLPREDNISOLONE 4 MG/1
TABLET ORAL
Qty: 21 EACH | Refills: 0 | Status: SHIPPED | OUTPATIENT
Start: 2022-04-14 | End: 2022-05-05

## 2022-04-14 RX ORDER — IBUPROFEN 600 MG/1
600 TABLET ORAL EVERY 8 HOURS PRN
Qty: 30 TABLET | Refills: 0 | Status: ON HOLD | OUTPATIENT
Start: 2022-04-14 | End: 2022-06-17 | Stop reason: HOSPADM

## 2022-04-14 RX ORDER — ONDANSETRON 4 MG/1
4 TABLET, ORALLY DISINTEGRATING ORAL EVERY 8 HOURS PRN
Qty: 21 TABLET | Refills: 0 | Status: SHIPPED | OUTPATIENT
Start: 2022-04-14 | End: 2022-07-14

## 2022-04-18 ENCOUNTER — OFFICE VISIT (OUTPATIENT)
Dept: PODIATRY | Facility: CLINIC | Age: 42
End: 2022-04-18
Payer: COMMERCIAL

## 2022-04-18 VITALS — WEIGHT: 223 LBS | BODY MASS INDEX: 37.15 KG/M2 | HEIGHT: 65 IN

## 2022-04-18 DIAGNOSIS — M72.2 PLANTAR FASCIITIS OF LEFT FOOT: ICD-10-CM

## 2022-04-18 DIAGNOSIS — G89.29 CHRONIC PAIN IN LEFT FOOT: Primary | ICD-10-CM

## 2022-04-18 DIAGNOSIS — M79.672 CHRONIC PAIN IN LEFT FOOT: Primary | ICD-10-CM

## 2022-04-18 DIAGNOSIS — Z01.818 PREOP TESTING: ICD-10-CM

## 2022-04-18 PROCEDURE — 20550 PR INJECT TENDON SHEATH/LIGAMENT: ICD-10-PCS | Mod: LT,S$GLB,, | Performed by: PODIATRIST

## 2022-04-18 PROCEDURE — 3008F BODY MASS INDEX DOCD: CPT | Mod: CPTII,S$GLB,, | Performed by: PODIATRIST

## 2022-04-18 PROCEDURE — 20550 NJX 1 TENDON SHEATH/LIGAMENT: CPT | Mod: LT,S$GLB,, | Performed by: PODIATRIST

## 2022-04-18 PROCEDURE — 99999 PR PBB SHADOW E&M-EST. PATIENT-LVL III: CPT | Mod: PBBFAC,,, | Performed by: PODIATRIST

## 2022-04-18 PROCEDURE — 99999 PR PBB SHADOW E&M-EST. PATIENT-LVL III: ICD-10-PCS | Mod: PBBFAC,,, | Performed by: PODIATRIST

## 2022-04-18 PROCEDURE — 1159F PR MEDICATION LIST DOCUMENTED IN MEDICAL RECORD: ICD-10-PCS | Mod: CPTII,S$GLB,, | Performed by: PODIATRIST

## 2022-04-18 PROCEDURE — 1160F RVW MEDS BY RX/DR IN RCRD: CPT | Mod: CPTII,S$GLB,, | Performed by: PODIATRIST

## 2022-04-18 PROCEDURE — 1159F MED LIST DOCD IN RCRD: CPT | Mod: CPTII,S$GLB,, | Performed by: PODIATRIST

## 2022-04-18 PROCEDURE — 1160F PR REVIEW ALL MEDS BY PRESCRIBER/CLIN PHARMACIST DOCUMENTED: ICD-10-PCS | Mod: CPTII,S$GLB,, | Performed by: PODIATRIST

## 2022-04-18 PROCEDURE — 99214 OFFICE O/P EST MOD 30 MIN: CPT | Mod: 25,S$GLB,, | Performed by: PODIATRIST

## 2022-04-18 PROCEDURE — 99214 PR OFFICE/OUTPT VISIT, EST, LEVL IV, 30-39 MIN: ICD-10-PCS | Mod: 25,S$GLB,, | Performed by: PODIATRIST

## 2022-04-18 PROCEDURE — 3008F PR BODY MASS INDEX (BMI) DOCUMENTED: ICD-10-PCS | Mod: CPTII,S$GLB,, | Performed by: PODIATRIST

## 2022-04-18 RX ORDER — METHYLPREDNISOLONE ACETATE 40 MG/ML
40 INJECTION, SUSPENSION INTRA-ARTICULAR; INTRALESIONAL; INTRAMUSCULAR; SOFT TISSUE
Status: COMPLETED | OUTPATIENT
Start: 2022-04-18 | End: 2022-04-18

## 2022-04-18 RX ORDER — CEFAZOLIN SODIUM 2 G/50ML
2 SOLUTION INTRAVENOUS
Status: CANCELLED | OUTPATIENT
Start: 2022-04-18

## 2022-04-18 RX ORDER — SODIUM CHLORIDE 0.9 % (FLUSH) 0.9 %
10 SYRINGE (ML) INJECTION
Status: SHIPPED | OUTPATIENT
Start: 2022-04-18

## 2022-04-18 RX ADMIN — METHYLPREDNISOLONE ACETATE 40 MG: 40 INJECTION, SUSPENSION INTRA-ARTICULAR; INTRALESIONAL; INTRAMUSCULAR; SOFT TISSUE at 10:04

## 2022-04-18 NOTE — PROGRESS NOTES
"Subjective:      Patient ID: Angel Mosquera is a 42 y.o. female.    Chief Complaint: Foot Pain (bilateral )    Patient presents to clinic with chief complaint of bilateral bottom heel pain for approximately 9 months now, left worse than right. She does not recall any prior injury to both feet other than jumping in a shallow pool this past summer. Pain is worse with the first couple of steps in the AM and worsens with prolonged standing/walking. She was seen by Dr. Swan 5 months ago who performed a steroid injection in her left heel which she states did not help with her pain. She has tried PT, stretching, night splints, and changing shoegear (HOKA, new balance, asics) with no improvement. Her right ankle recently started hurting, states it feels like her range of motion is limited, thinks it may be due to her compensating to stay off the left foot. 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     04/18/2022:  Presents complaining of chronic pain to the left heel for greater than a year.  She has a history of hysterectomy in December 2021 that required 8 weeks for recovery and modified duties.  She works as a nurse at [employer].  States she also has pain in the right heel to lesser extent.  Pending a trip to [place] later this week.  Requesting a steroid injection to left heel but also requesting consideration for surgical intervention to left heel next month.    Vitals:    04/18/22 0933   Weight: 101.2 kg (223 lb)   Height: 5' 5" (1.651 m)   PainSc:   7   PainLoc: Foot      Past Medical History:   Diagnosis Date    PONV (postoperative nausea and vomiting)     Rosacea        Past Surgical History:   Procedure Laterality Date    AUGMENTATION OF BREAST Bilateral 2005    Breast Augmentation  2005    COSMETIC SURGERY  2005    Breast augmentation    DILATION AND CURETTAGE OF UTERUS N/A 10/19/2019    Procedure: DILATION AND CURETTAGE, UTERUS;  Surgeon: Frances" LEESA Culp MD;  Location: [facility] OR;  Service: OB/GYN;  Laterality: N/A;  with suction    mischarr      ROBOT-ASSISTED LAPAROSCOPIC UTERINE MYOMECTOMY N/A 1/7/2020    Procedure: ROBOTIC MYOMECTOMY, UTERUS;  Surgeon: Frances Culp MD;  Location: [facility] OR;  Service: OB/GYN;  Laterality: N/A;    XI ROBOTIC HYSTERECTOMY, WITH SALPINGO-OOPHORECTOMY N/A 12/15/2021    Procedure: XI ROBOTIC HYSTERECTOMY,WITH SALPINGO-OOPHORECTOMY;  Surgeon: Carla Aguero MD;  Location: [facility] OR;  Service: OB/GYN;  Laterality: N/A;       Family History   Problem Relation Age of Onset    Diabetes Mother     Hypertension Mother     Heart failure Mother         viral?    Heart disease Mother     Hyperlipidemia Mother     Cancer Paternal Grandfather         Pancreatic Cancer    Cancer Paternal Grandmother         Lung Cancer    Arthritis Maternal Grandmother     Diabetes Maternal Grandmother     Alcohol abuse Maternal Grandfather     COPD Maternal Grandfather     Alcohol abuse Father     Alcohol abuse Maternal Uncle     Cancer Neg Hx     Ovarian cancer Neg Hx     Melanoma Neg Hx     Heart attack Neg Hx     Stroke Neg Hx     Colon cancer Neg Hx     Breast cancer Neg Hx     Blindness Neg Hx     Amblyopia Neg Hx     Cataracts Neg Hx     Glaucoma Neg Hx     Macular degeneration Neg Hx     Retinal detachment Neg Hx     Strabismus Neg Hx        Social History     Socioeconomic History    Marital status:    Occupational History    Occupation: Nurse     Employer: [employer]     Comment: Oncology   Tobacco Use    Smoking status: Never Smoker    Smokeless tobacco: Never Used   Substance and Sexual Activity    Alcohol use: No    Drug use: No    Sexual activity: Yes     Partners: Male     Birth control/protection: None     Comment: Vasectomy   Other Topics Concern    Are you pregnant or think you may be? No    Breast-feeding No     Social Determinants of Health     Financial Resource  Strain: Low Risk     Difficulty of Paying Living Expenses: Not hard at all   Food Insecurity: No Food Insecurity    Worried About Running Out of Food in the Last Year: Never true    Ran Out of Food in the Last Year: Never true   Transportation Needs: No Transportation Needs    Lack of Transportation (Medical): No    Lack of Transportation (Non-Medical): No   Physical Activity: Unknown    Days of Exercise per Week: Patient refused    Minutes of Exercise per Session: 0 min   Stress: Stress Concern Present    Feeling of Stress : To some extent   Social Connections: Unknown    Frequency of Communication with Friends and Family: More than three times a week    Frequency of Social Gatherings with Friends and Family: More than three times a week    Active Member of Clubs or Organizations: No    Attends Club or Organization Meetings: Never    Marital Status:    Housing Stability: Low Risk     Unable to Pay for Housing in the Last Year: No    Number of Places Lived in the Last Year: 1    Unstable Housing in the Last Year: No       Current Outpatient Medications   Medication Sig Dispense Refill    azelaic acid (FINACEA) 15 % gel Apply to affected area(s) of face nightly, then moisturize (Patient taking differently: Apply to affected area(s) of face nightly, then moisturize) 50 g 3    carvediloL (COREG) 3.125 MG tablet Take 1 tablet (3.125 mg total) by mouth 2 (two) times daily with meals. 60 tablet 11    EScitalopram oxalate (LEXAPRO) 20 MG tablet Take 1 tablet (20 mg total) by mouth once daily. 90 tablet 3    hydrOXYzine HCL (ATARAX) 25 MG tablet Take 1 tablet (25 mg total) by mouth nightly as needed (Insomnia). 30 tablet 3    ibuprofen (ADVIL,MOTRIN) 600 MG tablet Take 1 tablet (600 mg total) by mouth every 8 (eight) hours as needed for Pain. 30 tablet 0    methylPREDNISolone (MEDROL DOSEPACK) 4 mg tablet use as directed 21 each 0    multivitamin (THERAGRAN) per tablet Take 1 tablet by mouth  once daily.      ondansetron (ZOFRAN-ODT) 4 MG TbDL Take 1 tablet (4 mg total) by mouth every 8 (eight) hours as needed (nausea). 21 tablet 0     Current Facility-Administered Medications   Medication Dose Route Frequency Provider Last Rate Last Admin    sodium chloride 0.9% flush 10 mL  10 mL Intravenous PRN Mello Montez DPM           Review of patient's allergies indicates:  No Known Allergies      Review of Systems   Constitutional: Negative for chills, fever and malaise/fatigue.   HENT: Negative for hearing loss.    Cardiovascular: Negative for claudication and leg swelling.   Respiratory: Negative for cough, shortness of breath and wheezing.    Skin: Negative for flushing, rash and unusual hair distribution.   Musculoskeletal: Negative for joint pain and myalgias.        Bilateral heel pain L>R. Right ankle pain   Gastrointestinal: Negative for nausea and vomiting.   Neurological: Negative for loss of balance, numbness, paresthesias and sensory change.   Psychiatric/Behavioral: Negative for altered mental status.           Objective:      Physical Exam  Constitutional:       General: She is not in acute distress.     Appearance: She is not ill-appearing.   Cardiovascular:      Pulses:           Dorsalis pedis pulses are 2+ on the right side and 2+ on the left side.        Posterior tibial pulses are 2+ on the right side and 2+ on the left side.      Comments: CFT< 3 secs all toes bilateral foot, skin temp warm to cool bilateral foot, no hair growth bilateral lower extremity, no edema to bilateral lower extremities    Musculoskeletal:      Comments: Mild pes cavus foot type.     Range of motion to the ankle bilateral is within normal limits.    Moderate pain on palpation to the plantar medial left heel overlying the calcaneal tuber at the plantar fascia origin.  No pain with medial lateral squeeze test the left heel.  No pain with range of motion or manual muscle strength testing left foot ankle.  No  localized edema, warmth or discoloration.    Mild pain on palpation to the plantar medial right heel.  No pain medial lateral squeeze test of the right heel.  No pain range of motion or manual muscle strength testing to the right foot ankle.       Skin:     General: Skin is warm.      Capillary Refill: Capillary refill takes less than 2 seconds.      Findings: No ecchymosis or erythema.      Nails: There is no clubbing.      Comments: Skin intact. No lesions, no discoloration, no erythema, or other acute signs of infection to bilateral lower extremities.    Neurological:      Mental Status: She is alert and oriented to person, place, and time.      Sensory: Sensation is intact.      Motor: Motor function is intact.      Comments: Light touch intact to bilateral feet.                Assessment:       Encounter Diagnoses   Name Primary?    Chronic pain in left foot Yes    Plantar fasciitis of left foot     Preop testing          Plan:       Angel was seen today for heel pain.    Diagnoses and all orders for this visit:    Chronic pain in left foot    Plantar fasciitis of left foot  -     Ambulatory referral/consult to Internal Medicine; Future  -     Case Request Operating Room: FASCIOTOMY, PLANTAR, ENDOSCOPIC  -     Full code; Standing  -     Place in Outpatient; Standing  -     Verify surgical site; Standing  -     Verify informed consent; Standing  -     Insert peripheral IV; Standing  -     Full code  -     Insert peripheral IV    Preop testing  -     Ambulatory referral/consult to Internal Medicine; Future    Other orders  -     methylPREDNISolone acetate injection 40 mg  -     sodium chloride 0.9% flush 10 mL  -     cefazolin (ANCEF) 2 gram in dextrose 5% 50 mL IVPB (premix)      I counseled the patient on her conditions, their implications and medical management.    Previous MRI of the left hindfoot ankle complete on 10/04/2021 revealed plantar fasciitis involving the central lateral cords with small central  tear of the central cord and reactive marrow edema of the calcaneus.    MRI of the right hindfoot ankle completed on 10/04/2021 revealed plantar fasciitis associated with mild marrow edema at the inferior calcaneus and calcaneal spur.    Per previous discussion with discussed continued conservative care versus surgical intervention consisting of a plantar fasciotomy to the left heel.  Risks, benefits anticipate postop course discussed in detail.  No guarantees were given or implied.  Patient elected proceed surgical intervention outlined above.  We had also discussed debridement of the plantar fascia with resection of the plantar heel spur injection PRP however due to the previous partial tear and chronicity of the plantar fasciitis I have recommended for plantar fasciotomy.    This procedure has been fully reviewed with the patient and written informed consent has been obtained.    Referred to PCP for medical optimization and risk stratification    Surgical intervention schedule on 05/27/2022 as mac with local anesthetic to the left foot.    With the patient's verbal consent the skin was prepped to the plantar medial left heel with alcohol followed by skin anesthesia with ethyl chloride.  Injected mixture containing 40 mg of Depo-Medrol 1 mL 0.25% plain Marcaine to the affected area.  She tolerated procedure well without apparent complication.  Instructed rest, ice and elevate p.r.n..    RTC 1-2 weeks postop or p.r.n. as discussed.    A portion of this note was generated by voice recognition software and may contain spelling and grammar errors.

## 2022-04-19 ENCOUNTER — TELEPHONE (OUTPATIENT)
Dept: PODIATRY | Facility: CLINIC | Age: 42
End: 2022-04-19
Payer: COMMERCIAL

## 2022-04-19 NOTE — TELEPHONE ENCOUNTER
----- Message from Mello Montez DPM sent at 4/18/2022  4:03 PM CDT -----  Regarding: RE: surgery date  Thanks placed case request for 06/17.    Nolan.  ----- Message -----  From: Sasha Carter  Sent: 4/18/2022   3:03 PM CDT  To: Mello Montez DPM  Subject: RE: surgery date                                 Hey, you just saw this patient today and case request is not in yet  ----- Message -----  From: Mello Montez DPM  Sent: 4/18/2022   2:32 PM CDT  To: Sasha Carter  Subject: FW: surgery date                                 Mike Baez can you please assist?    [place]  ----- Message -----  From: Gabby Evans MA  Sent: 4/18/2022  10:44 AM CDT  To: Mello Montez DPM  Subject: surgery date                                     The above patient would like to change her surgery date to June 10th or later. Thanks.

## 2022-04-20 ENCOUNTER — TELEPHONE (OUTPATIENT)
Dept: ADMINISTRATIVE | Facility: OTHER | Age: 42
End: 2022-04-20
Payer: COMMERCIAL

## 2022-05-02 ENCOUNTER — TELEPHONE (OUTPATIENT)
Dept: BARIATRICS | Facility: CLINIC | Age: 42
End: 2022-05-02
Payer: COMMERCIAL

## 2022-05-02 PROBLEM — U07.1 COVID: Status: ACTIVE | Noted: 2022-05-02

## 2022-05-04 ENCOUNTER — INFUSION (OUTPATIENT)
Dept: INFECTIOUS DISEASES | Facility: HOSPITAL | Age: 42
End: 2022-05-04
Attending: EMERGENCY MEDICINE
Payer: COMMERCIAL

## 2022-05-04 VITALS
DIASTOLIC BLOOD PRESSURE: 68 MMHG | BODY MASS INDEX: 37.49 KG/M2 | HEART RATE: 70 BPM | SYSTOLIC BLOOD PRESSURE: 123 MMHG | WEIGHT: 225 LBS | HEIGHT: 65 IN | TEMPERATURE: 98 F | OXYGEN SATURATION: 97 % | RESPIRATION RATE: 17 BRPM

## 2022-05-04 DIAGNOSIS — U07.1 COVID-19: Primary | ICD-10-CM

## 2022-05-04 PROCEDURE — M0222 HC IV INJECTION, BEBTELOVIMAB, INCL POST ADMIN MONIT: HCPCS | Performed by: INTERNAL MEDICINE

## 2022-05-04 PROCEDURE — 63600175 PHARM REV CODE 636 W HCPCS: Performed by: INTERNAL MEDICINE

## 2022-05-04 RX ORDER — EPINEPHRINE 0.3 MG/.3ML
0.3 INJECTION SUBCUTANEOUS
Status: ACTIVE | OUTPATIENT
Start: 2022-05-04 | End: 2022-05-07

## 2022-05-04 RX ORDER — ACETAMINOPHEN 325 MG/1
650 TABLET ORAL
Status: ACTIVE | OUTPATIENT
Start: 2022-05-04 | End: 2022-05-05

## 2022-05-04 RX ORDER — BEBTELOVIMAB 87.5 MG/ML
175 INJECTION, SOLUTION INTRAVENOUS
Status: COMPLETED | OUTPATIENT
Start: 2022-05-04 | End: 2022-05-04

## 2022-05-04 RX ORDER — ALBUTEROL SULFATE 90 UG/1
2 AEROSOL, METERED RESPIRATORY (INHALATION)
Status: ACTIVE | OUTPATIENT
Start: 2022-05-04 | End: 2022-05-07

## 2022-05-04 RX ORDER — ONDANSETRON 4 MG/1
4 TABLET, ORALLY DISINTEGRATING ORAL
Status: ACTIVE | OUTPATIENT
Start: 2022-05-04 | End: 2022-05-05

## 2022-05-04 RX ORDER — DIPHENHYDRAMINE HYDROCHLORIDE 50 MG/ML
25 INJECTION INTRAMUSCULAR; INTRAVENOUS
Status: ACTIVE | OUTPATIENT
Start: 2022-05-04 | End: 2022-05-05

## 2022-05-04 RX ADMIN — BEBTELOVIMAB 175 MG: 87.5 INJECTION, SOLUTION INTRAVENOUS at 03:05

## 2022-05-04 NOTE — PROGRESS NOTES
Patient arrives for Bebtelovimab IV Injection. Ambulatory. Pt AAox3. No distress noted. RR even and unlabored.     Symptoms and onset date:  Fever, SOB, cough, body aches, fatigue since 05/01/22    Tested COVID + on 05/02/22

## 2022-05-04 NOTE — PROGRESS NOTES
Patient remains with no signs of complications noted. Patient received Bebtelovimab IV Injection according to FDA recommendations and [facility] SOP without complications noted and left with mask in place. Drug fact sheet provided. Pt discharged home. Ambulatory. Remains AAox3. No distress noted. RR even and unlabored.

## 2022-05-06 ENCOUNTER — PATIENT MESSAGE (OUTPATIENT)
Dept: INTERNAL MEDICINE | Facility: CLINIC | Age: 42
End: 2022-05-06
Payer: COMMERCIAL

## 2022-05-06 RX ORDER — PROMETHAZINE HYDROCHLORIDE AND DEXTROMETHORPHAN HYDROBROMIDE 6.25; 15 MG/5ML; MG/5ML
5 SYRUP ORAL NIGHTLY PRN
Qty: 100 ML | Refills: 1 | Status: SHIPPED | OUTPATIENT
Start: 2022-05-06 | End: 2022-08-05

## 2022-05-09 ENCOUNTER — TELEPHONE (OUTPATIENT)
Dept: BARIATRICS | Facility: CLINIC | Age: 42
End: 2022-05-09
Payer: COMMERCIAL

## 2022-05-09 NOTE — TELEPHONE ENCOUNTER
Called pt regarding appointment scheduled today. I informed patient due to testing positive for:Covid on May 2nd, provider will not be able to see her today. Pt verbalized understanding.

## 2022-05-30 ENCOUNTER — PATIENT MESSAGE (OUTPATIENT)
Dept: SURGERY | Facility: HOSPITAL | Age: 42
End: 2022-05-30
Payer: COMMERCIAL

## 2022-06-07 ENCOUNTER — OFFICE VISIT (OUTPATIENT)
Dept: INTERNAL MEDICINE | Facility: CLINIC | Age: 42
End: 2022-06-07
Payer: COMMERCIAL

## 2022-06-07 VITALS
OXYGEN SATURATION: 99 % | BODY MASS INDEX: 39.12 KG/M2 | HEART RATE: 77 BPM | WEIGHT: 234.81 LBS | SYSTOLIC BLOOD PRESSURE: 120 MMHG | HEIGHT: 65 IN | DIASTOLIC BLOOD PRESSURE: 92 MMHG

## 2022-06-07 DIAGNOSIS — M72.2 PLANTAR FASCIITIS OF LEFT FOOT: ICD-10-CM

## 2022-06-07 DIAGNOSIS — Z01.818 PREOP TESTING: ICD-10-CM

## 2022-06-07 DIAGNOSIS — Z00.00 ANNUAL PHYSICAL EXAM: Primary | ICD-10-CM

## 2022-06-07 DIAGNOSIS — Z12.31 SCREENING MAMMOGRAM, ENCOUNTER FOR: ICD-10-CM

## 2022-06-07 DIAGNOSIS — E66.01 MORBID OBESITY: ICD-10-CM

## 2022-06-07 PROCEDURE — 3008F BODY MASS INDEX DOCD: CPT | Mod: CPTII,S$GLB,, | Performed by: INTERNAL MEDICINE

## 2022-06-07 PROCEDURE — 3074F SYST BP LT 130 MM HG: CPT | Mod: CPTII,S$GLB,, | Performed by: INTERNAL MEDICINE

## 2022-06-07 PROCEDURE — 3080F PR MOST RECENT DIASTOLIC BLOOD PRESSURE >= 90 MM HG: ICD-10-PCS | Mod: CPTII,S$GLB,, | Performed by: INTERNAL MEDICINE

## 2022-06-07 PROCEDURE — 99396 PREV VISIT EST AGE 40-64: CPT | Mod: S$GLB,,, | Performed by: INTERNAL MEDICINE

## 2022-06-07 PROCEDURE — 3080F DIAST BP >= 90 MM HG: CPT | Mod: CPTII,S$GLB,, | Performed by: INTERNAL MEDICINE

## 2022-06-07 PROCEDURE — 1159F PR MEDICATION LIST DOCUMENTED IN MEDICAL RECORD: ICD-10-PCS | Mod: CPTII,S$GLB,, | Performed by: INTERNAL MEDICINE

## 2022-06-07 PROCEDURE — 1159F MED LIST DOCD IN RCRD: CPT | Mod: CPTII,S$GLB,, | Performed by: INTERNAL MEDICINE

## 2022-06-07 PROCEDURE — 1160F RVW MEDS BY RX/DR IN RCRD: CPT | Mod: CPTII,S$GLB,, | Performed by: INTERNAL MEDICINE

## 2022-06-07 PROCEDURE — 99999 PR PBB SHADOW E&M-EST. PATIENT-LVL IV: CPT | Mod: PBBFAC,,, | Performed by: INTERNAL MEDICINE

## 2022-06-07 PROCEDURE — 3008F PR BODY MASS INDEX (BMI) DOCUMENTED: ICD-10-PCS | Mod: CPTII,S$GLB,, | Performed by: INTERNAL MEDICINE

## 2022-06-07 PROCEDURE — 99396 PR PREVENTIVE VISIT,EST,40-64: ICD-10-PCS | Mod: S$GLB,,, | Performed by: INTERNAL MEDICINE

## 2022-06-07 PROCEDURE — 3074F PR MOST RECENT SYSTOLIC BLOOD PRESSURE < 130 MM HG: ICD-10-PCS | Mod: CPTII,S$GLB,, | Performed by: INTERNAL MEDICINE

## 2022-06-07 PROCEDURE — 1160F PR REVIEW ALL MEDS BY PRESCRIBER/CLIN PHARMACIST DOCUMENTED: ICD-10-PCS | Mod: CPTII,S$GLB,, | Performed by: INTERNAL MEDICINE

## 2022-06-07 PROCEDURE — 99999 PR PBB SHADOW E&M-EST. PATIENT-LVL IV: ICD-10-PCS | Mod: PBBFAC,,, | Performed by: INTERNAL MEDICINE

## 2022-06-07 RX ORDER — SEMAGLUTIDE 0.5 MG/.5ML
0.5 INJECTION, SOLUTION SUBCUTANEOUS
Qty: 2 ML | Refills: 3 | Status: SHIPPED | OUTPATIENT
Start: 2022-06-07 | End: 2022-06-07

## 2022-06-07 RX ORDER — SEMAGLUTIDE 1.34 MG/ML
0.5 INJECTION, SOLUTION SUBCUTANEOUS
Qty: 2 PEN | Refills: 11 | Status: SHIPPED | OUTPATIENT
Start: 2022-06-07 | End: 2022-09-27

## 2022-06-07 NOTE — Clinical Note
Max, I saw Angel Mosquera today for preop clearance for upcoming left plantar fasciotomy scheduled on 6/17/2022.  She is medically cleared for intermediate risk procedure with low cardiac risk profile.  She's on carvedilol for chronic palpitations which can be continued/discontinued based on your preference.  My default would be to hold day of surgery.  Let me know if I can be of any other assistance.  Respectfully,  Tato Ramachandran M.D.

## 2022-06-07 NOTE — PROGRESS NOTES
[facility] Note    Chief Complaint      Chief Complaint   Patient presents with    Pre-op Exam     Foot surgery       History of Present Illness      Angel Mosquera is a 42 y.o. female with chronic conditions of anxiety, palpitations, bilateral foot pain who presents today for: preop evaluation for upcoming left plantar fasciotomy with Dr. Montez on 6/17/2022.  Has tried different shoes which help but still pain when working.  Denies chest pain, shortness of breath.  Palpitations being controlled on carvedilol.  Able a few city blocks and climb a flight of stairs without chest pain.  Last Creatinine 0.8 12/2021.  BP slightly elevated today.  Has gained 50 lbs of weight in the past year.    Alternatively, pt also due for her annual preventative visit.  She reports gaining 50 lbs in the past year or so.  Interested in Wegovy (or Ozempic depending on coverage).    Diet:  Prepares own food mostly.  Limiting fatty foods and carbs.  Drinks plenty water   Exercise: Stays active but gym workouts limited by left foot pain.  Denies drinking and driving, drinking more than 4 drinks on occasion, drug use.     Flu shot UTD.  Tdap 2019. COVID vaccine UTD.  Shingrix due age 50.  Pneumonia vaccine due at 65.   Pap smear 2019. Mammogram 2021. DEXA due age 65.   Colon cancer screening due age 45.     Past Medical History:  Past Medical History:   Diagnosis Date    PONV (postoperative nausea and vomiting)     Rosacea        Past Surgical History:   has a past surgical history that includes Breast Augmentation (2005); mischarr; Dilation and curettage of uterus (N/A, 10/19/2019); Robot-assisted laparoscopic uterine myomectomy (N/A, 1/7/2020); Augmentation of breast (Bilateral, 2005); Cosmetic surgery (2005); and xi robotic hysterectomy, with salpingo-oophorectomy (N/A, 12/15/2021).    Family History:  family history includes Alcohol abuse in her father, maternal grandfather, and maternal uncle; Arthritis in her  maternal grandmother; COPD in her maternal grandfather; Cancer in her paternal grandfather and paternal grandmother; Diabetes in her maternal grandmother and mother; Heart disease in her mother; Heart failure in her mother; Hyperlipidemia in her mother; Hypertension in her mother.     Social History:  Social History     Tobacco Use    Smoking status: Never Smoker    Smokeless tobacco: Never Used   Substance Use Topics    Alcohol use: No    Drug use: No       I personally reviewed all past medical, surgical, social and family history.    Review of Systems   Constitutional: Negative for chills, fever and malaise/fatigue.   Respiratory: Negative for shortness of breath.    Cardiovascular: Negative for chest pain.   Gastrointestinal: Negative for constipation, diarrhea, nausea and vomiting.   Skin: Negative for rash.   Neurological: Negative for weakness.   All other systems reviewed and are negative.       Medications:  Outpatient Encounter Medications as of 6/7/2022   Medication Sig Dispense Refill    azelaic acid (FINACEA) 15 % gel Apply to affected area(s) of face nightly, then moisturize (Patient taking differently: Apply to affected area(s) of face nightly, then moisturize) 50 g 3    carvediloL (COREG) 3.125 MG tablet Take 1 tablet (3.125 mg total) by mouth 2 (two) times daily with meals. 60 tablet 11    EScitalopram oxalate (LEXAPRO) 20 MG tablet Take 1 tablet (20 mg total) by mouth once daily. 90 tablet 3    hydrOXYzine HCL (ATARAX) 25 MG tablet Take 1 tablet (25 mg total) by mouth nightly as needed (Insomnia). 30 tablet 3    ibuprofen (ADVIL,MOTRIN) 600 MG tablet Take 1 tablet (600 mg total) by mouth every 8 (eight) hours as needed for Pain. 30 tablet 0    multivitamin (THERAGRAN) per tablet Take 1 tablet by mouth once daily.      ondansetron (ZOFRAN-ODT) 4 MG TbDL Take 1 tablet (4 mg total) by mouth every 8 (eight) hours as needed (nausea). (Patient not taking: Reported on 6/7/2022) 21 tablet 0     "ondansetron (ZOFRAN-ODT) 4 MG TbDL Take 1 tablet (4 mg total) by mouth every 8 (eight) hours as needed. (Patient not taking: Reported on 6/7/2022) 14 tablet 1    promethazine-dextromethorphan (PROMETHAZINE-DM) 6.25-15 mg/5 mL Syrp Take 5 mLs by mouth nightly as needed (cough). (Patient not taking: Reported on 6/7/2022) 100 mL 1    semaglutide, weight loss, (WEGOVY) 0.5 mg/0.5 mL PnIj Inject 0.5 mg into the skin every 7 days. 2 mL 3     Facility-Administered Encounter Medications as of 6/7/2022   Medication Dose Route Frequency Provider Last Rate Last Admin    sodium chloride 0.9% flush 10 mL  10 mL Intravenous PRN Mello Montez DPM           Allergies:  Review of patient's allergies indicates:  No Known Allergies    Health Maintenance:  Immunization History   Administered Date(s) Administered    COVID-19, MRNA, LN-S, PF (Pfizer) (Purple Cap) 12/30/2020, 01/20/2021    Influenza - Quadrivalent - PF *Preferred* (6 months and older) 11/04/2016, 10/09/2017, 10/26/2018, 10/23/2019, 10/19/2020    Meningococcal Conjugate 05/20/2013    Tdap 06/27/2019      Health Maintenance   Topic Date Due    Mammogram  05/31/2022    TETANUS VACCINE  06/27/2029    Hepatitis C Screening  Completed    Lipid Panel  Completed        Physical Exam      Vital Signs  Pulse: 77  SpO2: 99 %  BP: (!) 120/92  BP Location: Left arm  Patient Position: Sitting  Pain Score: 0-No pain  Height and Weight  Height: 5' 5" (165.1 cm)  Weight: 106.5 kg (234 lb 12.6 oz)  BSA (Calculated - sq m): 2.21 sq meters  BMI (Calculated): 39.1  Weight in (lb) to have BMI = 25: 149.9]    Physical Exam  Vitals reviewed.   Constitutional:       Appearance: She is well-developed.   HENT:      Head: Normocephalic and atraumatic.      Right Ear: External ear normal.      Left Ear: External ear normal.   Cardiovascular:      Rate and Rhythm: Normal rate and regular rhythm.      Heart sounds: Normal heart sounds. No murmur heard.  Pulmonary:      Effort: " Pulmonary effort is normal.      Breath sounds: Normal breath sounds. No wheezing or rales.   Abdominal:      General: Bowel sounds are normal. There is no distension.      Palpations: Abdomen is soft.      Tenderness: There is no abdominal tenderness.          Laboratory:  CBC:  Recent Labs   Lab 04/28/20  1202 06/30/21  0719 12/13/21  1144   WBC 8.56 9.49 10.85   RBC 4.45 4.66 4.77   Hemoglobin 11.5 L 12.5 12.2   Hematocrit 39.3 40.7 40.7   Platelets 370 H 377 359   MCV 88 87 85   MCH 25.8 L 26.8 L 25.6 L   MCHC 29.3 L 30.7 L 30.0 L     CMP:  Recent Labs   Lab 04/28/20  1202 06/30/21  0719 12/27/21  1525   Glucose 90 98 116 H   Calcium 9.1 9.4 9.4   Albumin 4.2 4.1 3.8   Total Protein 7.9 7.6 7.9   Sodium 139 140 139   Potassium 4.0 4.1 3.5   CO2 25 26 27   Chloride 105 105 105   BUN 11 16 13   Alkaline Phosphatase 82 72 74   ALT 18 18 32   AST 27 25 22   Total Bilirubin 0.3 0.2 0.2     URINALYSIS:  Recent Labs   Lab 05/02/22  0011   Color, UA Yellow   Specific Gravity, UA 1.015   pH, UA 6.0   Protein, UA Negative   Nitrite, UA Negative   Leukocytes, UA Negative   Urobilinogen, UA Negative      LIPIDS:  Recent Labs   Lab 04/28/20  1202 06/30/21  0719 12/27/21  1525   TSH 1.420 0.878 0.718   HDL 41 49  --    Cholesterol 160 173  --    Triglycerides 104 96  --    LDL Cholesterol 98.2 104.8  --    HDL/Cholesterol Ratio 25.6 28.3  --    Non-HDL Cholesterol 119 124  --    Total Cholesterol/HDL Ratio 3.9 3.5  --      TSH:  Recent Labs   Lab 04/28/20  1202 06/30/21  0719 12/27/21  1525   TSH 1.420 0.878 0.718     A1C:        Assessment/Plan     Angel Mosquera is a 42 y.o.female with:    1. Preop testing  - Ambulatory referral/consult to Internal Medicine  2. Plantar fasciitis of left foot  - Ambulatory referral/consult to Internal Medicine  Medically optimized for intermediate risk procedure with low cardiac risk profile.    3. Annual physical exam  - CBC Auto Differential; Future  - Comprehensive Metabolic Panel;  Future  - Lipid Panel; Future  - TSH; Future  - T4, Free; Future  Discussed diet and exercise, vaccines and cancer screening, risk factors.  Screening labs ordered.     4. Screening mammogram, encounter for  - Mammo Digital Screening Bilat w/ Brian; Future    5. Morbid obesity  - semaglutide, weight loss, (WEGOVY) 0.5 mg/0.5 mL PnIj; Inject 0.5 mg into the skin every 7 days.  Dispense: 2 mL; Refill: 3       Chronic conditions status updated as per HPI.  Other than changes above, cont current medications and maintain follow up with specialists.  Follow up in about 1 year (around 6/7/2023).    Future Appointments   Date Time Provider Department Center   6/15/2022  8:30 AM PRE-ADMIT ONE, [facility]   6/24/2022 10:15 AM Mello Montez DPM [facility] POD[facility]AT [facility]   7/7/2022 10:15 AM Mello Montez DPM [facility]   7/11/2022  8:30 AM April Green MD [facility] PILAR Ramachandran MD  [facility]

## 2022-06-10 ENCOUNTER — TELEPHONE (OUTPATIENT)
Dept: PODIATRY | Facility: CLINIC | Age: 42
End: 2022-06-10
Payer: COMMERCIAL

## 2022-06-10 NOTE — TELEPHONE ENCOUNTER
Pt's FMLA forms were scanned into chart under media manager and faxed to [employer] [phone]. Confirmation was received.

## 2022-06-15 ENCOUNTER — ANESTHESIA EVENT (OUTPATIENT)
Dept: SURGERY | Facility: HOSPITAL | Age: 42
End: 2022-06-15
Payer: COMMERCIAL

## 2022-06-15 ENCOUNTER — HOSPITAL ENCOUNTER (OUTPATIENT)
Dept: PREADMISSION TESTING | Facility: HOSPITAL | Age: 42
Discharge: HOME | End: 2022-06-15
Attending: PODIATRIST
Payer: COMMERCIAL

## 2022-06-15 VITALS
WEIGHT: 235.69 LBS | SYSTOLIC BLOOD PRESSURE: 130 MMHG | OXYGEN SATURATION: 99 % | HEIGHT: 65 IN | TEMPERATURE: 98 F | HEART RATE: 80 BPM | BODY MASS INDEX: 39.27 KG/M2 | RESPIRATION RATE: 16 BRPM | DIASTOLIC BLOOD PRESSURE: 90 MMHG

## 2022-06-15 RX ORDER — SODIUM CHLORIDE, SODIUM LACTATE, POTASSIUM CHLORIDE, CALCIUM CHLORIDE 600; 310; 30; 20 MG/100ML; MG/100ML; MG/100ML; MG/100ML
INJECTION, SOLUTION INTRAVENOUS CONTINUOUS
Status: CANCELLED | OUTPATIENT
Start: 2022-06-15

## 2022-06-15 RX ORDER — SCOLOPAMINE TRANSDERMAL SYSTEM 1 MG/1
1 PATCH, EXTENDED RELEASE TRANSDERMAL
Status: CANCELLED | OUTPATIENT
Start: 2022-06-15

## 2022-06-15 RX ORDER — LIDOCAINE HYDROCHLORIDE 10 MG/ML
1 INJECTION, SOLUTION EPIDURAL; INFILTRATION; INTRACAUDAL; PERINEURAL ONCE
Status: CANCELLED | OUTPATIENT
Start: 2022-06-15 | End: 2022-06-15

## 2022-06-15 NOTE — PRE-PROCEDURE INSTRUCTIONS
Manpreet Barragan [phone]    Allergies, medical, surgical, family and psychosocial histories reviewed with patient. Periop plan of care reviewed. Preop instructions given, including medications to take and to hold. Hibiclens soap and instructions on use given. Time allotted for questions to be addressed.  Patient verbalized understanding.

## 2022-06-15 NOTE — ANESTHESIA PREPROCEDURE EVALUATION
06/15/2022  Angel Mosquera is a 42 y.o., female.    Past Medical History:   Diagnosis Date    Hypertension     PONV (postoperative nausea and vomiting)     Rosacea      Past Surgical History:   Procedure Laterality Date    AUGMENTATION OF BREAST Bilateral 2005    Breast Augmentation  2005    COSMETIC SURGERY  2005    Breast augmentation    DILATION AND CURETTAGE OF UTERUS N/A 10/19/2019    Procedure: DILATION AND CURETTAGE, UTERUS;  Surgeon: Frances Culp MD;  Location: [facility] OR;  Service: OB/GYN;  Laterality: N/A;  with suction    mischarr      ROBOT-ASSISTED LAPAROSCOPIC UTERINE MYOMECTOMY N/A 1/7/2020    Procedure: ROBOTIC MYOMECTOMY, UTERUS;  Surgeon: Frances Culp MD;  Location: [facility] OR;  Service: OB/GYN;  Laterality: N/A;    XI ROBOTIC HYSTERECTOMY, WITH SALPINGO-OOPHORECTOMY N/A 12/15/2021    Procedure: XI ROBOTIC HYSTERECTOMY,WITH SALPINGO-OOPHORECTOMY;  Surgeon: Carla Aguero MD;  Location: [facility] OR;  Service: OB/GYN;  Laterality: N/A;       Pre-op Assessment    I have reviewed the Patient Summary Reports.     I have reviewed the Nursing Notes. I have reviewed the NPO Status.   I have reviewed the Medications.     Review of Systems  Anesthesia Hx:  Hx of Anesthetic complications  History of prior surgery of interest to airway management or planning: Personal Hx of Anesthesia complications, Post-Operative Nausea/Vomiting, with every anesthetic, treatment not known   Social:  Non-Smoker, No Alcohol Use    Hematology/Oncology:  Hematology Normal        Cardiovascular:   Exercise tolerance: good Hypertension, well controlled  Denies Angina.  Denies REICH.    Pulmonary:  Pulmonary Normal  Denies Shortness of breath.    Renal/:  Renal/ Normal     Hepatic/GI:  Hepatic/GI Normal    Musculoskeletal:  Musculoskeletal Normal    Neurological:  Neurology Normal     Endocrine:  Obesity / BMI > 30  Psych:  Psychiatric Normal           Physical Exam  General: Well nourished and Cooperative    Airway:  Mallampati: II   Mouth Opening: Normal  TM Distance: Normal  Tongue: Normal  Neck ROM: Normal ROM    Dental:  Intact, Retainer    Chest/Lungs:  Clear to auscultation, Normal Respiratory Rate    Heart:  Rate: Normal  Rhythm: Regular Rhythm  Sounds: Normal        Anesthesia Plan  Type of Anesthesia, risks & benefits discussed:    Anesthesia Type: Regional, MAC  Intra-op Monitoring Plan: Standard ASA Monitors  Post Op Pain Control Plan: multimodal analgesia  Induction:  IV  Airway Plan: Direct  Informed Consent: Informed consent signed with the Patient and all parties understand the risks and agree with anesthesia plan.  All questions answered.   ASA Score: 2  Anesthesia Plan Notes: Anesthesia consent to be signed prior to surgery 6/17/22      Ready For Surgery From Anesthesia Perspective.     .

## 2022-06-15 NOTE — DISCHARGE INSTRUCTIONS
Your surgery is scheduled for 6/17/22.    Please report to Hospital Front Lobby on the 1st Floor at 0800 a.m.    THIS TIME IS SUBJECT TO CHANGE.  YOU WILL RECEIVE A PHONE CALL THE DAY BEFORE SURGERY BY 3:30 PM TO CONFIRM YOUR TIME OF ARRIVAL.  IF YOU HAVE NOT RECEIVED A PHONE CALL BY 3:30 PM THE DAY BEFORE YOUR SURGERY PLEASE CALL [phone]     INSTRUCTIONS IMPORTANT!!!  ¨ Do not eat or drink after 12 midnight-including water, candy, gum, & mints. OK to brush teeth.      ____  Proceed to [facility] on *** for additional testing.        ____  Do not wear makeup, including mascara.  ____  No powder, lotions or creams to surgical area.  ____  Please remove all jewelry, including piercings and leave at home.  ____  No money or valuables needed. Please leave at home.  ____  Please bring any documents given by your doctor.  ____  If going home the same day, arrange for a ride home. You will not be able to             drive if Anesthesia was used.  ____  Children under 18 years require a parent / guardian present the entire time             they are in surgery / recovery.  ____  Wear loose fitting clothing. Allow for dressings, bandages.  ____  Stop Aspirin, Ibuprofen, Motrin, Aleve, Goody's/BC powders, Excedrine and Naproxen (NSAIDS) at least 3-5 days before surgery, unless otherwise instructed by your doctor, or the nurse.   You MAY use Tylenol/acetaminophen until day of surgery.  ____  Wash the surgical area with Hibiclens the night before surgery, and again the             morning of surgery.  Be sure to rinse hibiclens off completely (if instructed by   nurse).  ____  If you take diabetic medication, do not take am of surgery unless instructed by Doctor.  ____  Call MD for temperature above 101 degrees or any other signs of infection such as Urinary (bladder) infection, Upper respiratory infection, skin boils, etc.  ____ Stop taking any Fish Oil supplement or any Vitamins that contain Vitamin E at  least 5 days prior to surgery.  ____ Do Not wear your contact lenses the day of your procedure.  You may wear your glasses.      ____Do not shave surgical site for 3 days prior to surgery.  ____ Practice Good hand washing before, during, and after procedure.      I have read or had read and explained to me, and understand the above information.  Additional comments or instructions:  For additional questions call [phone]      ANESTHESIA SIDE EFFECTS  -For the first 24 hours after surgery:  Do not drive, use heavy equipment, make important decisions, or drink alcohol  -It is normal to feel sleepy for several hours.  Rest until you are more awake.  -Have someone stay with you, if needed.  They can watch for problems and help keep you safe.  -Some possible post anesthesia side effects include: nausea and vomiting, sore throat and hoarseness, sleepiness, and dizziness.        Pre-Op Bathing Instructions    Before surgery, you can play an important role in your own health.    Because skin is not sterile, we need to be sure that your skin is as free of germs as possible. By following the instructions below, you can reduce the number of germs on your skin before surgery.    IMPORTANT: You will need to shower with a special soap called Hibiclens*, available at any pharmacy.  If you are allergic to Chlorhexidine (the antiseptic in Hibiclens), use an antibacterial soap such as Dial Soap for your preoperative shower.  You will shower with Hibiclens both the night before your surgery and the morning of your surgery.  Do not use Hibiclens on the head, face or genitals to avoid injury to those areas.    STEP #1: THE NIGHT BEFORE YOUR SURGERY     Do not shave the area of your body where your surgery will be performed.  Shower and wash your hair and body as usual with your normal soap and shampoo.  Rinse your hair and body thoroughly after you shower to remove all soap residue.  With your hand, apply one packet of Hibiclens soap to  the surgical site.   Wash the site gently for five (5) minutes. Do not scrub your skin too hard.   Do not wash with your regular soap after Hibiclens is used.  Rinse your body thoroughly.  Pat yourself dry with a clean, soft towel.  Do not use lotion, cream, or powder.  Wear clean clothes.    STEP #2: THE MORNING OF YOUR SURGERY     Repeat Step #1.    * Not to be used by people allergic to Chlorhexidine.

## 2022-06-16 ENCOUNTER — PATIENT MESSAGE (OUTPATIENT)
Dept: PODIATRY | Facility: CLINIC | Age: 42
End: 2022-06-16
Payer: COMMERCIAL

## 2022-06-17 ENCOUNTER — HOSPITAL ENCOUNTER (OUTPATIENT)
Facility: HOSPITAL | Age: 42
Discharge: HOME | End: 2022-06-17
Attending: PODIATRIST | Admitting: PODIATRIST
Payer: COMMERCIAL

## 2022-06-17 ENCOUNTER — ANESTHESIA (OUTPATIENT)
Dept: SURGERY | Facility: HOSPITAL | Age: 42
End: 2022-06-17
Payer: COMMERCIAL

## 2022-06-17 VITALS
OXYGEN SATURATION: 99 % | RESPIRATION RATE: 16 BRPM | SYSTOLIC BLOOD PRESSURE: 100 MMHG | HEART RATE: 84 BPM | WEIGHT: 230 LBS | DIASTOLIC BLOOD PRESSURE: 71 MMHG | TEMPERATURE: 97 F | BODY MASS INDEX: 38.32 KG/M2 | HEIGHT: 65 IN

## 2022-06-17 DIAGNOSIS — M72.2 PLANTAR FASCIITIS OF LEFT FOOT: ICD-10-CM

## 2022-06-17 DIAGNOSIS — G89.29 CHRONIC PAIN IN LEFT FOOT: ICD-10-CM

## 2022-06-17 DIAGNOSIS — M79.672 CHRONIC PAIN IN LEFT FOOT: ICD-10-CM

## 2022-06-17 DIAGNOSIS — Z98.890 POSTOPERATIVE STATE: Primary | ICD-10-CM

## 2022-06-17 DIAGNOSIS — Z74.09 IMPAIRED FUNCTIONAL MOBILITY AND ACTIVITY TOLERANCE: ICD-10-CM

## 2022-06-17 PROCEDURE — 63600175 PHARM REV CODE 636 W HCPCS: Performed by: NURSE ANESTHETIST, CERTIFIED REGISTERED

## 2022-06-17 PROCEDURE — 29893 PR ANKLE SCOPE,PLANTAR FASCIOTOMY: ICD-10-PCS | Mod: LT,,, | Performed by: PODIATRIST

## 2022-06-17 PROCEDURE — 36000707: Performed by: PODIATRIST

## 2022-06-17 PROCEDURE — 25000003 PHARM REV CODE 250: Performed by: PODIATRIST

## 2022-06-17 PROCEDURE — 37000009 HC ANESTHESIA EA ADD 15 MINS: Performed by: PODIATRIST

## 2022-06-17 PROCEDURE — 37000008 HC ANESTHESIA 1ST 15 MINUTES: Performed by: PODIATRIST

## 2022-06-17 PROCEDURE — 29893 ENDOSCOPIC PLANTR FASCIOTOMY: CPT | Mod: LT,,, | Performed by: PODIATRIST

## 2022-06-17 PROCEDURE — 71000016 HC POSTOP RECOV ADDL HR: Performed by: PODIATRIST

## 2022-06-17 PROCEDURE — 71000015 HC POSTOP RECOV 1ST HR: Performed by: PODIATRIST

## 2022-06-17 PROCEDURE — 27201423 OPTIME MED/SURG SUP & DEVICES STERILE SUPPLY: Performed by: PODIATRIST

## 2022-06-17 PROCEDURE — 25000003 PHARM REV CODE 250: Performed by: NURSE PRACTITIONER

## 2022-06-17 PROCEDURE — 97162 PT EVAL MOD COMPLEX 30 MIN: CPT

## 2022-06-17 PROCEDURE — 36000706: Performed by: PODIATRIST

## 2022-06-17 PROCEDURE — 25000003 PHARM REV CODE 250: Performed by: NURSE ANESTHETIST, CERTIFIED REGISTERED

## 2022-06-17 PROCEDURE — 63600175 PHARM REV CODE 636 W HCPCS: Performed by: PODIATRIST

## 2022-06-17 RX ORDER — IBUPROFEN 800 MG/1
800 TABLET ORAL EVERY 6 HOURS PRN
Qty: 30 TABLET | Refills: 1 | Status: SHIPPED | OUTPATIENT
Start: 2022-06-17 | End: 2022-07-14

## 2022-06-17 RX ORDER — PROPOFOL 10 MG/ML
VIAL (ML) INTRAVENOUS
Status: DISCONTINUED | OUTPATIENT
Start: 2022-06-17 | End: 2022-06-17

## 2022-06-17 RX ORDER — HYDROCODONE BITARTRATE AND ACETAMINOPHEN 5; 325 MG/1; MG/1
1 TABLET ORAL EVERY 4 HOURS PRN
Status: DISCONTINUED | OUTPATIENT
Start: 2022-06-17 | End: 2022-06-17 | Stop reason: HOSPADM

## 2022-06-17 RX ORDER — FENTANYL CITRATE 50 UG/ML
INJECTION, SOLUTION INTRAMUSCULAR; INTRAVENOUS
Status: DISCONTINUED | OUTPATIENT
Start: 2022-06-17 | End: 2022-06-17

## 2022-06-17 RX ORDER — KETOROLAC TROMETHAMINE 30 MG/ML
INJECTION, SOLUTION INTRAMUSCULAR; INTRAVENOUS
Status: DISCONTINUED | OUTPATIENT
Start: 2022-06-17 | End: 2022-06-17

## 2022-06-17 RX ORDER — SCOLOPAMINE TRANSDERMAL SYSTEM 1 MG/1
1 PATCH, EXTENDED RELEASE TRANSDERMAL
Status: DISCONTINUED | OUTPATIENT
Start: 2022-06-17 | End: 2022-06-17 | Stop reason: HOSPADM

## 2022-06-17 RX ORDER — LIDOCAINE HCL/PF 100 MG/5ML
SYRINGE (ML) INTRAVENOUS
Status: DISCONTINUED | OUTPATIENT
Start: 2022-06-17 | End: 2022-06-17

## 2022-06-17 RX ORDER — LIDOCAINE HYDROCHLORIDE 10 MG/ML
1 INJECTION, SOLUTION EPIDURAL; INFILTRATION; INTRACAUDAL; PERINEURAL ONCE
Status: DISCONTINUED | OUTPATIENT
Start: 2022-06-17 | End: 2022-06-17 | Stop reason: HOSPADM

## 2022-06-17 RX ORDER — TRAMADOL HYDROCHLORIDE 50 MG/1
50 TABLET ORAL EVERY 6 HOURS PRN
Qty: 10 TABLET | Refills: 0 | Status: SHIPPED | OUTPATIENT
Start: 2022-06-17 | End: 2022-07-14

## 2022-06-17 RX ORDER — LIDOCAINE HYDROCHLORIDE 20 MG/ML
INJECTION, SOLUTION EPIDURAL; INFILTRATION; INTRACAUDAL; PERINEURAL
Status: DISCONTINUED | OUTPATIENT
Start: 2022-06-17 | End: 2022-06-17 | Stop reason: HOSPADM

## 2022-06-17 RX ORDER — BUPIVACAINE HYDROCHLORIDE 5 MG/ML
INJECTION, SOLUTION EPIDURAL; INTRACAUDAL
Status: DISCONTINUED | OUTPATIENT
Start: 2022-06-17 | End: 2022-06-17 | Stop reason: HOSPADM

## 2022-06-17 RX ORDER — DEXMEDETOMIDINE HYDROCHLORIDE 100 UG/ML
INJECTION, SOLUTION INTRAVENOUS
Status: DISCONTINUED | OUTPATIENT
Start: 2022-06-17 | End: 2022-06-17

## 2022-06-17 RX ORDER — MIDAZOLAM HYDROCHLORIDE 1 MG/ML
INJECTION INTRAMUSCULAR; INTRAVENOUS
Status: DISCONTINUED | OUTPATIENT
Start: 2022-06-17 | End: 2022-06-17

## 2022-06-17 RX ORDER — CEFAZOLIN SODIUM 2 G/50ML
2 SOLUTION INTRAVENOUS
Status: COMPLETED | OUTPATIENT
Start: 2022-06-17 | End: 2022-06-17

## 2022-06-17 RX ORDER — SODIUM CHLORIDE, SODIUM LACTATE, POTASSIUM CHLORIDE, CALCIUM CHLORIDE 600; 310; 30; 20 MG/100ML; MG/100ML; MG/100ML; MG/100ML
INJECTION, SOLUTION INTRAVENOUS CONTINUOUS
Status: DISCONTINUED | OUTPATIENT
Start: 2022-06-17 | End: 2022-06-17 | Stop reason: HOSPADM

## 2022-06-17 RX ORDER — PROPOFOL 10 MG/ML
VIAL (ML) INTRAVENOUS CONTINUOUS PRN
Status: DISCONTINUED | OUTPATIENT
Start: 2022-06-17 | End: 2022-06-17

## 2022-06-17 RX ORDER — LIDOCAINE HYDROCHLORIDE 10 MG/ML
INJECTION INFILTRATION; PERINEURAL
Status: DISCONTINUED | OUTPATIENT
Start: 2022-06-17 | End: 2022-06-17 | Stop reason: HOSPADM

## 2022-06-17 RX ADMIN — DEXMEDETOMIDINE HYDROCHLORIDE 4 MCG: 100 INJECTION, SOLUTION, CONCENTRATE INTRAVENOUS at 10:06

## 2022-06-17 RX ADMIN — FENTANYL CITRATE 25 MCG: 50 INJECTION, SOLUTION INTRAMUSCULAR; INTRAVENOUS at 10:06

## 2022-06-17 RX ADMIN — PROPOFOL 50 MG: 10 INJECTION, EMULSION INTRAVENOUS at 10:06

## 2022-06-17 RX ADMIN — PROPOFOL 150 MCG/KG/MIN: 10 INJECTION, EMULSION INTRAVENOUS at 10:06

## 2022-06-17 RX ADMIN — CEFAZOLIN SODIUM 2 G: 2 SOLUTION INTRAVENOUS at 10:06

## 2022-06-17 RX ADMIN — KETOROLAC TROMETHAMINE 30 MG: 30 INJECTION, SOLUTION INTRAMUSCULAR; INTRAVENOUS at 11:06

## 2022-06-17 RX ADMIN — GLYCOPYRROLATE 0.2 MG: 0.2 INJECTION, SOLUTION INTRAMUSCULAR; INTRAVITREAL at 10:06

## 2022-06-17 RX ADMIN — SODIUM CHLORIDE, SODIUM LACTATE, POTASSIUM CHLORIDE, AND CALCIUM CHLORIDE: .6; .31; .03; .02 INJECTION, SOLUTION INTRAVENOUS at 09:06

## 2022-06-17 RX ADMIN — LIDOCAINE HYDROCHLORIDE 100 MG: 20 INJECTION, SOLUTION INTRAVENOUS at 10:06

## 2022-06-17 RX ADMIN — PROPOFOL 30 MG: 10 INJECTION, EMULSION INTRAVENOUS at 10:06

## 2022-06-17 RX ADMIN — MIDAZOLAM HYDROCHLORIDE 2 MG: 1 INJECTION, SOLUTION INTRAMUSCULAR; INTRAVENOUS at 09:06

## 2022-06-17 RX ADMIN — DEXMEDETOMIDINE HYDROCHLORIDE 8 MCG: 100 INJECTION, SOLUTION, CONCENTRATE INTRAVENOUS at 10:06

## 2022-06-17 RX ADMIN — SCOPALAMINE 1 PATCH: 1 PATCH, EXTENDED RELEASE TRANSDERMAL at 08:06

## 2022-06-17 NOTE — H&P
"Subjective:      Patient ID: Angel Mosquera is a 42 y.o. female.    Chief Complaint: Foot Pain (bilateral )    Patient presents to clinic with chief complaint of bilateral bottom heel pain for approximately 9 months now, left worse than right. She does not recall any prior injury to both feet other than jumping in a shallow pool this past summer. Pain is worse with the first couple of steps in the AM and worsens with prolonged standing/walking. She was seen by Dr. Swan 5 months ago who performed a steroid injection in her left heel which she states did not help with her pain. She has tried PT, stretching, night splints, and changing shoegear (HOKA, new balance, asics) with no improvement. Her right ankle recently started hurting, states it feels like her range of motion is limited, thinks it may be due to her compensating to stay off the left foot. 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     04/18/2022:  Presents complaining of chronic pain to the left heel for greater than a year.  She has a history of hysterectomy in December 2021 that required 8 weeks for recovery and modified duties.  She works as a nurse at [employer].  States she also has pain in the right heel to lesser extent.  Pending a trip to [place] later this week.  Requesting a steroid injection to left heel but also requesting consideration for surgical intervention to left heel next month.    06/17: Presents for surgical intervention left foot. No new complaints.    Vitals:    06/17/22 0818   BP: 134/75   Pulse: 72   Resp: 16   Temp: 98.8 °F (37.1 °C)   TempSrc: Temporal   SpO2: 97%   Weight: 104.3 kg (230 lb)   Height: 5' 5" (1.651 m)      Past Medical History:   Diagnosis Date    Hypertension     PONV (postoperative nausea and vomiting)     Rosacea        Past Surgical History:   Procedure Laterality Date    AUGMENTATION OF BREAST Bilateral 2005    Breast Augmentation  2005    " COSMETIC SURGERY  2005    Breast augmentation    DILATION AND CURETTAGE OF UTERUS N/A 10/19/2019    Procedure: DILATION AND CURETTAGE, UTERUS;  Surgeon: Frances Culp MD;  Location: [facility] [place] OR;  Service: OB/GYN;  Laterality: N/A;  with suction    mischarr      ROBOT-ASSISTED LAPAROSCOPIC UTERINE MYOMECTOMY N/A 1/7/2020    Procedure: ROBOTIC MYOMECTOMY, UTERUS;  Surgeon: Frances Culp MD;  Location: [facility] [place] OR;  Service: OB/GYN;  Laterality: N/A;    XI ROBOTIC HYSTERECTOMY, WITH SALPINGO-OOPHORECTOMY N/A 12/15/2021    Procedure: XI ROBOTIC HYSTERECTOMY,WITH SALPINGO-OOPHORECTOMY;  Surgeon: Carla Aguero MD;  Location: [facility] [place] OR;  Service: OB/GYN;  Laterality: N/A;       Family History   Problem Relation Age of Onset    Diabetes Mother     Hypertension Mother     Heart failure Mother         viral?    Heart disease Mother     Hyperlipidemia Mother     Cancer Paternal Grandfather         Pancreatic Cancer    Cancer Paternal Grandmother         Lung Cancer    Arthritis Maternal Grandmother     Diabetes Maternal Grandmother     Alcohol abuse Maternal Grandfather     COPD Maternal Grandfather     Alcohol abuse Father     Alcohol abuse Maternal Uncle     Cancer Neg Hx     Ovarian cancer Neg Hx     Melanoma Neg Hx     Heart attack Neg Hx     Stroke Neg Hx     Colon cancer Neg Hx     Breast cancer Neg Hx     Blindness Neg Hx     Amblyopia Neg Hx     Cataracts Neg Hx     Glaucoma Neg Hx     Macular degeneration Neg Hx     Retinal detachment Neg Hx     Strabismus Neg Hx        Social History     Socioeconomic History    Marital status:    Occupational History    Occupation: Nurse     Employer: [employer]     Comment: Oncology   Tobacco Use    Smoking status: Never Smoker    Smokeless tobacco: Never Used   Substance and Sexual Activity    Alcohol use: No    Drug use: No    Sexual activity: Yes     Partners: Male     Birth control/protection: None      Comment: Vasectomy   Other Topics Concern    Are you pregnant or think you may be? No    Breast-feeding No     Social Determinants of Health     Financial Resource Strain: Low Risk     Difficulty of Paying Living Expenses: Not hard at all   Food Insecurity: No Food Insecurity    Worried About Running Out of Food in the Last Year: Never true    Ran Out of Food in the Last Year: Never true   Transportation Needs: No Transportation Needs    Lack of Transportation (Medical): No    Lack of Transportation (Non-Medical): No   Physical Activity: Unknown    Days of Exercise per Week: Patient refused    Minutes of Exercise per Session: 0 min   Stress: Stress Concern Present    Feeling of Stress : To some extent   Social Connections: Unknown    Frequency of Communication with Friends and Family: More than three times a week    Frequency of Social Gatherings with Friends and Family: More than three times a week    Active Member of Clubs or Organizations: No    Attends Club or Organization Meetings: Never    Marital Status:    Housing Stability: Low Risk     Unable to Pay for Housing in the Last Year: No    Number of Places Lived in the Last Year: 1    Unstable Housing in the Last Year: No       Current Facility-Administered Medications   Medication Dose Route Frequency Provider Last Rate Last Admin    cefazolin (ANCEF) 2 gram in dextrose 5% 50 mL IVPB (premix)  2 g Intravenous On Call Procedure Mello Montez DPM        lactated ringers infusion   Intravenous Continuous Rosalia Doherty DNP        LIDOcaine (PF) 10 mg/ml (1%) injection 10 mg  1 mL Intradermal Once Rosalia Doherty DNP        scopolamine 1.3-1.5 mg (1 mg over 3 days) 1 patch  1 patch Transdermal Q3 Days Rosalia Doherty DNP   1 patch at 06/17/22 0831       Review of patient's allergies indicates:  No Known Allergies      Review of Systems   Constitutional: Negative for chills, fever and malaise/fatigue.   HENT:  Negative for hearing loss.    Cardiovascular: Negative for claudication and leg swelling.   Respiratory: Negative for cough, shortness of breath and wheezing.    Skin: Negative for flushing, rash and unusual hair distribution.   Musculoskeletal: Negative for joint pain and myalgias.        Bilateral heel pain L>R. Right ankle pain   Gastrointestinal: Negative for nausea and vomiting.   Neurological: Negative for loss of balance, numbness, paresthesias and sensory change.   Psychiatric/Behavioral: Negative for altered mental status.           Objective:      Physical Exam  Constitutional:       General: She is not in acute distress.     Appearance: She is not ill-appearing.   Cardiovascular:      Pulses:           Dorsalis pedis pulses are 2+ on the right side and 2+ on the left side.        Posterior tibial pulses are 2+ on the right side and 2+ on the left side.      Comments: CFT< 3 secs all toes bilateral foot, skin temp warm to cool bilateral foot, no hair growth bilateral lower extremity, no edema to bilateral lower extremities    Musculoskeletal:      Comments: Mild pes cavus foot type.     Range of motion to the ankle bilateral is within normal limits.    Moderate pain on palpation to the plantar medial left heel overlying the calcaneal tuber at the plantar fascia origin.  No pain with medial lateral squeeze test the left heel.  No pain with range of motion or manual muscle strength testing left foot ankle.  No localized edema, warmth or discoloration.    Mild pain on palpation to the plantar medial right heel.  No pain medial lateral squeeze test of the right heel.  No pain range of motion or manual muscle strength testing to the right foot ankle.       Skin:     General: Skin is warm.      Capillary Refill: Capillary refill takes less than 2 seconds.      Findings: No ecchymosis or erythema.      Nails: There is no clubbing.      Comments: Skin intact. No lesions, no discoloration, no erythema, or other  acute signs of infection to bilateral lower extremities.    Neurological:      Mental Status: She is alert and oriented to person, place, and time.      Sensory: Sensation is intact.      Motor: Motor function is intact.      Comments: Light touch intact to bilateral feet.                Assessment:       Encounter Diagnoses   Name Primary?    Chronic pain in left foot Yes    Plantar fasciitis of left foot     Preop testing          Plan:       Angel was seen today for heel pain.    Diagnoses and all orders for this visit:    Chronic pain in left foot    Plantar fasciitis of left foot  -     Ambulatory referral/consult to Internal Medicine; Future  -     Case Request Operating Room: FASCIOTOMY, PLANTAR, ENDOSCOPIC  -     Full code; Standing  -     Place in Outpatient; Standing  -     Verify surgical site; Standing  -     Verify informed consent; Standing  -     Insert peripheral IV; Standing  -     Full code  -     Insert peripheral IV    Preop testing  -     Ambulatory referral/consult to Internal Medicine; Future    Other orders  -     methylPREDNISolone acetate injection 40 mg  -     sodium chloride 0.9% flush 10 mL  -     cefazolin (ANCEF) 2 gram in dextrose 5% 50 mL IVPB (premix)      I counseled the patient on her conditions, their implications and medical management.    Previous MRI of the left hindfoot ankle complete on 10/04/2021 revealed plantar fasciitis involving the central lateral cords with small central tear of the central cord and reactive marrow edema of the calcaneus.    MRI of the right hindfoot ankle completed on 10/04/2021 revealed plantar fasciitis associated with mild marrow edema at the inferior calcaneus and calcaneal spur.    Per previous discussion with discussed continued conservative care versus surgical intervention consisting of a plantar fasciotomy to the left heel.  Risks, benefits anticipate postop course discussed in detail.  No guarantees were given or implied.  Patient  elected proceed surgical intervention outlined above.  We had also discussed debridement of the plantar fascia with resection of the plantar heel spur injection PRP however due to the previous partial tear and chronicity of the plantar fasciitis I have recommended for plantar fasciotomy.    This procedure has been fully reviewed with the patient and written informed consent has been obtained.    Referred to PCP for medical optimization and risk stratification    Surgical intervention schedule on 05/27/2022 as mac with local anesthetic to the left foot.    With the patient's verbal consent the skin was prepped to the plantar medial left heel with alcohol followed by skin anesthesia with ethyl chloride.  Injected mixture containing 40 mg of Depo-Medrol 1 mL 0.25% plain Marcaine to the affected area.  She tolerated procedure well without apparent complication.  Instructed rest, ice and elevate p.r.n..    RTC 1-2 weeks postop or p.r.n. as discussed.    A portion of this note was generated by voice recognition software and may contain spelling and grammar errors.    H&P completed on 04/18/22 has been reviewed, the patient has been examined and:  I concur with the findings and no changes have occurred since H&P was written.    There are no hospital problems to display for this patient.

## 2022-06-17 NOTE — BRIEF OP NOTE
[facility] - Surgery (Hospital)  Brief Operative Note    Surgery Date: 6/17/2022     Surgeon(s) and Role:     * Mello Montez DPM - Primary    Assisting Surgeon: None    Pre-op Diagnosis:  Plantar fasciitis of left foot [M72.2]    Post-op Diagnosis:  Post-Op Diagnosis Codes:     * Plantar fasciitis of left foot [M72.2]    Procedure(s) (LRB):  FASCIOTOMY, PLANTAR, ENDOSCOPIC (Left)    Anesthesia: Local MAC    Operative Findings: per above    Estimated Blood Loss: 1 mL       Specimens:   Specimen (24h ago, onward)            None            Discharge Note    OUTCOME: Patient tolerated treatment/procedure well without complication and is now ready for discharge.    DISPOSITION: Home or Self Care    FINAL DIAGNOSIS:  Plantar fasciitis of left foot    FOLLOWUP: In clinic    DISCHARGE INSTRUCTIONS:    Discharge Procedure Orders   Diet general     Keep surgical extremity elevated   Order Comments: Above heart level     Ice to affected area   Order Comments: As needed throughout the day x 2-3 days     Call MD for:  temperature >100.4     Call MD for:  persistent nausea and vomiting     Call MD for:  severe uncontrolled pain     Call MD for:  difficulty breathing, headache or visual disturbances     Remove dressing in 72 hours   Order Comments: Cover with large bandaids to protect the skin and wear a sock     Weight bearing restrictions (specify)   Order Comments: With orthopedic boot and crutches

## 2022-06-17 NOTE — H&P
06/15/2022  Angel Mosquera is a 42 y.o., female.          Past Medical History:   Diagnosis Date    Hypertension      PONV (postoperative nausea and vomiting)      Rosacea              Past Surgical History:   Procedure Laterality Date    AUGMENTATION OF BREAST Bilateral 2005    Breast Augmentation   2005    COSMETIC SURGERY   2005     Breast augmentation    DILATION AND CURETTAGE OF UTERUS N/A 10/19/2019     Procedure: DILATION AND CURETTAGE, UTERUS;  Surgeon: Frances Culp MD;  Location: [facility] OR;  Service: OB/GYN;  Laterality: N/A;  with suction    mischarr        ROBOT-ASSISTED LAPAROSCOPIC UTERINE MYOMECTOMY N/A 1/7/2020     Procedure: ROBOTIC MYOMECTOMY, UTERUS;  Surgeon: Frances Culp MD;  Location: [facility] OR;  Service: OB/GYN;  Laterality: N/A;    XI ROBOTIC HYSTERECTOMY, WITH SALPINGO-OOPHORECTOMY N/A 12/15/2021     Procedure: XI ROBOTIC HYSTERECTOMY,WITH SALPINGO-OOPHORECTOMY;  Surgeon: Carla Aguero MD;  Location: [facility] OR;  Service: OB/GYN;  Laterality: N/A;         Pre-op Assessment    I have reviewed the Patient Summary Reports.     I have reviewed the Nursing Notes. I have reviewed the NPO Status.   I have reviewed the Medications.      Review of Systems  Anesthesia Hx:  Hx of Anesthetic complications  History of prior surgery of interest to airway management or planning: Personal Hx of Anesthesia complications, Post-Operative Nausea/Vomiting, with every anesthetic, treatment not known   Social:  Non-Smoker, No Alcohol Use    Hematology/Oncology:  Hematology Normal         Cardiovascular:   Exercise tolerance: good Hypertension, well controlled  Denies Angina.  Denies REICH.    Pulmonary:  Pulmonary Normal  Denies Shortness of breath.    Renal/:  Renal/ Normal     Hepatic/GI:  Hepatic/GI Normal    Musculoskeletal:  Musculoskeletal Normal     Neurological:  Neurology Normal    Endocrine:  Obesity / BMI > 30  Psych:  Psychiatric Normal              Physical Exam  General: Well nourished and Cooperative   Airway:  Mallampati: II   Mouth Opening: Normal  TM Distance: Normal  Tongue: Normal  Neck ROM: Normal ROM   Dental:  Intact, Retainer    Chest/Lungs:  Clear to auscultation, Normal Respiratory Rate   Heart:  Rate: Normal  Rhythm: Regular Rhythm  Sounds: Normal         Anesthesia Plan  Type of Anesthesia, risks & benefits discussed:     Anesthesia Type: Regional, MAC  Intra-op Monitoring Plan: Standard ASA Monitors  Post Op Pain Control Plan: multimodal analgesia  Induction:  IV  Airway Plan: Direct  Informed Consent: Informed consent signed with the Patient and all parties understand the risks and agree with anesthesia plan.  All questions answered.   ASA Score: 2  Anesthesia Plan Notes: Anesthesia consent to be signed prior to surgery 6/17/22      Ready For Surgery From Anesthesia Perspective.

## 2022-06-17 NOTE — TRANSFER OF CARE
"Anesthesia Transfer of Care Note    Patient: Angel Mosquera    Procedure(s) Performed: Procedure(s) (LRB):  FASCIOTOMY, PLANTAR, ENDOSCOPIC (Left)    Patient location: OPS    Anesthesia Type: MAC    Transport from OR: Transported from OR on 2-3 L/min O2 by NC with adequate spontaneous ventilation    Post pain: adequate analgesia    Post assessment: no apparent anesthetic complications    Post vital signs: stable    Level of consciousness: awake and alert    Nausea/Vomiting: no nausea/vomiting    Complications: neck pain    Transfer of care protocol was followed      Last vitals:   Visit Vitals  /75 (BP Location: Right arm, Patient Position: Sitting)   Pulse 72   Temp 37.1 °C (98.8 °F) (Temporal)   Resp 16   Ht 5' 5" (1.651 m)   Wt 104.3 kg (230 lb)   LMP 12/09/2021   SpO2 97%   Breastfeeding No   BMI 38.27 kg/m²     "

## 2022-06-17 NOTE — ANESTHESIA POSTPROCEDURE EVALUATION
Anesthesia Post Evaluation    Patient: Angel Mosquera    Procedure(s) Performed: Procedure(s) (LRB):  FASCIOTOMY, PLANTAR, ENDOSCOPIC (Left)    Final Anesthesia Type: MAC      Patient location during evaluation: OPS  Patient participation: Yes- Able to Participate  Level of consciousness: awake and alert  Post-procedure vital signs: reviewed and stable  Pain management: adequate  Airway patency: patent    PONV status at discharge: No PONV  Anesthetic complications: no      Cardiovascular status: blood pressure returned to baseline  Respiratory status: unassisted, spontaneous ventilation and room air  Hydration status: euvolemic  Follow-up not needed.          Vitals Value Taken Time   /56 06/17/22 1135   Temp 97.7 06/17/22 1135   Pulse 85 06/17/22 1135   Resp 14 06/17/22 1135   SpO2 95 06/17/22 1135         No case tracking events are documented in the log.      Pain/Madai Score: No data recorded

## 2022-06-17 NOTE — PT/OT/SLP PROGRESS
Physical Therapy Crutch Evaluation/Training    Angel Mosquera   MRN: [id number]   Admitting Diagnosis: Plantar fasciitis of left foot    PT Received On: 06/17/22  PT Start Time: 1309     PT Stop Time: 1323    PT Total Time (min): 14 min       Billable Minutes:  Evaluation 14     Order: Evaluate and Treat  Order Date: 6/17/2022    Precautions Weight Bearing Status: weight bearing as tolerated: left leg    Patient Active Problem List   Diagnosis    Rosacea    Amenorrhea, secondary    Bilateral hip pain    Female infertility    Weakness    Pain in pelvis    Class 2 obesity with body mass index (BMI) of 35.0 to 35.9 in adult    Dorsalgia, unspecified    Swimmer's ear of right side    Impaired functional mobility and activity tolerance    Primary insomnia    Anxiety    sp RA-TLH/BS    COVID    Plantar fasciitis of left foot    Chronic pain in left foot     Past Medical History:   Diagnosis Date    Hypertension     PONV (postoperative nausea and vomiting)     Rosacea      Past Surgical History:   Procedure Laterality Date    AUGMENTATION OF BREAST Bilateral 2005    Breast Augmentation  2005    COSMETIC SURGERY  2005    Breast augmentation    DILATION AND CURETTAGE OF UTERUS N/A 10/19/2019    Procedure: DILATION AND CURETTAGE, UTERUS;  Surgeon: Frances Culp MD;  Location: [facility];  Service: OB/GYN;  Laterality: N/A;  with suction    mischarr      ROBOT-ASSISTED LAPAROSCOPIC UTERINE MYOMECTOMY N/A 1/7/2020    Procedure: ROBOTIC MYOMECTOMY, UTERUS;  Surgeon: Frances Culp MD;  Location: [facility] OR;  Service: OB/GYN;  Laterality: N/A;    XI ROBOTIC HYSTERECTOMY, WITH SALPINGO-OOPHORECTOMY N/A 12/15/2021    Procedure: XI ROBOTIC HYSTERECTOMY,WITH SALPINGO-OOPHORECTOMY;  Surgeon: Carla Agueor MD;  Location: [facility];  Service: OB/GYN;  Laterality: N/A;       Subjective Information     Prior level of function: independent  Residence: lives with their spouse 1-story house/ trailer    Support available: family  Equipment owned: None  Mental Status: Oriented X 4 and Alert  Skin: Dressing and boot applied to Left foot  Sensation: Intact  Posture: Good    Pain at time of assessment: 0/10     Objective findings/Assessment  Bed mobility: Independent  Transfers: Modified Independent with crutches and a RW  Standing balance: No LOB with gait with crutches or a RW.  Patient states she feels more comfortable with a RW  ROM:  WNL, L Ankle/foot NT  Strength: WFL, L Ankle/foot NT  Patient requires walker fitting and training.    Treatment  Gait: Patient attempts to use B axillary crutches x 50' at Modified Independent level, however states that she would prefer a RW.  Gait with RW x 50' at Modified Independent level.    Stairs: Reviewed ascending/descending threshold step with use of RW  Transfers: Modified Independent with RW  Education provided in form of: Demonstration    AM-[facility] 6 CLICK MOBILITY  How much help from another person does this patient currently need?   1 = Unable, Total/Dependent Assistance  2 = A lot, Maximum/Moderate Assistance  3 = A little, Minimum/Contact Guard/Supervision  4 = None, Modified [place]/Independent    Turning over in bed (including adjusting bedclothes, sheets and blankets)?: 4  Sitting down on and standing up from a chair with arms (e.g., wheelchair, bedside commode, etc.): 4  Moving from lying on back to sitting on the side of the bed?: 4  Moving to and from a bed to a chair (including a wheelchair)?: 4  Need to walk in hospital room?: 4  Climbing 3-5 steps with a railing?: 3  Basic Mobility Total Score: 23     AM-PAC Raw Score CMS G-Code Modifier Level of Impairment Assistance   6 % Total / Unable   7 - 9 CM 80 - 100% Maximal Assist   10 - 14 CL 60 - 80% Moderate Assist   15 - 19 CK 40 - 60% Moderate Assist   20 - 22 CJ 20 - 40% Minimal Assist   23 CI 1-20% SBA / CGA   24 CH 0% Independent/ Mod I     Goals/Discharge Status  Patient safely and effectively  ambulates with walker with  modified independent,  weight bearing as tolerated: left leg on level surfaces.    Recommended Plan:  Patient to be discharged to home with spouse support.and RW    06/17/2022

## 2022-06-18 NOTE — OP NOTE
[facility] - Surgery (Hospital)  Operative Note      Date of Procedure: 6/17/2022     Procedure: Procedure(s) (LRB):  FASCIOTOMY, PLANTAR, ENDOSCOPIC (Left)     Surgeon(s) and Role:     * Mello Montez DPM - Primary    Assisting Surgeon: Sherlyn Mayers DPM PGY3    Pre-Operative Diagnosis: Plantar fasciitis of left foot [M72.2]    Post-Operative Diagnosis: Post-Op Diagnosis Codes:     * Plantar fasciitis of left foot [M72.2]    Anesthesia: Local MAC    Description of Technical Procedures:   The patient was brought to the operating room on a stretcher and transferred onto the operating table in a supine position.  Following the successful induction of MAC anesthesia, a well padded nonsterile pneumatic tourniquet was placed on the left calf.  A left ankle block was administered using 25 mL of a 1:1 mixture of 0.5% bupivicaine plain and 1% Lidocaine plain.  The left foot was then prepped in a sterile manner.  A timeout was performed verifying the patient's identity, surgical site, allergies, and medications. All were in agreement. An esmarch bandage was used to exsanguinate the left foot and calf tourniquet was inflated to 250 mmHg.      Attention was then directed to the left heel in which a longitudinal incision was made approximately 1.5 cm from the plantar skin and slightly anterior to the medial calcaneal tubercle aligning with the posterior border of the distal tibia. Due to inadequate anesthesia, an additional 15 mL of a 1:1 mixture of 1% lidocaine plain and 0.5% bupivacaine plain was injected as an infiltrative block surrounding the left heel. Blunt dissection was completed through the subQ tissue with a hemostat from medial to lateral directly inferior to the plantar fascia. The Plane Finder instrument was then inserted through the medial incision and carefully directed laterally while advancing in the plane inferior to the plantar fascia until tenting of the lateral skin was noted. A lateral portal was  created by making a small longitudinal incision on the lateral heel. The cannula was inserted into the lateral portal over the Plane Finder and advanced until the flange was in close approximation to the lateral heel. The plane finder was removed and a 4 mm 30 degree arthroscope was introduced into the lateral portal via the cannula. The plantar fascia was visualized. A soft tissue rasp was introduced into the medial portal to remove adipose tissue from the field. The triangular cutting blade was then introduced into the medial portal and while applying upward pressure the medial and central band of the plantar fascia was released.  The endoscope was used to visualize the underlying intrinsic musculature confirming a full thickness release was completed. The wound was copiously irrigated with normal sterile saline solution and skin closure to both lateral and medial heel was achieved with 4-0 vicryl and skin was reapproximated in a horizontal mattress suture fashion with 4-0 Nylon suture material.      The incision sites were covered with xeroform, 4x4 gauze, and the left foot was dressed with cast padding and ACE bandage. Calf tourniquet was deflated and brisk capillary refill was noted to return to all digits on the left foot. A short orthopedic boot was applied to the left foot and the patient was transported to recovery with vital signs stable and vascular status intact.        Following a period of post op monitoring, the patient was discharged home with the following instructions:   1. Keep dressing dry and intact until Podiatry follow up.   2. Weightbearing as tolerated in orthopedic boot left foot  3. All necessary prescriptions ordered and medical management will continue.   4. Contact Podiatry for all post op follow up care and if any problems arise.      Estimated Blood Loss (EBL): 1 mL           Implants: * No implants in log *    Specimens:   Specimen (24h ago, onward)                None                     Condition: Good    Disposition: PACU - hemodynamically stable.    Attestation: I was present and scrubbed for the entire procedure.    I have personally taken the history and examined this patient and agree with the resident's note as stated as above.   Mello Montez DPM, FACFAS

## 2022-06-24 ENCOUNTER — OFFICE VISIT (OUTPATIENT)
Dept: PODIATRY | Facility: CLINIC | Age: 42
End: 2022-06-24
Payer: COMMERCIAL

## 2022-06-24 ENCOUNTER — TELEPHONE (OUTPATIENT)
Dept: PODIATRY | Facility: CLINIC | Age: 42
End: 2022-06-24

## 2022-06-24 VITALS
BODY MASS INDEX: 38.27 KG/M2 | SYSTOLIC BLOOD PRESSURE: 133 MMHG | HEART RATE: 82 BPM | DIASTOLIC BLOOD PRESSURE: 89 MMHG | HEIGHT: 65 IN

## 2022-06-24 DIAGNOSIS — Z98.890 POSTOPERATIVE STATE: Primary | ICD-10-CM

## 2022-06-24 PROCEDURE — 99024 PR POST-OP FOLLOW-UP VISIT: ICD-10-PCS | Mod: S$GLB,,, | Performed by: PODIATRIST

## 2022-06-24 PROCEDURE — 99999 PR PBB SHADOW E&M-EST. PATIENT-LVL III: ICD-10-PCS | Mod: PBBFAC,,, | Performed by: PODIATRIST

## 2022-06-24 PROCEDURE — 3075F PR MOST RECENT SYSTOLIC BLOOD PRESS GE 130-139MM HG: ICD-10-PCS | Mod: CPTII,S$GLB,, | Performed by: PODIATRIST

## 2022-06-24 PROCEDURE — 3008F PR BODY MASS INDEX (BMI) DOCUMENTED: ICD-10-PCS | Mod: CPTII,S$GLB,, | Performed by: PODIATRIST

## 2022-06-24 PROCEDURE — 1159F PR MEDICATION LIST DOCUMENTED IN MEDICAL RECORD: ICD-10-PCS | Mod: CPTII,S$GLB,, | Performed by: PODIATRIST

## 2022-06-24 PROCEDURE — 1160F PR REVIEW ALL MEDS BY PRESCRIBER/CLIN PHARMACIST DOCUMENTED: ICD-10-PCS | Mod: CPTII,S$GLB,, | Performed by: PODIATRIST

## 2022-06-24 PROCEDURE — 3079F DIAST BP 80-89 MM HG: CPT | Mod: CPTII,S$GLB,, | Performed by: PODIATRIST

## 2022-06-24 PROCEDURE — 99024 POSTOP FOLLOW-UP VISIT: CPT | Mod: S$GLB,,, | Performed by: PODIATRIST

## 2022-06-24 PROCEDURE — 3075F SYST BP GE 130 - 139MM HG: CPT | Mod: CPTII,S$GLB,, | Performed by: PODIATRIST

## 2022-06-24 PROCEDURE — 99999 PR PBB SHADOW E&M-EST. PATIENT-LVL III: CPT | Mod: PBBFAC,,, | Performed by: PODIATRIST

## 2022-06-24 PROCEDURE — 3008F BODY MASS INDEX DOCD: CPT | Mod: CPTII,S$GLB,, | Performed by: PODIATRIST

## 2022-06-24 PROCEDURE — 1160F RVW MEDS BY RX/DR IN RCRD: CPT | Mod: CPTII,S$GLB,, | Performed by: PODIATRIST

## 2022-06-24 PROCEDURE — 3079F PR MOST RECENT DIASTOLIC BLOOD PRESSURE 80-89 MM HG: ICD-10-PCS | Mod: CPTII,S$GLB,, | Performed by: PODIATRIST

## 2022-06-24 PROCEDURE — 1159F MED LIST DOCD IN RCRD: CPT | Mod: CPTII,S$GLB,, | Performed by: PODIATRIST

## 2022-06-24 NOTE — TELEPHONE ENCOUNTER
Pt's FMLA forms were scanned into chart under media manager and faxed to [employer] [phone]. Confirmation was received.

## 2022-06-24 NOTE — PROGRESS NOTES
"Subjective:      Patient ID: Angel Mosquera is a 42 y.o. female.    Chief Complaint: Foot Pain (bilateral )    Patient presents to clinic with chief complaint of bilateral bottom heel pain for approximately 9 months now, left worse than right. She does not recall any prior injury to both feet other than jumping in a shallow pool this past summer. Pain is worse with the first couple of steps in the AM and worsens with prolonged standing/walking. She was seen by Dr. Swan 5 months ago who performed a steroid injection in her left heel which she states did not help with her pain. She has tried PT, stretching, night splints, and changing shoegear (HOKA, new balance, asics) with no improvement. Her right ankle recently started hurting, states it feels like her range of motion is limited, thinks it may be due to her compensating to stay off the left foot. 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     04/18/2022:  Presents complaining of chronic pain to the left heel for greater than a year.  She has a history of hysterectomy in December 2021 that required 8 weeks for recovery and modified duties.  She works as a nurse at [employer].  States she also has pain in the right heel to lesser extent.  Pending a trip to [place] later this week.  Requesting a steroid injection to left heel but also requesting consideration for surgical intervention to left heel next month.    06/24/2022:  Post endoscopic plantar fasciotomy left foot on 06/17/2022.  Reports minimal discomfort to left foot.  She has been ambulating with the orthopedic boot.  Taking ibuprofen as needed for pain.    Vitals:    06/24/22 1026   BP: 133/89   Pulse: 82   Height: 5' 5" (1.651 m)   PainSc:   2   PainLoc: Foot      Past Medical History:   Diagnosis Date    Hypertension     PONV (postoperative nausea and vomiting)     Rosacea        Past Surgical History:   Procedure Laterality Date    AUGMENTATION " OF BREAST Bilateral 2005    Breast Augmentation  2005    COSMETIC SURGERY  2005    Breast augmentation    DILATION AND CURETTAGE OF UTERUS N/A 10/19/2019    Procedure: DILATION AND CURETTAGE, UTERUS;  Surgeon: Frances Culp MD;  Location: [facility] OR;  Service: OB/GYN;  Laterality: N/A;  with suction    ENDOSCOPIC PLANTAR FASCIOTOMY Left 6/17/2022    Procedure: FASCIOTOMY, PLANTAR, ENDOSCOPIC;  Surgeon: Mello Montez DPM;  Location: [facility] OR;  Service: Podiatry;  Laterality: Left;  endoscopic plantar fasciotomy kit    mischarr      ROBOT-ASSISTED LAPAROSCOPIC UTERINE MYOMECTOMY N/A 1/7/2020    Procedure: ROBOTIC MYOMECTOMY, UTERUS;  Surgeon: Frances Culp MD;  Location: [facility] OR;  Service: OB/GYN;  Laterality: N/A;    XI ROBOTIC HYSTERECTOMY, WITH SALPINGO-OOPHORECTOMY N/A 12/15/2021    Procedure: XI ROBOTIC HYSTERECTOMY,WITH SALPINGO-OOPHORECTOMY;  Surgeon: Carla Aguero MD;  Location: [facility];  Service: OB/GYN;  Laterality: N/A;       Family History   Problem Relation Age of Onset    Diabetes Mother     Hypertension Mother     Heart failure Mother         viral?    Heart disease Mother     Hyperlipidemia Mother     Cancer Paternal Grandfather         Pancreatic Cancer    Cancer Paternal Grandmother         Lung Cancer    Arthritis Maternal Grandmother     Diabetes Maternal Grandmother     Alcohol abuse Maternal Grandfather     COPD Maternal Grandfather     Alcohol abuse Father     Alcohol abuse Maternal Uncle     Cancer Neg Hx     Ovarian cancer Neg Hx     Melanoma Neg Hx     Heart attack Neg Hx     Stroke Neg Hx     Colon cancer Neg Hx     Breast cancer Neg Hx     Blindness Neg Hx     Amblyopia Neg Hx     Cataracts Neg Hx     Glaucoma Neg Hx     Macular degeneration Neg Hx     Retinal detachment Neg Hx     Strabismus Neg Hx        Social History     Socioeconomic History    Marital status:    Occupational History    Occupation: Nurse     Employer:  [employer]     Comment: Oncology   Tobacco Use    Smoking status: Never Smoker    Smokeless tobacco: Never Used   Substance and Sexual Activity    Alcohol use: No    Drug use: No    Sexual activity: Yes     Partners: Male     Birth control/protection: None     Comment: Vasectomy   Other Topics Concern    Are you pregnant or think you may be? No    Breast-feeding No     Social Determinants of Health     Financial Resource Strain: Low Risk     Difficulty of Paying Living Expenses: Not hard at all   Food Insecurity: No Food Insecurity    Worried About Running Out of Food in the Last Year: Never true    Ran Out of Food in the Last Year: Never true   Transportation Needs: No Transportation Needs    Lack of Transportation (Medical): No    Lack of Transportation (Non-Medical): No   Physical Activity: Unknown    Days of Exercise per Week: Patient refused    Minutes of Exercise per Session: 0 min   Stress: Stress Concern Present    Feeling of Stress : To some extent   Social Connections: Unknown    Frequency of Communication with Friends and Family: More than three times a week    Frequency of Social Gatherings with Friends and Family: More than three times a week    Active Member of Clubs or Organizations: No    Attends Club or Organization Meetings: Never    Marital Status:    Housing Stability: Low Risk     Unable to Pay for Housing in the Last Year: No    Number of Places Lived in the Last Year: 1    Unstable Housing in the Last Year: No       Current Outpatient Medications   Medication Sig Dispense Refill    azelaic acid (FINACEA) 15 % gel Apply to affected area(s) of face nightly, then moisturize (Patient taking differently: Apply to affected area(s) of face nightly, then moisturize) 50 g 3    carvediloL (COREG) 3.125 MG tablet Take 1 tablet (3.125 mg total) by mouth 2 (two) times daily with meals. 60 tablet 11    EScitalopram oxalate (LEXAPRO) 20 MG tablet Take 1 tablet  (20 mg total) by mouth once daily. 90 tablet 3    hydrOXYzine HCL (ATARAX) 25 MG tablet Take 1 tablet (25 mg total) by mouth nightly as needed (Insomnia). 30 tablet 3    ibuprofen (ADVIL,MOTRIN) 800 MG tablet Take 1 tablet (800 mg total) by mouth every 6 (six) hours as needed for Pain. 30 tablet 1    multivitamin (THERAGRAN) per tablet Take 1 tablet by mouth once daily.      promethazine-dextromethorphan (PROMETHAZINE-DM) 6.25-15 mg/5 mL Syrp Take 5 mLs by mouth nightly as needed (cough). 100 mL 1    semaglutide (OZEMPIC) 0.25 mg or 0.5 mg(2 mg/1.5 mL) pen injector Inject 0.5 mg into the skin every 7 days. 2 pen 11    traMADoL (ULTRAM) 50 mg tablet Take 1 tablet (50 mg total) by mouth every 6 (six) hours as needed for Pain. 10 tablet 0    ondansetron (ZOFRAN-ODT) 4 MG TbDL Take 1 tablet (4 mg total) by mouth every 8 (eight) hours as needed (nausea). (Patient not taking: No sig reported) 21 tablet 0    ondansetron (ZOFRAN-ODT) 4 MG TbDL Take 1 tablet (4 mg total) by mouth every 8 (eight) hours as needed. (Patient not taking: No sig reported) 14 tablet 1     Current Facility-Administered Medications   Medication Dose Route Frequency Provider Last Rate Last Admin    sodium chloride 0.9% flush 10 mL  10 mL Intravenous PRN Mello Montez DPM           Review of patient's allergies indicates:  No Known Allergies      Review of Systems   Constitutional: Negative for chills, fever and malaise/fatigue.   HENT: Negative for hearing loss.    Cardiovascular: Negative for claudication and leg swelling.   Respiratory: Negative for cough, shortness of breath and wheezing.    Skin: Negative for flushing, rash and unusual hair distribution.   Musculoskeletal: Negative for joint pain and myalgias.        Bilateral heel pain L>R. Right ankle pain   Gastrointestinal: Negative for nausea and vomiting.   Neurological: Negative for loss of balance, numbness, paresthesias and sensory change.   Psychiatric/Behavioral: Negative  for altered mental status.           Objective:      Physical Exam  Constitutional:       General: She is not in acute distress.     Appearance: She is not ill-appearing.   Cardiovascular:      Pulses:           Dorsalis pedis pulses are 2+ on the right side and 2+ on the left side.        Posterior tibial pulses are 2+ on the right side and 2+ on the left side.      Comments: CFT< 3 secs all toes bilateral foot, skin temp warm to cool bilateral foot, no hair growth bilateral lower extremity, no edema to bilateral lower extremities    Musculoskeletal:      Comments: Localized pain on palpation to the surgical incisions medial lateral left heel and along the plantar left heel.  No palpable fluctuance or crepitance left foot.   Skin:     General: Skin is warm.      Capillary Refill: Capillary refill takes less than 2 seconds.      Findings: No ecchymosis or erythema.      Nails: There is no clubbing.      Comments: Incision sites along the medial and lateral left heel well-approximated sutures.  No significant gapping or signs infection.     Neurological:      Mental Status: She is alert and oriented to person, place, and time.      Sensory: Sensation is intact.      Motor: Motor function is intact.      Comments: Light touch intact to bilateral feet.                Assessment:       Encounter Diagnosis   Name Primary?    Postoperative state Yes         Plan:       Angel was seen today for post-op evaluation.    Diagnoses and all orders for this visit:    Postoperative state      I counseled the patient on her conditions, their implications and medical management.    Incision sites inspected.  Applied Mepilex borders incisions.  Home care instructions reviewed in detail.    Patient may continue activity as tolerated using the orthopedic boot on the left foot for standing and walking.  She may remove at rest.  In 1 week begin gradually transitioning of the orthopedic boot into tennis shoe with adequate arch support  with 30 minutes daily increments.    RTC as scheduled for suture removal.    A portion of this note was generated by voice recognition software and may contain spelling and grammar errors.

## 2022-07-07 ENCOUNTER — PATIENT MESSAGE (OUTPATIENT)
Dept: INTERNAL MEDICINE | Facility: CLINIC | Age: 42
End: 2022-07-07
Payer: COMMERCIAL

## 2022-07-07 ENCOUNTER — PATIENT MESSAGE (OUTPATIENT)
Dept: PODIATRY | Facility: CLINIC | Age: 42
End: 2022-07-07

## 2022-07-07 ENCOUNTER — OFFICE VISIT (OUTPATIENT)
Dept: PODIATRY | Facility: CLINIC | Age: 42
End: 2022-07-07
Payer: COMMERCIAL

## 2022-07-07 VITALS
BODY MASS INDEX: 38.32 KG/M2 | DIASTOLIC BLOOD PRESSURE: 95 MMHG | WEIGHT: 230 LBS | HEIGHT: 65 IN | HEART RATE: 81 BPM | SYSTOLIC BLOOD PRESSURE: 134 MMHG

## 2022-07-07 DIAGNOSIS — M79.672 CHRONIC PAIN IN LEFT FOOT: ICD-10-CM

## 2022-07-07 DIAGNOSIS — Z98.890 POSTOPERATIVE STATE: Primary | ICD-10-CM

## 2022-07-07 DIAGNOSIS — G89.29 CHRONIC PAIN IN LEFT FOOT: ICD-10-CM

## 2022-07-07 DIAGNOSIS — M72.2 PLANTAR FASCIITIS OF LEFT FOOT: ICD-10-CM

## 2022-07-07 PROCEDURE — 1159F MED LIST DOCD IN RCRD: CPT | Mod: CPTII,S$GLB,, | Performed by: PODIATRIST

## 2022-07-07 PROCEDURE — 99999 PR PBB SHADOW E&M-EST. PATIENT-LVL IV: CPT | Mod: PBBFAC,,, | Performed by: PODIATRIST

## 2022-07-07 PROCEDURE — 99999 PR PBB SHADOW E&M-EST. PATIENT-LVL IV: ICD-10-PCS | Mod: PBBFAC,,, | Performed by: PODIATRIST

## 2022-07-07 PROCEDURE — 3080F PR MOST RECENT DIASTOLIC BLOOD PRESSURE >= 90 MM HG: ICD-10-PCS | Mod: CPTII,S$GLB,, | Performed by: PODIATRIST

## 2022-07-07 PROCEDURE — 1159F PR MEDICATION LIST DOCUMENTED IN MEDICAL RECORD: ICD-10-PCS | Mod: CPTII,S$GLB,, | Performed by: PODIATRIST

## 2022-07-07 PROCEDURE — 3080F DIAST BP >= 90 MM HG: CPT | Mod: CPTII,S$GLB,, | Performed by: PODIATRIST

## 2022-07-07 PROCEDURE — 1160F PR REVIEW ALL MEDS BY PRESCRIBER/CLIN PHARMACIST DOCUMENTED: ICD-10-PCS | Mod: CPTII,S$GLB,, | Performed by: PODIATRIST

## 2022-07-07 PROCEDURE — 99024 POSTOP FOLLOW-UP VISIT: CPT | Mod: S$GLB,,, | Performed by: PODIATRIST

## 2022-07-07 PROCEDURE — 3075F SYST BP GE 130 - 139MM HG: CPT | Mod: CPTII,S$GLB,, | Performed by: PODIATRIST

## 2022-07-07 PROCEDURE — 99024 PR POST-OP FOLLOW-UP VISIT: ICD-10-PCS | Mod: S$GLB,,, | Performed by: PODIATRIST

## 2022-07-07 PROCEDURE — 3008F BODY MASS INDEX DOCD: CPT | Mod: CPTII,S$GLB,, | Performed by: PODIATRIST

## 2022-07-07 PROCEDURE — 3008F PR BODY MASS INDEX (BMI) DOCUMENTED: ICD-10-PCS | Mod: CPTII,S$GLB,, | Performed by: PODIATRIST

## 2022-07-07 PROCEDURE — 1160F RVW MEDS BY RX/DR IN RCRD: CPT | Mod: CPTII,S$GLB,, | Performed by: PODIATRIST

## 2022-07-07 PROCEDURE — 3075F PR MOST RECENT SYSTOLIC BLOOD PRESS GE 130-139MM HG: ICD-10-PCS | Mod: CPTII,S$GLB,, | Performed by: PODIATRIST

## 2022-07-07 RX ORDER — CARVEDILOL 6.25 MG/1
6.25 TABLET ORAL 2 TIMES DAILY WITH MEALS
Qty: 60 TABLET | Refills: 11 | Status: SHIPPED | OUTPATIENT
Start: 2022-07-07 | End: 2023-03-08

## 2022-07-07 NOTE — PROGRESS NOTES
Subjective:      Patient ID: Agnel Mosquera is a 42 y.o. female.    Chief Complaint: Foot Pain (bilateral )    Patient presents to clinic with chief complaint of bilateral bottom heel pain for approximately 9 months now, left worse than right. She does not recall any prior injury to both feet other than jumping in a shallow pool this past summer. Pain is worse with the first couple of steps in the AM and worsens with prolonged standing/walking. She was seen by Dr. Swan 5 months ago who performed a steroid injection in her left heel which she states did not help with her pain. She has tried PT, stretching, night splints, and changing shoegear (HOKA, new balance, asics) with no improvement. Her right ankle recently started hurting, states it feels like her range of motion is limited, thinks it may be due to her compensating to stay off the left foot. 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     04/18/2022:  Presents complaining of chronic pain to the left heel for greater than a year.  She has a history of hysterectomy in December 2021 that required 8 weeks for recovery and modified duties.  She works as a nurse at [employer].  States she also has pain in the right heel to lesser extent.  Pending a trip to [place] later this week.  Requesting a steroid injection to left heel but also requesting consideration for surgical intervention to left heel next month.    06/24/2022:  Post endoscopic plantar fasciotomy left foot on 06/17/2022.  Reports minimal discomfort to left foot.  She has been ambulating with the orthopedic boot.  Taking ibuprofen as needed for pain.    07/07/2022:  Approximately 3 weeks postop.  She has been ambulating without her orthopedic boot but still uses for long distances.  Reports some numbness along the bottom of the foot when she touches it.  She reports pain over the medial surgical incision and no pain anywhere else.  She has no pain  "directly on the bottom of the foot when she stands.    Vitals:    07/07/22 1038   BP: (!) 134/95   Pulse: 81   Weight: 104.3 kg (230 lb)   Height: 5' 5" (1.651 m)   PainSc:   2   PainLoc: Foot      Past Medical History:   Diagnosis Date    Hypertension     PONV (postoperative nausea and vomiting)     Rosacea        Past Surgical History:   Procedure Laterality Date    AUGMENTATION OF BREAST Bilateral 2005    Breast Augmentation  2005    COSMETIC SURGERY  2005    Breast augmentation    DILATION AND CURETTAGE OF UTERUS N/A 10/19/2019    Procedure: DILATION AND CURETTAGE, UTERUS;  Surgeon: Frances Culp MD;  Location: [facility] OR;  Service: OB/GYN;  Laterality: N/A;  with suction    ENDOSCOPIC PLANTAR FASCIOTOMY Left 6/17/2022    Procedure: FASCIOTOMY, PLANTAR, ENDOSCOPIC;  Surgeon: Mello Montez DPM;  Location: [facility] OR;  Service: Podiatry;  Laterality: Left;  endoscopic plantar fasciotomy kit    mischarr      ROBOT-ASSISTED LAPAROSCOPIC UTERINE MYOMECTOMY N/A 1/7/2020    Procedure: ROBOTIC MYOMECTOMY, UTERUS;  Surgeon: Frances Culp MD;  Location: [facility] OR;  Service: OB/GYN;  Laterality: N/A;    XI ROBOTIC HYSTERECTOMY, WITH SALPINGO-OOPHORECTOMY N/A 12/15/2021    Procedure: XI ROBOTIC HYSTERECTOMY,WITH SALPINGO-OOPHORECTOMY;  Surgeon: Carla Aguero MD;  Location: [facility] OR;  Service: OB/GYN;  Laterality: N/A;       Family History   Problem Relation Age of Onset    Diabetes Mother     Hypertension Mother     Heart failure Mother         viral?    Heart disease Mother     Hyperlipidemia Mother     Cancer Paternal Grandfather         Pancreatic Cancer    Cancer Paternal Grandmother         Lung Cancer    Arthritis Maternal Grandmother     Diabetes Maternal Grandmother     Alcohol abuse Maternal Grandfather     COPD Maternal Grandfather     Alcohol abuse Father     Alcohol abuse Maternal Uncle     Cancer Neg Hx     Ovarian cancer Neg Hx     Melanoma Neg Hx     Heart attack " Neg Hx     Stroke Neg Hx     Colon cancer Neg Hx     Breast cancer Neg Hx     Blindness Neg Hx     Amblyopia Neg Hx     Cataracts Neg Hx     Glaucoma Neg Hx     Macular degeneration Neg Hx     Retinal detachment Neg Hx     Strabismus Neg Hx        Social History     Socioeconomic History    Marital status:    Occupational History    Occupation: Nurse     Employer: [employer]     Comment: Oncology   Tobacco Use    Smoking status: Never Smoker    Smokeless tobacco: Never Used   Substance and Sexual Activity    Alcohol use: No    Drug use: No    Sexual activity: Yes     Partners: Male     Birth control/protection: None     Comment: Vasectomy   Other Topics Concern    Are you pregnant or think you may be? No    Breast-feeding No     Social Determinants of Health     Financial Resource Strain: Low Risk     Difficulty of Paying Living Expenses: Not hard at all   Food Insecurity: No Food Insecurity    Worried About Running Out of Food in the Last Year: Never true    Ran Out of Food in the Last Year: Never true   Transportation Needs: No Transportation Needs    Lack of Transportation (Medical): No    Lack of Transportation (Non-Medical): No   Physical Activity: Unknown    Days of Exercise per Week: Patient refused    Minutes of Exercise per Session: 0 min   Stress: Stress Concern Present    Feeling of Stress : To some extent   Social Connections: Unknown    Frequency of Communication with Friends and Family: More than three times a week    Frequency of Social Gatherings with Friends and Family: More than three times a week    Active Member of Clubs or Organizations: No    Attends Club or Organization Meetings: Never    Marital Status:    Housing Stability: Low Risk     Unable to Pay for Housing in the Last Year: No    Number of Places Lived in the Last Year: 1    Unstable Housing in the Last Year: No       Current Outpatient Medications   Medication Sig  Dispense Refill    azelaic acid (FINACEA) 15 % gel Apply to affected area(s) of face nightly, then moisturize (Patient taking differently: Apply to affected area(s) of face nightly, then moisturize) 50 g 3    carvediloL (COREG) 3.125 MG tablet Take 1 tablet (3.125 mg total) by mouth 2 (two) times daily with meals. 60 tablet 11    EScitalopram oxalate (LEXAPRO) 20 MG tablet Take 1 tablet (20 mg total) by mouth once daily. 90 tablet 3    hydrOXYzine HCL (ATARAX) 25 MG tablet Take 1 tablet (25 mg total) by mouth nightly as needed (Insomnia). 30 tablet 3    ibuprofen (ADVIL,MOTRIN) 800 MG tablet Take 1 tablet (800 mg total) by mouth every 6 (six) hours as needed for Pain. 30 tablet 1    multivitamin (THERAGRAN) per tablet Take 1 tablet by mouth once daily.      ondansetron (ZOFRAN-ODT) 4 MG TbDL Take 1 tablet (4 mg total) by mouth every 8 (eight) hours as needed (nausea). 21 tablet 0    ondansetron (ZOFRAN-ODT) 4 MG TbDL Take 1 tablet (4 mg total) by mouth every 8 (eight) hours as needed. 14 tablet 1    promethazine-dextromethorphan (PROMETHAZINE-DM) 6.25-15 mg/5 mL Syrp Take 5 mLs by mouth nightly as needed (cough). 100 mL 1    semaglutide (OZEMPIC) 0.25 mg or 0.5 mg(2 mg/1.5 mL) pen injector Inject 0.5 mg into the skin every 7 days. 2 pen 11    traMADoL (ULTRAM) 50 mg tablet Take 1 tablet (50 mg total) by mouth every 6 (six) hours as needed for Pain. 10 tablet 0     Current Facility-Administered Medications   Medication Dose Route Frequency Provider Last Rate Last Admin    sodium chloride 0.9% flush 10 mL  10 mL Intravenous PRN Mello Montez DPM           Review of patient's allergies indicates:  No Known Allergies      Review of Systems   Constitutional: Negative for chills, fever and malaise/fatigue.   HENT: Negative for hearing loss.    Cardiovascular: Negative for claudication and leg swelling.   Respiratory: Negative for cough, shortness of breath and wheezing.    Skin: Negative for flushing,  rash and unusual hair distribution.   Musculoskeletal: Negative for joint pain and myalgias.        Bilateral heel pain L>R. Right ankle pain   Gastrointestinal: Negative for nausea and vomiting.   Neurological: Negative for loss of balance, numbness, paresthesias and sensory change.   Psychiatric/Behavioral: Negative for altered mental status.           Objective:      Physical Exam  Constitutional:       General: She is not in acute distress.     Appearance: She is not ill-appearing.   Cardiovascular:      Pulses:           Dorsalis pedis pulses are 2+ on the right side and 2+ on the left side.        Posterior tibial pulses are 2+ on the right side and 2+ on the left side.      Comments: CFT< 3 secs all toes bilateral foot, skin temp warm to cool bilateral foot, no hair growth bilateral lower extremity, no edema to bilateral lower extremities    Musculoskeletal:      Comments: Pain on palpation overlying the medial left heel surgical incision that is localized and does not radiate.  There is slight pain on palpation overlying the tarsal tunnel however negative Tinel sign.  No pain with range of motion or manual muscle strength testing left foot ankle.  There is no pain on palpation to the plantar left heel overlying the calcaneal tuber at the plantar fascia origin.  No pain on palpation overlying the lateral incision site left heel.   Skin:     General: Skin is warm.      Capillary Refill: Capillary refill takes less than 2 seconds.      Findings: No ecchymosis or erythema.      Nails: There is no clubbing.      Comments: Healed incisions to the left heel with intact sutures.   Neurological:      Mental Status: She is alert and oriented to person, place, and time.      Sensory: Sensation is intact.      Motor: Motor function is intact.      Comments: Light touch intact to bilateral feet.                Assessment:       Encounter Diagnoses   Name Primary?    Postoperative state Yes    Plantar fasciitis of left  foot     Chronic pain in left foot          Plan:       Angel was seen today for post-op evaluation.    Diagnoses and all orders for this visit:    Postoperative state  -     Ambulatory referral/consult to Physical/Occupational Therapy; Future    Plantar fasciitis of left foot  -     Ambulatory referral/consult to Physical/Occupational Therapy; Future    Chronic pain in left foot  -     Ambulatory referral/consult to Physical/Occupational Therapy; Future      I counseled the patient on her conditions, their implications and medical management.    Sutures removed.  No dehiscence noted.    Referred to physical therapy for desensitization of the surgical site and gradual strengthening of the left lower extremity.    Slowly progress activity as tolerated.    RTC within 3 weeks or p.r.n. as discussed.  Patient is scheduled to return to work by 07/29/2022.  Disability paperwork completed today.  Do not recommend going back to work with restrictions at this time.    A portion of this note was generated by voice recognition software and may contain spelling and grammar errors.

## 2022-07-12 ENCOUNTER — PATIENT MESSAGE (OUTPATIENT)
Dept: PODIATRY | Facility: CLINIC | Age: 42
End: 2022-07-12
Payer: COMMERCIAL

## 2022-07-14 ENCOUNTER — PATIENT MESSAGE (OUTPATIENT)
Dept: PODIATRY | Facility: CLINIC | Age: 42
End: 2022-07-14
Payer: COMMERCIAL

## 2022-07-14 ENCOUNTER — TELEPHONE (OUTPATIENT)
Dept: PODIATRY | Facility: CLINIC | Age: 42
End: 2022-07-14
Payer: COMMERCIAL

## 2022-07-14 RX ORDER — GABAPENTIN 300 MG/1
300 CAPSULE ORAL 3 TIMES DAILY
Qty: 90 CAPSULE | Refills: 11 | Status: SHIPPED | OUTPATIENT
Start: 2022-07-14 | End: 2022-09-27

## 2022-07-14 RX ORDER — IBUPROFEN 800 MG/1
800 TABLET ORAL EVERY 6 HOURS PRN
Qty: 60 TABLET | Refills: 1 | Status: ON HOLD | OUTPATIENT
Start: 2022-07-14 | End: 2023-01-05 | Stop reason: SDUPTHER

## 2022-07-14 NOTE — TELEPHONE ENCOUNTER
Pt's FMLA forms were scanned into chart under media manager and faxed to [employer] [phone]. Confirmation was received.

## 2022-07-19 PROBLEM — Z74.09 IMPAIRED FUNCTIONAL MOBILITY, BALANCE, GAIT, AND ENDURANCE: Status: ACTIVE | Noted: 2022-07-19

## 2022-07-29 ENCOUNTER — OFFICE VISIT (OUTPATIENT)
Dept: PODIATRY | Facility: CLINIC | Age: 42
End: 2022-07-29
Payer: COMMERCIAL

## 2022-07-29 VITALS
BODY MASS INDEX: 38.27 KG/M2 | SYSTOLIC BLOOD PRESSURE: 125 MMHG | HEART RATE: 79 BPM | DIASTOLIC BLOOD PRESSURE: 87 MMHG | HEIGHT: 65 IN

## 2022-07-29 DIAGNOSIS — G89.29 CHRONIC PAIN IN LEFT FOOT: Primary | ICD-10-CM

## 2022-07-29 DIAGNOSIS — M79.672 CHRONIC PAIN IN LEFT FOOT: Primary | ICD-10-CM

## 2022-07-29 DIAGNOSIS — Z98.890 POSTOPERATIVE STATE: ICD-10-CM

## 2022-07-29 PROCEDURE — 99024 PR POST-OP FOLLOW-UP VISIT: ICD-10-PCS | Mod: S$GLB,,, | Performed by: PODIATRIST

## 2022-07-29 PROCEDURE — 99999 PR PBB SHADOW E&M-EST. PATIENT-LVL III: CPT | Mod: PBBFAC,,, | Performed by: PODIATRIST

## 2022-07-29 PROCEDURE — 99999 PR PBB SHADOW E&M-EST. PATIENT-LVL III: ICD-10-PCS | Mod: PBBFAC,,, | Performed by: PODIATRIST

## 2022-07-29 PROCEDURE — 99024 POSTOP FOLLOW-UP VISIT: CPT | Mod: S$GLB,,, | Performed by: PODIATRIST

## 2022-07-29 PROCEDURE — 99213 OFFICE O/P EST LOW 20 MIN: CPT | Mod: PBBFAC,PN | Performed by: PODIATRIST

## 2022-07-29 NOTE — PROGRESS NOTES
Subjective:      Patient ID: Angel Mosquera is a 42 y.o. female.    Chief Complaint: Foot Pain (bilateral )    Patient presents to clinic with chief complaint of bilateral bottom heel pain for approximately 9 months now, left worse than right. She does not recall any prior injury to both feet other than jumping in a shallow pool this past summer. Pain is worse with the first couple of steps in the AM and worsens with prolonged standing/walking. She was seen by Dr. Swan 5 months ago who performed a steroid injection in her left heel which she states did not help with her pain. She has tried PT, stretching, night splints, and changing shoegear (HOKA, new balance, asics) with no improvement. Her right ankle recently started hurting, states it feels like her range of motion is limited, thinks it may be due to her compensating to stay off the left foot. 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     04/18/2022:  Presents complaining of chronic pain to the left heel for greater than a year.  She has a history of hysterectomy in December 2021 that required 8 weeks for recovery and modified duties.  She works as a nurse at [employer].  States she also has pain in the right heel to lesser extent.  Pending a trip to [place] later this week.  Requesting a steroid injection to left heel but also requesting consideration for surgical intervention to left heel next month.    06/24/2022:  Post endoscopic plantar fasciotomy left foot on 06/17/2022.  Reports minimal discomfort to left foot.  She has been ambulating with the orthopedic boot.  Taking ibuprofen as needed for pain.    07/07/2022:  Approximately 3 weeks postop.  She has been ambulating without her orthopedic boot but still uses for long distances.  Reports some numbness along the bottom of the foot when she touches it.  She reports pain over the medial surgical incision and no pain anywhere else.  She has no pain  "directly on the bottom of the foot when she stands.    07/29/2022: Approximately 6 weeks postop. She has been ambulating in standard footwear, she has been active with home chores and watching children. 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 She has had improved functional level with similar amounts of pain, she also has some residual numbness of the foot mostly pointing along the plantar lateral midfoot region. She has not filled the gabapentin prescription because she is not want to add to her medications.       Vitals:    07/29/22 1515   BP: 125/87   Pulse: 79   Height: 5' 5" (1.651 m)   PainSc:   4   PainLoc: Foot      Past Medical History:   Diagnosis Date    Hypertension     PONV (postoperative nausea and vomiting)     Rosacea        Past Surgical History:   Procedure Laterality Date    AUGMENTATION OF BREAST Bilateral 2005    Breast Augmentation  2005    COSMETIC SURGERY  2005    Breast augmentation    DILATION AND CURETTAGE OF UTERUS N/A 10/19/2019    Procedure: DILATION AND CURETTAGE, UTERUS;  Surgeon: Frances Culp MD;  Location: [facility];  Service: OB/GYN;  Laterality: N/A;  with suction    ENDOSCOPIC PLANTAR FASCIOTOMY Left 6/17/2022    Procedure: FASCIOTOMY, PLANTAR, ENDOSCOPIC;  Surgeon: Mello Montez DPM;  Location: [facility] OR;  Service: Podiatry;  Laterality: Left;  endoscopic plantar fasciotomy kit    mischarr      ROBOT-ASSISTED LAPAROSCOPIC UTERINE MYOMECTOMY N/A 1/7/2020    Procedure: ROBOTIC MYOMECTOMY, UTERUS;  Surgeon: Frances Culp MD;  Location: [facility];  Service: OB/GYN;  Laterality: N/A;    XI ROBOTIC HYSTERECTOMY, WITH SALPINGO-OOPHORECTOMY N/A 12/15/2021    Procedure: XI ROBOTIC HYSTERECTOMY,WITH SALPINGO-OOPHORECTOMY;  Surgeon: Carla Aguero MD;  Location: [facility];  Service: OB/GYN;  Laterality: N/A;       Family History   Problem Relation Age of Onset    Diabetes Mother     Hypertension Mother     Heart failure Mother  "        viral?    Heart disease Mother     Hyperlipidemia Mother     Cancer Paternal Grandfather         Pancreatic Cancer    Cancer Paternal Grandmother         Lung Cancer    Arthritis Maternal Grandmother     Diabetes Maternal Grandmother     Alcohol abuse Maternal Grandfather     COPD Maternal Grandfather     Alcohol abuse Father     Alcohol abuse Maternal Uncle     Cancer Neg Hx     Ovarian cancer Neg Hx     Melanoma Neg Hx     Heart attack Neg Hx     Stroke Neg Hx     Colon cancer Neg Hx     Breast cancer Neg Hx     Blindness Neg Hx     Amblyopia Neg Hx     Cataracts Neg Hx     Glaucoma Neg Hx     Macular degeneration Neg Hx     Retinal detachment Neg Hx     Strabismus Neg Hx        Social History     Socioeconomic History    Marital status:    Occupational History    Occupation: Nurse     Employer: [employer]     Comment: Oncology   Tobacco Use    Smoking status: Never Smoker    Smokeless tobacco: Never Used   Substance and Sexual Activity    Alcohol use: No    Drug use: No    Sexual activity: Yes     Partners: Male     Birth control/protection: None     Comment: Vasectomy   Other Topics Concern    Are you pregnant or think you may be? No    Breast-feeding No     Social Determinants of Health     Financial Resource Strain: Low Risk     Difficulty of Paying Living Expenses: Not hard at all   Food Insecurity: No Food Insecurity    Worried About Running Out of Food in the Last Year: Never true    Ran Out of Food in the Last Year: Never true   Transportation Needs: No Transportation Needs    Lack of Transportation (Medical): No    Lack of Transportation (Non-Medical): No   Physical Activity: Unknown    Days of Exercise per Week: Patient refused    Minutes of Exercise per Session: 0 min   Stress: Stress Concern Present    Feeling of Stress : To some extent   Social Connections: Unknown    Frequency of Communication with Friends and Family: More than  three times a week    Frequency of Social Gatherings with Friends and Family: More than three times a week    Active Member of Clubs or Organizations: No    Attends Club or Organization Meetings: Never    Marital Status:    Housing Stability: Low Risk     Unable to Pay for Housing in the Last Year: No    Number of Places Lived in the Last Year: 1    Unstable Housing in the Last Year: No       Current Outpatient Medications   Medication Sig Dispense Refill    azelaic acid (FINACEA) 15 % gel Apply to affected area(s) of face nightly, then moisturize (Patient taking differently: Apply to affected area(s) of face nightly, then moisturize) 50 g 3    carvediloL (COREG) 6.25 MG tablet Take 1 tablet (6.25 mg total) by mouth 2 (two) times daily with meals. 60 tablet 11    gabapentin (NEURONTIN) 300 MG capsule Take 1 capsule (300 mg total) by mouth 3 (three) times daily. 90 capsule 11    hydrOXYzine HCL (ATARAX) 25 MG tablet Take 1 tablet (25 mg total) by mouth nightly as needed (Insomnia). 30 tablet 3    ibuprofen (ADVIL,MOTRIN) 800 MG tablet Take 1 tablet (800 mg total) by mouth every 6 (six) hours as needed for Pain. 60 tablet 1    multivitamin (THERAGRAN) per tablet Take 1 tablet by mouth once daily.      promethazine-dextromethorphan (PROMETHAZINE-DM) 6.25-15 mg/5 mL Syrp Take 5 mLs by mouth nightly as needed (cough). 100 mL 1    semaglutide (OZEMPIC) 0.25 mg or 0.5 mg(2 mg/1.5 mL) pen injector Inject 0.5 mg into the skin every 7 days. 2 pen 11     Current Facility-Administered Medications   Medication Dose Route Frequency Provider Last Rate Last Admin    sodium chloride 0.9% flush 10 mL  10 mL Intravenous PRN Mello Montez DPM           Review of patient's allergies indicates:  No Known Allergies      Review of Systems   Constitutional: Negative for chills, fever and malaise/fatigue.   HENT: Negative for hearing loss.    Cardiovascular: Negative for claudication and leg swelling.    Respiratory: Negative for cough, shortness of breath and wheezing.    Skin: Negative for flushing, rash and unusual hair distribution.   Musculoskeletal: Negative for joint pain and myalgias.        Bilateral heel pain L>R. Right ankle pain   Gastrointestinal: Negative for nausea and vomiting.   Neurological: Negative for loss of balance, numbness, paresthesias and sensory change.   Psychiatric/Behavioral: Negative for altered mental status.           Objective:      Physical Exam  Constitutional:       General: She is not in acute distress.     Appearance: She is not ill-appearing.   Cardiovascular:      Pulses:           Dorsalis pedis pulses are 2+ on the right side and 2+ on the left side.        Posterior tibial pulses are 2+ on the right side and 2+ on the left side.      Comments: CFT< 3 secs all toes bilateral foot, skin temp warm to cool bilateral foot, no hair growth bilateral lower extremity, no edema to bilateral lower extremities    Musculoskeletal:      Comments: Residual mild pain on palpation overlying hypertrophic scar at the medial aspect of the heel slightly anterior to the abductor muscle belly that is localized and does not radiate.  Negative Tinel sign overlying the abductor muscle belly and tarsal tunnel region.  No pain with range of motion or manual muscle strength testing left foot ankle.  No significant edema to the left foot.   Skin:     General: Skin is warm.      Capillary Refill: Capillary refill takes less than 2 seconds.      Findings: No ecchymosis or erythema.      Nails: There is no clubbing.      Comments: Normal appearing scar along the lateral left heel with mild hypertrophic scar along the medial right heel.   Neurological:      Mental Status: She is alert and oriented to person, place, and time.      Sensory: Sensation is intact.      Motor: Motor function is intact.      Comments: Light touch intact to bilateral feet.                Assessment:       Encounter Diagnoses    Name Primary?    Chronic pain in left foot Yes    Postoperative state          Plan:       Angel was seen today for post-op evaluation.    Diagnoses and all orders for this visit:    Chronic pain in left foot    Postoperative state      I counseled the patient on her conditions, their implications and medical management.    From a clinical standpoint the patient has significantly improved since the last exam however she does have subjective concerns over residual numbness the bottom the left foot which appears to be stable.  She does not display symptoms of neuritis along the medial heel and physical therapy with ASTYM technique appears to also be helping.  Continue activity as tolerated.    Read discussed that the numbness to the left foot hopefully will continue to improve over the next 6 months.    Continue wearing supportive shoe gear as discussed.    Patient set to return to work on 07/31/2022.    RTC within 2 months or p.r.n. as discussed.    A portion of this note was generated by voice recognition software and may contain spelling and grammar errors.

## 2022-08-03 ENCOUNTER — PATIENT MESSAGE (OUTPATIENT)
Dept: INTERNAL MEDICINE | Facility: CLINIC | Age: 42
End: 2022-08-03
Payer: COMMERCIAL

## 2022-08-03 ENCOUNTER — PATIENT MESSAGE (OUTPATIENT)
Dept: BARIATRICS | Facility: CLINIC | Age: 42
End: 2022-08-03
Payer: COMMERCIAL

## 2022-08-05 ENCOUNTER — OFFICE VISIT (OUTPATIENT)
Dept: INTERNAL MEDICINE | Facility: CLINIC | Age: 42
End: 2022-08-05
Payer: COMMERCIAL

## 2022-08-05 ENCOUNTER — LAB VISIT (OUTPATIENT)
Dept: LAB | Facility: HOSPITAL | Age: 42
End: 2022-08-05
Attending: NURSE PRACTITIONER
Payer: COMMERCIAL

## 2022-08-05 VITALS
OXYGEN SATURATION: 99 % | SYSTOLIC BLOOD PRESSURE: 128 MMHG | TEMPERATURE: 98 F | BODY MASS INDEX: 38.04 KG/M2 | HEART RATE: 93 BPM | WEIGHT: 228.63 LBS | DIASTOLIC BLOOD PRESSURE: 74 MMHG

## 2022-08-05 DIAGNOSIS — M25.521 PAIN OF BOTH ELBOWS: Primary | ICD-10-CM

## 2022-08-05 DIAGNOSIS — Z84.0 FAMILY HISTORY OF PSORIATIC ARTHRITIS: ICD-10-CM

## 2022-08-05 DIAGNOSIS — Z86.16 HISTORY OF COVID-19: ICD-10-CM

## 2022-08-05 DIAGNOSIS — M25.521 PAIN OF BOTH ELBOWS: ICD-10-CM

## 2022-08-05 DIAGNOSIS — M25.522 PAIN OF BOTH ELBOWS: Primary | ICD-10-CM

## 2022-08-05 DIAGNOSIS — M25.522 PAIN OF BOTH ELBOWS: ICD-10-CM

## 2022-08-05 LAB
ALBUMIN SERPL BCP-MCNC: 4.1 G/DL (ref 3.5–5.2)
ALP SERPL-CCNC: 104 U/L (ref 55–135)
ALT SERPL W/O P-5'-P-CCNC: 24 U/L (ref 10–44)
ANION GAP SERPL CALC-SCNC: 8 MMOL/L (ref 8–16)
AST SERPL-CCNC: 19 U/L (ref 10–40)
BASOPHILS # BLD AUTO: 0.05 K/UL (ref 0–0.2)
BASOPHILS NFR BLD: 0.7 % (ref 0–1.9)
BILIRUB SERPL-MCNC: 0.4 MG/DL (ref 0.1–1)
BUN SERPL-MCNC: 10 MG/DL (ref 6–20)
CALCIUM SERPL-MCNC: 9.9 MG/DL (ref 8.7–10.5)
CHLORIDE SERPL-SCNC: 103 MMOL/L (ref 95–110)
CO2 SERPL-SCNC: 28 MMOL/L (ref 23–29)
CREAT SERPL-MCNC: 0.8 MG/DL (ref 0.5–1.4)
CRP SERPL-MCNC: 5.2 MG/L (ref 0–8.2)
DIFFERENTIAL METHOD: ABNORMAL
EOSINOPHIL # BLD AUTO: 0.1 K/UL (ref 0–0.5)
EOSINOPHIL NFR BLD: 1.4 % (ref 0–8)
ERYTHROCYTE [DISTWIDTH] IN BLOOD BY AUTOMATED COUNT: 14.4 % (ref 11.5–14.5)
ERYTHROCYTE [SEDIMENTATION RATE] IN BLOOD BY PHOTOMETRIC METHOD: 27 MM/HR (ref 0–36)
EST. GFR  (NO RACE VARIABLE): >60 ML/MIN/1.73 M^2
GLUCOSE SERPL-MCNC: 136 MG/DL (ref 70–110)
HCT VFR BLD AUTO: 40.8 % (ref 37–48.5)
HGB BLD-MCNC: 12.3 G/DL (ref 12–16)
IMM GRANULOCYTES # BLD AUTO: 0.01 K/UL (ref 0–0.04)
IMM GRANULOCYTES NFR BLD AUTO: 0.1 % (ref 0–0.5)
LYMPHOCYTES # BLD AUTO: 2.8 K/UL (ref 1–4.8)
LYMPHOCYTES NFR BLD: 36.6 % (ref 18–48)
MCH RBC QN AUTO: 25.7 PG (ref 27–31)
MCHC RBC AUTO-ENTMCNC: 30.1 G/DL (ref 32–36)
MCV RBC AUTO: 85 FL (ref 82–98)
MONOCYTES # BLD AUTO: 0.4 K/UL (ref 0.3–1)
MONOCYTES NFR BLD: 4.9 % (ref 4–15)
NEUTROPHILS # BLD AUTO: 4.3 K/UL (ref 1.8–7.7)
NEUTROPHILS NFR BLD: 56.3 % (ref 38–73)
NRBC BLD-RTO: 0 /100 WBC
PLATELET # BLD AUTO: 395 K/UL (ref 150–450)
PMV BLD AUTO: 10.3 FL (ref 9.2–12.9)
POTASSIUM SERPL-SCNC: 3.6 MMOL/L (ref 3.5–5.1)
PROT SERPL-MCNC: 8.2 G/DL (ref 6–8.4)
RBC # BLD AUTO: 4.79 M/UL (ref 4–5.4)
RHEUMATOID FACT SERPL-ACNC: <13 IU/ML (ref 0–15)
SODIUM SERPL-SCNC: 139 MMOL/L (ref 136–145)
WBC # BLD AUTO: 7.6 K/UL (ref 3.9–12.7)

## 2022-08-05 PROCEDURE — 99999 PR PBB SHADOW E&M-EST. PATIENT-LVL III: ICD-10-PCS | Mod: PBBFAC,,, | Performed by: NURSE PRACTITIONER

## 2022-08-05 PROCEDURE — 80053 COMPREHEN METABOLIC PANEL: CPT | Performed by: NURSE PRACTITIONER

## 2022-08-05 PROCEDURE — 85025 COMPLETE CBC W/AUTO DIFF WBC: CPT | Performed by: NURSE PRACTITIONER

## 2022-08-05 PROCEDURE — 86140 C-REACTIVE PROTEIN: CPT | Performed by: NURSE PRACTITIONER

## 2022-08-05 PROCEDURE — 85652 RBC SED RATE AUTOMATED: CPT | Performed by: NURSE PRACTITIONER

## 2022-08-05 PROCEDURE — 99999 PR PBB SHADOW E&M-EST. PATIENT-LVL III: CPT | Mod: PBBFAC,,, | Performed by: NURSE PRACTITIONER

## 2022-08-05 PROCEDURE — 99213 OFFICE O/P EST LOW 20 MIN: CPT | Mod: PBBFAC | Performed by: NURSE PRACTITIONER

## 2022-08-05 PROCEDURE — 99214 OFFICE O/P EST MOD 30 MIN: CPT | Mod: S$GLB,,, | Performed by: NURSE PRACTITIONER

## 2022-08-05 PROCEDURE — 36415 COLL VENOUS BLD VENIPUNCTURE: CPT | Performed by: NURSE PRACTITIONER

## 2022-08-05 PROCEDURE — 86431 RHEUMATOID FACTOR QUANT: CPT | Performed by: NURSE PRACTITIONER

## 2022-08-05 PROCEDURE — 99214 PR OFFICE/OUTPT VISIT, EST, LEVL IV, 30-39 MIN: ICD-10-PCS | Mod: S$GLB,,, | Performed by: NURSE PRACTITIONER

## 2022-08-05 RX ORDER — METHYLPREDNISOLONE 4 MG/1
TABLET ORAL
Qty: 21 EACH | Refills: 0 | Status: SHIPPED | OUTPATIENT
Start: 2022-08-05 | End: 2022-08-26

## 2022-08-05 NOTE — PROGRESS NOTES
[facility] Note    Chief Complaint      Chief Complaint   Patient presents with    Elbow Pain     Both elbows x 1 month    Blood Pressure Check     History of Present Illness      Angel Mosquera is a 42 y.o. female patient of Dr. Heard who is new to me and presents today for bilateral elbow pain over the past month. Reports pain constant, worse when she bends her arms, and worse in the am. She reports that she is unable to  heavy things now. No redness,warmth, swelling, numbness or tingling. She had a grandparent with arthritis. No radiating pain, sob or cp.   Did have covid in May.    Health Maintenance   Topic Date Due    Mammogram  06/08/2023    TETANUS VACCINE  06/27/2029    Hepatitis C Screening  Completed    Lipid Panel  Completed       Past Medical History:   Diagnosis Date    Hypertension     PONV (postoperative nausea and vomiting)     Rosacea        Past Surgical History:   Procedure Laterality Date    AUGMENTATION OF BREAST Bilateral 2005    Breast Augmentation  2005    COSMETIC SURGERY  2005    Breast augmentation    DILATION AND CURETTAGE OF UTERUS N/A 10/19/2019    Procedure: DILATION AND CURETTAGE, UTERUS;  Surgeon: Frances Culp MD;  Location: [facility];  Service: OB/GYN;  Laterality: N/A;  with suction    ENDOSCOPIC PLANTAR FASCIOTOMY Left 6/17/2022    Procedure: FASCIOTOMY, PLANTAR, ENDOSCOPIC;  Surgeon: Mello Montez DPM;  Location: [facility];  Service: Podiatry;  Laterality: Left;  endoscopic plantar fasciotomy kit    mischarr      ROBOT-ASSISTED LAPAROSCOPIC UTERINE MYOMECTOMY N/A 1/7/2020    Procedure: ROBOTIC MYOMECTOMY, UTERUS;  Surgeon: Frances Culp MD;  Location: [facility];  Service: OB/GYN;  Laterality: N/A;    XI ROBOTIC HYSTERECTOMY, WITH SALPINGO-OOPHORECTOMY N/A 12/15/2021    Procedure: XI ROBOTIC HYSTERECTOMY,WITH SALPINGO-OOPHORECTOMY;  Surgeon: Carla Aguero MD;  Location: [facility];  Service: OB/GYN;  Laterality:  N/A;       family history includes Alcohol abuse in her father, maternal grandfather, and maternal uncle; Arthritis in her maternal grandmother; COPD in her maternal grandfather; Cancer in her paternal grandfather and paternal grandmother; Diabetes in her maternal grandmother and mother; Heart disease in her mother; Heart failure in her mother; Hyperlipidemia in her mother; Hypertension in her mother.    Social History     Tobacco Use    Smoking status: Never Smoker    Smokeless tobacco: Never Used   Substance Use Topics    Alcohol use: No    Drug use: No       Review of Systems   Constitutional: Negative for chills, fever and malaise/fatigue.   HENT: Negative for congestion.    Eyes: Negative for blurred vision.   Respiratory: Negative for shortness of breath.    Cardiovascular: Negative for chest pain and palpitations.   Gastrointestinal: Negative for nausea.   Genitourinary: Negative for dysuria.   Musculoskeletal: Positive for joint pain. Negative for back pain, myalgias and neck pain.   Neurological: Negative for dizziness, tingling, weakness and headaches.        Outpatient Encounter Medications as of 8/5/2022   Medication Sig Dispense Refill    azelaic acid (FINACEA) 15 % gel Apply to affected area(s) of face nightly, then moisturize (Patient taking differently: Apply to affected area(s) of face nightly, then moisturize) 50 g 3    carvediloL (COREG) 6.25 MG tablet Take 1 tablet (6.25 mg total) by mouth 2 (two) times daily with meals. 60 tablet 11    hydrOXYzine HCL (ATARAX) 25 MG tablet Take 1 tablet (25 mg total) by mouth nightly as needed (Insomnia). 30 tablet 3    ibuprofen (ADVIL,MOTRIN) 800 MG tablet Take 1 tablet (800 mg total) by mouth every 6 (six) hours as needed for Pain. 60 tablet 1    multivitamin (THERAGRAN) per tablet Take 1 tablet by mouth once daily.      semaglutide (OZEMPIC) 0.25 mg or 0.5 mg(2 mg/1.5 mL) pen injector Inject 0.5 mg into the skin every 7 days. 2 pen 11     gabapentin (NEURONTIN) 300 MG capsule Take 1 capsule (300 mg total) by mouth 3 (three) times daily. (Patient not taking: Reported on 8/5/2022) 90 capsule 11    methylPREDNISolone (MEDROL DOSEPACK) 4 mg tablet use as directed 21 each 0    [DISCONTINUED] promethazine-dextromethorphan (PROMETHAZINE-DM) 6.25-15 mg/5 mL Syrp Take 5 mLs by mouth nightly as needed (cough). (Patient not taking: Reported on 8/5/2022) 100 mL 1     Facility-Administered Encounter Medications as of 8/5/2022   Medication Dose Route Frequency Provider Last Rate Last Admin    sodium chloride 0.9% flush 10 mL  10 mL Intravenous PRN Mello Montez DPM            Review of patient's allergies indicates:  No Known Allergies    Physical Exam      Vital Signs  Temp: 98 °F (36.7 °C)  Pulse: 93  SpO2: 99 %  BP: 128/74  Height and Weight  Weight: 103.7 kg (228 lb 9.9 oz)    Physical Exam  Vitals and nursing note reviewed.   Constitutional:       General: She is not in acute distress.     Appearance: Normal appearance. She is not ill-appearing.   HENT:      Head: Normocephalic and atraumatic.   Eyes:      Pupils: Pupils are equal, round, and reactive to light.   Cardiovascular:      Rate and Rhythm: Normal rate and regular rhythm.      Heart sounds: Normal heart sounds.   Pulmonary:      Effort: Pulmonary effort is normal. No respiratory distress.      Breath sounds: Normal breath sounds.   Musculoskeletal:         General: Tenderness present. No swelling, deformity or signs of injury.      Cervical back: Normal range of motion and neck supple. No tenderness.      Comments: Tenderness to bilateral elbows, no redness, swelling or warmth noted   Skin:     General: Skin is warm and dry.   Neurological:      Mental Status: She is alert and oriented to person, place, and time.   Psychiatric:         Mood and Affect: Mood normal.         Behavior: Behavior normal.         Thought Content: Thought content normal.         Judgment: Judgment normal.           Laboratory:  CBC:  Lab Results   Component Value Date    WBC 7.60 08/05/2022    RBC 4.79 08/05/2022    HGB 12.3 08/05/2022    HCT 40.8 08/05/2022     08/05/2022    MCV 85 08/05/2022    MCH 25.7 (L) 08/05/2022    MCHC 30.1 (L) 08/05/2022    MCHC 30.6 (L) 06/08/2022    MCHC 30.0 (L) 12/13/2021     CMP:  Lab Results   Component Value Date     (H) 08/05/2022    CALCIUM 9.9 08/05/2022    ALBUMIN 4.1 08/05/2022    PROT 8.2 08/05/2022     08/05/2022    K 3.6 08/05/2022    CO2 28 08/05/2022     08/05/2022    BUN 10 08/05/2022    ALKPHOS 104 08/05/2022    ALT 24 08/05/2022    AST 19 08/05/2022    BILITOT 0.4 08/05/2022    BILITOT 0.4 06/08/2022    BILITOT 0.2 12/27/2021     URINALYSIS:  Lab Results   Component Value Date    COLORU Yellow 05/02/2022    SPECGRAV 1.015 05/02/2022    PHUR 6.0 05/02/2022    PROTEINUA Negative 05/02/2022    BACTERIA Rare 11/26/2008    NITRITE Negative 05/02/2022    LEUKOCYTESUR Negative 05/02/2022    UROBILINOGEN Negative 05/02/2022      LIPIDS:  Lab Results   Component Value Date    TSH 1.080 06/08/2022    TSH 0.718 12/27/2021    TSH 0.878 06/30/2021    HDL 38 (L) 06/08/2022    HDL 49 06/30/2021    HDL 41 04/28/2020    CHOL 174 06/08/2022    CHOL 173 06/30/2021    CHOL 160 04/28/2020    TRIG 115 06/08/2022    TRIG 96 06/30/2021    TRIG 104 04/28/2020    LDLCALC 113.0 06/08/2022    LDLCALC 104.8 06/30/2021    LDLCALC 98.2 04/28/2020    CHOLHDL 21.8 06/08/2022    CHOLHDL 28.3 06/30/2021    CHOLHDL 25.6 04/28/2020    NONHDLCHOL 136 06/08/2022    NONHDLCHOL 124 06/30/2021    NONHDLCHOL 119 04/28/2020    TOTALCHOLEST 4.6 06/08/2022    TOTALCHOLEST 3.5 06/30/2021    TOTALCHOLEST 3.9 04/28/2020     TSH:  Lab Results   Component Value Date    TSH 1.080 06/08/2022    TSH 0.718 12/27/2021    TSH 0.878 06/30/2021     A1C:  Lab Results   Component Value Date    HGBA1C 5.5 05/25/2015         Assessment/Plan     Angel Mosquera is a 42 y.o.female with:    Pain of both elbows  -      CBC Auto Differential; Future; Expected date: 08/05/2022  -     Comprehensive Metabolic Panel; Future; Expected date: 08/05/2022  -     Rheumatoid Factor; Future; Expected date: 08/05/2022  -     Sedimentation rate; Future; Expected date: 08/05/2022  -     C-REACTIVE PROTEIN; Future; Expected date: 08/05/2022  -     methylPREDNISolone (MEDROL DOSEPACK) 4 mg tablet; use as directed  Dispense: 21 each; Refill: 0    Family history of psoriatic arthritis  -     CBC Auto Differential; Future; Expected date: 08/05/2022  -     Comprehensive Metabolic Panel; Future; Expected date: 08/05/2022  -     Rheumatoid Factor; Future; Expected date: 08/05/2022  -     Sedimentation rate; Future; Expected date: 08/05/2022  -     C-REACTIVE PROTEIN; Future; Expected date: 08/05/2022  -     methylPREDNISolone (MEDROL DOSEPACK) 4 mg tablet; use as directed  Dispense: 21 each; Refill: 0    History of COVID-19  -     CBC Auto Differential; Future; Expected date: 08/05/2022  -     Comprehensive Metabolic Panel; Future; Expected date: 08/05/2022  -     Rheumatoid Factor; Future; Expected date: 08/05/2022  -     Sedimentation rate; Future; Expected date: 08/05/2022  -     C-REACTIVE PROTEIN; Future; Expected date: 08/05/2022  -     methylPREDNISolone (MEDROL DOSEPACK) 4 mg tablet; use as directed  Dispense: 21 each; Refill: 0          I spent 35 minutes on the day of this encounter for preparing for, evaluating, treating, and managing this patient.      -Continue current medications and maintain follow up with specialists.  Return to clinic as needed for any concerns   No follow-ups on file.       RANJAN Cardenas  [facility]

## 2022-08-07 ENCOUNTER — PATIENT MESSAGE (OUTPATIENT)
Dept: INTERNAL MEDICINE | Facility: CLINIC | Age: 42
End: 2022-08-07
Payer: COMMERCIAL

## 2022-08-17 ENCOUNTER — TELEPHONE (OUTPATIENT)
Dept: OBSTETRICS AND GYNECOLOGY | Facility: CLINIC | Age: 42
End: 2022-08-17
Payer: COMMERCIAL

## 2022-08-17 NOTE — TELEPHONE ENCOUNTER
Spoke with the patient    She is having joint pain recently .  She had HYST BSO.  She was ruled out for rheumatologic and medical conditions.    A full discussion of the benefit-risk ratio of hormonal replacement therapy was carried out. Improvement in vasomotor and other climacteric symptoms is discussed, including possible improvements in sleep and mood. Reduction of risk for osteoporosis was explained. We discussed the study data showing increased risk of thrombo-embolic events such as myocardial infarction, stroke and also possibly breast cancer with estrogen replacement, and how this might affect her. The range of side effects such as breast tenderness, weight gain and including possible increases in lifetime risk of breast cancer and possible thrombotic complications was discussed. We also discussed ACOG's recommendation to use hormone replacement therapy for the relief of hot flashes alone and to be on the lowest dose possible for the shortest amount of time.  Alternative such as herbal and soy-based products were reviewed. All of her questions about this therapy were answered.    She will try supplements OTC  for now and keep me posted AP

## 2022-09-13 ENCOUNTER — PATIENT MESSAGE (OUTPATIENT)
Dept: PODIATRY | Facility: CLINIC | Age: 42
End: 2022-09-13
Payer: COMMERCIAL

## 2022-09-23 ENCOUNTER — TELEPHONE (OUTPATIENT)
Dept: ORTHOPEDICS | Facility: CLINIC | Age: 42
End: 2022-09-23
Payer: COMMERCIAL

## 2022-09-23 NOTE — TELEPHONE ENCOUNTER
Called and left message for pt asking her to call the office and clarify her joint pain so that we can order the appropriate xray's. Left my callback number and requested a call back.

## 2022-09-23 NOTE — TELEPHONE ENCOUNTER
----- Message from Debbi Wilkerson PA-C sent at 9/23/2022  8:03 AM CDT -----  She has an appt on Tuesday with me for joint pain. Please call and see if we can find out exactly what hurts so we can get XRs. You can order the XRs or let me know if I need to order them.     Thanks.

## 2022-09-26 ENCOUNTER — TELEPHONE (OUTPATIENT)
Dept: ORTHOPEDICS | Facility: CLINIC | Age: 42
End: 2022-09-26
Payer: COMMERCIAL

## 2022-09-26 DIAGNOSIS — M25.521 BILATERAL ELBOW JOINT PAIN: ICD-10-CM

## 2022-09-26 DIAGNOSIS — M25.562 PAIN IN BOTH KNEES, UNSPECIFIED CHRONICITY: Primary | ICD-10-CM

## 2022-09-26 DIAGNOSIS — G89.29 ELBOW PAIN, CHRONIC, UNSPECIFIED LATERALITY: Primary | ICD-10-CM

## 2022-09-26 DIAGNOSIS — M25.561 PAIN IN BOTH KNEES, UNSPECIFIED CHRONICITY: Primary | ICD-10-CM

## 2022-09-26 DIAGNOSIS — M25.522 BILATERAL ELBOW JOINT PAIN: ICD-10-CM

## 2022-09-26 DIAGNOSIS — M25.529 ELBOW PAIN, CHRONIC, UNSPECIFIED LATERALITY: Primary | ICD-10-CM

## 2022-09-26 NOTE — TELEPHONE ENCOUNTER
Called patient, Spoke to patient. Patient was asked to come in early for her appointment tomorrow because she have xray that needs to be taken prior to be seen. Patient replied yes she can come in early.

## 2022-09-26 NOTE — TELEPHONE ENCOUNTER
----- Message from Debbi Wilkerson PA-C sent at 9/26/2022 11:15 AM CDT -----  I recommend she f/u with PCP and/or GYN regarding this, but if she still wants to be seen tomorrow then I would get XRs of her both elbows and both knees.     (Bilateral elbows complete and bilateral knee with flexion)    Thanks.     ----- Message -----  From: Winston Horn MA  Sent: 9/26/2022   9:28 AM CDT  To: Debbi Wilkerson PA-C    Spoke with patient.. She said she believe she is experiencing low estrogen due to her have an hysterectomy. States she is having multi joint pain both elbow, left knee spine.   ----- Message -----  From: Debbi Wilkerson PA-C  Sent: 9/23/2022   8:04 AM CDT  To: [facility] Staff    She has an appt on Tuesday with me for joint pain. Please call and see if we can find out exactly what hurts so we can get XRs. You can order the XRs or let me know if I need to order them.     Thanks.

## 2022-09-26 NOTE — PROGRESS NOTES
Subjective:      Patient ID: Angel Mosquera is a 42 y.o. female.    Chief Complaint: Pain of the Right Elbow (Patient presents today complaining she has bilateral knee pain and elbow pain. 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     Last seen by me on 8/6/20 for bilateral hip pain that improved.     Here today with complaints of diffuse intermittent joint pain x 3 months. She notes bilateral elbow pain/tenderness that is worse with lifting. No know injury. She rates her pain as a 4 on a scale of 1-10. She has intermittent pain in left > right knee. No locking, catching, or giving way. She notes diffuse stiffness. She has pain in her shoulders, back, wrists, and feet.     She had foot surgery by Dr. Valverde in June and is still having pain left foot- she sees him next week.     She is taking prn motrin 800mg or tylenol. She is on tumeric and glucosamine- has not noted much change.     She had hysterectomy in December, was told by GYN that joint pain was from lack of estrogen.       Past Medical History:   Diagnosis Date    Hypertension     PONV (postoperative nausea and vomiting)     Rosacea          Current Outpatient Medications:     azelaic acid (FINACEA) 15 % gel, Apply to affected area(s) of face nightly, then moisturize (Patient taking differently: Apply to affected area(s) of face nightly, then moisturize), Disp: 50 g, Rfl: 3    carvediloL (COREG) 6.25 MG tablet, Take 1 tablet (6.25 mg total) by mouth 2 (two) times daily with meals., Disp: 60 tablet, Rfl: 11    hydrOXYzine HCL (ATARAX) 25 MG tablet, Take 1 tablet (25 mg total) by mouth nightly as needed (Insomnia)., Disp: 30 tablet, Rfl: 3    ibuprofen (ADVIL,MOTRIN) 800 MG tablet, Take 1 tablet (800 mg total) by mouth every 6 (six) hours as needed for Pain., Disp: 60 tablet, Rfl: 1    multivitamin (THERAGRAN) per tablet, Take 1 tablet by mouth once daily., Disp: , Rfl:  "    Current Facility-Administered Medications:     sodium chloride 0.9% flush 10 mL, 10 mL, Intravenous, PRN, Mello Montez DPM    Review of patient's allergies indicates:  No Known Allergies    Review of Systems   Constitutional: Negative for chills, fever, night sweats and weight gain.   Gastrointestinal:  Negative for bowel incontinence, nausea and vomiting.   Genitourinary:  Negative for bladder incontinence.   Neurological:  Negative for disturbances in coordination and loss of balance.         Objective:        Ht 5' 5" (1.651 m)   Wt 104.7 kg (230 lb 14.4 oz)   LMP 12/09/2021   BMI 38.42 kg/m²     Ortho/SPM Exam    RIGHT ELBOW EXAM:  Good ROM with no pain.     Tenderness To Palpation:  Minimal tenderness at the medial epicondyle. No tenderness at lateral epicondyle.  No tenderness at the olecranon.  No tenderness at the distal humerus or proximal radius/ulna.  No tenderness at the radial head.    Strength Testing:  Deltoid - 5/5   Biceps - 5/5   Triceps - 5/5   Wrist extension - 5/5   Wrist flexion - 5/5    - 5/5   Finger extension - 5/5   Finger abduction - 5/5     Special Tests:  No laxity of the MCL at 0 and 30 degrees.    No laxity of the LCL at 0 and 30 degrees.    3rd digit extension resistance test - negative  Resisted supination - negative  Resisted pronation - negative  Resisted wrist extension (Cozen's test) - negative  Resisted wrist flexion - negative    LEFT ELBOW EXAM:  Good ROM with no pain.     Tenderness To Palpation:  No tenderness at the medial epicondyle or lateral epicondyle.  No tenderness at the olecranon.  No tenderness at the distal humerus or proximal radius/ulna.  No tenderness at the radial head.    Strength Testing:  Deltoid - 5/5   Biceps - 5/5   Triceps - 5/5   Wrist extension - 5/5   Wrist flexion - 5/5    - 5/5   Finger extension - 5/5   Finger abduction - 5/5     Special Tests:  No laxity of the MCL at 0 and 30 degrees.    No laxity of the LCL at 0 and 30 " degrees.    3rd digit extension resistance test - negative  Resisted supination - negative  Resisted pronation - negative  Resisted wrist extension (Cozen's test) - negative  Resisted wrist flexion - negative    Neurovascular Exam of Bilateral UEs:  2+ radial pulses BL.  Sensation to light touch intact in the distal median, radial, and ulnar nerve distributions bilaterally.  Negative Tinnels at carpal tunnel.  Negative Tinnels at cubital tunnel.      Body habitus is obese.   The patient walks without a limp.    RIGHT KNEE EXAM:  Resisted SLR negative.   Knee effusion none   Tendernes is located  n/a  Range of motion- Flexion full, Extension full,     Ligament exam:   MCL intact   Lachman intact              Post sag intact    LCL intact    Patellar apprehension negative.  Popliteal cyst negative  Patellar crepitation absent.  Flexion/pinch negative.    Motor normal 5/5 strength in all tested muscle groups.   Sensory normal.    LEFT KNEE EXAM:  Resisted SLR negative. .  Knee effusion none   Tendernes is located  n/a  Range of motion- Flexion full, Extension full,     Ligament exam:   MCL intact   Lachman intact              Post sag intact    LCL intact    Patellar apprehension negative.  Popliteal cyst negative  Patellar crepitation absent.  Flexion/pinch negative.    Motor normal 5/5 strength in all tested muscle groups.   Sensory normal.      XRAY INTERPRETATION:  X-rays of both elbows dated 9/27/22 are personally reviewed and show no fracture or dislocation.    X-rays of bilateral knees dated 9/27/22 are personally reviewed and show very mild medial joint space narrowing both knees.          Assessment:       Encounter Diagnosis   Name Primary?    Arthralgia, unspecified joint Yes          Plan:       Angel was seen today for pain, pain, pain and pain.    Diagnoses and all orders for this visit:    Arthralgia, unspecified joint  -     Ambulatory referral/consult to Physical/Occupational Therapy; Future  -      Ambulatory referral/consult to Rheumatology; Future      3 month history of diffuse joint pain that varies in severity and location. Worst pain is bilateral elbows and left > right knee. She has pain in her shoulder, back, wrist, and feet.     XRs show mild medial joint space narrowing both knees. Elbow XRs look good. Pain is diffuse and I don't see any specific musculoskeletal etiology.     She had hysterectomy in December, was told by GYN that joint pain was from lack of estrogen.     Treatment options reviewed with patient along with above bilateral knee/elbow xrays. Following plan made:     - Referral to rheumatology for diffuse joint pain.   - PT/OT for knees and elbows. Orders to [facility].   - Recommend starting daily 10 minute full body stretching. Can google program.   - Will continue on tumeric for now. If she stops this, consider NSAID such as mobic.   - She will f/u with podiatry as scheduled for her foot.   - Consider f/u with GYN to discuss estrogen therapy more, this may be contributing to joint pain.   - I will email her in 6 weeks to check on her progress.     Follow up if symptoms worsen or fail to improve.

## 2022-09-27 ENCOUNTER — OFFICE VISIT (OUTPATIENT)
Dept: ORTHOPEDICS | Facility: CLINIC | Age: 42
End: 2022-09-27
Payer: COMMERCIAL

## 2022-09-27 VITALS — WEIGHT: 230.88 LBS | BODY MASS INDEX: 38.47 KG/M2 | HEIGHT: 65 IN

## 2022-09-27 DIAGNOSIS — M25.50 ARTHRALGIA, UNSPECIFIED JOINT: Primary | ICD-10-CM

## 2022-09-27 PROCEDURE — 1160F PR REVIEW ALL MEDS BY PRESCRIBER/CLIN PHARMACIST DOCUMENTED: ICD-10-PCS | Mod: CPTII,S$GLB,, | Performed by: PHYSICIAN ASSISTANT

## 2022-09-27 PROCEDURE — 99214 OFFICE O/P EST MOD 30 MIN: CPT | Mod: S$GLB,,, | Performed by: PHYSICIAN ASSISTANT

## 2022-09-27 PROCEDURE — 99999 PR PBB SHADOW E&M-EST. PATIENT-LVL III: CPT | Mod: PBBFAC,,, | Performed by: PHYSICIAN ASSISTANT

## 2022-09-27 PROCEDURE — 1159F PR MEDICATION LIST DOCUMENTED IN MEDICAL RECORD: ICD-10-PCS | Mod: CPTII,S$GLB,, | Performed by: PHYSICIAN ASSISTANT

## 2022-09-27 PROCEDURE — 99214 PR OFFICE/OUTPT VISIT, EST, LEVL IV, 30-39 MIN: ICD-10-PCS | Mod: S$GLB,,, | Performed by: PHYSICIAN ASSISTANT

## 2022-09-27 PROCEDURE — 1159F MED LIST DOCD IN RCRD: CPT | Mod: CPTII,S$GLB,, | Performed by: PHYSICIAN ASSISTANT

## 2022-09-27 PROCEDURE — 3008F PR BODY MASS INDEX (BMI) DOCUMENTED: ICD-10-PCS | Mod: CPTII,S$GLB,, | Performed by: PHYSICIAN ASSISTANT

## 2022-09-27 PROCEDURE — 99999 PR PBB SHADOW E&M-EST. PATIENT-LVL III: ICD-10-PCS | Mod: PBBFAC,,, | Performed by: PHYSICIAN ASSISTANT

## 2022-09-27 PROCEDURE — 3008F BODY MASS INDEX DOCD: CPT | Mod: CPTII,S$GLB,, | Performed by: PHYSICIAN ASSISTANT

## 2022-09-27 PROCEDURE — 1160F RVW MEDS BY RX/DR IN RCRD: CPT | Mod: CPTII,S$GLB,, | Performed by: PHYSICIAN ASSISTANT

## 2022-09-29 ENCOUNTER — OFFICE VISIT (OUTPATIENT)
Dept: PODIATRY | Facility: CLINIC | Age: 42
End: 2022-09-29
Payer: COMMERCIAL

## 2022-09-29 ENCOUNTER — HOSPITAL ENCOUNTER (OUTPATIENT)
Dept: RADIOLOGY | Facility: HOSPITAL | Age: 42
Discharge: HOME | End: 2022-09-29
Attending: PODIATRIST
Payer: COMMERCIAL

## 2022-09-29 VITALS
HEART RATE: 75 BPM | HEIGHT: 65 IN | DIASTOLIC BLOOD PRESSURE: 85 MMHG | SYSTOLIC BLOOD PRESSURE: 135 MMHG | BODY MASS INDEX: 38.42 KG/M2

## 2022-09-29 DIAGNOSIS — M79.672 CHRONIC PAIN IN LEFT FOOT: Primary | ICD-10-CM

## 2022-09-29 DIAGNOSIS — M77.42 METATARSALGIA, LEFT FOOT: ICD-10-CM

## 2022-09-29 DIAGNOSIS — G89.29 CHRONIC PAIN IN LEFT FOOT: Primary | ICD-10-CM

## 2022-09-29 DIAGNOSIS — M79.672 CHRONIC PAIN IN LEFT FOOT: ICD-10-CM

## 2022-09-29 DIAGNOSIS — G89.29 CHRONIC PAIN IN LEFT FOOT: ICD-10-CM

## 2022-09-29 DIAGNOSIS — G57.62 MORTON'S NEUROMA OF LEFT FOOT: ICD-10-CM

## 2022-09-29 PROCEDURE — 1159F PR MEDICATION LIST DOCUMENTED IN MEDICAL RECORD: ICD-10-PCS | Mod: CPTII,S$GLB,, | Performed by: PODIATRIST

## 2022-09-29 PROCEDURE — 1159F MED LIST DOCD IN RCRD: CPT | Mod: CPTII,S$GLB,, | Performed by: PODIATRIST

## 2022-09-29 PROCEDURE — 99214 PR OFFICE/OUTPT VISIT, EST, LEVL IV, 30-39 MIN: ICD-10-PCS | Mod: S$GLB,,, | Performed by: PODIATRIST

## 2022-09-29 PROCEDURE — 3075F SYST BP GE 130 - 139MM HG: CPT | Mod: CPTII,S$GLB,, | Performed by: PODIATRIST

## 2022-09-29 PROCEDURE — 3079F DIAST BP 80-89 MM HG: CPT | Mod: CPTII,S$GLB,, | Performed by: PODIATRIST

## 2022-09-29 PROCEDURE — 1160F PR REVIEW ALL MEDS BY PRESCRIBER/CLIN PHARMACIST DOCUMENTED: ICD-10-PCS | Mod: CPTII,S$GLB,, | Performed by: PODIATRIST

## 2022-09-29 PROCEDURE — 73630 XR FOOT COMPLETE 3 VIEW LEFT: ICD-10-PCS | Mod: 26,LT,, | Performed by: RADIOLOGY

## 2022-09-29 PROCEDURE — 99999 PR PBB SHADOW E&M-EST. PATIENT-LVL IV: CPT | Mod: PBBFAC,,, | Performed by: PODIATRIST

## 2022-09-29 PROCEDURE — 73630 X-RAY EXAM OF FOOT: CPT | Mod: 26,LT,, | Performed by: RADIOLOGY

## 2022-09-29 PROCEDURE — 1160F RVW MEDS BY RX/DR IN RCRD: CPT | Mod: CPTII,S$GLB,, | Performed by: PODIATRIST

## 2022-09-29 PROCEDURE — 3075F PR MOST RECENT SYSTOLIC BLOOD PRESS GE 130-139MM HG: ICD-10-PCS | Mod: CPTII,S$GLB,, | Performed by: PODIATRIST

## 2022-09-29 PROCEDURE — 3008F BODY MASS INDEX DOCD: CPT | Mod: CPTII,S$GLB,, | Performed by: PODIATRIST

## 2022-09-29 PROCEDURE — 99214 OFFICE O/P EST MOD 30 MIN: CPT | Mod: S$GLB,,, | Performed by: PODIATRIST

## 2022-09-29 PROCEDURE — 73630 X-RAY EXAM OF FOOT: CPT | Mod: TC,PN,LT

## 2022-09-29 PROCEDURE — 3079F PR MOST RECENT DIASTOLIC BLOOD PRESSURE 80-89 MM HG: ICD-10-PCS | Mod: CPTII,S$GLB,, | Performed by: PODIATRIST

## 2022-09-29 PROCEDURE — 99999 PR PBB SHADOW E&M-EST. PATIENT-LVL IV: ICD-10-PCS | Mod: PBBFAC,,, | Performed by: PODIATRIST

## 2022-09-29 PROCEDURE — 3008F PR BODY MASS INDEX (BMI) DOCUMENTED: ICD-10-PCS | Mod: CPTII,S$GLB,, | Performed by: PODIATRIST

## 2022-09-29 NOTE — PROGRESS NOTES
Subjective:      Patient ID: Angel Mosquera is a 42 y.o. female.    Chief Complaint: Foot Pain (bilateral )    Patient presents to clinic with chief complaint of bilateral bottom heel pain for approximately 9 months now, left worse than right. She does not recall any prior injury to both feet other than jumping in a shallow pool this past summer. Pain is worse with the first couple of steps in the AM and worsens with prolonged standing/walking. She was seen by Dr. Swan 5 months ago who performed a steroid injection in her left heel which she states did not help with her pain. She has tried PT, stretching, night splints, and changing shoegear (HOKA, new balance, asics) with no improvement. Her right ankle recently started hurting, states it feels like her range of motion is limited, thinks it may be due to her compensating to stay off the left foot. 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     04/18/2022:  Presents complaining of chronic pain to the left heel for greater than a year.  She has a history of hysterectomy in December 2021 that required 8 weeks for recovery and modified duties.  She works as a nurse at [employer].  States she also has pain in the right heel to lesser extent.  Pending a trip to [place] later this week.  Requesting a steroid injection to left heel but also requesting consideration for surgical intervention to left heel next month.    06/24/2022:  Post endoscopic plantar fasciotomy left foot on 06/17/2022.  Reports minimal discomfort to left foot.  She has been ambulating with the orthopedic boot.  Taking ibuprofen as needed for pain.    07/07/2022:  Approximately 3 weeks postop.  She has been ambulating without her orthopedic boot but still uses for long distances.  Reports some numbness along the bottom of the foot when she touches it.  She reports pain over the medial surgical incision and no pain anywhere else.  She has no pain  "directly on the bottom of the foot when she stands.    07/29/2022: Approximately 6 weeks postop. She has been ambulating in standard footwear, she has been active with home chores and watching children. 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 She has had improved functional level with similar amounts of pain, she also has some residual numbness of the foot mostly pointing along the plantar lateral midfoot region. She has not filled the gabapentin prescription because she is not want to add to her medications.     09/29/2022:  Relates no longer has pain to the bottom the heel however has worsening pain to the left forefoot pointing to the ball the foot when she pushes off on the foot.  She describes a sharp pain only with standing and walking.  No pain at rest.  States that she is unable to tolerate barefoot walking.  Also relates that she still has some residual numbness to the left foot.      Vitals:    09/29/22 1303   BP: 135/85   Pulse: 75   Height: 5' 5" (1.651 m)   PainSc:   5   PainLoc: Foot      Past Medical History:   Diagnosis Date    Hypertension     PONV (postoperative nausea and vomiting)     Rosacea        Past Surgical History:   Procedure Laterality Date    AUGMENTATION OF BREAST Bilateral 2005    Breast Augmentation  2005    COSMETIC SURGERY  2005    Breast augmentation    DILATION AND CURETTAGE OF UTERUS N/A 10/19/2019    Procedure: DILATION AND CURETTAGE, UTERUS;  Surgeon: Frances Culp MD;  Location: [facility] OR;  Service: OB/GYN;  Laterality: N/A;  with suction    ENDOSCOPIC PLANTAR FASCIOTOMY Left 6/17/2022    Procedure: FASCIOTOMY, PLANTAR, ENDOSCOPIC;  Surgeon: Mello Montez DPM;  Location: [facility] OR;  Service: Podiatry;  Laterality: Left;  endoscopic plantar fasciotomy kit    mischarr      ROBOT-ASSISTED LAPAROSCOPIC UTERINE MYOMECTOMY N/A 1/7/2020    Procedure: ROBOTIC MYOMECTOMY, UTERUS;  Surgeon: Frances Culp MD;  Location: [facility] OR;  Service: " OB/GYN;  Laterality: N/A;    XI ROBOTIC HYSTERECTOMY, WITH SALPINGO-OOPHORECTOMY N/A 12/15/2021    Procedure: XI ROBOTIC HYSTERECTOMY,WITH SALPINGO-OOPHORECTOMY;  Surgeon: Carla Aguero MD;  Location: [facility];  Service: OB/GYN;  Laterality: N/A;       Family History   Problem Relation Age of Onset    Diabetes Mother     Hypertension Mother     Heart failure Mother         viral?    Heart disease Mother     Hyperlipidemia Mother     Cancer Paternal Grandfather         Pancreatic Cancer    Cancer Paternal Grandmother         Lung Cancer    Arthritis Maternal Grandmother     Diabetes Maternal Grandmother     Alcohol abuse Maternal Grandfather     COPD Maternal Grandfather     Alcohol abuse Father     Alcohol abuse Maternal Uncle     Cancer Neg Hx     Ovarian cancer Neg Hx     Melanoma Neg Hx     Heart attack Neg Hx     Stroke Neg Hx     Colon cancer Neg Hx     Breast cancer Neg Hx     Blindness Neg Hx     Amblyopia Neg Hx     Cataracts Neg Hx     Glaucoma Neg Hx     Macular degeneration Neg Hx     Retinal detachment Neg Hx     Strabismus Neg Hx        Social History     Socioeconomic History    Marital status:    Occupational History    Occupation: Nurse     Employer: [employer]     Comment: Oncology   Tobacco Use    Smoking status: Never    Smokeless tobacco: Never   Substance and Sexual Activity    Alcohol use: No    Drug use: No    Sexual activity: Yes     Partners: Male     Birth control/protection: None     Comment: Vasectomy   Other Topics Concern    Are you pregnant or think you may be? No    Breast-feeding No     Social Determinants of Health     Financial Resource Strain: Low Risk     Difficulty of Paying Living Expenses: Not hard at all   Food Insecurity: No Food Insecurity    Worried About Running Out of Food in the Last Year: Never true    Ran Out of Food in the Last Year: Never true   Transportation Needs: No Transportation Needs    Lack of Transportation (Medical): No    Lack of  Transportation (Non-Medical): No   Physical Activity: Unknown    Days of Exercise per Week: Patient refused    Minutes of Exercise per Session: 0 min   Stress: Stress Concern Present    Feeling of Stress : To some extent   Social Connections: Unknown    Frequency of Communication with Friends and Family: More than three times a week    Frequency of Social Gatherings with Friends and Family: More than three times a week    Active Member of Clubs or Organizations: No    Attends Club or Organization Meetings: Never    Marital Status:    Housing Stability: Low Risk     Unable to Pay for Housing in the Last Year: No    Number of Places Lived in the Last Year: 1    Unstable Housing in the Last Year: No       Current Outpatient Medications   Medication Sig Dispense Refill    azelaic acid (FINACEA) 15 % gel Apply to affected area(s) of face nightly, then moisturize (Patient taking differently: Apply to affected area(s) of face nightly, then moisturize) 50 g 3    carvediloL (COREG) 6.25 MG tablet Take 1 tablet (6.25 mg total) by mouth 2 (two) times daily with meals. 60 tablet 11    hydrOXYzine HCL (ATARAX) 25 MG tablet Take 1 tablet (25 mg total) by mouth nightly as needed (Insomnia). 30 tablet 3    ibuprofen (ADVIL,MOTRIN) 800 MG tablet Take 1 tablet (800 mg total) by mouth every 6 (six) hours as needed for Pain. 60 tablet 1    multivitamin (THERAGRAN) per tablet Take 1 tablet by mouth once daily.       Current Facility-Administered Medications   Medication Dose Route Frequency Provider Last Rate Last Admin    sodium chloride 0.9% flush 10 mL  10 mL Intravenous PRN Mello Montez DPM           Review of patient's allergies indicates:  No Known Allergies      Review of Systems   Constitutional: Negative for chills, fever and malaise/fatigue.   HENT:  Negative for hearing loss.    Cardiovascular:  Negative for claudication and leg swelling.   Respiratory:  Negative for cough, shortness of breath and wheezing.     Skin:  Negative for flushing, rash and unusual hair distribution.   Musculoskeletal:  Negative for joint pain and myalgias.        Bilateral heel pain L>R. Right ankle pain   Gastrointestinal:  Negative for nausea and vomiting.   Neurological:  Negative for loss of balance, numbness, paresthesias and sensory change.   Psychiatric/Behavioral:  Negative for altered mental status.          Objective:      Physical Exam  Constitutional:       General: She is not in acute distress.     Appearance: She is not ill-appearing.   Cardiovascular:      Pulses:           Dorsalis pedis pulses are 2+ on the right side and 2+ on the left side.        Posterior tibial pulses are 2+ on the right side and 2+ on the left side.      Comments: CFT< 3 secs all toes bilateral foot, skin temp warm to cool bilateral foot, no hair growth bilateral lower extremity, no edema to bilateral lower extremities    Musculoskeletal:      Comments: Mild pain on palpation overlying the tarsal tunnel left medial hindfoot that is localized and does not radiate.  No pain on palpation to the plantar heel left foot.  There is some mild diffuse pain on palpation along the medial arch.  Moderate pain on palpation to the distal 4th intermetatarsal space left foot.  Mild to moderate pain on palpation to the dorsal mid aspect of the 4th metatarsal shaft.  No localized edema, warmth for discoloration to left foot.    Resting calcaneal stance position is inverted bilateral foot.  No significant collapse of the medial longitudinal arch left foot standing noting forefoot varus in comparison to the right.   Skin:     General: Skin is warm.      Capillary Refill: Capillary refill takes less than 2 seconds.      Findings: No ecchymosis or erythema.      Nails: There is no clubbing.      Comments: Normal appearing scars left foot.   Neurological:      Mental Status: She is alert and oriented to person, place, and time.      Sensory: Sensation is intact.      Motor:  Motor function is intact.      Comments: Light touch intact to bilateral feet.              Assessment:       Encounter Diagnoses   Name Primary?    Chronic pain in left foot Yes    Sparrow's neuroma of left foot     Metatarsalgia, left foot          Plan:       Angel was seen today for follow-up.    Diagnoses and all orders for this visit:    Chronic pain in left foot  -     X-Ray Foot Complete Left; Future  -     MRI Foot (Forefoot) Left Without Contrast; Future    Sparrow's neuroma of left foot  -     X-Ray Foot Complete Left; Future  -     MRI Foot (Forefoot) Left Without Contrast; Future    Metatarsalgia, left foot  -     X-Ray Foot Complete Left; Future  -     MRI Foot (Forefoot) Left Without Contrast; Future    I counseled the patient on her conditions, their implications and medical management.    Patient with worsening forefoot pain despite conservative care.  Ordered MRI to assess for neuroma of the 4th intermetatarsal space and or possible stress reaction/stress fracture to the 4th metatarsal shaft left foot.  Baseline x-ray ordered today to assess for stress fracture.  Also placed metatarsal pad to left foot which seemed to help alleviate some the patient's pain when she was standing.  We discussed corticosteroid injection to the distal 4th intermetatarsal space if the MRI is negative for stress reaction.      RTC within 2-3 weeks or p.r.n. as discussed.    A portion of this note was generated by voice recognition software and may contain spelling and grammar errors.

## 2022-09-30 ENCOUNTER — PATIENT MESSAGE (OUTPATIENT)
Dept: PODIATRY | Facility: CLINIC | Age: 42
End: 2022-09-30
Payer: COMMERCIAL

## 2022-10-06 ENCOUNTER — PATIENT MESSAGE (OUTPATIENT)
Dept: BARIATRICS | Facility: CLINIC | Age: 42
End: 2022-10-06
Payer: COMMERCIAL

## 2022-10-12 ENCOUNTER — OFFICE VISIT (OUTPATIENT)
Dept: INTERNAL MEDICINE | Facility: CLINIC | Age: 42
End: 2022-10-12
Payer: COMMERCIAL

## 2022-10-12 ENCOUNTER — LAB VISIT (OUTPATIENT)
Dept: LAB | Facility: HOSPITAL | Age: 42
End: 2022-10-12
Attending: INTERNAL MEDICINE
Payer: COMMERCIAL

## 2022-10-12 ENCOUNTER — PATIENT MESSAGE (OUTPATIENT)
Dept: INTERNAL MEDICINE | Facility: CLINIC | Age: 42
End: 2022-10-12

## 2022-10-12 VITALS
OXYGEN SATURATION: 98 % | BODY MASS INDEX: 38.09 KG/M2 | HEART RATE: 68 BPM | DIASTOLIC BLOOD PRESSURE: 86 MMHG | SYSTOLIC BLOOD PRESSURE: 130 MMHG | WEIGHT: 228.63 LBS | HEIGHT: 65 IN

## 2022-10-12 DIAGNOSIS — M25.50 POLYARTHRALGIA: ICD-10-CM

## 2022-10-12 DIAGNOSIS — M25.50 POLYARTHRALGIA: Primary | ICD-10-CM

## 2022-10-12 DIAGNOSIS — E55.9 VITAMIN D DEFICIENCY: ICD-10-CM

## 2022-10-12 DIAGNOSIS — E53.8 VITAMIN B12 DEFICIENCY: ICD-10-CM

## 2022-10-12 LAB
25(OH)D3+25(OH)D2 SERPL-MCNC: 21 NG/ML (ref 30–96)
VIT B12 SERPL-MCNC: 366 PG/ML (ref 210–950)

## 2022-10-12 PROCEDURE — 1160F PR REVIEW ALL MEDS BY PRESCRIBER/CLIN PHARMACIST DOCUMENTED: ICD-10-PCS | Mod: CPTII,S$GLB,, | Performed by: INTERNAL MEDICINE

## 2022-10-12 PROCEDURE — 3075F SYST BP GE 130 - 139MM HG: CPT | Mod: CPTII,S$GLB,, | Performed by: INTERNAL MEDICINE

## 2022-10-12 PROCEDURE — 1160F RVW MEDS BY RX/DR IN RCRD: CPT | Mod: CPTII,S$GLB,, | Performed by: INTERNAL MEDICINE

## 2022-10-12 PROCEDURE — 82607 VITAMIN B-12: CPT | Performed by: INTERNAL MEDICINE

## 2022-10-12 PROCEDURE — 3079F PR MOST RECENT DIASTOLIC BLOOD PRESSURE 80-89 MM HG: ICD-10-PCS | Mod: CPTII,S$GLB,, | Performed by: INTERNAL MEDICINE

## 2022-10-12 PROCEDURE — 36415 COLL VENOUS BLD VENIPUNCTURE: CPT | Performed by: INTERNAL MEDICINE

## 2022-10-12 PROCEDURE — 82306 VITAMIN D 25 HYDROXY: CPT | Performed by: INTERNAL MEDICINE

## 2022-10-12 PROCEDURE — 99214 OFFICE O/P EST MOD 30 MIN: CPT | Mod: S$GLB,,, | Performed by: INTERNAL MEDICINE

## 2022-10-12 PROCEDURE — 3079F DIAST BP 80-89 MM HG: CPT | Mod: CPTII,S$GLB,, | Performed by: INTERNAL MEDICINE

## 2022-10-12 PROCEDURE — 3075F PR MOST RECENT SYSTOLIC BLOOD PRESS GE 130-139MM HG: ICD-10-PCS | Mod: CPTII,S$GLB,, | Performed by: INTERNAL MEDICINE

## 2022-10-12 PROCEDURE — 99999 PR PBB SHADOW E&M-EST. PATIENT-LVL IV: ICD-10-PCS | Mod: PBBFAC,,, | Performed by: INTERNAL MEDICINE

## 2022-10-12 PROCEDURE — 1159F MED LIST DOCD IN RCRD: CPT | Mod: CPTII,S$GLB,, | Performed by: INTERNAL MEDICINE

## 2022-10-12 PROCEDURE — 99214 PR OFFICE/OUTPT VISIT, EST, LEVL IV, 30-39 MIN: ICD-10-PCS | Mod: S$GLB,,, | Performed by: INTERNAL MEDICINE

## 2022-10-12 PROCEDURE — 1159F PR MEDICATION LIST DOCUMENTED IN MEDICAL RECORD: ICD-10-PCS | Mod: CPTII,S$GLB,, | Performed by: INTERNAL MEDICINE

## 2022-10-12 PROCEDURE — 99999 PR PBB SHADOW E&M-EST. PATIENT-LVL IV: CPT | Mod: PBBFAC,,, | Performed by: INTERNAL MEDICINE

## 2022-10-12 NOTE — PROGRESS NOTES
[facility] Note    Chief Complaint      Chief Complaint   Patient presents with    Joint Pain     All over       History of Present Illness      Angel Mosquera is a 42 y.o. female with chronic conditions of anxiety, palpitations, bilateral foot pain who presents today for: follow up joint pains.  Has been having migrating joint pains.  Has symptoms 2/3 of the month.  Considered whether it could be related to hysterectomy induced menopause and OBGYN offered estrogen supplementation.  Pt hesitant to proceed.  Also saw ortho who ordered xrays without degenerative arthritis findings.  Flu shot UTD.  Tdap 2019. COVID vaccine UTD.  Shingrix due age 50.  Pneumonia vaccine due at 65.   Pap smear 2019. Mammogram 2021. DEXA due age 65.   Colon cancer screening due age 45.     Past Medical History:  Past Medical History:   Diagnosis Date    Hypertension     PONV (postoperative nausea and vomiting)     Rosacea        Past Surgical History:   has a past surgical history that includes Breast Augmentation (2005); mischarr; Dilation and curettage of uterus (N/A, 10/19/2019); Robot-assisted laparoscopic uterine myomectomy (N/A, 1/7/2020); Augmentation of breast (Bilateral, 2005); Cosmetic surgery (2005); xi robotic hysterectomy, with salpingo-oophorectomy (N/A, 12/15/2021); and Endoscopic plantar fasciotomy (Left, 6/17/2022).    Family History:  family history includes Alcohol abuse in her father, maternal grandfather, and maternal uncle; Arthritis in her maternal grandmother; COPD in her maternal grandfather; Cancer in her paternal grandfather and paternal grandmother; Diabetes in her maternal grandmother and mother; Heart disease in her mother; Heart failure in her mother; Hyperlipidemia in her mother; Hypertension in her mother.     Social History:  Social History     Tobacco Use    Smoking status: Never    Smokeless tobacco: Never   Substance Use Topics    Alcohol use: No    Drug use: No       I personally  reviewed all past medical, surgical, social and family history.    Review of Systems   Constitutional:  Negative for chills, fever and malaise/fatigue.   Respiratory:  Negative for shortness of breath.    Cardiovascular:  Negative for chest pain.   Gastrointestinal:  Negative for constipation, diarrhea, nausea and vomiting.   Skin:  Negative for rash.   Neurological:  Negative for weakness.   All other systems reviewed and are negative.     Medications:  Outpatient Encounter Medications as of 10/12/2022   Medication Sig Dispense Refill    azelaic acid (FINACEA) 15 % gel Apply to affected area(s) of face nightly, then moisturize (Patient taking differently: Apply to affected area(s) of face nightly, then moisturize) 50 g 3    carvediloL (COREG) 6.25 MG tablet Take 1 tablet (6.25 mg total) by mouth 2 (two) times daily with meals. 60 tablet 11    hydrOXYzine HCL (ATARAX) 25 MG tablet Take 1 tablet (25 mg total) by mouth nightly as needed (Insomnia). 30 tablet 3    ibuprofen (ADVIL,MOTRIN) 800 MG tablet Take 1 tablet (800 mg total) by mouth every 6 (six) hours as needed for Pain. 60 tablet 1    multivitamin (THERAGRAN) per tablet Take 1 tablet by mouth once daily.       Facility-Administered Encounter Medications as of 10/12/2022   Medication Dose Route Frequency Provider Last Rate Last Admin    sodium chloride 0.9% flush 10 mL  10 mL Intravenous PRN Mello Montez DPM           Allergies:  Review of patient's allergies indicates:  No Known Allergies    Health Maintenance:  Immunization History   Administered Date(s) Administered    COVID-19, MRNA, LN-S, PF (Pfizer) (Purple Cap) 12/30/2020, 01/20/2021    Influenza - Quadrivalent - PF *Preferred* (6 months and older) 11/04/2016, 10/09/2017, 10/26/2018, 10/23/2019, 10/19/2020    Meningococcal Conjugate 05/20/2013    Tdap 06/27/2019      Health Maintenance   Topic Date Due    Mammogram  06/08/2023    TETANUS VACCINE  06/27/2029    Hepatitis C Screening  Completed     "Lipid Panel  Completed        Physical Exam      Vital Signs  Pulse: 68  SpO2: 98 %  BP: 130/86  BP Location: Left arm  Patient Position: Sitting  Pain Score: 0-No pain  Height and Weight  Height: 5' 5" (165.1 cm)  Weight: 103.7 kg (228 lb 9.9 oz)  BSA (Calculated - sq m): 2.18 sq meters  BMI (Calculated): 38  Weight in (lb) to have BMI = 25: 149.9]    Physical Exam  Vitals reviewed.   Constitutional:       Appearance: She is well-developed.   HENT:      Head: Normocephalic and atraumatic.      Right Ear: External ear normal.      Left Ear: External ear normal.   Cardiovascular:      Rate and Rhythm: Normal rate and regular rhythm.      Heart sounds: Normal heart sounds. No murmur heard.  Pulmonary:      Effort: Pulmonary effort is normal.      Breath sounds: Normal breath sounds. No wheezing or rales.   Abdominal:      General: Bowel sounds are normal. There is no distension.      Palpations: Abdomen is soft.      Tenderness: There is no abdominal tenderness.        Laboratory:  CBC:  Recent Labs   Lab 12/13/21  1144 06/08/22  0715 08/05/22  1230   WBC 10.85 8.36 7.60   RBC 4.77 4.88 4.79   Hemoglobin 12.2 12.3 12.3   Hematocrit 40.7 40.2 40.8   Platelets 359 378 395   MCV 85 82 85   MCH 25.6 L 25.2 L 25.7 L   MCHC 30.0 L 30.6 L 30.1 L     CMP:  Recent Labs   Lab 12/27/21  1525 06/08/22  0715 08/05/22  1230   Glucose 116 H 101 136 H   Calcium 9.4 9.1 9.9   Albumin 3.8 4.5 4.1   Total Protein 7.9 8.1 8.2   Sodium 139 142 139   Potassium 3.5 4.3 3.6   CO2 27 25 28   Chloride 105 110 103   BUN 13 8 10   Alkaline Phosphatase 74 100 104   ALT 32 26 24   AST 22 27 19   Total Bilirubin 0.2 0.4 0.4     URINALYSIS:  Recent Labs   Lab 05/02/22  0011   Color, UA Yellow   Specific Gravity, UA 1.015   pH, UA 6.0   Protein, UA Negative   Nitrite, UA Negative   Leukocytes, UA Negative   Urobilinogen, UA Negative      LIPIDS:  Recent Labs   Lab 04/28/20  1202 06/30/21  0719 12/27/21  1525 06/08/22  0715   TSH 1.420 0.878 0.718 " 1.080   HDL 41 49  --  38 L   Cholesterol 160 173  --  174   Triglycerides 104 96  --  115   LDL Cholesterol 98.2 104.8  --  113.0   HDL/Cholesterol Ratio 25.6 28.3  --  21.8   Non-HDL Cholesterol 119 124  --  136   Total Cholesterol/HDL Ratio 3.9 3.5  --  4.6     TSH:  Recent Labs   Lab 06/30/21  0719 12/27/21  1525 06/08/22  0715   TSH 0.878 0.718 1.080     A1C:        Assessment/Plan     Angel Mosquera is a 42 y.o.female with:    1. Polyarthralgia  - VITAMIN B12; Future  - Misc Sendout Test, Blood Vit D 25 OH; Future    2. Vitamin B12 deficiency  - VITAMIN B12; Future    3. Vitamin D deficiency  - Misc Sendout Test, Blood Vit D 25 OH; Future   Check Vit levels.  Otherwise, change supplements to Relief Factor.  Consider estrogen trial to confirm arthralgias related to menopause.     Chronic conditions status updated as per HPI.  Other than changes above, cont current medications and maintain follow up with specialists.  Follow up in about 4 weeks (around 11/9/2022) for Virtual Follow Up.    Future Appointments   Date Time Provider Department Center   10/13/2022  6:15 AM [facility] MRI1 500 LB LIMIT [facility]   10/17/2022  8:00 AM Mello Montez DPM [facility]       Tato Ramachandran MD  [facility]

## 2022-10-13 ENCOUNTER — HOSPITAL ENCOUNTER (OUTPATIENT)
Dept: RADIOLOGY | Facility: HOSPITAL | Age: 42
Discharge: HOME | End: 2022-10-13
Attending: PODIATRIST
Payer: COMMERCIAL

## 2022-10-13 DIAGNOSIS — M79.672 CHRONIC PAIN IN LEFT FOOT: ICD-10-CM

## 2022-10-13 DIAGNOSIS — M77.42 METATARSALGIA, LEFT FOOT: ICD-10-CM

## 2022-10-13 DIAGNOSIS — G89.29 CHRONIC PAIN IN LEFT FOOT: ICD-10-CM

## 2022-10-13 DIAGNOSIS — G57.62 MORTON'S NEUROMA OF LEFT FOOT: ICD-10-CM

## 2022-10-13 PROCEDURE — 73718 MRI LOWER EXTREMITY W/O DYE: CPT | Mod: TC,LT

## 2022-10-13 PROCEDURE — 73718 MRI FOOT (FOREFOOT) LEFT WITHOUT CONTRAST: ICD-10-PCS | Mod: 26,LT,, | Performed by: RADIOLOGY

## 2022-10-13 PROCEDURE — 73718 MRI LOWER EXTREMITY W/O DYE: CPT | Mod: 26,LT,, | Performed by: RADIOLOGY

## 2022-10-17 ENCOUNTER — OFFICE VISIT (OUTPATIENT)
Dept: PODIATRY | Facility: CLINIC | Age: 42
End: 2022-10-17
Payer: COMMERCIAL

## 2022-10-17 VITALS
DIASTOLIC BLOOD PRESSURE: 89 MMHG | HEIGHT: 65 IN | HEART RATE: 76 BPM | BODY MASS INDEX: 38.04 KG/M2 | SYSTOLIC BLOOD PRESSURE: 136 MMHG

## 2022-10-17 DIAGNOSIS — G89.29 CHRONIC PAIN IN LEFT FOOT: Primary | ICD-10-CM

## 2022-10-17 DIAGNOSIS — G57.62 MORTON'S NEUROMA OF LEFT FOOT: ICD-10-CM

## 2022-10-17 DIAGNOSIS — M79.672 CHRONIC PAIN IN LEFT FOOT: Primary | ICD-10-CM

## 2022-10-17 PROCEDURE — 99213 OFFICE O/P EST LOW 20 MIN: CPT | Mod: 25,S$GLB,, | Performed by: PODIATRIST

## 2022-10-17 PROCEDURE — 3075F PR MOST RECENT SYSTOLIC BLOOD PRESS GE 130-139MM HG: ICD-10-PCS | Mod: CPTII,S$GLB,, | Performed by: PODIATRIST

## 2022-10-17 PROCEDURE — 99999 PR PBB SHADOW E&M-EST. PATIENT-LVL III: CPT | Mod: PBBFAC,,, | Performed by: PODIATRIST

## 2022-10-17 PROCEDURE — 64455 NJX AA&/STRD PLTR COM DG NRV: CPT | Mod: LT,S$GLB,, | Performed by: PODIATRIST

## 2022-10-17 PROCEDURE — 1160F RVW MEDS BY RX/DR IN RCRD: CPT | Mod: CPTII,S$GLB,, | Performed by: PODIATRIST

## 2022-10-17 PROCEDURE — 1159F MED LIST DOCD IN RCRD: CPT | Mod: CPTII,S$GLB,, | Performed by: PODIATRIST

## 2022-10-17 PROCEDURE — 3079F PR MOST RECENT DIASTOLIC BLOOD PRESSURE 80-89 MM HG: ICD-10-PCS | Mod: CPTII,S$GLB,, | Performed by: PODIATRIST

## 2022-10-17 PROCEDURE — 64455 PR INJECT ANES/STEROID PLANTAR COMMON DIGITAL NERVE: ICD-10-PCS | Mod: LT,S$GLB,, | Performed by: PODIATRIST

## 2022-10-17 PROCEDURE — 1159F PR MEDICATION LIST DOCUMENTED IN MEDICAL RECORD: ICD-10-PCS | Mod: CPTII,S$GLB,, | Performed by: PODIATRIST

## 2022-10-17 PROCEDURE — 99999 PR PBB SHADOW E&M-EST. PATIENT-LVL III: ICD-10-PCS | Mod: PBBFAC,,, | Performed by: PODIATRIST

## 2022-10-17 PROCEDURE — 3079F DIAST BP 80-89 MM HG: CPT | Mod: CPTII,S$GLB,, | Performed by: PODIATRIST

## 2022-10-17 PROCEDURE — 1160F PR REVIEW ALL MEDS BY PRESCRIBER/CLIN PHARMACIST DOCUMENTED: ICD-10-PCS | Mod: CPTII,S$GLB,, | Performed by: PODIATRIST

## 2022-10-17 PROCEDURE — 99213 PR OFFICE/OUTPT VISIT, EST, LEVL III, 20-29 MIN: ICD-10-PCS | Mod: 25,S$GLB,, | Performed by: PODIATRIST

## 2022-10-17 PROCEDURE — 3075F SYST BP GE 130 - 139MM HG: CPT | Mod: CPTII,S$GLB,, | Performed by: PODIATRIST

## 2022-10-17 RX ORDER — TRIAMCINOLONE ACETONIDE 40 MG/ML
40 INJECTION, SUSPENSION INTRA-ARTICULAR; INTRAMUSCULAR
Status: COMPLETED | OUTPATIENT
Start: 2022-10-17 | End: 2022-10-17

## 2022-10-17 RX ADMIN — TRIAMCINOLONE ACETONIDE 40 MG: 40 INJECTION, SUSPENSION INTRA-ARTICULAR; INTRAMUSCULAR at 08:10

## 2022-10-17 NOTE — PROGRESS NOTES
Subjective:      Patient ID: Angel Mosquera is a 42 y.o. female.    Chief Complaint: Foot Pain (bilateral )    Patient presents to clinic with chief complaint of bilateral bottom heel pain for approximately 9 months now, left worse than right. She does not recall any prior injury to both feet other than jumping in a shallow pool this past summer. Pain is worse with the first couple of steps in the AM and worsens with prolonged standing/walking. She was seen by Dr. Swan 5 months ago who performed a steroid injection in her left heel which she states did not help with her pain. She has tried PT, stretching, night splints, and changing shoegear (HOKA, new balance, asics) with no improvement. Her right ankle recently started hurting, states it feels like her range of motion is limited, thinks it may be due to her compensating to stay off the left foot. 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     04/18/2022:  Presents complaining of chronic pain to the left heel for greater than a year.  She has a history of hysterectomy in December 2021 that required 8 weeks for recovery and modified duties.  She works as a nurse at [employer].  States she also has pain in the right heel to lesser extent.  Pending a trip to [place] later this week.  Requesting a steroid injection to left heel but also requesting consideration for surgical intervention to left heel next month.    06/24/2022:  Post endoscopic plantar fasciotomy left foot on 06/17/2022.  Reports minimal discomfort to left foot.  She has been ambulating with the orthopedic boot.  Taking ibuprofen as needed for pain.    07/07/2022:  Approximately 3 weeks postop.  She has been ambulating without her orthopedic boot but still uses for long distances.  Reports some numbness along the bottom of the foot when she touches it.  She reports pain over the medial surgical incision and no pain anywhere else.  She has no pain  "directly on the bottom of the foot when she stands.    07/29/2022: Approximately 6 weeks postop. She has been ambulating in standard footwear, she has been active with home chores and watching children. 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 She has had improved functional level with similar amounts of pain, she also has some residual numbness of the foot mostly pointing along the plantar lateral midfoot region. She has not filled the gabapentin prescription because she is not want to add to her medications.     09/29/2022:  Relates no longer has pain to the bottom the heel however has worsening pain to the left forefoot pointing to the ball the foot when she pushes off on the foot.  She describes a sharp pain only with standing and walking.  No pain at rest.  States that she is unable to tolerate barefoot walking.  Also relates that she still has some residual numbness to the left foot.    10/17/2022:  Follow-up from MRI left forefoot.  She states that initially when she wore the metatarsal pad she has significant improvement her symptoms for 1-2 days and then the pain began to worsen.  She did not attend work yesterday secondary to pain.    Vitals:    10/17/22 0753   BP: 136/89   Pulse: 76   Height: 5' 5" (1.651 m)   PainSc:   4   PainLoc: Foot      Past Medical History:   Diagnosis Date    Hypertension     PONV (postoperative nausea and vomiting)     Rosacea        Past Surgical History:   Procedure Laterality Date    AUGMENTATION OF BREAST Bilateral 2005    Breast Augmentation  2005    COSMETIC SURGERY  2005    Breast augmentation    DILATION AND CURETTAGE OF UTERUS N/A 10/19/2019    Procedure: DILATION AND CURETTAGE, UTERUS;  Surgeon: Frances Culp MD;  Location: [facility] OR;  Service: OB/GYN;  Laterality: N/A;  with suction    ENDOSCOPIC PLANTAR FASCIOTOMY Left 6/17/2022    Procedure: FASCIOTOMY, PLANTAR, ENDOSCOPIC;  Surgeon: Mello Montez DPM;  Location: [facility] OR;  " Service: Podiatry;  Laterality: Left;  endoscopic plantar fasciotomy kit    mischarr      ROBOT-ASSISTED LAPAROSCOPIC UTERINE MYOMECTOMY N/A 1/7/2020    Procedure: ROBOTIC MYOMECTOMY, UTERUS;  Surgeon: Fracnes Culp MD;  Location: [facility];  Service: OB/GYN;  Laterality: N/A;    XI ROBOTIC HYSTERECTOMY, WITH SALPINGO-OOPHORECTOMY N/A 12/15/2021    Procedure: XI ROBOTIC HYSTERECTOMY,WITH SALPINGO-OOPHORECTOMY;  Surgeon: Carla Aguero MD;  Location: [facility];  Service: OB/GYN;  Laterality: N/A;       Family History   Problem Relation Age of Onset    Diabetes Mother     Hypertension Mother     Heart failure Mother         viral?    Heart disease Mother     Hyperlipidemia Mother     Cancer Paternal Grandfather         Pancreatic Cancer    Cancer Paternal Grandmother         Lung Cancer    Arthritis Maternal Grandmother     Diabetes Maternal Grandmother     Alcohol abuse Maternal Grandfather     COPD Maternal Grandfather     Alcohol abuse Father     Alcohol abuse Maternal Uncle     Cancer Neg Hx     Ovarian cancer Neg Hx     Melanoma Neg Hx     Heart attack Neg Hx     Stroke Neg Hx     Colon cancer Neg Hx     Breast cancer Neg Hx     Blindness Neg Hx     Amblyopia Neg Hx     Cataracts Neg Hx     Glaucoma Neg Hx     Macular degeneration Neg Hx     Retinal detachment Neg Hx     Strabismus Neg Hx        Social History     Socioeconomic History    Marital status:    Occupational History    Occupation: Nurse     Employer: [employer]     Comment: Oncology   Tobacco Use    Smoking status: Never    Smokeless tobacco: Never   Substance and Sexual Activity    Alcohol use: No    Drug use: No    Sexual activity: Yes     Partners: Male     Birth control/protection: None     Comment: Vasectomy   Other Topics Concern    Are you pregnant or think you may be? No    Breast-feeding No     Social Determinants of Health     Financial Resource Strain: Low Risk     Difficulty of Paying Living Expenses: Not  hard at all   Food Insecurity: No Food Insecurity    Worried About Running Out of Food in the Last Year: Never true    Ran Out of Food in the Last Year: Never true   Transportation Needs: No Transportation Needs    Lack of Transportation (Medical): No    Lack of Transportation (Non-Medical): No   Physical Activity: Unknown    Days of Exercise per Week: Patient refused    Minutes of Exercise per Session: 0 min   Stress: Stress Concern Present    Feeling of Stress : To some extent   Social Connections: Unknown    Frequency of Communication with Friends and Family: More than three times a week    Frequency of Social Gatherings with Friends and Family: More than three times a week    Active Member of Clubs or Organizations: No    Attends Club or Organization Meetings: Never    Marital Status:    Housing Stability: Low Risk     Unable to Pay for Housing in the Last Year: No    Number of Places Lived in the Last Year: 1    Unstable Housing in the Last Year: No       Current Outpatient Medications   Medication Sig Dispense Refill    azelaic acid (FINACEA) 15 % gel Apply to affected area(s) of face nightly, then moisturize (Patient taking differently: Apply to affected area(s) of face nightly, then moisturize) 50 g 3    carvediloL (COREG) 6.25 MG tablet Take 1 tablet (6.25 mg total) by mouth 2 (two) times daily with meals. 60 tablet 11    hydrOXYzine HCL (ATARAX) 25 MG tablet Take 1 tablet (25 mg total) by mouth nightly as needed (Insomnia). 30 tablet 3    ibuprofen (ADVIL,MOTRIN) 800 MG tablet Take 1 tablet (800 mg total) by mouth every 6 (six) hours as needed for Pain. 60 tablet 1    multivitamin (THERAGRAN) per tablet Take 1 tablet by mouth once daily.       Current Facility-Administered Medications   Medication Dose Route Frequency Provider Last Rate Last Admin    sodium chloride 0.9% flush 10 mL  10 mL Intravenous PRN Mello Montez DPM           Review of patient's allergies indicates:  No Known  Allergies      Review of Systems   Constitutional: Negative for chills, fever and malaise/fatigue.   HENT:  Negative for hearing loss.    Cardiovascular:  Negative for claudication and leg swelling.   Respiratory:  Negative for cough, shortness of breath and wheezing.    Skin:  Negative for flushing, rash and unusual hair distribution.   Musculoskeletal:  Negative for joint pain and myalgias.        Bilateral heel pain L>R. Right ankle pain   Gastrointestinal:  Negative for nausea and vomiting.   Neurological:  Negative for loss of balance, numbness, paresthesias and sensory change.   Psychiatric/Behavioral:  Negative for altered mental status.          Objective:      Physical Exam  Constitutional:       General: She is not in acute distress.     Appearance: She is not ill-appearing.   Cardiovascular:      Pulses:           Dorsalis pedis pulses are 2+ on the right side and 2+ on the left side.        Posterior tibial pulses are 2+ on the right side and 2+ on the left side.      Comments: CFT< 3 secs all toes bilateral foot, skin temp warm to cool bilateral foot, no hair growth bilateral lower extremity, no edema to bilateral lower extremities    Musculoskeletal:      Comments: Mild pain on palpation overlying the tarsal tunnel left medial hindfoot that is localized and does not radiate.  No pain on palpation to the plantar heel left foot.  There is some mild diffuse pain on palpation along the medial arch.  Moderate pain on palpation to the distal 4th intermetatarsal space left foot.  Mild pain on palpation distal 3rd intermetatarsal space left foot.  No localized edema, warmth for discoloration to left foot.    Resting calcaneal stance position is inverted bilateral foot.  No significant collapse of the medial longitudinal arch left foot standing noting forefoot varus in comparison to the right.   Skin:     General: Skin is warm.      Capillary Refill: Capillary refill takes less than 2 seconds.      Findings: No  ecchymosis or erythema.      Nails: There is no clubbing.      Comments: Normal appearing scars left foot.   Neurological:      Mental Status: She is alert and oriented to person, place, and time.      Sensory: Sensation is intact.      Motor: Motor function is intact.      Comments: Light touch intact to bilateral feet.              Assessment:       Encounter Diagnoses   Name Primary?    Chronic pain in left foot Yes    Sparrow's neuroma of left foot          Plan:       Angel was seen today for foot problem, foot pain and follow-up.    Diagnoses and all orders for this visit:    Chronic pain in left foot    Sparrow's neuroma of left foot    Other orders  -     triamcinolone acetonide injection 40 mg    I counseled the patient on her conditions, their implications and medical management.    MRI left forefoot reviewed noting no 4th metatarsal stress fracture.  Clinically patient has neuroma to the distal 4th intermetatarsal space.  We discussed continued conservative care such as appropriate use of the metatarsal pad, corticosteroid injection, activity modifications, shoe gear modifications.  If conservative care fails then consider excision of the Sparrow's neuroma.    With the patient's verbal consent the skin was prepped overlying the distal 4th intermetatarsal space left forefoot with alcohol followed by skin anesthesia with ethyl chloride.  Injected 1:1 mixture containing 40 mg of triamcinolone with 1 mL 0.25% plain Marcaine to the affected area.  She tolerated the procedure well without apparent complication.  Instructed rest, ice and elevate.  Dispensed and fitted a metatarsal pad in demonstrated proper application once again.      RTC within 4 weeks or p.r.n. as discussed.  A portion of this note was generated by voice recognition software and may contain spelling and grammar errors.

## 2022-10-24 NOTE — PROGRESS NOTES
Vit B12 normal.  Vit D slightly low.  Is pt taking supplement?  If so, will need to increase.  If not, start OTC vit D 1000 units daily

## 2022-11-04 ENCOUNTER — PATIENT MESSAGE (OUTPATIENT)
Dept: INTERNAL MEDICINE | Facility: CLINIC | Age: 42
End: 2022-11-04
Payer: COMMERCIAL

## 2022-11-04 DIAGNOSIS — E66.01 MORBID OBESITY: Primary | ICD-10-CM

## 2022-11-07 RX ORDER — TIRZEPATIDE 2.5 MG/.5ML
2.5 INJECTION, SOLUTION SUBCUTANEOUS
Qty: 4 PEN | Refills: 2 | Status: SHIPPED | OUTPATIENT
Start: 2022-11-07 | End: 2023-01-06

## 2022-11-08 ENCOUNTER — PATIENT MESSAGE (OUTPATIENT)
Dept: ORTHOPEDICS | Facility: CLINIC | Age: 42
End: 2022-11-08
Payer: COMMERCIAL

## 2022-11-17 ENCOUNTER — PATIENT MESSAGE (OUTPATIENT)
Dept: PODIATRY | Facility: CLINIC | Age: 42
End: 2022-11-17
Payer: COMMERCIAL

## 2022-11-18 ENCOUNTER — OFFICE VISIT (OUTPATIENT)
Dept: PODIATRY | Facility: CLINIC | Age: 42
End: 2022-11-18
Payer: COMMERCIAL

## 2022-11-18 ENCOUNTER — TELEPHONE (OUTPATIENT)
Dept: INTERNAL MEDICINE | Facility: CLINIC | Age: 42
End: 2022-11-18
Payer: COMMERCIAL

## 2022-11-18 VITALS
DIASTOLIC BLOOD PRESSURE: 91 MMHG | HEIGHT: 65 IN | BODY MASS INDEX: 37.99 KG/M2 | WEIGHT: 228 LBS | SYSTOLIC BLOOD PRESSURE: 135 MMHG | HEART RATE: 70 BPM

## 2022-11-18 DIAGNOSIS — G57.62 MORTON'S NEUROMA OF LEFT FOOT: Primary | ICD-10-CM

## 2022-11-18 DIAGNOSIS — Z01.818 PREOP TESTING: ICD-10-CM

## 2022-11-18 PROCEDURE — 3075F SYST BP GE 130 - 139MM HG: CPT | Mod: CPTII,S$GLB,, | Performed by: PODIATRIST

## 2022-11-18 PROCEDURE — 3080F PR MOST RECENT DIASTOLIC BLOOD PRESSURE >= 90 MM HG: ICD-10-PCS | Mod: CPTII,S$GLB,, | Performed by: PODIATRIST

## 2022-11-18 PROCEDURE — 3080F DIAST BP >= 90 MM HG: CPT | Mod: CPTII,S$GLB,, | Performed by: PODIATRIST

## 2022-11-18 PROCEDURE — 20550 PR INJECT TENDON SHEATH/LIGAMENT: ICD-10-PCS | Mod: LT,S$GLB,, | Performed by: PODIATRIST

## 2022-11-18 PROCEDURE — 3075F PR MOST RECENT SYSTOLIC BLOOD PRESS GE 130-139MM HG: ICD-10-PCS | Mod: CPTII,S$GLB,, | Performed by: PODIATRIST

## 2022-11-18 PROCEDURE — 1160F PR REVIEW ALL MEDS BY PRESCRIBER/CLIN PHARMACIST DOCUMENTED: ICD-10-PCS | Mod: CPTII,S$GLB,, | Performed by: PODIATRIST

## 2022-11-18 PROCEDURE — 20550 NJX 1 TENDON SHEATH/LIGAMENT: CPT | Mod: LT,S$GLB,, | Performed by: PODIATRIST

## 2022-11-18 PROCEDURE — 99999 PR PBB SHADOW E&M-EST. PATIENT-LVL V: CPT | Mod: PBBFAC,,, | Performed by: PODIATRIST

## 2022-11-18 PROCEDURE — 1159F MED LIST DOCD IN RCRD: CPT | Mod: CPTII,S$GLB,, | Performed by: PODIATRIST

## 2022-11-18 PROCEDURE — 3008F BODY MASS INDEX DOCD: CPT | Mod: CPTII,S$GLB,, | Performed by: PODIATRIST

## 2022-11-18 PROCEDURE — 3008F PR BODY MASS INDEX (BMI) DOCUMENTED: ICD-10-PCS | Mod: CPTII,S$GLB,, | Performed by: PODIATRIST

## 2022-11-18 PROCEDURE — 99214 OFFICE O/P EST MOD 30 MIN: CPT | Mod: 25,57,S$GLB, | Performed by: PODIATRIST

## 2022-11-18 PROCEDURE — 1160F RVW MEDS BY RX/DR IN RCRD: CPT | Mod: CPTII,S$GLB,, | Performed by: PODIATRIST

## 2022-11-18 PROCEDURE — 99214 PR OFFICE/OUTPT VISIT, EST, LEVL IV, 30-39 MIN: ICD-10-PCS | Mod: 25,57,S$GLB, | Performed by: PODIATRIST

## 2022-11-18 PROCEDURE — 1159F PR MEDICATION LIST DOCUMENTED IN MEDICAL RECORD: ICD-10-PCS | Mod: CPTII,S$GLB,, | Performed by: PODIATRIST

## 2022-11-18 PROCEDURE — 99999 PR PBB SHADOW E&M-EST. PATIENT-LVL V: ICD-10-PCS | Mod: PBBFAC,,, | Performed by: PODIATRIST

## 2022-11-18 RX ORDER — METHYLPREDNISOLONE ACETATE 40 MG/ML
40 INJECTION, SUSPENSION INTRA-ARTICULAR; INTRALESIONAL; INTRAMUSCULAR; SOFT TISSUE
Status: COMPLETED | OUTPATIENT
Start: 2022-11-18 | End: 2022-11-18

## 2022-11-18 RX ORDER — CEFAZOLIN SODIUM 2 G/50ML
2 SOLUTION INTRAVENOUS
Status: CANCELLED | OUTPATIENT
Start: 2022-11-18

## 2022-11-18 RX ORDER — SODIUM CHLORIDE 0.9 % (FLUSH) 0.9 %
10 SYRINGE (ML) INJECTION
Status: SHIPPED | OUTPATIENT
Start: 2022-11-18

## 2022-11-18 RX ADMIN — METHYLPREDNISOLONE ACETATE 40 MG: 40 INJECTION, SUSPENSION INTRA-ARTICULAR; INTRALESIONAL; INTRAMUSCULAR; SOFT TISSUE at 09:11

## 2022-11-18 NOTE — TELEPHONE ENCOUNTER
----- Message from Danial Doherty sent at 11/18/2022  2:23 PM CST -----  Contact: self [phone]  Pt requesting a call in regards to pre-op for foot surgery clearance.    Please call and advise

## 2022-11-18 NOTE — TELEPHONE ENCOUNTER
----- Message from Isaura Wallis RN sent at 11/18/2022 11:36 AM CST -----  Regarding: Foot Surgery 12/27/22  Good afternoon:    Wanted to let you know that Dr Montez is planning on taking Ms Mosquera to surgery on 12/27/22 for neuroma excision left foot scheduled as MAC with local anesthesia.    She will need to make an appt with Dr. Ramachandran for surgical clearance or per his discretion he can order labs etc that are needed and write a progress note clearing her for surgery.     Thank you    Isaura

## 2022-11-18 NOTE — TELEPHONE ENCOUNTER
----- Message from Sally Gary sent at 11/18/2022 10:20 AM CST -----  Contact: lonny yo  .Type:  Sooner Appointment Request    Caller is requesting a sooner appointment.  Caller declined first available appointment listed below.  Caller will not accept being placed on the waitlist and is requesting a message be sent to doctor.  Name of Caller: Angel  When is the first available appointment?  Symptoms: Sparrow's neuroma of left foot [G57.62]  Preop testing [Z01.818]    Would the patient rather a call back or a response via [facility]?  Callback   Best Call Back Number: .[phone] (home)     Additional Information:  referral from Mello Montez DPM in [facility]

## 2022-11-18 NOTE — PROGRESS NOTES
Subjective:      Patient ID: Angel Mosquera is a 42 y.o. female.    Chief Complaint: Foot Pain (bilateral )    Patient presents to clinic with chief complaint of bilateral bottom heel pain for approximately 9 months now, left worse than right. She does not recall any prior injury to both feet other than jumping in a shallow pool this past summer. Pain is worse with the first couple of steps in the AM and worsens with prolonged standing/walking. She was seen by Dr. Swan 5 months ago who performed a steroid injection in her left heel which she states did not help with her pain. She has tried PT, stretching, night splints, and changing shoegear (HOKA, new balance, asics) with no improvement. Her right ankle recently started hurting, states it feels like her range of motion is limited, thinks it may be due to her compensating to stay off the left foot. 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     04/18/2022:  Presents complaining of chronic pain to the left heel for greater than a year.  She has a history of hysterectomy in December 2021 that required 8 weeks for recovery and modified duties.  She works as a nurse at [employer].  States she also has pain in the right heel to lesser extent.  Pending a trip to [place] later this week.  Requesting a steroid injection to left heel but also requesting consideration for surgical intervention to left heel next month.    06/24/2022:  Post endoscopic plantar fasciotomy left foot on 06/17/2022.  Reports minimal discomfort to left foot.  She has been ambulating with the orthopedic boot.  Taking ibuprofen as needed for pain.    07/07/2022:  Approximately 3 weeks postop.  She has been ambulating without her orthopedic boot but still uses for long distances.  Reports some numbness along the bottom of the foot when she touches it.  She reports pain over the medial surgical incision and no pain anywhere else.  She has no pain  "directly on the bottom of the foot when she stands.    07/29/2022: Approximately 6 weeks postop. She has been ambulating in standard footwear, she has been active with home chores and watching children. 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 She has had improved functional level with similar amounts of pain, she also has some residual numbness of the foot mostly pointing along the plantar lateral midfoot region. She has not filled the gabapentin prescription because she is not want to add to her medications.     09/29/2022:  Relates no longer has pain to the bottom the heel however has worsening pain to the left forefoot pointing to the ball the foot when she pushes off on the foot.  She describes a sharp pain only with standing and walking.  No pain at rest.  States that she is unable to tolerate barefoot walking.  Also relates that she still has some residual numbness to the left foot.    10/17/2022:  Follow-up from MRI left forefoot.  She states that initially when she wore the metatarsal pad she has significant improvement her symptoms for 1-2 days and then the pain began to worsen.  She did not attend work yesterday secondary to pain.    11/18/2022:  Follow-up from steroid injection to the left forefoot for Sparrow's neuroma.  Relates nearly 3 weeks of complete pain relief.  States that the pain gradually recurred and she is also developed some pain along the arch from compensation.  She is inquiring about another steroid injection in addition to surgical intervention.    Vitals:    11/18/22 0903   BP: (!) 135/91   Pulse: 70   Weight: 103.4 kg (228 lb)   Height: 5' 5" (1.651 m)   PainSc:   6   PainLoc: Foot      Past Medical History:   Diagnosis Date    Hypertension     PONV (postoperative nausea and vomiting)     Rosacea        Past Surgical History:   Procedure Laterality Date    AUGMENTATION OF BREAST Bilateral 2005    Breast Augmentation  2005    COSMETIC SURGERY  2005    Breast " augmentation    DILATION AND CURETTAGE OF UTERUS N/A 10/19/2019    Procedure: DILATION AND CURETTAGE, UTERUS;  Surgeon: Frances Culp MD;  Location: [facility] OR;  Service: OB/GYN;  Laterality: N/A;  with suction    ENDOSCOPIC PLANTAR FASCIOTOMY Left 6/17/2022    Procedure: FASCIOTOMY, PLANTAR, ENDOSCOPIC;  Surgeon: Mello Montez DPM;  Location: [facility] OR;  Service: Podiatry;  Laterality: Left;  endoscopic plantar fasciotomy kit    mischarr      ROBOT-ASSISTED LAPAROSCOPIC UTERINE MYOMECTOMY N/A 1/7/2020    Procedure: ROBOTIC MYOMECTOMY, UTERUS;  Surgeon: Frances Culp MD;  Location: [facility] OR;  Service: OB/GYN;  Laterality: N/A;    XI ROBOTIC HYSTERECTOMY, WITH SALPINGO-OOPHORECTOMY N/A 12/15/2021    Procedure: XI ROBOTIC HYSTERECTOMY,WITH SALPINGO-OOPHORECTOMY;  Surgeon: Carla Aguero MD;  Location: [facility] OR;  Service: OB/GYN;  Laterality: N/A;       Family History   Problem Relation Age of Onset    Diabetes Mother     Hypertension Mother     Heart failure Mother         viral?    Heart disease Mother     Hyperlipidemia Mother     Cancer Paternal Grandfather         Pancreatic Cancer    Cancer Paternal Grandmother         Lung Cancer    Arthritis Maternal Grandmother     Diabetes Maternal Grandmother     Alcohol abuse Maternal Grandfather     COPD Maternal Grandfather     Alcohol abuse Father     Alcohol abuse Maternal Uncle     Cancer Neg Hx     Ovarian cancer Neg Hx     Melanoma Neg Hx     Heart attack Neg Hx     Stroke Neg Hx     Colon cancer Neg Hx     Breast cancer Neg Hx     Blindness Neg Hx     Amblyopia Neg Hx     Cataracts Neg Hx     Glaucoma Neg Hx     Macular degeneration Neg Hx     Retinal detachment Neg Hx     Strabismus Neg Hx        Social History     Socioeconomic History    Marital status:    Occupational History    Occupation: Nurse     Employer: [employer]     Comment: Oncology   Tobacco Use    Smoking status: Never    Smokeless tobacco: Never   Substance  and Sexual Activity    Alcohol use: No    Drug use: No    Sexual activity: Yes     Partners: Male     Birth control/protection: None     Comment: Vasectomy   Other Topics Concern    Are you pregnant or think you may be? No    Breast-feeding No     Social Determinants of Health     Financial Resource Strain: Low Risk     Difficulty of Paying Living Expenses: Not hard at all   Food Insecurity: No Food Insecurity    Worried About Running Out of Food in the Last Year: Never true    Ran Out of Food in the Last Year: Never true   Transportation Needs: No Transportation Needs    Lack of Transportation (Medical): No    Lack of Transportation (Non-Medical): No   Physical Activity: Unknown    Days of Exercise per Week: Patient refused   Stress: Stress Concern Present    Feeling of Stress : To some extent   Social Connections: Unknown    Frequency of Communication with Friends and Family: More than three times a week    Frequency of Social Gatherings with Friends and Family: More than three times a week    Active Member of Clubs or Organizations: No    Attends Club or Organization Meetings: Never    Marital Status:    Housing Stability: Low Risk     Unable to Pay for Housing in the Last Year: No    Number of Places Lived in the Last Year: 1    Unstable Housing in the Last Year: No       Current Outpatient Medications   Medication Sig Dispense Refill    azelaic acid (FINACEA) 15 % gel Apply to affected area(s) of face nightly, then moisturize (Patient taking differently: Apply to affected area(s) of face nightly, then moisturize) 50 g 3    carvediloL (COREG) 6.25 MG tablet Take 1 tablet (6.25 mg total) by mouth 2 (two) times daily with meals. 60 tablet 11    hydrOXYzine HCL (ATARAX) 25 MG tablet Take 1 tablet (25 mg total) by mouth nightly as needed (Insomnia). 30 tablet 3    ibuprofen (ADVIL,MOTRIN) 800 MG tablet Take 1 tablet (800 mg total) by mouth every 6 (six) hours as needed for Pain. 60 tablet 1    multivitamin  (THERAGRAN) per tablet Take 1 tablet by mouth once daily.      tirzepatide (MOUNJARO) 2.5 mg/0.5 mL PnIj Inject 2.5 mg into the skin every 7 days. 4 pen 2     Current Facility-Administered Medications   Medication Dose Route Frequency Provider Last Rate Last Admin    sodium chloride 0.9% flush 10 mL  10 mL Intravenous PRN Mello Montez DPM        sodium chloride 0.9% flush 10 mL  10 mL Intravenous PRN Mello Montez DPM           Review of patient's allergies indicates:  No Known Allergies      Review of Systems   Constitutional: Negative for chills, fever and malaise/fatigue.   HENT:  Negative for hearing loss.    Cardiovascular:  Negative for claudication and leg swelling.   Respiratory:  Negative for cough, shortness of breath and wheezing.    Skin:  Negative for flushing, rash and unusual hair distribution.   Musculoskeletal:  Negative for joint pain and myalgias.        Bilateral heel pain L>R. Right ankle pain   Gastrointestinal:  Negative for nausea and vomiting.   Neurological:  Negative for loss of balance, numbness, paresthesias and sensory change.   Psychiatric/Behavioral:  Negative for altered mental status.          Objective:      Physical Exam  Constitutional:       General: She is not in acute distress.     Appearance: She is not ill-appearing.   Cardiovascular:      Pulses:           Dorsalis pedis pulses are 2+ on the right side and 2+ on the left side.        Posterior tibial pulses are 2+ on the right side and 2+ on the left side.      Comments: CFT< 3 secs all toes bilateral foot, skin temp warm to cool bilateral foot, no hair growth bilateral lower extremity, no edema to bilateral lower extremities    Musculoskeletal:      Comments: No pain on palpation to the plantar heel left foot.  There is some mild diffuse pain on palpation along the medial arch.  Moderate pain on palpation to the distal 4th intermetatarsal space left foot.  Mild pain on palpation distal 3rd intermetatarsal  space left foot.  No localized edema, warmth for discoloration to left foot.    Resting calcaneal stance position is inverted bilateral foot.  No significant collapse of the medial longitudinal arch left foot standing noting forefoot varus in comparison to the right.   Skin:     General: Skin is warm.      Capillary Refill: Capillary refill takes less than 2 seconds.      Findings: No ecchymosis or erythema.      Nails: There is no clubbing.      Comments: Normal appearing scars left foot.   Neurological:      Mental Status: She is alert and oriented to person, place, and time.      Sensory: Sensation is intact.      Motor: Motor function is intact.      Comments: Light touch intact to bilateral feet.              Assessment:       Encounter Diagnoses   Name Primary?    Sparrow's neuroma of left foot Yes    Preop testing          Plan:       Angel was seen today for foot pain.    Diagnoses and all orders for this visit:    Sparrow's neuroma of left foot  -     Ambulatory referral/consult to Internal Medicine; Future  -     Case Request Operating Room: EXCISION, SPARROW'S NEUROMA  -     Full code; Standing  -     Place in Outpatient; Standing  -     Insert peripheral IV; Standing  -     Verify informed consent; Standing  -     Verify surgical site; Standing  -     Full code  -     Insert peripheral IV    Preop testing  -     Ambulatory referral/consult to Internal Medicine; Future    Other orders  -     methylPREDNISolone acetate injection 40 mg  -     sodium chloride 0.9% flush 10 mL  -     cefazolin (ANCEF) 2 gram in dextrose 5% 50 mL IVPB (premix)    I counseled the patient on her conditions, their implications and medical management.    MRI left forefoot reviewed noting no 4th metatarsal stress fracture.  Clinically patient has neuroma to the distal 4th intermetatarsal space.      We discussed further conservative care such as another steroid injection, shoe gear modifications, padding to offload the neuroma versus  surgical intervention consisting of excision of the Sparrow's neuroma 4th intermetatarsal space left foot.  Risks, benefits and postop course discussed in detail.  No guarantees were given or implied.  Patient elected to proceed surgical intervention however she has also requesting another steroid injection for today.    With the patient's verbal consent the skin was prepped overlying the distal 4th intermetatarsal space left forefoot with alcohol followed by skin anesthesia with ethyl chloride.  Injected 1:1 mixture containing 40 mg of Depo-Medrol with 1 mL 0.5% plain Marcaine to the affected area.  She tolerated the procedure well without apparent complication.  Instructed rest, ice and elevate.      This procedure has been fully reviewed with the patient and written informed consent has been obtained.    Referred to PCP for medical optimization and risk stratification    Surgical intervention tentatively scheduled for 12/27/2022 as mac with local anesthesia.  We will attempt to get 7:00 a.m. time slot.    RTC 1 week postoperative or prn as discussed.      A portion of this note was generated by voice recognition software and may contain spelling and grammar errors.

## 2022-12-02 ENCOUNTER — PATIENT MESSAGE (OUTPATIENT)
Dept: INTERNAL MEDICINE | Facility: CLINIC | Age: 42
End: 2022-12-02
Payer: COMMERCIAL

## 2022-12-02 DIAGNOSIS — H66.90 ACUTE OTITIS MEDIA, UNSPECIFIED OTITIS MEDIA TYPE: Primary | ICD-10-CM

## 2022-12-02 RX ORDER — AMOXICILLIN AND CLAVULANATE POTASSIUM 875; 125 MG/1; MG/1
1 TABLET, FILM COATED ORAL EVERY 12 HOURS
Qty: 20 TABLET | Refills: 0 | Status: ON HOLD | OUTPATIENT
Start: 2022-12-02 | End: 2023-01-05 | Stop reason: HOSPADM

## 2022-12-07 ENCOUNTER — PATIENT MESSAGE (OUTPATIENT)
Dept: SURGERY | Facility: HOSPITAL | Age: 42
End: 2022-12-07
Payer: COMMERCIAL

## 2022-12-15 ENCOUNTER — OFFICE VISIT (OUTPATIENT)
Dept: INTERNAL MEDICINE | Facility: CLINIC | Age: 42
End: 2022-12-15
Payer: COMMERCIAL

## 2022-12-15 VITALS
OXYGEN SATURATION: 99 % | DIASTOLIC BLOOD PRESSURE: 78 MMHG | SYSTOLIC BLOOD PRESSURE: 120 MMHG | HEART RATE: 66 BPM | WEIGHT: 224.19 LBS | HEIGHT: 65 IN | BODY MASS INDEX: 37.35 KG/M2

## 2022-12-15 DIAGNOSIS — Z01.818 PREOP TESTING: ICD-10-CM

## 2022-12-15 DIAGNOSIS — G57.62 MORTON'S NEUROMA OF LEFT FOOT: ICD-10-CM

## 2022-12-15 PROCEDURE — 3008F PR BODY MASS INDEX (BMI) DOCUMENTED: ICD-10-PCS | Mod: CPTII,S$GLB,, | Performed by: INTERNAL MEDICINE

## 2022-12-15 PROCEDURE — 99213 OFFICE O/P EST LOW 20 MIN: CPT | Mod: S$GLB,,, | Performed by: INTERNAL MEDICINE

## 2022-12-15 PROCEDURE — 3074F PR MOST RECENT SYSTOLIC BLOOD PRESSURE < 130 MM HG: ICD-10-PCS | Mod: CPTII,S$GLB,, | Performed by: INTERNAL MEDICINE

## 2022-12-15 PROCEDURE — 99999 PR PBB SHADOW E&M-EST. PATIENT-LVL IV: ICD-10-PCS | Mod: PBBFAC,,, | Performed by: INTERNAL MEDICINE

## 2022-12-15 PROCEDURE — 1159F MED LIST DOCD IN RCRD: CPT | Mod: CPTII,S$GLB,, | Performed by: INTERNAL MEDICINE

## 2022-12-15 PROCEDURE — 1159F PR MEDICATION LIST DOCUMENTED IN MEDICAL RECORD: ICD-10-PCS | Mod: CPTII,S$GLB,, | Performed by: INTERNAL MEDICINE

## 2022-12-15 PROCEDURE — 3078F PR MOST RECENT DIASTOLIC BLOOD PRESSURE < 80 MM HG: ICD-10-PCS | Mod: CPTII,S$GLB,, | Performed by: INTERNAL MEDICINE

## 2022-12-15 PROCEDURE — 99213 PR OFFICE/OUTPT VISIT, EST, LEVL III, 20-29 MIN: ICD-10-PCS | Mod: S$GLB,,, | Performed by: INTERNAL MEDICINE

## 2022-12-15 PROCEDURE — 99999 PR PBB SHADOW E&M-EST. PATIENT-LVL IV: CPT | Mod: PBBFAC,,, | Performed by: INTERNAL MEDICINE

## 2022-12-15 PROCEDURE — 3008F BODY MASS INDEX DOCD: CPT | Mod: CPTII,S$GLB,, | Performed by: INTERNAL MEDICINE

## 2022-12-15 PROCEDURE — 3074F SYST BP LT 130 MM HG: CPT | Mod: CPTII,S$GLB,, | Performed by: INTERNAL MEDICINE

## 2022-12-15 PROCEDURE — 3078F DIAST BP <80 MM HG: CPT | Mod: CPTII,S$GLB,, | Performed by: INTERNAL MEDICINE

## 2022-12-15 NOTE — PROGRESS NOTES
[facility] Note    Chief Complaint      Chief Complaint   Patient presents with    Pre-op Exam     Left foot surgery       History of Present Illness      Angel Mosquera is a 42 y.o. female with chronic conditions of anxiety, palpitations, bilateral foot pain who presents today for: preop exam left foot mcgovern's neuroma resection with Dr. Montez scheduled on 1/5/2023.  Denies chest pain, shortness of breath.  Able to walk a city block and climb a flight of stairs without chest pain.  Last creatinine 0.8, GFR >60 8/2022 and has been stable historically.  BP at goal 120/78.      Past Medical History:  Past Medical History:   Diagnosis Date    Hypertension     PONV (postoperative nausea and vomiting)     Rosacea        Past Surgical History:   has a past surgical history that includes Breast Augmentation (2005); mischarr; Dilation and curettage of uterus (N/A, 10/19/2019); Robot-assisted laparoscopic uterine myomectomy (N/A, 1/7/2020); Augmentation of breast (Bilateral, 2005); Cosmetic surgery (2005); xi robotic hysterectomy, with salpingo-oophorectomy (N/A, 12/15/2021); and Endoscopic plantar fasciotomy (Left, 6/17/2022).    Family History:  family history includes Alcohol abuse in her father, maternal grandfather, and maternal uncle; Arthritis in her maternal grandmother; COPD in her maternal grandfather; Cancer in her paternal grandfather and paternal grandmother; Diabetes in her maternal grandmother and mother; Heart disease in her mother; Heart failure in her mother; Hyperlipidemia in her mother; Hypertension in her mother.     Social History:  Social History     Tobacco Use    Smoking status: Never    Smokeless tobacco: Never   Substance Use Topics    Alcohol use: No    Drug use: No       I personally reviewed all past medical, surgical, social and family history.    Review of Systems   Constitutional:  Negative for chills, fever and malaise/fatigue.   Respiratory:  Negative for shortness of  breath.    Cardiovascular:  Negative for chest pain.   Gastrointestinal:  Negative for constipation, diarrhea, nausea and vomiting.   Skin:  Negative for rash.   Neurological:  Negative for weakness.   All other systems reviewed and are negative.     Medications:  Outpatient Encounter Medications as of 12/15/2022   Medication Sig Dispense Refill    amoxicillin-clavulanate 875-125mg (AUGMENTIN) 875-125 mg per tablet Take 1 tablet by mouth every 12 (twelve) hours. (Patient not taking: Reported on 12/15/2022) 20 tablet 0    azelaic acid (FINACEA) 15 % gel Apply to affected area(s) of face nightly, then moisturize (Patient taking differently: Apply to affected area(s) of face nightly, then moisturize) 50 g 3    carvediloL (COREG) 6.25 MG tablet Take 1 tablet (6.25 mg total) by mouth 2 (two) times daily with meals. 60 tablet 11    hydrOXYzine HCL (ATARAX) 25 MG tablet Take 1 tablet (25 mg total) by mouth nightly as needed (Insomnia). 30 tablet 3    ibuprofen (ADVIL,MOTRIN) 800 MG tablet Take 1 tablet (800 mg total) by mouth every 6 (six) hours as needed for Pain. 60 tablet 1    multivitamin (THERAGRAN) per tablet Take 1 tablet by mouth once daily.      tirzepatide (MOUNJARO) 2.5 mg/0.5 mL PnIj Inject 2.5 mg into the skin every 7 days. 4 pen 2     Facility-Administered Encounter Medications as of 12/15/2022   Medication Dose Route Frequency Provider Last Rate Last Admin    sodium chloride 0.9% flush 10 mL  10 mL Intravenous PRN Mello Montez DPM        sodium chloride 0.9% flush 10 mL  10 mL Intravenous PRN Mello Montez DPM           Allergies:  Review of patient's allergies indicates:  No Known Allergies    Health Maintenance:  Immunization History   Administered Date(s) Administered    COVID-19, MRNA, LN-S, PF (Pfizer) (Purple Cap) 12/30/2020, 01/20/2021    Influenza - Quadrivalent - PF *Preferred* (6 months and older) 11/04/2016, 10/09/2017, 10/26/2018, 10/23/2019, 10/19/2020    Meningococcal Conjugate  "05/20/2013    Tdap 06/27/2019      Health Maintenance   Topic Date Due    Mammogram  06/08/2023    TETANUS VACCINE  06/27/2029    Hepatitis C Screening  Completed    Lipid Panel  Completed        Physical Exam      Vital Signs  Pulse: 66  SpO2: 99 %  BP: 120/78  BP Location: Left arm  Patient Position: Sitting  Pain Score: 0-No pain  Height and Weight  Height: 5' 5" (165.1 cm)  Weight: 101.7 kg (224 lb 3.3 oz)  BSA (Calculated - sq m): 2.16 sq meters  BMI (Calculated): 37.3  Weight in (lb) to have BMI = 25: 149.9]    Physical Exam  Vitals reviewed.   Constitutional:       Appearance: She is well-developed.   HENT:      Head: Normocephalic and atraumatic.      Right Ear: External ear normal.      Left Ear: External ear normal.   Cardiovascular:      Rate and Rhythm: Normal rate and regular rhythm.      Heart sounds: Normal heart sounds. No murmur heard.  Pulmonary:      Effort: Pulmonary effort is normal.      Breath sounds: Normal breath sounds. No wheezing or rales.   Abdominal:      General: Bowel sounds are normal. There is no distension.      Palpations: Abdomen is soft.      Tenderness: There is no abdominal tenderness.        Laboratory:  CBC:  Recent Labs   Lab 12/13/21  1144 06/08/22  0715 08/05/22  1230   WBC 10.85 8.36 7.60   RBC 4.77 4.88 4.79   Hemoglobin 12.2 12.3 12.3   Hematocrit 40.7 40.2 40.8   Platelets 359 378 395   MCV 85 82 85   MCH 25.6 L 25.2 L 25.7 L   MCHC 30.0 L 30.6 L 30.1 L     CMP:  Recent Labs   Lab 12/27/21  1525 06/08/22  0715 08/05/22  1230   Glucose 116 H 101 136 H   Calcium 9.4 9.1 9.9   Albumin 3.8 4.5 4.1   Total Protein 7.9 8.1 8.2   Sodium 139 142 139   Potassium 3.5 4.3 3.6   CO2 27 25 28   Chloride 105 110 103   BUN 13 8 10   Alkaline Phosphatase 74 100 104   ALT 32 26 24   AST 22 27 19   Total Bilirubin 0.2 0.4 0.4     URINALYSIS:  Recent Labs   Lab 05/02/22  0011   Color, UA Yellow   Specific Gravity, UA 1.015   pH, UA 6.0   Protein, UA Negative   Nitrite, UA Negative "   Leukocytes, UA Negative   Urobilinogen, UA Negative      LIPIDS:  Recent Labs   Lab 04/28/20  1202 06/30/21  0719 12/27/21  1525 06/08/22  0715   TSH 1.420 0.878 0.718 1.080   HDL 41 49  --  38 L   Cholesterol 160 173  --  174   Triglycerides 104 96  --  115   LDL Cholesterol 98.2 104.8  --  113.0   HDL/Cholesterol Ratio 25.6 28.3  --  21.8   Non-HDL Cholesterol 119 124  --  136   Total Cholesterol/HDL Ratio 3.9 3.5  --  4.6     TSH:  Recent Labs   Lab 06/30/21  0719 12/27/21  1525 06/08/22  0715   TSH 0.878 0.718 1.080     A1C:        Assessment/Plan     Angel Mosquera is a 42 y.o.female with:    1. Preop testing  - Ambulatory referral/consult to Internal Medicine    2. Sparrow's neuroma of left foot  - Ambulatory referral/consult to Internal Medicine  Medically optimized for low risk procedure with low cardiac risk profile.      Chronic conditions status updated as per HPI.  Other than changes above, cont current medications and maintain follow up with specialists.  No follow-ups on file.    Future Appointments   Date Time Provider Department Center   12/20/2022  8:00 AM PRE-ADMIT [facility]   1/10/2023  1:45 PM Mello Montez DPM [facility]   1/24/2023  2:30 PM Mello Montez DPM [facility] [place] [facility]       Tato Ramachandran MD  [facility]

## 2022-12-19 ENCOUNTER — PATIENT MESSAGE (OUTPATIENT)
Dept: SURGERY | Facility: HOSPITAL | Age: 42
End: 2022-12-19
Payer: COMMERCIAL

## 2022-12-20 ENCOUNTER — ANESTHESIA EVENT (OUTPATIENT)
Dept: SURGERY | Facility: HOSPITAL | Age: 42
End: 2022-12-20
Payer: COMMERCIAL

## 2022-12-20 ENCOUNTER — HOSPITAL ENCOUNTER (OUTPATIENT)
Dept: PREADMISSION TESTING | Facility: HOSPITAL | Age: 42
Discharge: HOME | End: 2022-12-20
Attending: PODIATRIST
Payer: COMMERCIAL

## 2022-12-20 VITALS
HEART RATE: 77 BPM | DIASTOLIC BLOOD PRESSURE: 63 MMHG | SYSTOLIC BLOOD PRESSURE: 134 MMHG | TEMPERATURE: 98 F | BODY MASS INDEX: 37.28 KG/M2 | RESPIRATION RATE: 16 BRPM | OXYGEN SATURATION: 99 % | WEIGHT: 223.75 LBS | HEIGHT: 65 IN

## 2022-12-20 DIAGNOSIS — Z01.818 PREOP TESTING: ICD-10-CM

## 2022-12-20 PROCEDURE — 93005 ELECTROCARDIOGRAM TRACING: CPT

## 2022-12-20 PROCEDURE — 93010 ELECTROCARDIOGRAM REPORT: CPT | Mod: ,,, | Performed by: INTERNAL MEDICINE

## 2022-12-20 PROCEDURE — 93010 EKG 12-LEAD: ICD-10-PCS | Mod: ,,, | Performed by: INTERNAL MEDICINE

## 2022-12-20 RX ORDER — SODIUM CHLORIDE, SODIUM LACTATE, POTASSIUM CHLORIDE, CALCIUM CHLORIDE 600; 310; 30; 20 MG/100ML; MG/100ML; MG/100ML; MG/100ML
INJECTION, SOLUTION INTRAVENOUS CONTINUOUS
Status: CANCELLED | OUTPATIENT
Start: 2022-12-20

## 2022-12-20 RX ORDER — SCOLOPAMINE TRANSDERMAL SYSTEM 1 MG/1
1 PATCH, EXTENDED RELEASE TRANSDERMAL
Status: CANCELLED | OUTPATIENT
Start: 2022-12-20

## 2022-12-20 RX ORDER — LIDOCAINE HYDROCHLORIDE 10 MG/ML
1 INJECTION, SOLUTION EPIDURAL; INFILTRATION; INTRACAUDAL; PERINEURAL ONCE
Status: CANCELLED | OUTPATIENT
Start: 2022-12-20 | End: 2022-12-20

## 2022-12-20 NOTE — DISCHARGE INSTRUCTIONS
Your surgery is scheduled for 1/5/22.    Please report to [facility] on the 1st Floor at 0530 a.m.    THIS TIME IS SUBJECT TO CHANGE.  YOU WILL RECEIVE A PHONE CALL THE DAY BEFORE SURGERY BY 3:30 PM TO CONFIRM YOUR TIME OF ARRIVAL.  IF YOU HAVE NOT RECEIVED A PHONE CALL BY 3:30 PM THE DAY BEFORE YOUR SURGERY PLEASE CALL [phone]     INSTRUCTIONS IMPORTANT!!!  ¨ Do not eat or drink after 12 midnight-including water, candy, gum, & mints. OK to brush teeth.      ____  Proceed to [facility] on *** for additional testing.        ____  Do not wear makeup, including mascara.  ____  No powder, lotions or creams to surgical area.  ____  Please remove all jewelry, including piercings and leave at home.  ____  No money or valuables needed. Please leave at home.  ____  Please bring any documents given by your doctor.  ____  If going home the same day, arrange for a ride home. You will not be able to             drive if Anesthesia was used.  ____  Children under 18 years require a parent / guardian present the entire time             they are in surgery / recovery.  ____  Wear loose fitting clothing. Allow for dressings, bandages.  ____  Stop Aspirin, Ibuprofen, Motrin, Aleve, Goody's/BC powders, Excedrine and Naproxen (NSAIDS) at least 3-5 days before surgery, unless otherwise instructed by your doctor, or the nurse.   You MAY use Tylenol/acetaminophen until day of surgery.  ____  Wash the surgical area with Hibiclens the night before surgery, and again the             morning of surgery.  Be sure to rinse hibiclens off completely (if instructed by   nurse).  ____  If you take diabetic medication, do not take am of surgery unless instructed by Doctor.  ____  Call MD for temperature above 101 degrees or any other signs of infection such as Urinary (bladder) infection, Upper respiratory infection, skin boils, etc.  ____ Stop taking any Fish Oil supplement or any Vitamins that contain Vitamin E at  least 5 days prior to surgery.  ____ Do Not wear your contact lenses the day of your procedure.  You may wear your glasses.      ____Do not shave surgical site for 3 days prior to surgery.  ____ Practice Good hand washing before, during, and after procedure.      I have read or had read and explained to me, and understand the above information.  Additional comments or instructions:  For additional questions call [phone]      ANESTHESIA SIDE EFFECTS  -For the first 24 hours after surgery:  Do not drive, use heavy equipment, make important decisions, or drink alcohol  -It is normal to feel sleepy for several hours.  Rest until you are more awake.  -Have someone stay with you, if needed.  They can watch for problems and help keep you safe.  -Some possible post anesthesia side effects include: nausea and vomiting, sore throat and hoarseness, sleepiness, and dizziness.        Pre-Op Bathing Instructions    Before surgery, you can play an important role in your own health.    Because skin is not sterile, we need to be sure that your skin is as free of germs as possible. By following the instructions below, you can reduce the number of germs on your skin before surgery.    IMPORTANT: You will need to shower with a special soap called Hibiclens*, available at any pharmacy.  If you are allergic to Chlorhexidine (the antiseptic in Hibiclens), use an antibacterial soap such as Dial Soap for your preoperative shower.  You will shower with Hibiclens both the night before your surgery and the morning of your surgery.  Do not use Hibiclens on the head, face or genitals to avoid injury to those areas.    STEP #1: THE NIGHT BEFORE YOUR SURGERY     Do not shave the area of your body where your surgery will be performed.  Shower and wash your hair and body as usual with your normal soap and shampoo.  Rinse your hair and body thoroughly after you shower to remove all soap residue.  With your hand, apply one packet of Hibiclens soap to  the surgical site.   Wash the site gently for five (5) minutes. Do not scrub your skin too hard.   Do not wash with your regular soap after Hibiclens is used.  Rinse your body thoroughly.  Pat yourself dry with a clean, soft towel.  Do not use lotion, cream, or powder.  Wear clean clothes.    STEP #2: THE MORNING OF YOUR SURGERY     Repeat Step #1.    * Not to be used by people allergic to Chlorhexidine.

## 2022-12-20 NOTE — ANESTHESIA PREPROCEDURE EVALUATION
"                                                                                                             12/20/2022  Angel Mosquera is a 42 y.o., female scheduled for EXCISION, FERNANDEZ'S NEUROMA (Left) 1/5/22.    H&P completed 12/15/22  Per internal medicine Dr. Ramachandran, "Medically optimized for low risk procedure with low cardiac risk profile."    Past Medical History:   Diagnosis Date    Hypertension     PONV (postoperative nausea and vomiting)     Rosacea      Past Surgical History:   Procedure Laterality Date    AUGMENTATION OF BREAST Bilateral 2005    Breast Augmentation  2005    COSMETIC SURGERY  2005    Breast augmentation    DILATION AND CURETTAGE OF UTERUS N/A 10/19/2019    Procedure: DILATION AND CURETTAGE, UTERUS;  Surgeon: Frances Culp MD;  Location: [facility], operated by [facility] OR;  Service: OB/GYN;  Laterality: N/A;  with suction    ENDOSCOPIC PLANTAR FASCIOTOMY Left 6/17/2022    Procedure: FASCIOTOMY, PLANTAR, ENDOSCOPIC;  Surgeon: Mello Montez DPM;  Location: [facility] OR;  Service: Podiatry;  Laterality: Left;  endoscopic plantar fasciotomy kit    mischarr      ROBOT-ASSISTED LAPAROSCOPIC UTERINE MYOMECTOMY N/A 1/7/2020    Procedure: ROBOTIC MYOMECTOMY, UTERUS;  Surgeon: Frances Culp MD;  Location: [facility], operated by [facility] OR;  Service: OB/GYN;  Laterality: N/A;    XI ROBOTIC HYSTERECTOMY, WITH SALPINGO-OOPHORECTOMY N/A 12/15/2021    Procedure: XI ROBOTIC HYSTERECTOMY,WITH SALPINGO-OOPHORECTOMY;  Surgeon: Carla Aguero MD;  Location: [facility], operated by [facility] OR;  Service: OB/GYN;  Laterality: N/A;     Current Outpatient Medications   Medication Instructions    amoxicillin-clavulanate 875-125mg (AUGMENTIN) 875-125 mg per tablet 1 tablet, Oral, Every 12 hours    azelaic acid (FINACEA) 15 % gel Apply to affected area(s) of face nightly, then moisturize    carvediloL (COREG) 6.25 mg, Oral, 2 times daily with meals    hydrOXYzine HCL (ATARAX) 25 mg, Oral, Nightly PRN    ibuprofen (ADVIL,MOTRIN) 800 mg, Oral, Every 6 " hours PRN    MOUNJARO 2.5 mg, Subcutaneous, Every 7 days    multivitamin (THERAGRAN) per tablet 1 tablet, Oral, Daily       Pre-op Assessment    I have reviewed the Patient Summary Reports.     I have reviewed the Nursing Notes.    I have reviewed the Medications.     Review of Systems  Anesthesia Hx:  Personal Hx of Anesthesia complications, Post-Operative Nausea/Vomiting  Nerve Injury   Social:  Non-Smoker, Social Alcohol Use    Hematology/Oncology:  Hematology Normal        Cardiovascular:   Exercise tolerance: good Hypertension, well controlled  Denies Angina.    Pulmonary:  Pulmonary Normal  Denies Shortness of breath.    Renal/:  Renal/ Normal     Hepatic/GI:  Hepatic/GI Normal  Denies GERD.    Neurological:   Denies TIA. Denies CVA. Denies Seizures.   Chronic Pain Syndrome (left foot)   Endocrine:  Endocrine Normal  Obesity / BMI > 30  Psych:   anxiety          Physical Exam  General: Well nourished and Cooperative    Airway:  Mallampati: IV / III  Mouth Opening: Normal  TM Distance: Normal  Tongue: Normal  Neck ROM: Normal ROM    Dental:  Intact, Retainer    Chest/Lungs:  Clear to auscultation, Normal Respiratory Rate    Heart:  Rate: Normal  Rhythm: Regular Rhythm  Sounds: Normal      Lab Results   Component Value Date    WBC 7.60 08/05/2022    HGB 12.3 08/05/2022    HCT 40.8 08/05/2022     08/05/2022    CHOL 174 06/08/2022    TRIG 115 06/08/2022    HDL 38 (L) 06/08/2022    ALT 24 08/05/2022    AST 19 08/05/2022     08/05/2022    K 3.6 08/05/2022     08/05/2022    CREATININE 0.8 08/05/2022    BUN 10 08/05/2022    CO2 28 08/05/2022    TSH 1.080 06/08/2022    HGBA1C 5.5 05/25/2015     Results for orders placed or performed in visit on 12/27/21   EKG 12-lead    Collection Time: 12/27/21  3:05 PM    Narrative    Test Reason :     Vent. Rate : 089 BPM     Atrial Rate : 089 BPM     P-R Int : 152 ms          QRS Dur : 074 ms      QT Int : 374 ms       P-R-T Axes : 045 026 022 degrees      QTc Int : 455 ms    Normal sinus rhythm  Normal ECG  When compared with ECG of 13-MAR-2016 18:19,  Nonspecific T wave abnormality now evident in Inferior leads  Confirmed by Fab KEE, Miah WONG ([id number]) on 1/2/2022 9:33:09 AM    Referred By: ROBERT LIMON           Confirmed By:Miah Sanon MD         Anesthesia Plan  Type of Anesthesia, risks & benefits discussed:    Anesthesia Type: MAC  Intra-op Monitoring Plan: Standard ASA Monitors  Post Op Pain Control Plan: multimodal analgesia  Induction:  IV  ASA Score: 2  Day of Surgery Review of History & Physical: H&P completed by Anesthesiologist.  Anesthesia Plan Notes: Anesthesia consent to be signed prior to surgery 1/5/23  Patient requests a nerve block due to issues from surgery in 6/2022      Ready For Surgery From Anesthesia Perspective.     .

## 2022-12-20 NOTE — PRE-PROCEDURE INSTRUCTIONS
Manpreet Barragan [phone]    Allergies, medical, surgical, family and psychosocial histories reviewed with patient. Periop plan of care reviewed. Preop instructions given, including medications to take and to hold. Hibiclens soap and instructions on use given. Time allotted for questions to be addressed.  Patient verbalized understanding.

## 2023-01-04 ENCOUNTER — PATIENT MESSAGE (OUTPATIENT)
Dept: PODIATRY | Facility: CLINIC | Age: 43
End: 2023-01-04
Payer: COMMERCIAL

## 2023-01-05 ENCOUNTER — PATIENT MESSAGE (OUTPATIENT)
Dept: INTERNAL MEDICINE | Facility: CLINIC | Age: 43
End: 2023-01-05
Payer: COMMERCIAL

## 2023-01-05 ENCOUNTER — ANESTHESIA (OUTPATIENT)
Dept: SURGERY | Facility: HOSPITAL | Age: 43
End: 2023-01-05
Payer: COMMERCIAL

## 2023-01-05 ENCOUNTER — HOSPITAL ENCOUNTER (OUTPATIENT)
Facility: HOSPITAL | Age: 43
Discharge: HOME | End: 2023-01-05
Attending: PODIATRIST | Admitting: PODIATRIST
Payer: COMMERCIAL

## 2023-01-05 VITALS
DIASTOLIC BLOOD PRESSURE: 59 MMHG | RESPIRATION RATE: 16 BRPM | TEMPERATURE: 98 F | SYSTOLIC BLOOD PRESSURE: 115 MMHG | OXYGEN SATURATION: 98 % | HEART RATE: 57 BPM | BODY MASS INDEX: 35.82 KG/M2 | WEIGHT: 215 LBS | HEIGHT: 65 IN

## 2023-01-05 DIAGNOSIS — G57.62 MORTON'S NEUROMA OF LEFT FOOT: ICD-10-CM

## 2023-01-05 PROCEDURE — 28080 PR EXCIS INTERDIGITAL NEUROMA,EA: ICD-10-PCS | Mod: LT,,, | Performed by: PODIATRIST

## 2023-01-05 PROCEDURE — 37000008 HC ANESTHESIA 1ST 15 MINUTES: Performed by: PODIATRIST

## 2023-01-05 PROCEDURE — 25000003 PHARM REV CODE 250: Performed by: NURSE PRACTITIONER

## 2023-01-05 PROCEDURE — 36000706: Performed by: PODIATRIST

## 2023-01-05 PROCEDURE — 88304 TISSUE EXAM BY PATHOLOGIST: CPT | Mod: 26,,, | Performed by: STUDENT IN AN ORGANIZED HEALTH CARE EDUCATION/TRAINING PROGRAM

## 2023-01-05 PROCEDURE — 71000015 HC POSTOP RECOV 1ST HR: Performed by: PODIATRIST

## 2023-01-05 PROCEDURE — 88304 PR  SURG PATH,LEVEL III: ICD-10-PCS | Mod: 26,,, | Performed by: STUDENT IN AN ORGANIZED HEALTH CARE EDUCATION/TRAINING PROGRAM

## 2023-01-05 PROCEDURE — 25000003 PHARM REV CODE 250: Performed by: NURSE ANESTHETIST, CERTIFIED REGISTERED

## 2023-01-05 PROCEDURE — 63600175 PHARM REV CODE 636 W HCPCS: Performed by: PODIATRIST

## 2023-01-05 PROCEDURE — 25000003 PHARM REV CODE 250: Performed by: PODIATRIST

## 2023-01-05 PROCEDURE — D9220A PRA ANESTHESIA: Mod: ANES,,, | Performed by: ANESTHESIOLOGY

## 2023-01-05 PROCEDURE — 28080 REMOVAL OF FOOT LESION: CPT | Mod: LT,,, | Performed by: PODIATRIST

## 2023-01-05 PROCEDURE — D9220A PRA ANESTHESIA: Mod: CRNA,,, | Performed by: NURSE ANESTHETIST, CERTIFIED REGISTERED

## 2023-01-05 PROCEDURE — 88304 TISSUE EXAM BY PATHOLOGIST: CPT | Performed by: STUDENT IN AN ORGANIZED HEALTH CARE EDUCATION/TRAINING PROGRAM

## 2023-01-05 PROCEDURE — 36000707: Performed by: PODIATRIST

## 2023-01-05 PROCEDURE — D9220A PRA ANESTHESIA: ICD-10-PCS | Mod: ANES,,, | Performed by: ANESTHESIOLOGY

## 2023-01-05 PROCEDURE — 63600175 PHARM REV CODE 636 W HCPCS: Performed by: NURSE PRACTITIONER

## 2023-01-05 PROCEDURE — 63600175 PHARM REV CODE 636 W HCPCS: Performed by: ANESTHESIOLOGY

## 2023-01-05 PROCEDURE — 37000009 HC ANESTHESIA EA ADD 15 MINS: Performed by: PODIATRIST

## 2023-01-05 PROCEDURE — 25000242 PHARM REV CODE 250 ALT 637 W/ HCPCS: Performed by: NURSE ANESTHETIST, CERTIFIED REGISTERED

## 2023-01-05 PROCEDURE — 63600175 PHARM REV CODE 636 W HCPCS: Performed by: NURSE ANESTHETIST, CERTIFIED REGISTERED

## 2023-01-05 PROCEDURE — D9220A PRA ANESTHESIA: ICD-10-PCS | Mod: CRNA,,, | Performed by: NURSE ANESTHETIST, CERTIFIED REGISTERED

## 2023-01-05 RX ORDER — PROPOFOL 10 MG/ML
VIAL (ML) INTRAVENOUS CONTINUOUS PRN
Status: DISCONTINUED | OUTPATIENT
Start: 2023-01-05 | End: 2023-01-05

## 2023-01-05 RX ORDER — ALBUTEROL SULFATE 90 UG/1
AEROSOL, METERED RESPIRATORY (INHALATION)
Status: DISCONTINUED | OUTPATIENT
Start: 2023-01-05 | End: 2023-01-05

## 2023-01-05 RX ORDER — ONDANSETRON 4 MG/1
8 TABLET, ORALLY DISINTEGRATING ORAL EVERY 12 HOURS PRN
Qty: 10 TABLET | Refills: 0 | Status: SHIPPED | OUTPATIENT
Start: 2023-01-05 | End: 2023-01-23 | Stop reason: SDUPTHER

## 2023-01-05 RX ORDER — BUPIVACAINE HYDROCHLORIDE 2.5 MG/ML
INJECTION, SOLUTION EPIDURAL; INFILTRATION; INTRACAUDAL
Status: DISCONTINUED | OUTPATIENT
Start: 2023-01-05 | End: 2023-01-05 | Stop reason: HOSPADM

## 2023-01-05 RX ORDER — TRAMADOL HYDROCHLORIDE 50 MG/1
100 TABLET ORAL EVERY 6 HOURS PRN
Qty: 30 TABLET | Refills: 0 | Status: SHIPPED | OUTPATIENT
Start: 2023-01-05 | End: 2024-01-08

## 2023-01-05 RX ORDER — LIDOCAINE HCL/PF 100 MG/5ML
SYRINGE (ML) INTRAVENOUS
Status: DISCONTINUED | OUTPATIENT
Start: 2023-01-05 | End: 2023-01-05

## 2023-01-05 RX ORDER — ONDANSETRON 2 MG/ML
4 INJECTION INTRAMUSCULAR; INTRAVENOUS ONCE AS NEEDED
Status: DISCONTINUED | OUTPATIENT
Start: 2023-01-05 | End: 2023-01-05 | Stop reason: HOSPADM

## 2023-01-05 RX ORDER — OXYCODONE HYDROCHLORIDE 5 MG/1
5 TABLET ORAL
Status: DISCONTINUED | OUTPATIENT
Start: 2023-01-05 | End: 2023-01-05 | Stop reason: HOSPADM

## 2023-01-05 RX ORDER — KETOROLAC TROMETHAMINE 30 MG/ML
INJECTION, SOLUTION INTRAMUSCULAR; INTRAVENOUS
Status: DISCONTINUED | OUTPATIENT
Start: 2023-01-05 | End: 2023-01-05

## 2023-01-05 RX ORDER — SCOLOPAMINE TRANSDERMAL SYSTEM 1 MG/1
1 PATCH, EXTENDED RELEASE TRANSDERMAL
Status: DISCONTINUED | OUTPATIENT
Start: 2023-01-05 | End: 2023-01-05 | Stop reason: HOSPADM

## 2023-01-05 RX ORDER — DIPHENHYDRAMINE HYDROCHLORIDE 50 MG/ML
INJECTION INTRAMUSCULAR; INTRAVENOUS
Status: DISCONTINUED | OUTPATIENT
Start: 2023-01-05 | End: 2023-01-05

## 2023-01-05 RX ORDER — FENTANYL CITRATE 50 UG/ML
25 INJECTION, SOLUTION INTRAMUSCULAR; INTRAVENOUS EVERY 5 MIN PRN
Status: DISCONTINUED | OUTPATIENT
Start: 2023-01-05 | End: 2023-01-05 | Stop reason: HOSPADM

## 2023-01-05 RX ORDER — IBUPROFEN 800 MG/1
800 TABLET ORAL EVERY 6 HOURS PRN
Qty: 30 TABLET | Refills: 1 | Status: SHIPPED | OUTPATIENT
Start: 2023-01-05

## 2023-01-05 RX ORDER — CEFAZOLIN SODIUM 2 G/50ML
2 SOLUTION INTRAVENOUS
Status: COMPLETED | OUTPATIENT
Start: 2023-01-05 | End: 2023-01-05

## 2023-01-05 RX ORDER — SODIUM CHLORIDE 0.9 % (FLUSH) 0.9 %
3 SYRINGE (ML) INJECTION
Status: DISCONTINUED | OUTPATIENT
Start: 2023-01-05 | End: 2023-01-05 | Stop reason: HOSPADM

## 2023-01-05 RX ORDER — ONDANSETRON HYDROCHLORIDE 2 MG/ML
INJECTION, SOLUTION INTRAMUSCULAR; INTRAVENOUS
Status: DISCONTINUED | OUTPATIENT
Start: 2023-01-05 | End: 2023-01-05

## 2023-01-05 RX ORDER — LIDOCAINE HYDROCHLORIDE 10 MG/ML
INJECTION INFILTRATION; PERINEURAL
Status: DISCONTINUED | OUTPATIENT
Start: 2023-01-05 | End: 2023-01-05 | Stop reason: HOSPADM

## 2023-01-05 RX ORDER — CEPHALEXIN 500 MG/1
500 CAPSULE ORAL 4 TIMES DAILY
Qty: 28 CAPSULE | Refills: 0 | Status: SHIPPED | OUTPATIENT
Start: 2023-01-05 | End: 2023-02-17

## 2023-01-05 RX ORDER — SODIUM CHLORIDE, SODIUM LACTATE, POTASSIUM CHLORIDE, CALCIUM CHLORIDE 600; 310; 30; 20 MG/100ML; MG/100ML; MG/100ML; MG/100ML
INJECTION, SOLUTION INTRAVENOUS CONTINUOUS
Status: DISCONTINUED | OUTPATIENT
Start: 2023-01-05 | End: 2023-01-05 | Stop reason: HOSPADM

## 2023-01-05 RX ORDER — PROPOFOL 10 MG/ML
VIAL (ML) INTRAVENOUS
Status: DISCONTINUED | OUTPATIENT
Start: 2023-01-05 | End: 2023-01-05

## 2023-01-05 RX ORDER — HYDROMORPHONE HYDROCHLORIDE 2 MG/ML
0.2 INJECTION, SOLUTION INTRAMUSCULAR; INTRAVENOUS; SUBCUTANEOUS EVERY 5 MIN PRN
Status: DISCONTINUED | OUTPATIENT
Start: 2023-01-05 | End: 2023-01-05 | Stop reason: HOSPADM

## 2023-01-05 RX ORDER — MIDAZOLAM HYDROCHLORIDE 1 MG/ML
INJECTION INTRAMUSCULAR; INTRAVENOUS
Status: DISCONTINUED | OUTPATIENT
Start: 2023-01-05 | End: 2023-01-05

## 2023-01-05 RX ORDER — LIDOCAINE HYDROCHLORIDE 10 MG/ML
1 INJECTION, SOLUTION EPIDURAL; INFILTRATION; INTRACAUDAL; PERINEURAL ONCE
Status: DISCONTINUED | OUTPATIENT
Start: 2023-01-05 | End: 2023-01-05 | Stop reason: HOSPADM

## 2023-01-05 RX ORDER — FENTANYL CITRATE 50 UG/ML
INJECTION, SOLUTION INTRAMUSCULAR; INTRAVENOUS
Status: DISCONTINUED | OUTPATIENT
Start: 2023-01-05 | End: 2023-01-05

## 2023-01-05 RX ORDER — DEXAMETHASONE SODIUM PHOSPHATE 4 MG/ML
INJECTION, SOLUTION INTRA-ARTICULAR; INTRALESIONAL; INTRAMUSCULAR; INTRAVENOUS; SOFT TISSUE
Status: DISCONTINUED | OUTPATIENT
Start: 2023-01-05 | End: 2023-01-05 | Stop reason: HOSPADM

## 2023-01-05 RX ADMIN — LIDOCAINE HYDROCHLORIDE 60 MG: 20 INJECTION, SOLUTION INTRAVENOUS at 07:01

## 2023-01-05 RX ADMIN — ONDANSETRON 4 MG: 2 INJECTION INTRAMUSCULAR; INTRAVENOUS at 07:01

## 2023-01-05 RX ADMIN — CEFAZOLIN SODIUM 2 G: 2 SOLUTION INTRAVENOUS at 07:01

## 2023-01-05 RX ADMIN — PROPOFOL 50 MG: 10 INJECTION, EMULSION INTRAVENOUS at 07:01

## 2023-01-05 RX ADMIN — SODIUM CHLORIDE, SODIUM LACTATE, POTASSIUM CHLORIDE, AND CALCIUM CHLORIDE: .6; .31; .03; .02 INJECTION, SOLUTION INTRAVENOUS at 06:01

## 2023-01-05 RX ADMIN — DIPHENHYDRAMINE HYDROCHLORIDE 12.5 MG: 50 INJECTION INTRAMUSCULAR; INTRAVENOUS at 07:01

## 2023-01-05 RX ADMIN — HYDROMORPHONE HYDROCHLORIDE 0.2 MG: 2 INJECTION, SOLUTION INTRAMUSCULAR; INTRAVENOUS; SUBCUTANEOUS at 08:01

## 2023-01-05 RX ADMIN — ALBUTEROL SULFATE 2 PUFF: 90 AEROSOL, METERED RESPIRATORY (INHALATION) at 07:01

## 2023-01-05 RX ADMIN — MIDAZOLAM HYDROCHLORIDE 2 MG: 1 INJECTION, SOLUTION INTRAMUSCULAR; INTRAVENOUS at 06:01

## 2023-01-05 RX ADMIN — KETOROLAC TROMETHAMINE 30 MG: 30 INJECTION, SOLUTION INTRAMUSCULAR; INTRAVENOUS at 07:01

## 2023-01-05 RX ADMIN — FENTANYL CITRATE 100 MCG: 0.05 INJECTION, SOLUTION INTRAMUSCULAR; INTRAVENOUS at 07:01

## 2023-01-05 RX ADMIN — PROPOFOL 150 MCG/KG/MIN: 10 INJECTION, EMULSION INTRAVENOUS at 07:01

## 2023-01-05 RX ADMIN — SCOPALAMINE 1 PATCH: 1 PATCH, EXTENDED RELEASE TRANSDERMAL at 06:01

## 2023-01-05 NOTE — TRANSFER OF CARE
"Anesthesia Transfer of Care Note    Patient: Angel Mosquera    Procedure(s) Performed: Procedure(s) (LRB):  EXCISION, FERNANDEZ'S NEUROMA (Left)    Patient location: [facility]    Anesthesia Type: MAC    Transport from OR: Transported from OR on room air with adequate spontaneous ventilation    Post pain: adequate analgesia    Post assessment: no apparent anesthetic complications    Post vital signs: stable    Level of consciousness: awake and alert    Nausea/Vomiting: no nausea/vomiting    Complications: none    Transfer of care protocol was followed      Last vitals:   Visit Vitals  /75   Pulse 68   Temp 36.6 °C (97.9 °F) (Skin)   Resp 18   Ht 5' 5" (1.651 m)   Wt 97.5 kg (215 lb)   LMP 12/09/2021   SpO2 97%   Breastfeeding No   BMI 35.78 kg/m²     "

## 2023-01-05 NOTE — BRIEF OP NOTE
[facility] - [facility] (Hospital)  Brief Operative Note    Surgery Date: 1/5/2023     Surgeon(s) and Role:     * Mello Montez DPM - Primary    Assisting Surgeon: None    Pre-op Diagnosis:  Fernandez's neuroma of left foot [G57.62]    Post-op Diagnosis:  Post-Op Diagnosis Codes:     * Fernandez's neuroma of left foot [G57.62]    Procedure(s) (LRB):  EXCISION, FERNANDEZ'S NEUROMA (Left)    Anesthesia: Local MAC    Operative Findings: large neuroma fourth intermetatarsal space left foot excised via plantar approach.    Estimated Blood Loss: < 1 mL         Specimens:   Specimen (24h ago, onward)      None              Discharge Note    OUTCOME: Patient tolerated treatment/procedure well without complication and is now ready for discharge.    DISPOSITION: Home or Self Care    FINAL DIAGNOSIS:  Fernandez's neuroma of left foot    FOLLOWUP: In clinic    DISCHARGE INSTRUCTIONS:    Discharge Procedure Orders   Diet general     Ice to affected area   Order Comments: As needed throughout the day x 2-3 days after surgery     Leave dressing on - Keep it clean, dry, and intact until clinic visit   Order Comments: May loosen outer ace wrap if too tight.     Weight bearing restrictions (specify)   Order Comments: Heel weightbearing left foot with surgical shoe as tolerated, however should limit < 10 minutes per hour x 3 days

## 2023-01-05 NOTE — OP NOTE
Date of surgery:  01/05/2023  Surgeon:Mello Montez DPM, DARRYL  Assistant: None  Preoperative diagnosis:  4th Intermetatarsal space neuroma left foot  Postoperative diagnosis:  Same   Procedure:  Excision of neuroma left foot  Pathology:  Soft tissue mass sent for routine pathology  Anesthesia: Mac with local infiltration of 10 mL 1:1 mixture containing 1% plain lidocaine and 0.25% plain Marcaine   Hemostasis: Left ankle pneumatic tourniquet inflated 250 mm Hg  Estimated blood loss:  Less than 1 mL   Materials:  3-0 nylon suture   Injectables:  Per above also to include 10 mL 0.25% plain Marcaine postoperatively and 4 mg of Decadron  Complications:  None  Intraop findings large 4th intermetatarsal space neuroma noted and excised per above    Indications for the procedure:  42-year-old female whom had limited relief with conservative care consisting of steroid injections, shoe gear and activity modifications, padding and presents today for surgical excision of painful intermetatarsal neuroma left foot.    Procedure in detail:  The above-named patient was identified, brought to operative placed supine on operative table.  A time-out was called identifying the patient, procedure and foot.  The anesthesiologist then administered MAC anesthesia.  The skin to the left foot was then prepped with alcohol followed by local anesthetic infiltration of the mixture above.  A nonsterile left ankle pneumatic tourniquet was applied.  The left lower extremity was then prepped and draped in usual sterile technique.  The left foot was then exsanguinated with an Esmarch and the left ankle pneumatic tourniquet inflated 250 mm Hg.  Attention was then directed to the plantar left foot.      A skin marker was then utilized to plan a longitudinal incision commencing between the 5th met plantar forefoot. A #15 deformed to level of the adipose and superficial fascia. Combination of blunt and sharp dissection with Metzenbaum scissor was used  to identify the neuroma within the adipose layer. It was initially traced distally and the two digital branches identified and incised and then traced further proximally to the main intermetatarsal nerve which was grossly edematous and enlarged. The nerve was incised proximally within the intermetatarsal space and excised in toto. The excised nerve was sent for routine pathology.  Copious irrigation was then performed of the surgical site incision.  Further exploration of the surgical site incision did not show any further pathology. The skin was repaired with 3-0 nylon suture using combination of vertical mattress and simple interrupted sutures.     Xeroform was applied over the incision followed by dry sterile bulky gauze dressing secured with cast padding and outer Ace wrap.  The tourniquet was then deflated noting instant return of capillary fill time to all toes left foot.  The patient tolerated the procedure well without apparent complication.  She was transferred from the OR to recovery.      Patient is permitted to be partial weight-bearing to the left heel using a surgical shoe and crutches or walker as tolerated however should limit to less than 10 minutes/hour for the 1st 3 days postoperatively.  Encouraged elevation above heart level.  Follow up as scheduled within 1 week.    A portion of this note was generated by voice recognition software and may contain spelling and grammar errors.

## 2023-01-05 NOTE — DISCHARGE INSTRUCTIONS
Leave your dressing in place, dry and clean until your post op visit with Dr. Montez.  You may loosen outer ace wrap if it feels too tight.  Use ice pack as needed throughout the day x 2-3 days after surgery.  Heel weightbearing left foot with surgical shoe as tolerated using your walker or crutches, however should limit < 10 minutes per hour x 3 days.  Keep your left foot elevated above the bed 30 degrees, or on 2 pillows when not ambulating.  Call Dr. Montez for any questions regarding your surgery.    ANESTHESIA  -For the first 24 hours after surgery:  Do not drive, use heavy equipment, make important decisions, or drink alcohol  -It is normal to feel sleepy for several hours.  Rest until you are more awake.  -Have someone stay with you, if needed.  They can watch for problems and help keep you safe.  -Some possible post anesthesia side effects include: nausea and vomiting, sore throat and hoarseness, sleepiness, and dizziness.    PAIN  -If you have pain after surgery, pain medicine will help you feel better.  Take it as directed, before pain becomes severe.  Most pain relievers taken by mouth need at least 20-30 minutes to start working.  -Do not drive or drink alcohol while taking pain medicine.  -Pain medication can upset your stomach.  Taking them with a little food may help.  -Other ways to help control pain: elevation, ice, and relaxation  -Call your surgeon if still having unmanageable pain an hour after taking pain medicine.  -Pain medicine can cause constipation.  Taking an over-the counter stool softener while on prescription pain medicine and drinking plenty of fluids can prevent this side effect.  -Call your surgeon if you have severe side effects like: breathing problems, trouble waking up, dizziness, confusion, or severe constipation.    NAUSEA  -Some people have nausea after surgery.  This is often because of anesthesia, pain, pain medicine, or the stress of surgery.  -Do not push yourself to eat.   Start off with clear liquids and soup.  Slowly move to solid foods.  Don't eat fatty, rich, spicy foods at first.  Eat smaller amounts.  -If you develop persistent nausea and vomiting please notify your surgeon immediately.    BLEEDING  -Different types of surgery require different types of care and dressing changes.  It is important to follow all instructions and advice from your surgeon.  Change dressing as directed.  Call your surgeon for any concerns regarding postop bleeding.    SIGNS OF INFECTION  -Signs of infection include: fever, swelling, drainage, and redness  -Notify your surgeon if you have a fever of 100.4 F (38.0 C) or higher.  -Notify your surgeon if you notice redness, swelling, increased pain, pus, or a foul smell at the incision site.

## 2023-01-05 NOTE — ANESTHESIA POSTPROCEDURE EVALUATION
Anesthesia Post Evaluation    Patient: Angel Mosquera    Procedure(s) Performed: Procedure(s) (LRB):  EXCISION, FERNANDEZ'S NEUROMA (Left)    Final Anesthesia Type: MAC      Patient location during evaluation: [facility]  Patient participation: Yes- Able to Participate  Level of consciousness: awake and alert, awake and oriented  Post-procedure vital signs: reviewed and stable  Pain management: adequate  Airway patency: patent    PONV status at discharge: No PONV  Anesthetic complications: no      Cardiovascular status: blood pressure returned to baseline, hemodynamically stable and stable  Respiratory status: spontaneous ventilation, unassisted and room air  Hydration status: euvolemic  Follow-up not needed.          Vitals Value Taken Time   /75 01/05/23 0646   Temp 36.6 °C (97.9 °F) 01/05/23 0645   Pulse 68 01/05/23 0645   Resp 18 01/05/23 0645   SpO2 97 % 01/05/23 0645         No case tracking events are documented in the log.      Pain/Madai Score: No data recorded

## 2023-01-05 NOTE — H&P
Subjective:      Patient ID: nAgel Mosquera is a 42 y.o. female.    Chief Complaint: Foot Pain (bilateral )    Patient presents to clinic with chief complaint of bilateral bottom heel pain for approximately 9 months now, left worse than right. She does not recall any prior injury to both feet other than jumping in a shallow pool this past summer. Pain is worse with the first couple of steps in the AM and worsens with prolonged standing/walking. She was seen by Dr. Swan 5 months ago who performed a steroid injection in her left heel which she states did not help with her pain. She has tried PT, stretching, night splints, and changing shoegear (HOKA, new balance, asics) with no improvement. Her right ankle recently started hurting, states it feels like her range of motion is limited, thinks it may be due to her compensating to stay off the left foot. 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     04/18/2022:  Presents complaining of chronic pain to the left heel for greater than a year.  She has a history of hysterectomy in December 2021 that required 8 weeks for recovery and modified duties.  She works as a nurse at [employer].  States she also has pain in the right heel to lesser extent.  Pending a trip to [place] later this week.  Requesting a steroid injection to left heel but also requesting consideration for surgical intervention to left heel next month.    06/24/2022:  Post endoscopic plantar fasciotomy left foot on 06/17/2022.  Reports minimal discomfort to left foot.  She has been ambulating with the orthopedic boot.  Taking ibuprofen as needed for pain.    07/07/2022:  Approximately 3 weeks postop.  She has been ambulating without her orthopedic boot but still uses for long distances.  Reports some numbness along the bottom of the foot when she touches it.  She reports pain over the medial surgical incision and no pain anywhere else.  She has no pain  "directly on the bottom of the foot when she stands.    07/29/2022: Approximately 6 weeks postop. She has been ambulating in standard footwear, she has been active with home chores and watching children. 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 She has had improved functional level with similar amounts of pain, she also has some residual numbness of the foot mostly pointing along the plantar lateral midfoot region. She has not filled the gabapentin prescription because she is not want to add to her medications.     09/29/2022:  Relates no longer has pain to the bottom the heel however has worsening pain to the left forefoot pointing to the ball the foot when she pushes off on the foot.  She describes a sharp pain only with standing and walking.  No pain at rest.  States that she is unable to tolerate barefoot walking.  Also relates that she still has some residual numbness to the left foot.    10/17/2022:  Follow-up from MRI left forefoot.  She states that initially when she wore the metatarsal pad she has significant improvement her symptoms for 1-2 days and then the pain began to worsen.  She did not attend work yesterday secondary to pain.    11/18/2022:  Follow-up from steroid injection to the left forefoot for Sparrow's neuroma.  Relates nearly 3 weeks of complete pain relief.  States that the pain gradually recurred and she is also developed some pain along the arch from compensation.  She is inquiring about another steroid injection in addition to surgical intervention.    01/05/23: Presents for surgical intervention left foot. No new complaints.    Vitals:    11/18/22 0903   BP: (!) 135/91   Pulse: 70   Weight: 103.4 kg (228 lb)   Height: 5' 5" (1.651 m)   PainSc:   6   PainLoc: Foot      Past Medical History:   Diagnosis Date    Hypertension     PONV (postoperative nausea and vomiting)     Rosacea        Past Surgical History:   Procedure Laterality Date    AUGMENTATION OF BREAST " Bilateral 2005    Breast Augmentation  2005    COSMETIC SURGERY  2005    Breast augmentation    DILATION AND CURETTAGE OF UTERUS N/A 10/19/2019    Procedure: DILATION AND CURETTAGE, UTERUS;  Surgeon: Frances Culp MD;  Location: [facility] OR;  Service: OB/GYN;  Laterality: N/A;  with suction    ENDOSCOPIC PLANTAR FASCIOTOMY Left 6/17/2022    Procedure: FASCIOTOMY, PLANTAR, ENDOSCOPIC;  Surgeon: Mello Montez DPM;  Location: [facility] OR;  Service: Podiatry;  Laterality: Left;  endoscopic plantar fasciotomy kit    mischarr      ROBOT-ASSISTED LAPAROSCOPIC UTERINE MYOMECTOMY N/A 1/7/2020    Procedure: ROBOTIC MYOMECTOMY, UTERUS;  Surgeon: Frances Culp MD;  Location: [facility] OR;  Service: OB/GYN;  Laterality: N/A;    XI ROBOTIC HYSTERECTOMY, WITH SALPINGO-OOPHORECTOMY N/A 12/15/2021    Procedure: XI ROBOTIC HYSTERECTOMY,WITH SALPINGO-OOPHORECTOMY;  Surgeon: Carla Aguero MD;  Location: [facility];  Service: OB/GYN;  Laterality: N/A;       Family History   Problem Relation Age of Onset    Diabetes Mother     Hypertension Mother     Heart failure Mother         viral?    Heart disease Mother     Hyperlipidemia Mother     Cancer Paternal Grandfather         Pancreatic Cancer    Cancer Paternal Grandmother         Lung Cancer    Arthritis Maternal Grandmother     Diabetes Maternal Grandmother     Alcohol abuse Maternal Grandfather     COPD Maternal Grandfather     Alcohol abuse Father     Alcohol abuse Maternal Uncle     Cancer Neg Hx     Ovarian cancer Neg Hx     Melanoma Neg Hx     Heart attack Neg Hx     Stroke Neg Hx     Colon cancer Neg Hx     Breast cancer Neg Hx     Blindness Neg Hx     Amblyopia Neg Hx     Cataracts Neg Hx     Glaucoma Neg Hx     Macular degeneration Neg Hx     Retinal detachment Neg Hx     Strabismus Neg Hx        Social History     Socioeconomic History    Marital status:    Occupational History    Occupation: Nurse     Employer: [employer]     Comment:  Oncology   Tobacco Use    Smoking status: Never    Smokeless tobacco: Never   Substance and Sexual Activity    Alcohol use: No    Drug use: No    Sexual activity: Yes     Partners: Male     Birth control/protection: None     Comment: Vasectomy   Other Topics Concern    Are you pregnant or think you may be? No    Breast-feeding No     Social Determinants of Health     Financial Resource Strain: Low Risk     Difficulty of Paying Living Expenses: Not hard at all   Food Insecurity: No Food Insecurity    Worried About Running Out of Food in the Last Year: Never true    Ran Out of Food in the Last Year: Never true   Transportation Needs: No Transportation Needs    Lack of Transportation (Medical): No    Lack of Transportation (Non-Medical): No   Physical Activity: Unknown    Days of Exercise per Week: Patient refused   Stress: Stress Concern Present    Feeling of Stress : To some extent   Social Connections: Unknown    Frequency of Communication with Friends and Family: More than three times a week    Frequency of Social Gatherings with Friends and Family: More than three times a week    Active Member of Clubs or Organizations: No    Attends Club or Organization Meetings: Never    Marital Status:    Housing Stability: Low Risk     Unable to Pay for Housing in the Last Year: No    Number of Places Lived in the Last Year: 1    Unstable Housing in the Last Year: No       Current Outpatient Medications   Medication Sig Dispense Refill    azelaic acid (FINACEA) 15 % gel Apply to affected area(s) of face nightly, then moisturize (Patient taking differently: Apply to affected area(s) of face nightly, then moisturize) 50 g 3    carvediloL (COREG) 6.25 MG tablet Take 1 tablet (6.25 mg total) by mouth 2 (two) times daily with meals. 60 tablet 11    hydrOXYzine HCL (ATARAX) 25 MG tablet Take 1 tablet (25 mg total) by mouth nightly as needed (Insomnia). 30 tablet 3    ibuprofen (ADVIL,MOTRIN) 800 MG tablet Take 1 tablet (800  mg total) by mouth every 6 (six) hours as needed for Pain. 60 tablet 1    multivitamin (THERAGRAN) per tablet Take 1 tablet by mouth once daily.      tirzepatide (MOUNJARO) 2.5 mg/0.5 mL PnIj Inject 2.5 mg into the skin every 7 days. 4 pen 2     Current Facility-Administered Medications   Medication Dose Route Frequency Provider Last Rate Last Admin    sodium chloride 0.9% flush 10 mL  10 mL Intravenous PRN Mello Montez DPM        sodium chloride 0.9% flush 10 mL  10 mL Intravenous PRN Mello Montez DPM           Review of patient's allergies indicates:  No Known Allergies      Review of Systems   Constitutional: Negative for chills, fever and malaise/fatigue.   HENT:  Negative for hearing loss.    Cardiovascular:  Negative for claudication and leg swelling.   Respiratory:  Negative for cough, shortness of breath and wheezing.    Skin:  Negative for flushing, rash and unusual hair distribution.   Musculoskeletal:  Negative for joint pain and myalgias.        Bilateral heel pain L>R. Right ankle pain   Gastrointestinal:  Negative for nausea and vomiting.   Neurological:  Negative for loss of balance, numbness, paresthesias and sensory change.   Psychiatric/Behavioral:  Negative for altered mental status.          Objective:      Physical Exam  Constitutional:       General: She is not in acute distress.     Appearance: She is not ill-appearing.   Cardiovascular:      Pulses:           Dorsalis pedis pulses are 2+ on the right side and 2+ on the left side.        Posterior tibial pulses are 2+ on the right side and 2+ on the left side.      Comments: CFT< 3 secs all toes bilateral foot, skin temp warm to cool bilateral foot, no hair growth bilateral lower extremity, no edema to bilateral lower extremities    Musculoskeletal:      Comments: No pain on palpation to the plantar heel left foot.  There is some mild diffuse pain on palpation along the medial arch.  Moderate pain on palpation to the distal 4th  intermetatarsal space left foot.  Mild pain on palpation distal 3rd intermetatarsal space left foot.  No localized edema, warmth for discoloration to left foot.    Resting calcaneal stance position is inverted bilateral foot.  No significant collapse of the medial longitudinal arch left foot standing noting forefoot varus in comparison to the right.   Skin:     General: Skin is warm.      Capillary Refill: Capillary refill takes less than 2 seconds.      Findings: No ecchymosis or erythema.      Nails: There is no clubbing.      Comments: Normal appearing scars left foot.   Neurological:      Mental Status: She is alert and oriented to person, place, and time.      Sensory: Sensation is intact.      Motor: Motor function is intact.      Comments: Light touch intact to bilateral feet.              Assessment:       Encounter Diagnoses   Name Primary?    Sparrow's neuroma of left foot Yes    Preop testing          Plan:       Angel was seen today for foot pain.    Diagnoses and all orders for this visit:    Sparrow's neuroma of left foot  -     Ambulatory referral/consult to Internal Medicine; Future  -     Case Request Operating Room: EXCISION, SPARROW'S NEUROMA  -     Full code; Standing  -     Place in Outpatient; Standing  -     Insert peripheral IV; Standing  -     Verify informed consent; Standing  -     Verify surgical site; Standing  -     Full code  -     Insert peripheral IV    Preop testing  -     Ambulatory referral/consult to Internal Medicine; Future    Other orders  -     methylPREDNISolone acetate injection 40 mg  -     sodium chloride 0.9% flush 10 mL  -     cefazolin (ANCEF) 2 gram in dextrose 5% 50 mL IVPB (premix)    I counseled the patient on her conditions, their implications and medical management.    MRI left forefoot reviewed noting no 4th metatarsal stress fracture.  Clinically patient has neuroma to the distal 4th intermetatarsal space.      We discussed further conservative care such as  another steroid injection, shoe gear modifications, padding to offload the neuroma versus surgical intervention consisting of excision of the Sparrow's neuroma 4th intermetatarsal space left foot.  Risks, benefits and postop course discussed in detail.  No guarantees were given or implied.  Patient elected to proceed surgical intervention however she has also requesting another steroid injection for today.    With the patient's verbal consent the skin was prepped overlying the distal 4th intermetatarsal space left forefoot with alcohol followed by skin anesthesia with ethyl chloride.  Injected 1:1 mixture containing 40 mg of Depo-Medrol with 1 mL 0.5% plain Marcaine to the affected area.  She tolerated the procedure well without apparent complication.  Instructed rest, ice and elevate.      This procedure has been fully reviewed with the patient and written informed consent has been obtained.    Referred to PCP for medical optimization and risk stratification    Surgical intervention tentatively scheduled for 12/27/2022 as mac with local anesthesia.  We will attempt to get 7:00 a.m. time slot.    RTC 1 week postoperative or prn as discussed.      A portion of this note was generated by voice recognition software and may contain spelling and grammar errors.    H&P completed on 11/18/23 has been reviewed, the patient has been examined and:  I concur with the findings and no changes have occurred since H&P was written.    There are no hospital problems to display for this patient.

## 2023-01-06 NOTE — TELEPHONE ENCOUNTER
No new care gaps identified.  [facility] Embedded Care Gaps. Reference number: [id number]. 1/06/2023   7:57:43 AM CST

## 2023-01-10 ENCOUNTER — OFFICE VISIT (OUTPATIENT)
Dept: PODIATRY | Facility: CLINIC | Age: 43
End: 2023-01-10
Payer: COMMERCIAL

## 2023-01-10 VITALS
BODY MASS INDEX: 35.82 KG/M2 | SYSTOLIC BLOOD PRESSURE: 125 MMHG | WEIGHT: 215 LBS | HEIGHT: 65 IN | HEART RATE: 77 BPM | DIASTOLIC BLOOD PRESSURE: 66 MMHG

## 2023-01-10 DIAGNOSIS — Z98.890 POSTOPERATIVE STATE: Primary | ICD-10-CM

## 2023-01-10 PROCEDURE — 3074F PR MOST RECENT SYSTOLIC BLOOD PRESSURE < 130 MM HG: ICD-10-PCS | Mod: CPTII,S$GLB,, | Performed by: PODIATRIST

## 2023-01-10 PROCEDURE — 99999 PR PBB SHADOW E&M-EST. PATIENT-LVL IV: CPT | Mod: PBBFAC,,, | Performed by: PODIATRIST

## 2023-01-10 PROCEDURE — 3008F BODY MASS INDEX DOCD: CPT | Mod: CPTII,S$GLB,, | Performed by: PODIATRIST

## 2023-01-10 PROCEDURE — 3008F PR BODY MASS INDEX (BMI) DOCUMENTED: ICD-10-PCS | Mod: CPTII,S$GLB,, | Performed by: PODIATRIST

## 2023-01-10 PROCEDURE — 1160F RVW MEDS BY RX/DR IN RCRD: CPT | Mod: CPTII,S$GLB,, | Performed by: PODIATRIST

## 2023-01-10 PROCEDURE — 99024 POSTOP FOLLOW-UP VISIT: CPT | Mod: S$GLB,,, | Performed by: PODIATRIST

## 2023-01-10 PROCEDURE — 1159F MED LIST DOCD IN RCRD: CPT | Mod: CPTII,S$GLB,, | Performed by: PODIATRIST

## 2023-01-10 PROCEDURE — 3078F DIAST BP <80 MM HG: CPT | Mod: CPTII,S$GLB,, | Performed by: PODIATRIST

## 2023-01-10 PROCEDURE — 1159F PR MEDICATION LIST DOCUMENTED IN MEDICAL RECORD: ICD-10-PCS | Mod: CPTII,S$GLB,, | Performed by: PODIATRIST

## 2023-01-10 PROCEDURE — 99024 PR POST-OP FOLLOW-UP VISIT: ICD-10-PCS | Mod: S$GLB,,, | Performed by: PODIATRIST

## 2023-01-10 PROCEDURE — 3074F SYST BP LT 130 MM HG: CPT | Mod: CPTII,S$GLB,, | Performed by: PODIATRIST

## 2023-01-10 PROCEDURE — 99999 PR PBB SHADOW E&M-EST. PATIENT-LVL IV: ICD-10-PCS | Mod: PBBFAC,,, | Performed by: PODIATRIST

## 2023-01-10 PROCEDURE — 3078F PR MOST RECENT DIASTOLIC BLOOD PRESSURE < 80 MM HG: ICD-10-PCS | Mod: CPTII,S$GLB,, | Performed by: PODIATRIST

## 2023-01-10 PROCEDURE — 1160F PR REVIEW ALL MEDS BY PRESCRIBER/CLIN PHARMACIST DOCUMENTED: ICD-10-PCS | Mod: CPTII,S$GLB,, | Performed by: PODIATRIST

## 2023-01-10 NOTE — PROGRESS NOTES
Subjective:      Patient ID: Angel Mosquera is a 42 y.o. female.    Chief Complaint: Foot Pain (bilateral )    Patient presents to clinic with chief complaint of bilateral bottom heel pain for approximately 9 months now, left worse than right. She does not recall any prior injury to both feet other than jumping in a shallow pool this past summer. Pain is worse with the first couple of steps in the AM and worsens with prolonged standing/walking. She was seen by Dr. Swan 5 months ago who performed a steroid injection in her left heel which she states did not help with her pain. She has tried PT, stretching, night splints, and changing shoegear (HOKA, new balance, asics) with no improvement. Her right ankle recently started hurting, states it feels like her range of motion is limited, thinks it may be due to her compensating to stay off the left foot. 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     04/18/2022:  Presents complaining of chronic pain to the left heel for greater than a year.  She has a history of hysterectomy in December 2021 that required 8 weeks for recovery and modified duties.  She works as a nurse at [employer].  States she also has pain in the right heel to lesser extent.  Pending a trip to [place] later this week.  Requesting a steroid injection to left heel but also requesting consideration for surgical intervention to left heel next month.    06/24/2022:  Post endoscopic plantar fasciotomy left foot on 06/17/2022.  Reports minimal discomfort to left foot.  She has been ambulating with the orthopedic boot.  Taking ibuprofen as needed for pain.    07/07/2022:  Approximately 3 weeks postop.  She has been ambulating without her orthopedic boot but still uses for long distances.  Reports some numbness along the bottom of the foot when she touches it.  She reports pain over the medial surgical incision and no pain anywhere else.  She has no pain  "directly on the bottom of the foot when she stands.    07/29/2022: Approximately 6 weeks postop. She has been ambulating in standard footwear, she has been active with home chores and watching children. 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 She has had improved functional level with similar amounts of pain, she also has some residual numbness of the foot mostly pointing along the plantar lateral midfoot region. She has not filled the gabapentin prescription because she is not want to add to her medications.     09/29/2022:  Relates no longer has pain to the bottom the heel however has worsening pain to the left forefoot pointing to the ball the foot when she pushes off on the foot.  She describes a sharp pain only with standing and walking.  No pain at rest.  States that she is unable to tolerate barefoot walking.  Also relates that she still has some residual numbness to the left foot.    10/17/2022:  Follow-up from MRI left forefoot.  She states that initially when she wore the metatarsal pad she has significant improvement her symptoms for 1-2 days and then the pain began to worsen.  She did not attend work yesterday secondary to pain.    11/18/2022:  Follow-up from steroid injection to the left forefoot for Sparrow's neuroma.  Relates nearly 3 weeks of complete pain relief.  States that the pain gradually recurred and she is also developed some pain along the arch from compensation.  She is inquiring about another steroid injection in addition to surgical intervention.    01/10/2023:  Post neuroma excision 4th intermetatarsal space left forefoot on 01/05/2023.  Relates mild discomfort to left foot.  She does describe throbbing pain when she leaves the foot in dependency.  Heel weight-bearing left foot with surgical shoe and walker.  Accompanied by her .    Vitals:    01/10/23 1346   BP: 125/66   Pulse: 77   Weight: 97.5 kg (215 lb)   Height: 5' 5" (1.651 m)   PainSc:   2 "   PainLoc: Foot      Past Medical History:   Diagnosis Date    Hypertension     PONV (postoperative nausea and vomiting)     Rosacea        Past Surgical History:   Procedure Laterality Date    AUGMENTATION OF BREAST Bilateral 2005    Breast Augmentation  2005    COSMETIC SURGERY  2005    Breast augmentation    DILATION AND CURETTAGE OF UTERUS N/A 10/19/2019    Procedure: DILATION AND CURETTAGE, UTERUS;  Surgeon: Frances Culp MD;  Location: [facility];  Service: OB/GYN;  Laterality: N/A;  with suction    ENDOSCOPIC PLANTAR FASCIOTOMY Left 6/17/2022    Procedure: FASCIOTOMY, PLANTAR, ENDOSCOPIC;  Surgeon: Mello Montez DPM;  Location: [facility];  Service: Podiatry;  Laterality: Left;  endoscopic plantar fasciotomy kit    mischarr      ROBOT-ASSISTED LAPAROSCOPIC UTERINE MYOMECTOMY N/A 1/7/2020    Procedure: ROBOTIC MYOMECTOMY, UTERUS;  Surgeon: Frances Culp MD;  Location: [facility];  Service: OB/GYN;  Laterality: N/A;    SURGICAL REMOVAL OF FERNANDEZ'S NEUROMA Left 1/5/2023    Procedure: EXCISION, FERNANDEZ'S NEUROMA;  Surgeon: Mello Montez DPM;  Location: [facility] OR;  Service: Podiatry;  Laterality: Left;    XI ROBOTIC HYSTERECTOMY, WITH SALPINGO-OOPHORECTOMY N/A 12/15/2021    Procedure: XI ROBOTIC HYSTERECTOMY,WITH SALPINGO-OOPHORECTOMY;  Surgeon: Carla Aguero MD;  Location: [facility];  Service: OB/GYN;  Laterality: N/A;       Family History   Problem Relation Age of Onset    Diabetes Mother     Hypertension Mother     Heart failure Mother         viral?    Heart disease Mother     Hyperlipidemia Mother     Cancer Paternal Grandfather         Pancreatic Cancer    Cancer Paternal Grandmother         Lung Cancer    Arthritis Maternal Grandmother     Diabetes Maternal Grandmother     Alcohol abuse Maternal Grandfather     COPD Maternal Grandfather     Alcohol abuse Father     Alcohol abuse Maternal Uncle     Cancer Neg Hx     Ovarian cancer Neg Hx     Melanoma Neg Hx     Heart attack Neg Hx      Stroke Neg Hx     Colon cancer Neg Hx     Breast cancer Neg Hx     Blindness Neg Hx     Amblyopia Neg Hx     Cataracts Neg Hx     Glaucoma Neg Hx     Macular degeneration Neg Hx     Retinal detachment Neg Hx     Strabismus Neg Hx        Social History     Socioeconomic History    Marital status:    Occupational History    Occupation: Nurse     Employer: [employer]     Comment: Oncology   Tobacco Use    Smoking status: Never    Smokeless tobacco: Never   Substance and Sexual Activity    Alcohol use: No    Drug use: No    Sexual activity: Yes     Partners: Male     Birth control/protection: None     Comment: Vasectomy   Other Topics Concern    Are you pregnant or think you may be? No    Breast-feeding No       Current Outpatient Medications   Medication Sig Dispense Refill    azelaic acid (FINACEA) 15 % gel Apply to affected area(s) of face nightly, then moisturize (Patient taking differently: Apply to affected area(s) of face nightly, then moisturize) 50 g 3    carvediloL (COREG) 6.25 MG tablet Take 1 tablet (6.25 mg total) by mouth 2 (two) times daily with meals. 60 tablet 11    cephALEXin (KEFLEX) 500 MG capsule Take 1 capsule (500 mg total) by mouth 4 (four) times daily. 28 capsule 0    hydrOXYzine HCL (ATARAX) 25 MG tablet Take 1 tablet (25 mg total) by mouth nightly as needed (Insomnia). 30 tablet 3    ibuprofen (ADVIL,MOTRIN) 800 MG tablet Take 1 tablet (800 mg total) by mouth every 6 (six) hours as needed for Pain. 30 tablet 1    multivitamin (THERAGRAN) per tablet Take 1 tablet by mouth once daily.      ondansetron (ZOFRAN-ODT) 4 MG TbDL Take 2 tablets (8 mg total) by mouth every 12 (twelve) hours as needed (nausea). 10 tablet 0    tirzepatide 5 mg/0.5 mL PnIj Inject 5 mg into the skin every 7 days. 4 pen 2    traMADoL (ULTRAM) 50 mg tablet Take 2 tablets (100 mg total) by mouth every 6 (six) hours as needed for Pain. 30 tablet 0     Current Facility-Administered Medications    Medication Dose Route Frequency Provider Last Rate Last Admin    sodium chloride 0.9% flush 10 mL  10 mL Intravenous PRN Mello Montez DPM        sodium chloride 0.9% flush 10 mL  10 mL Intravenous PRN Mello Montez DPM           Review of patient's allergies indicates:  No Known Allergies      Review of Systems   Constitutional: Negative for chills, fever and malaise/fatigue.   HENT:  Negative for hearing loss.    Cardiovascular:  Negative for claudication and leg swelling.   Respiratory:  Negative for cough, shortness of breath and wheezing.    Skin:  Negative for flushing, rash and unusual hair distribution.   Musculoskeletal:  Negative for joint pain and myalgias.        Bilateral heel pain L>R. Right ankle pain   Gastrointestinal:  Negative for nausea and vomiting.   Neurological:  Negative for loss of balance, numbness, paresthesias and sensory change.   Psychiatric/Behavioral:  Negative for altered mental status.          Objective:      Physical Exam  Constitutional:       General: She is not in acute distress.     Appearance: She is not ill-appearing.   Cardiovascular:      Pulses:           Dorsalis pedis pulses are 2+ on the right side and 2+ on the left side.        Posterior tibial pulses are 2+ on the right side and 2+ on the left side.      Comments: CFT< 3 secs all toes bilateral foot, skin temp warm to cool bilateral foot, no hair growth bilateral lower extremity, no edema to bilateral lower extremities    Musculoskeletal:      Comments: Localized pain on palpation at the proximal aspect of the incision site plantar left forefoot.  No palpable fluctuance or crepitance.  Mild localized edema.  No surrounding erythema.    Resting calcaneal stance position is inverted bilateral foot.  No significant collapse of the medial longitudinal arch left foot standing noting forefoot varus in comparison to the right.   Skin:     General: Skin is warm.      Capillary Refill: Capillary refill takes  less than 2 seconds.      Findings: No ecchymosis or erythema.      Nails: There is no clubbing.      Comments: Incision plantar left forefoot well-approximated sutures.  No gapping or drainage observed.  No signs infection.   Neurological:      Mental Status: She is alert and oriented to person, place, and time.      Sensory: Sensation is intact.      Motor: Motor function is intact.      Comments: Light touch intact to bilateral feet.              Assessment:       Encounter Diagnosis   Name Primary?    Postoperative state Yes         Plan:       Angel was seen today for post-op evaluation.    Diagnoses and all orders for this visit:    Postoperative state    I counseled the patient on her conditions, their implications and medical management.    Dressing left foot removed.  Left foot cleansed Hibiclens scrub.  Applied Mepilex border.  Home care instructions reviewed in detail.  Patient may shower.  Discussed avoiding submerging foot directly into water.      Continue heel weight-bearing left foot as tolerated.      Continue elevation at rest.      RTC within 2 weeks for suture removal or p.r.n. as discussed.    Assisted by Vikas Russ DPM PGY 3    A portion of this note was generated by voice recognition software and may contain spelling and grammar errors.

## 2023-01-11 LAB
FINAL PATHOLOGIC DIAGNOSIS: NORMAL
GROSS: NORMAL
Lab: NORMAL
MICROSCOPIC EXAM: NORMAL

## 2023-01-17 ENCOUNTER — PATIENT MESSAGE (OUTPATIENT)
Dept: PODIATRY | Facility: CLINIC | Age: 43
End: 2023-01-17
Payer: COMMERCIAL

## 2023-01-17 ENCOUNTER — TELEPHONE (OUTPATIENT)
Dept: PODIATRY | Facility: CLINIC | Age: 43
End: 2023-01-17
Payer: COMMERCIAL

## 2023-01-17 NOTE — TELEPHONE ENCOUNTER
Contacted pt to clarify status of forms. I explained to pt that I did receive a second set of forms on 01/10 but they were just duplicates along with signed consent forms from her (which I hadn't received at first). I also informed pt I sent forms to Dr. Montez to sign on 01/05. Pt stated this process is frustrating because she has had surgery 3 times in the past year+ and Isaura RN stated she sent forms to me. I informed pt Isaura did what she told pt she would do, and I did receive forms on the 10th, however they were the same forms I received on 01/04. Pt verbalized understanding but again stated this was frustrating. I again informed pt that forms were sent back to Dr. Montez on 01/05 and they just need to be completed by Dr. Montez. I informed pt Dr. Montez is not in office today, and will be in surgery tomorrow per Isaura, so it would be best if pt can request an extension, as they are due tomorrow. Pt verbalized understanding and stated she would e-mail [employer]. I informed pt as soon as the forms are complete, I will fax and inform her. Pt verbalized understanding.

## 2023-01-19 ENCOUNTER — PATIENT MESSAGE (OUTPATIENT)
Dept: INTERNAL MEDICINE | Facility: CLINIC | Age: 43
End: 2023-01-19
Payer: COMMERCIAL

## 2023-01-19 ENCOUNTER — PATIENT MESSAGE (OUTPATIENT)
Dept: PODIATRY | Facility: CLINIC | Age: 43
End: 2023-01-19
Payer: COMMERCIAL

## 2023-01-19 ENCOUNTER — TELEPHONE (OUTPATIENT)
Dept: PODIATRY | Facility: CLINIC | Age: 43
End: 2023-01-19
Payer: COMMERCIAL

## 2023-01-19 DIAGNOSIS — R11.0 NAUSEA: Primary | ICD-10-CM

## 2023-01-19 NOTE — TELEPHONE ENCOUNTER
Pt's FMLA forms and Medical Consideration Form was scanned into chart under media manager and faxed to [employer] [phone], and [facility] [employer] [facility] [phone]. Confirmation was received. Pt notified.

## 2023-01-20 ENCOUNTER — TELEPHONE (OUTPATIENT)
Dept: PODIATRY | Facility: CLINIC | Age: 43
End: 2023-01-20
Payer: COMMERCIAL

## 2023-01-20 NOTE — TELEPHONE ENCOUNTER
Contacted [employer] per pt request, and spoke with representative (DONNIE) who stated that even though the forms were denied that they may be approved late because they were submitted a day late. She stated the denial letter was automatically sent out on due date because they had not received. However, they are currently reviewing so pt should hear back no later than next week. I apologized to pt for this inconvenience and explained that I couldn't sign the forms, I had to wait for provider to sign in order to submit. Message sent via portal.

## 2023-01-23 ENCOUNTER — PATIENT MESSAGE (OUTPATIENT)
Dept: INTERNAL MEDICINE | Facility: CLINIC | Age: 43
End: 2023-01-23
Payer: COMMERCIAL

## 2023-01-23 RX ORDER — ONDANSETRON 4 MG/1
8 TABLET, ORALLY DISINTEGRATING ORAL EVERY 12 HOURS PRN
Qty: 10 TABLET | Refills: 0 | Status: SHIPPED | OUTPATIENT
Start: 2023-01-23 | End: 2023-02-16 | Stop reason: SDUPTHER

## 2023-01-24 ENCOUNTER — OFFICE VISIT (OUTPATIENT)
Dept: PODIATRY | Facility: CLINIC | Age: 43
End: 2023-01-24
Payer: COMMERCIAL

## 2023-01-24 VITALS
BODY MASS INDEX: 35.82 KG/M2 | WEIGHT: 215 LBS | SYSTOLIC BLOOD PRESSURE: 131 MMHG | HEIGHT: 65 IN | HEART RATE: 77 BPM | DIASTOLIC BLOOD PRESSURE: 73 MMHG

## 2023-01-24 DIAGNOSIS — Z98.890 POSTOPERATIVE STATE: Primary | ICD-10-CM

## 2023-01-24 PROCEDURE — 3075F PR MOST RECENT SYSTOLIC BLOOD PRESS GE 130-139MM HG: ICD-10-PCS | Mod: CPTII,S$GLB,, | Performed by: PODIATRIST

## 2023-01-24 PROCEDURE — 3078F PR MOST RECENT DIASTOLIC BLOOD PRESSURE < 80 MM HG: ICD-10-PCS | Mod: CPTII,S$GLB,, | Performed by: PODIATRIST

## 2023-01-24 PROCEDURE — 1160F PR REVIEW ALL MEDS BY PRESCRIBER/CLIN PHARMACIST DOCUMENTED: ICD-10-PCS | Mod: CPTII,S$GLB,, | Performed by: PODIATRIST

## 2023-01-24 PROCEDURE — 1159F PR MEDICATION LIST DOCUMENTED IN MEDICAL RECORD: ICD-10-PCS | Mod: CPTII,S$GLB,, | Performed by: PODIATRIST

## 2023-01-24 PROCEDURE — 99024 PR POST-OP FOLLOW-UP VISIT: ICD-10-PCS | Mod: S$GLB,,, | Performed by: PODIATRIST

## 2023-01-24 PROCEDURE — 99999 PR PBB SHADOW E&M-EST. PATIENT-LVL III: CPT | Mod: PBBFAC,,, | Performed by: PODIATRIST

## 2023-01-24 PROCEDURE — 1159F MED LIST DOCD IN RCRD: CPT | Mod: CPTII,S$GLB,, | Performed by: PODIATRIST

## 2023-01-24 PROCEDURE — 3008F PR BODY MASS INDEX (BMI) DOCUMENTED: ICD-10-PCS | Mod: CPTII,S$GLB,, | Performed by: PODIATRIST

## 2023-01-24 PROCEDURE — 1160F RVW MEDS BY RX/DR IN RCRD: CPT | Mod: CPTII,S$GLB,, | Performed by: PODIATRIST

## 2023-01-24 PROCEDURE — 3075F SYST BP GE 130 - 139MM HG: CPT | Mod: CPTII,S$GLB,, | Performed by: PODIATRIST

## 2023-01-24 PROCEDURE — 3008F BODY MASS INDEX DOCD: CPT | Mod: CPTII,S$GLB,, | Performed by: PODIATRIST

## 2023-01-24 PROCEDURE — 99999 PR PBB SHADOW E&M-EST. PATIENT-LVL III: ICD-10-PCS | Mod: PBBFAC,,, | Performed by: PODIATRIST

## 2023-01-24 PROCEDURE — 99024 POSTOP FOLLOW-UP VISIT: CPT | Mod: S$GLB,,, | Performed by: PODIATRIST

## 2023-01-24 PROCEDURE — 3078F DIAST BP <80 MM HG: CPT | Mod: CPTII,S$GLB,, | Performed by: PODIATRIST

## 2023-01-24 NOTE — PROGRESS NOTES
Subjective:      Patient ID: Angel Mosquera is a 42 y.o. female.    Chief Complaint: Foot Pain (bilateral )    Patient presents to clinic with chief complaint of bilateral bottom heel pain for approximately 9 months now, left worse than right. She does not recall any prior injury to both feet other than jumping in a shallow pool this past summer. Pain is worse with the first couple of steps in the AM and worsens with prolonged standing/walking. She was seen by Dr. Swan 5 months ago who performed a steroid injection in her left heel which she states did not help with her pain. She has tried PT, stretching, night splints, and changing shoegear (HOKA, new balance, asics) with no improvement. Her right ankle recently started hurting, states it feels like her range of motion is limited, thinks it may be due to her compensating to stay off the left foot. 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     04/18/2022:  Presents complaining of chronic pain to the left heel for greater than a year.  She has a history of hysterectomy in December 2021 that required 8 weeks for recovery and modified duties.  She works as a nurse at [employer].  States she also has pain in the right heel to lesser extent.  Pending a trip to [place] later this week.  Requesting a steroid injection to left heel but also requesting consideration for surgical intervention to left heel next month.    06/24/2022:  Post endoscopic plantar fasciotomy left foot on 06/17/2022.  Reports minimal discomfort to left foot.  She has been ambulating with the orthopedic boot.  Taking ibuprofen as needed for pain.    07/07/2022:  Approximately 3 weeks postop.  She has been ambulating without her orthopedic boot but still uses for long distances.  Reports some numbness along the bottom of the foot when she touches it.  She reports pain over the medial surgical incision and no pain anywhere else.  She has no pain  directly on the bottom of the foot when she stands.    07/29/2022: Approximately 6 weeks postop. She has been ambulating in standard footwear, she has been active with home chores and watching children. 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 She has had improved functional level with similar amounts of pain, she also has some residual numbness of the foot mostly pointing along the plantar lateral midfoot region. She has not filled the gabapentin prescription because she is not want to add to her medications.     09/29/2022:  Relates no longer has pain to the bottom the heel however has worsening pain to the left forefoot pointing to the ball the foot when she pushes off on the foot.  She describes a sharp pain only with standing and walking.  No pain at rest.  States that she is unable to tolerate barefoot walking.  Also relates that she still has some residual numbness to the left foot.    10/17/2022:  Follow-up from MRI left forefoot.  She states that initially when she wore the metatarsal pad she has significant improvement her symptoms for 1-2 days and then the pain began to worsen.  She did not attend work yesterday secondary to pain.    11/18/2022:  Follow-up from steroid injection to the left forefoot for Sparrow's neuroma.  Relates nearly 3 weeks of complete pain relief.  States that the pain gradually recurred and she is also developed some pain along the arch from compensation.  She is inquiring about another steroid injection in addition to surgical intervention.    01/10/2023:  Post neuroma excision 4th intermetatarsal space left forefoot on 01/05/2023.  Relates mild discomfort to left foot.  She does describe throbbing pain when she leaves the foot in dependency.  Heel weight-bearing left foot with surgical shoe and walker.  Accompanied by her .    01/24/2023:  3 weeks postop.  Reports the previous discomfort and bruising as nearly resolved.  She still having some pain  "to the surgical site when she attempts to walk on it.  Also complains that her left 5th toe is underlapping the 4th toe when she walks.    Vitals:    01/24/23 1457   BP: 131/73   Pulse: 77   Weight: 97.5 kg (215 lb)   Height: 5' 5" (1.651 m)      Past Medical History:   Diagnosis Date    Hypertension     PONV (postoperative nausea and vomiting)     Rosacea        Past Surgical History:   Procedure Laterality Date    AUGMENTATION OF BREAST Bilateral 2005    Breast Augmentation  2005    COSMETIC SURGERY  2005    Breast augmentation    DILATION AND CURETTAGE OF UTERUS N/A 10/19/2019    Procedure: DILATION AND CURETTAGE, UTERUS;  Surgeon: Frances Culp MD;  Location: [facility] OR;  Service: OB/GYN;  Laterality: N/A;  with suction    ENDOSCOPIC PLANTAR FASCIOTOMY Left 6/17/2022    Procedure: FASCIOTOMY, PLANTAR, ENDOSCOPIC;  Surgeon: Mello Montez DPM;  Location: [facility] OR;  Service: Podiatry;  Laterality: Left;  endoscopic plantar fasciotomy kit    mischarr      ROBOT-ASSISTED LAPAROSCOPIC UTERINE MYOMECTOMY N/A 1/7/2020    Procedure: ROBOTIC MYOMECTOMY, UTERUS;  Surgeon: Frances Culp MD;  Location: [facility] OR;  Service: OB/GYN;  Laterality: N/A;    SURGICAL REMOVAL OF FERNANDEZ'S NEUROMA Left 1/5/2023    Procedure: EXCISION, FERNANDEZ'S NEUROMA;  Surgeon: Mello Montez DPM;  Location: [facility] OR;  Service: Podiatry;  Laterality: Left;    XI ROBOTIC HYSTERECTOMY, WITH SALPINGO-OOPHORECTOMY N/A 12/15/2021    Procedure: XI ROBOTIC HYSTERECTOMY,WITH SALPINGO-OOPHORECTOMY;  Surgeon: Carla Aguero MD;  Location: [facility];  Service: OB/GYN;  Laterality: N/A;       Family History   Problem Relation Age of Onset    Diabetes Mother     Hypertension Mother     Heart failure Mother         viral?    Heart disease Mother     Hyperlipidemia Mother     Cancer Paternal Grandfather         Pancreatic Cancer    Cancer Paternal Grandmother         Lung Cancer    Arthritis Maternal Grandmother     Diabetes Maternal " Grandmother     Alcohol abuse Maternal Grandfather     COPD Maternal Grandfather     Alcohol abuse Father     Alcohol abuse Maternal Uncle     Cancer Neg Hx     Ovarian cancer Neg Hx     Melanoma Neg Hx     Heart attack Neg Hx     Stroke Neg Hx     Colon cancer Neg Hx     Breast cancer Neg Hx     Blindness Neg Hx     Amblyopia Neg Hx     Cataracts Neg Hx     Glaucoma Neg Hx     Macular degeneration Neg Hx     Retinal detachment Neg Hx     Strabismus Neg Hx        Social History     Socioeconomic History    Marital status:    Occupational History    Occupation: Nurse     Employer: [employer]     Comment: Oncology   Tobacco Use    Smoking status: Never    Smokeless tobacco: Never   Substance and Sexual Activity    Alcohol use: No    Drug use: No    Sexual activity: Yes     Partners: Male     Birth control/protection: None     Comment: Vasectomy   Other Topics Concern    Are you pregnant or think you may be? No    Breast-feeding No       Current Outpatient Medications   Medication Sig Dispense Refill    azelaic acid (FINACEA) 15 % gel Apply to affected area(s) of face nightly, then moisturize (Patient taking differently: Apply to affected area(s) of face nightly, then moisturize) 50 g 3    carvediloL (COREG) 6.25 MG tablet Take 1 tablet (6.25 mg total) by mouth 2 (two) times daily with meals. 60 tablet 11    cephALEXin (KEFLEX) 500 MG capsule Take 1 capsule (500 mg total) by mouth 4 (four) times daily. 28 capsule 0    hydrOXYzine HCL (ATARAX) 25 MG tablet Take 1 tablet (25 mg total) by mouth nightly as needed (Insomnia). 30 tablet 3    ibuprofen (ADVIL,MOTRIN) 800 MG tablet Take 1 tablet (800 mg total) by mouth every 6 (six) hours as needed for Pain. 30 tablet 1    multivitamin (THERAGRAN) per tablet Take 1 tablet by mouth once daily.      ondansetron (ZOFRAN-ODT) 4 MG TbDL Take 2 tablets (8 mg total) by mouth every 12 (twelve) hours as needed (nausea). 10 tablet 0    tirzepatide 5 mg/0.5 mL PnIj  Inject 5 mg into the skin every 7 days. 4 pen 2    traMADoL (ULTRAM) 50 mg tablet Take 2 tablets (100 mg total) by mouth every 6 (six) hours as needed for Pain. 30 tablet 0     Current Facility-Administered Medications   Medication Dose Route Frequency Provider Last Rate Last Admin    sodium chloride 0.9% flush 10 mL  10 mL Intravenous PRN Mello Montez, JUSTICE        sodium chloride 0.9% flush 10 mL  10 mL Intravenous PRN Mello Montez DPM           Review of patient's allergies indicates:  No Known Allergies      Review of Systems   Constitutional: Negative for chills, fever and malaise/fatigue.   HENT:  Negative for hearing loss.    Cardiovascular:  Negative for claudication and leg swelling.   Respiratory:  Negative for cough, shortness of breath and wheezing.    Skin:  Negative for flushing, rash and unusual hair distribution.   Musculoskeletal:  Negative for joint pain and myalgias.        Bilateral heel pain L>R. Right ankle pain   Gastrointestinal:  Negative for nausea and vomiting.   Neurological:  Negative for loss of balance, numbness, paresthesias and sensory change.   Psychiatric/Behavioral:  Negative for altered mental status.          Objective:      Physical Exam  Constitutional:       General: She is not in acute distress.     Appearance: She is not ill-appearing.   Cardiovascular:      Pulses:           Dorsalis pedis pulses are 2+ on the right side and 2+ on the left side.        Posterior tibial pulses are 2+ on the right side and 2+ on the left side.      Comments: CFT< 3 secs all toes bilateral foot, skin temp warm to cool bilateral foot, no hair growth bilateral lower extremity, no edema to bilateral lower extremities    Musculoskeletal:      Comments: Localized pain on palpation at the proximal aspect of the incision site plantar left forefoot.  No palpable fluctuance or crepitance.  Mild localized edema.  No surrounding erythema.  Active range of motion to left 5th toe appears to be  intact but reduced.  Flexible adductovarus contracture of the 5th toe.    Resting calcaneal stance position is inverted bilateral foot.  No significant collapse of the medial longitudinal arch left foot standing noting forefoot varus in comparison to the right.   Skin:     General: Skin is warm.      Capillary Refill: Capillary refill takes less than 2 seconds.      Findings: No ecchymosis or erythema.      Nails: There is no clubbing.      Comments: Incision plantar left forefoot well-approximated sutures.  No gapping or drainage observed.  No signs infection.   Neurological:      Mental Status: She is alert and oriented to person, place, and time.      Sensory: Sensation is intact.      Motor: Motor function is intact.      Comments: Light touch intact to bilateral feet.              Assessment:       Encounter Diagnosis   Name Primary?    Postoperative state Yes         Plan:       Angel was seen today for post-op evaluation.    Diagnoses and all orders for this visit:    Postoperative state    I counseled the patient on her conditions, their implications and medical management.    Sutures removed.  No dehiscence noted.  Patient encouraged to weightbear as tolerated with surgical shoe.  A crest pad was applied to the left 4th toe to help offload the 5th toe.  Patient is encouraged to continue active and passive range of motion to her toes.    Tentative return to work date 02/20/2023 with follow up scheduled on 02/17/2023 to reassess.    Assisted by Vikas Russ DPM PGY 3    A portion of this note was generated by voice recognition software and may contain spelling and grammar errors.

## 2023-01-25 ENCOUNTER — PATIENT MESSAGE (OUTPATIENT)
Dept: PODIATRY | Facility: CLINIC | Age: 43
End: 2023-01-25
Payer: COMMERCIAL

## 2023-02-03 ENCOUNTER — PATIENT MESSAGE (OUTPATIENT)
Dept: INTERNAL MEDICINE | Facility: CLINIC | Age: 43
End: 2023-02-03
Payer: COMMERCIAL

## 2023-02-08 ENCOUNTER — PATIENT MESSAGE (OUTPATIENT)
Dept: PODIATRY | Facility: CLINIC | Age: 43
End: 2023-02-08
Payer: COMMERCIAL

## 2023-02-16 DIAGNOSIS — R11.0 NAUSEA: ICD-10-CM

## 2023-02-16 RX ORDER — ONDANSETRON 4 MG/1
8 TABLET, ORALLY DISINTEGRATING ORAL EVERY 12 HOURS PRN
Qty: 10 TABLET | Refills: 3 | Status: SHIPPED | OUTPATIENT
Start: 2023-02-16 | End: 2023-10-24 | Stop reason: SDUPTHER

## 2023-02-16 NOTE — TELEPHONE ENCOUNTER
No new care gaps identified.  [facility] Embedded Care Gaps. Reference number: [id number]. 2/16/2023   8:22:11 AM CST

## 2023-02-17 ENCOUNTER — OFFICE VISIT (OUTPATIENT)
Dept: PODIATRY | Facility: CLINIC | Age: 43
End: 2023-02-17
Payer: COMMERCIAL

## 2023-02-17 VITALS
WEIGHT: 215 LBS | SYSTOLIC BLOOD PRESSURE: 103 MMHG | BODY MASS INDEX: 35.82 KG/M2 | HEART RATE: 66 BPM | DIASTOLIC BLOOD PRESSURE: 73 MMHG | HEIGHT: 65 IN

## 2023-02-17 DIAGNOSIS — T81.31XD POSTOPERATIVE WOUND DEHISCENCE, SUBSEQUENT ENCOUNTER: Primary | ICD-10-CM

## 2023-02-17 PROCEDURE — 99024 POSTOP FOLLOW-UP VISIT: CPT | Mod: S$GLB,,, | Performed by: PODIATRIST

## 2023-02-17 PROCEDURE — 99999 PR PBB SHADOW E&M-EST. PATIENT-LVL III: CPT | Mod: PBBFAC,,, | Performed by: PODIATRIST

## 2023-02-17 PROCEDURE — 99024 PR POST-OP FOLLOW-UP VISIT: ICD-10-PCS | Mod: S$GLB,,, | Performed by: PODIATRIST

## 2023-02-17 PROCEDURE — 3074F PR MOST RECENT SYSTOLIC BLOOD PRESSURE < 130 MM HG: ICD-10-PCS | Mod: CPTII,S$GLB,, | Performed by: PODIATRIST

## 2023-02-17 PROCEDURE — 3008F PR BODY MASS INDEX (BMI) DOCUMENTED: ICD-10-PCS | Mod: CPTII,S$GLB,, | Performed by: PODIATRIST

## 2023-02-17 PROCEDURE — 3078F PR MOST RECENT DIASTOLIC BLOOD PRESSURE < 80 MM HG: ICD-10-PCS | Mod: CPTII,S$GLB,, | Performed by: PODIATRIST

## 2023-02-17 PROCEDURE — 1159F PR MEDICATION LIST DOCUMENTED IN MEDICAL RECORD: ICD-10-PCS | Mod: CPTII,S$GLB,, | Performed by: PODIATRIST

## 2023-02-17 PROCEDURE — 3074F SYST BP LT 130 MM HG: CPT | Mod: CPTII,S$GLB,, | Performed by: PODIATRIST

## 2023-02-17 PROCEDURE — 3008F BODY MASS INDEX DOCD: CPT | Mod: CPTII,S$GLB,, | Performed by: PODIATRIST

## 2023-02-17 PROCEDURE — 3078F DIAST BP <80 MM HG: CPT | Mod: CPTII,S$GLB,, | Performed by: PODIATRIST

## 2023-02-17 PROCEDURE — 1160F RVW MEDS BY RX/DR IN RCRD: CPT | Mod: CPTII,S$GLB,, | Performed by: PODIATRIST

## 2023-02-17 PROCEDURE — 99999 PR PBB SHADOW E&M-EST. PATIENT-LVL III: ICD-10-PCS | Mod: PBBFAC,,, | Performed by: PODIATRIST

## 2023-02-17 PROCEDURE — 1160F PR REVIEW ALL MEDS BY PRESCRIBER/CLIN PHARMACIST DOCUMENTED: ICD-10-PCS | Mod: CPTII,S$GLB,, | Performed by: PODIATRIST

## 2023-02-17 PROCEDURE — 1159F MED LIST DOCD IN RCRD: CPT | Mod: CPTII,S$GLB,, | Performed by: PODIATRIST

## 2023-02-17 NOTE — PROGRESS NOTES
Subjective:      Patient ID: Angel Mosquera is a 42 y.o. female.    Chief Complaint: Foot Pain (bilateral )    Patient presents to clinic with chief complaint of bilateral bottom heel pain for approximately 9 months now, left worse than right. She does not recall any prior injury to both feet other than jumping in a shallow pool this past summer. Pain is worse with the first couple of steps in the AM and worsens with prolonged standing/walking. She was seen by Dr. Swan 5 months ago who performed a steroid injection in her left heel which she states did not help with her pain. She has tried PT, stretching, night splints, and changing shoegear (HOKA, new balance, asics) with no improvement. Her right ankle recently started hurting, states it feels like her range of motion is limited, thinks it may be due to her compensating to stay off the left foot. 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     04/18/2022:  Presents complaining of chronic pain to the left heel for greater than a year.  She has a history of hysterectomy in December 2021 that required 8 weeks for recovery and modified duties.  She works as a nurse at [employer].  States she also has pain in the right heel to lesser extent.  Pending a trip to [place] later this week.  Requesting a steroid injection to left heel but also requesting consideration for surgical intervention to left heel next month.    06/24/2022:  Post endoscopic plantar fasciotomy left foot on 06/17/2022.  Reports minimal discomfort to left foot.  She has been ambulating with the orthopedic boot.  Taking ibuprofen as needed for pain.    07/07/2022:  Approximately 3 weeks postop.  She has been ambulating without her orthopedic boot but still uses for long distances.  Reports some numbness along the bottom of the foot when she touches it.  She reports pain over the medial surgical incision and no pain anywhere else.  She has no pain  directly on the bottom of the foot when she stands.    07/29/2022: Approximately 6 weeks postop. She has been ambulating in standard footwear, she has been active with home chores and watching children. 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. She has had improved functional level with similar amounts of pain, she also has some residual numbness of the foot mostly pointing along the plantar lateral midfoot region. She has not filled the gabapentin prescription because she is not want to add to her medications.     09/29/2022:  Relates no longer has pain to the bottom the heel however has worsening pain to the left forefoot pointing to the ball the foot when she pushes off on the foot.  She describes a sharp pain only with standing and walking.  No pain at rest.  States that she is unable to tolerate barefoot walking.  Also relates that she still has some residual numbness to the left foot.    10/17/2022:  Follow-up from MRI left forefoot.  She states that initially when she wore the metatarsal pad she has significant improvement her symptoms for 1-2 days and then the pain began to worsen.  She did not attend work yesterday secondary to pain.    11/18/2022:  Follow-up from steroid injection to the left forefoot for Sparrow's neuroma.  Relates nearly 3 weeks of complete pain relief.  States that the pain gradually recurred and she is also developed some pain along the arch from compensation.  She is inquiring about another steroid injection in addition to surgical intervention.    01/10/2023:  Post neuroma excision 4th intermetatarsal space left forefoot on 01/05/2023.  Relates mild discomfort to left foot.  She does describe throbbing pain when she leaves the foot in dependency.  Heel weight-bearing left foot with surgical shoe and walker.  Accompanied by her .    01/24/2023:  3 weeks postop.  Reports the previous discomfort and bruising as nearly resolved.  She still having some pain  "to the surgical site when she attempts to walk on it.  Also complains that her left 5th toe is underlapping the 4th toe when she walks.    02/17/2023:  Proximally 6 weeks postop.  Reports mild discomfort to the bottom of the left foot overlying the proximal aspect of the incision.  States that the incision had opened up and she initially Steri-Strips the opening and used bandaging and subsequently healed up.  Ambulating with tennis shoes.  Inquiring about returning to work.    Vitals:    02/17/23 1118   BP: 103/73   Pulse: 66   Weight: 97.5 kg (215 lb)   Height: 5' 5" (1.651 m)   PainSc:   4   PainLoc: Foot      Past Medical History:   Diagnosis Date    Hypertension     PONV (postoperative nausea and vomiting)     Rosacea        Past Surgical History:   Procedure Laterality Date    AUGMENTATION OF BREAST Bilateral 2005    Breast Augmentation  2005    COSMETIC SURGERY  2005    Breast augmentation    DILATION AND CURETTAGE OF UTERUS N/A 10/19/2019    Procedure: DILATION AND CURETTAGE, UTERUS;  Surgeon: Frances Culp MD;  Location: [facility] OR;  Service: OB/GYN;  Laterality: N/A;  with suction    ENDOSCOPIC PLANTAR FASCIOTOMY Left 6/17/2022    Procedure: FASCIOTOMY, PLANTAR, ENDOSCOPIC;  Surgeon: Mello Montez DPM;  Location: [facility] OR;  Service: Podiatry;  Laterality: Left;  endoscopic plantar fasciotomy [facility]    mischarr      ROBOT-ASSISTED LAPAROSCOPIC UTERINE MYOMECTOMY N/A 1/7/2020    Procedure: ROBOTIC MYOMECTOMY, UTERUS;  Surgeon: Frances Culp MD;  Location: [facility] OR;  Service: OB/GYN;  Laterality: N/A;    SURGICAL REMOVAL OF FERNANDEZ'S NEUROMA Left 1/5/2023    Procedure: EXCISION, FERNANDEZ'S NEUROMA;  Surgeon: Mello Montez DPM;  Location: [facility] OR;  Service: Podiatry;  Laterality: Left;    XI ROBOTIC HYSTERECTOMY, WITH SALPINGO-OOPHORECTOMY N/A 12/15/2021    Procedure: XI ROBOTIC HYSTERECTOMY,WITH SALPINGO-OOPHORECTOMY;  Surgeon: Carla Aguero MD;  Location: [facility] OR;  Service: OB/GYN;  " Laterality: N/A;       Family History   Problem Relation Age of Onset    Diabetes Mother     Hypertension Mother     Heart failure Mother         viral?    Heart disease Mother     Hyperlipidemia Mother     Cancer Paternal Grandfather         Pancreatic Cancer    Cancer Paternal Grandmother         Lung Cancer    Arthritis Maternal Grandmother     Diabetes Maternal Grandmother     Alcohol abuse Maternal Grandfather     COPD Maternal Grandfather     Alcohol abuse Father     Alcohol abuse Maternal Uncle     Cancer Neg Hx     Ovarian cancer Neg Hx     Melanoma Neg Hx     Heart attack Neg Hx     Stroke Neg Hx     Colon cancer Neg Hx     Breast cancer Neg Hx     Blindness Neg Hx     Amblyopia Neg Hx     Cataracts Neg Hx     Glaucoma Neg Hx     Macular degeneration Neg Hx     Retinal detachment Neg Hx     Strabismus Neg Hx        Social History     Socioeconomic History    Marital status:    Occupational History    Occupation: Nurse     Employer: [employer]     Comment: Oncology   Tobacco Use    Smoking status: Never    Smokeless tobacco: Never   Substance and Sexual Activity    Alcohol use: No    Drug use: No    Sexual activity: Yes     Partners: Male     Birth control/protection: None     Comment: Vasectomy   Other Topics Concern    Are you pregnant or think you may be? No    Breast-feeding No       Current Outpatient Medications   Medication Sig Dispense Refill    azelaic acid (FINACEA) 15 % gel Apply to affected area(s) of face nightly, then moisturize (Patient taking differently: Apply to affected area(s) of face nightly, then moisturize) 50 g 3    carvediloL (COREG) 6.25 MG tablet Take 1 tablet (6.25 mg total) by mouth 2 (two) times daily with meals. 60 tablet 11    hydrOXYzine HCL (ATARAX) 25 MG tablet Take 1 tablet (25 mg total) by mouth nightly as needed (Insomnia). 30 tablet 3    ibuprofen (ADVIL,MOTRIN) 800 MG tablet Take 1 tablet (800 mg total) by mouth every 6 (six) hours as needed for  Pain. 30 tablet 1    multivitamin (THERAGRAN) per tablet Take 1 tablet by mouth once daily.      ondansetron (ZOFRAN-ODT) 4 MG TbDL Take 2 tablets (8 mg total) by mouth every 12 (twelve) hours as needed (nausea). 10 tablet 3    tirzepatide 5 mg/0.5 mL PnIj Inject 5 mg into the skin every 7 days. 4 pen 2    traMADoL (ULTRAM) 50 mg tablet Take 2 tablets (100 mg total) by mouth every 6 (six) hours as needed for Pain. 30 tablet 0     Current Facility-Administered Medications   Medication Dose Route Frequency Provider Last Rate Last Admin    sodium chloride 0.9% flush 10 mL  10 mL Intravenous PRN Mello Montez, DPM        sodium chloride 0.9% flush 10 mL  10 mL Intravenous PRN Mello Montez, EMILYM           Review of patient's allergies indicates:  No Known Allergies      Review of Systems   Constitutional: Negative for chills, fever and malaise/fatigue.   HENT:  Negative for hearing loss.    Cardiovascular:  Negative for claudication and leg swelling.   Respiratory:  Negative for cough, shortness of breath and wheezing.    Skin:  Negative for flushing, rash and unusual hair distribution.   Musculoskeletal:  Negative for joint pain and myalgias.        Bilateral heel pain L>R. Right ankle pain   Gastrointestinal:  Negative for nausea and vomiting.   Neurological:  Negative for loss of balance, numbness, paresthesias and sensory change.   Psychiatric/Behavioral:  Negative for altered mental status.          Objective:      Physical Exam  Constitutional:       General: She is not in acute distress.     Appearance: She is not ill-appearing.   Cardiovascular:      Pulses:           Dorsalis pedis pulses are 2+ on the right side and 2+ on the left side.        Posterior tibial pulses are 2+ on the right side and 2+ on the left side.      Comments: CFT< 3 secs all toes bilateral foot, skin temp warm to cool bilateral foot, no hair growth bilateral lower extremity, no edema to bilateral lower  extremities    Musculoskeletal:      Comments: Localized pain on palpation at the proximal aspect of the incision site plantar left forefoot with the remainder of the incision having a normal appearing scar and no pain on palpation.  Left 5th toe is no longer underlapping the 4th toe and there is no pain with range of motion of the left 5th toe.  Flexible adductovarus contracture of the 5th toe.    Resting calcaneal stance position is inverted bilateral foot.  No significant collapse of the medial longitudinal arch left foot standing noting forefoot varus in comparison to the right.   Skin:     General: Skin is warm.      Capillary Refill: Capillary refill takes less than 2 seconds.      Findings: No ecchymosis or erythema.      Nails: There is no clubbing.      Comments: Normal appearing scar plantar 4th intermetatarsal space left forefoot with slight superficial dehiscence at the proximal aspect measuring less than 4 mm in length extending down to dermal skin with mild localized pain on palpation.  Mild overlying hyperkeratotic tissue.   Neurological:      Mental Status: She is alert and oriented to person, place, and time.      Sensory: Sensation is intact.      Motor: Motor function is intact.      Comments: Light touch intact to bilateral feet.                Assessment:       Encounter Diagnosis   Name Primary?    Postoperative wound dehiscence, subsequent encounter Yes         Plan:       Angel was seen today for foot pain.    Diagnoses and all orders for this visit:    Postoperative wound dehiscence, subsequent encounter    I counseled the patient on her conditions, their implications and medical management.    With the patient's verbal consent a sterile #15 scalpel used to trim the raised hyperkeratotic skin overlying the dehisced portion of the incision at the proximal aspect.  She tolerated procedure well without apparent complication.  No blood loss.  Mild discomfort during the procedure.  Wound extends  to dermal skin as discussed.  Mepilex border applied.  Continue home care as discussed.      Activity as tolerated.      Patient may return to work without restrictions on 02/20/2023.      RTC p.r.n. as discussed.    A portion of this note was generated by voice recognition software and may contain spelling and grammar errors.

## 2023-02-22 ENCOUNTER — PATIENT MESSAGE (OUTPATIENT)
Dept: BARIATRICS | Facility: CLINIC | Age: 43
End: 2023-02-22
Payer: COMMERCIAL

## 2023-03-07 ENCOUNTER — PATIENT MESSAGE (OUTPATIENT)
Dept: PRIMARY CARE CLINIC | Facility: CLINIC | Age: 43
End: 2023-03-07
Payer: COMMERCIAL

## 2023-03-07 DIAGNOSIS — E66.09 CLASS 2 OBESITY DUE TO EXCESS CALORIES WITHOUT SERIOUS COMORBIDITY WITH BODY MASS INDEX (BMI) OF 35.0 TO 35.9 IN ADULT: Primary | ICD-10-CM

## 2023-03-22 ENCOUNTER — PATIENT MESSAGE (OUTPATIENT)
Dept: PRIMARY CARE CLINIC | Facility: CLINIC | Age: 43
End: 2023-03-22
Payer: COMMERCIAL

## 2023-03-22 DIAGNOSIS — L65.9 HAIR LOSS: Primary | ICD-10-CM

## 2023-03-31 ENCOUNTER — PATIENT MESSAGE (OUTPATIENT)
Dept: PRIMARY CARE CLINIC | Facility: CLINIC | Age: 43
End: 2023-03-31
Payer: COMMERCIAL

## 2023-03-31 DIAGNOSIS — L65.9 HAIR LOSS: Primary | ICD-10-CM

## 2023-04-02 ENCOUNTER — PATIENT MESSAGE (OUTPATIENT)
Dept: PRIMARY CARE CLINIC | Facility: CLINIC | Age: 43
End: 2023-04-02
Payer: COMMERCIAL

## 2023-04-02 DIAGNOSIS — F41.9 ANXIETY: Primary | ICD-10-CM

## 2023-04-02 DIAGNOSIS — L65.9 HAIR LOSS: ICD-10-CM

## 2023-04-03 ENCOUNTER — PATIENT MESSAGE (OUTPATIENT)
Dept: PRIMARY CARE CLINIC | Facility: CLINIC | Age: 43
End: 2023-04-03
Payer: COMMERCIAL

## 2023-04-03 ENCOUNTER — PATIENT MESSAGE (OUTPATIENT)
Dept: DERMATOLOGY | Facility: CLINIC | Age: 43
End: 2023-04-03
Payer: COMMERCIAL

## 2023-04-03 DIAGNOSIS — F41.9 ANXIETY: ICD-10-CM

## 2023-04-03 RX ORDER — ALPRAZOLAM 0.25 MG/1
0.25 TABLET ORAL DAILY PRN
Qty: 30 TABLET | Refills: 0 | Status: SHIPPED | OUTPATIENT
Start: 2023-04-03 | End: 2023-04-04 | Stop reason: SDUPTHER

## 2023-04-04 ENCOUNTER — PATIENT MESSAGE (OUTPATIENT)
Dept: PRIMARY CARE CLINIC | Facility: CLINIC | Age: 43
End: 2023-04-04
Payer: COMMERCIAL

## 2023-04-04 RX ORDER — ALPRAZOLAM 0.25 MG/1
0.25 TABLET ORAL DAILY PRN
Qty: 30 TABLET | Refills: 0 | Status: SHIPPED | OUTPATIENT
Start: 2023-04-04 | End: 2024-01-08

## 2023-04-04 NOTE — TELEPHONE ENCOUNTER
Sorry, I sent in xanax with that earlier message and didn't mention it.  Please inform pt that I have sent in xanax

## 2023-04-12 ENCOUNTER — LAB VISIT (OUTPATIENT)
Dept: LAB | Facility: HOSPITAL | Age: 43
End: 2023-04-12
Attending: INTERNAL MEDICINE
Payer: COMMERCIAL

## 2023-04-12 ENCOUNTER — OFFICE VISIT (OUTPATIENT)
Dept: ENDOCRINOLOGY | Facility: CLINIC | Age: 43
End: 2023-04-12
Payer: COMMERCIAL

## 2023-04-12 VITALS
HEART RATE: 73 BPM | SYSTOLIC BLOOD PRESSURE: 130 MMHG | DIASTOLIC BLOOD PRESSURE: 84 MMHG | HEIGHT: 65 IN | OXYGEN SATURATION: 99 % | BODY MASS INDEX: 35.78 KG/M2

## 2023-04-12 DIAGNOSIS — F41.9 ANXIETY: ICD-10-CM

## 2023-04-12 DIAGNOSIS — E66.01 MORBID OBESITY: ICD-10-CM

## 2023-04-12 DIAGNOSIS — R94.6 ABNORMAL THYROID FUNCTION TEST: Primary | ICD-10-CM

## 2023-04-12 DIAGNOSIS — R94.6 ABNORMAL THYROID FUNCTION TEST: ICD-10-CM

## 2023-04-12 DIAGNOSIS — L65.9 HAIR LOSS: ICD-10-CM

## 2023-04-12 LAB
T3FREE SERPL-MCNC: 2.7 PG/ML (ref 2.3–4.2)
T4 FREE SERPL-MCNC: 0.9 NG/DL (ref 0.71–1.51)
TSH SERPL DL<=0.005 MIU/L-ACNC: 1.05 UIU/ML (ref 0.4–4)

## 2023-04-12 PROCEDURE — 99204 OFFICE O/P NEW MOD 45 MIN: CPT | Mod: S$GLB,,, | Performed by: INTERNAL MEDICINE

## 2023-04-12 PROCEDURE — 99999 PR PBB SHADOW E&M-EST. PATIENT-LVL III: ICD-10-PCS | Mod: PBBFAC,,, | Performed by: INTERNAL MEDICINE

## 2023-04-12 PROCEDURE — 3008F BODY MASS INDEX DOCD: CPT | Mod: CPTII,S$GLB,, | Performed by: INTERNAL MEDICINE

## 2023-04-12 PROCEDURE — 1159F PR MEDICATION LIST DOCUMENTED IN MEDICAL RECORD: ICD-10-PCS | Mod: CPTII,S$GLB,, | Performed by: INTERNAL MEDICINE

## 2023-04-12 PROCEDURE — 99999 PR PBB SHADOW E&M-EST. PATIENT-LVL III: CPT | Mod: PBBFAC,,, | Performed by: INTERNAL MEDICINE

## 2023-04-12 PROCEDURE — 84481 FREE ASSAY (FT-3): CPT | Performed by: INTERNAL MEDICINE

## 2023-04-12 PROCEDURE — 99204 PR OFFICE/OUTPT VISIT, NEW, LEVL IV, 45-59 MIN: ICD-10-PCS | Mod: S$GLB,,, | Performed by: INTERNAL MEDICINE

## 2023-04-12 PROCEDURE — 36415 COLL VENOUS BLD VENIPUNCTURE: CPT | Performed by: INTERNAL MEDICINE

## 2023-04-12 PROCEDURE — 3079F PR MOST RECENT DIASTOLIC BLOOD PRESSURE 80-89 MM HG: ICD-10-PCS | Mod: CPTII,S$GLB,, | Performed by: INTERNAL MEDICINE

## 2023-04-12 PROCEDURE — 3079F DIAST BP 80-89 MM HG: CPT | Mod: CPTII,S$GLB,, | Performed by: INTERNAL MEDICINE

## 2023-04-12 PROCEDURE — 3075F PR MOST RECENT SYSTOLIC BLOOD PRESS GE 130-139MM HG: ICD-10-PCS | Mod: CPTII,S$GLB,, | Performed by: INTERNAL MEDICINE

## 2023-04-12 PROCEDURE — 84439 ASSAY OF FREE THYROXINE: CPT | Performed by: INTERNAL MEDICINE

## 2023-04-12 PROCEDURE — 3008F PR BODY MASS INDEX (BMI) DOCUMENTED: ICD-10-PCS | Mod: CPTII,S$GLB,, | Performed by: INTERNAL MEDICINE

## 2023-04-12 PROCEDURE — 83520 IMMUNOASSAY QUANT NOS NONAB: CPT | Performed by: INTERNAL MEDICINE

## 2023-04-12 PROCEDURE — 3075F SYST BP GE 130 - 139MM HG: CPT | Mod: CPTII,S$GLB,, | Performed by: INTERNAL MEDICINE

## 2023-04-12 PROCEDURE — 1159F MED LIST DOCD IN RCRD: CPT | Mod: CPTII,S$GLB,, | Performed by: INTERNAL MEDICINE

## 2023-04-12 PROCEDURE — 84443 ASSAY THYROID STIM HORMONE: CPT | Performed by: INTERNAL MEDICINE

## 2023-04-12 NOTE — ASSESSMENT & PLAN NOTE
She has noted recent worsening in baseline anxiety. It's unclear if this is related to the abnormal thyroid tests or not.

## 2023-04-12 NOTE — ASSESSMENT & PLAN NOTE
The pattern of hair loss is most consistent with telogen effluvium, of which there are several possible etiologies.  Surgical menopause, sudden weight loss, subclinical hyperthyroidism and/or the recent COVID infection could all be contributing.  If that is the case, her hair should begin to grow back over the next few months.  She is seeing Dermatology tomorrow to rule out other possibilities.

## 2023-04-12 NOTE — ASSESSMENT & PLAN NOTE
She has multiple symptoms which could be consistent with hyperthyroidism, although the amount of hair loss she is been experiencing seems out of proportion to the mild depression and TSH.  The pattern of low TSH with low-normal free T4 could also be related to energy deficit related to the recent sudden weight loss akin to euthyroid sick syndrome.    Will repeat TFTs today along with thyrotropin receptor antibody.  If labs continue to show a pattern of subclinical hyperthyroidism and TRAB is negative, we will pursue an I-123 thyroid uptake and scan.    The treatment plan will be determined based on the results of the lab testing.

## 2023-04-12 NOTE — PROGRESS NOTES
NEW PATIENT VISIT    Subjective:      Chief Complaint: abnormal thyroid function tests    HPI: Angel Mosquera is a 42 y.o. female who is here for abnormal thyroid function tests      Past Medical History:   Diagnosis Date    Hypertension     PONV (postoperative nausea and vomiting)     Rosacea        With regards to low TSH:    Found to have low TSH on recent labs from 4/4/23 done for workup of sudden hair loss.  She describes the hair loss as diffuse thinning over the entire scalp.  There are no focal areas of alopecia.  FT4 is normal.    She started on Mounjaro for weight loss in November, 2022; lost 45 lbs in 4 months.  She was having a lot of nausea with it after increasing to 5 mg.  Since the hair loss started, she has stopped taking it.  She had COVID in December, 2022 (second infection), but her symptoms weren't too bad; had her first COVID infection in April, 2022 and did not have hair loss at that time.    She underwent total abdominal hysterectomy with bilateral salpingo-oophorectomy in Jan, 2022.  She did not want to take hormone replacement due to concerns for increased risk of breast cancer and other possible adverse effects of hormone replacement.  She noticed an increase in her weight after the hysterectomy.  She was also on Lexapro, which was weaned off in mid-2022.    Additionally, she's been having increasing of her baseline anxiety lately with some panic attacks.        Lab Results   Component Value Date    TSH 1.049 04/12/2023    TSH 0.161 (L) 04/04/2023    TSH 1.080 06/08/2022    FREET4 0.90 04/12/2023    FREET4 0.89 04/04/2023    FREET4 0.75 06/08/2022    T3FREE 2.7 04/12/2023    R2RZOUD 114 03/30/2017     Reviewed past medical, family, social history and updated as appropriate.    Objective:     Vitals:    04/12/23 1447   BP: 130/84   Pulse: 73         BP Readings from Last 5 Encounters:   04/12/23 130/84   02/17/23 103/73   01/24/23 131/73   01/10/23 125/66   01/05/23 (!) 115/59          Physical Exam  Vitals and nursing note reviewed.   Constitutional:       General: She is not in acute distress.     Appearance: She is well-developed. She is not ill-appearing.   HENT:      Head: Normocephalic and atraumatic.   Eyes:      General:         Right eye: No discharge.         Left eye: No discharge.      Conjunctiva/sclera: Conjunctivae normal.      Comments: No evidence of ocular inflammation or exophthalmos   Neck:      Thyroid: No thyroid mass, thyromegaly or thyroid tenderness.      Trachea: No tracheal deviation.   Pulmonary:      Effort: Pulmonary effort is normal. No respiratory distress.   Musculoskeletal:      Comments: No digital clubbing or extremity cyanosis   Neurological:      Mental Status: She is alert and oriented to person, place, and time.      Coordination: Coordination normal.   Psychiatric:         Mood and Affect: Mood normal.         Behavior: Behavior normal.         Wt Readings from Last 3 Encounters:   02/17/23 1118 97.5 kg (215 lb)   01/24/23 1457 97.5 kg (215 lb)   01/10/23 1346 97.5 kg (215 lb)       Assessment/Plan:     Abnormal thyroid function test  She has multiple symptoms which could be consistent with hyperthyroidism, although the amount of hair loss she is been experiencing seems out of proportion to the mild depression and TSH.  The pattern of low TSH with low-normal free T4 could also be related to energy deficit related to the recent sudden weight loss akin to euthyroid sick syndrome.    Will repeat TFTs today along with thyrotropin receptor antibody.  If labs continue to show a pattern of subclinical hyperthyroidism and TRAB is negative, we will pursue an I-123 thyroid uptake and scan.    The treatment plan will be determined based on the results of the lab testing.    Hair loss  The pattern of hair loss is most consistent with telogen effluvium, of which there are several possible etiologies.  Surgical menopause, sudden weight loss, subclinical  hyperthyroidism and/or the recent COVID infection could all be contributing.  If that is the case, her hair should begin to grow back over the next few months.  She is seeing Dermatology tomorrow to rule out other possibilities.    Anxiety  She has noted recent worsening in baseline anxiety. It's unclear if this is related to the abnormal thyroid tests or not.

## 2023-04-13 ENCOUNTER — OFFICE VISIT (OUTPATIENT)
Dept: DERMATOLOGY | Facility: CLINIC | Age: 43
End: 2023-04-13
Payer: COMMERCIAL

## 2023-04-13 VITALS — BODY MASS INDEX: 32.1 KG/M2 | HEIGHT: 65 IN | WEIGHT: 192.69 LBS

## 2023-04-13 DIAGNOSIS — L65.9 HAIR LOSS: ICD-10-CM

## 2023-04-13 DIAGNOSIS — L21.9 SEBORRHEIC DERMATITIS: Primary | ICD-10-CM

## 2023-04-13 PROCEDURE — 3008F PR BODY MASS INDEX (BMI) DOCUMENTED: ICD-10-PCS | Mod: CPTII,S$GLB,, | Performed by: DERMATOLOGY

## 2023-04-13 PROCEDURE — 1159F PR MEDICATION LIST DOCUMENTED IN MEDICAL RECORD: ICD-10-PCS | Mod: CPTII,S$GLB,, | Performed by: DERMATOLOGY

## 2023-04-13 PROCEDURE — 99214 PR OFFICE/OUTPT VISIT, EST, LEVL IV, 30-39 MIN: ICD-10-PCS | Mod: 25,S$GLB,, | Performed by: DERMATOLOGY

## 2023-04-13 PROCEDURE — 11104 PUNCH BX SKIN SINGLE LESION: CPT | Mod: S$GLB,,, | Performed by: DERMATOLOGY

## 2023-04-13 PROCEDURE — 99999 PR PBB SHADOW E&M-EST. PATIENT-LVL III: ICD-10-PCS | Mod: PBBFAC,,, | Performed by: DERMATOLOGY

## 2023-04-13 PROCEDURE — 11104 PR PUNCH BIOPSY, SKIN, SINGLE LESION: ICD-10-PCS | Mod: S$GLB,,, | Performed by: DERMATOLOGY

## 2023-04-13 PROCEDURE — 1159F MED LIST DOCD IN RCRD: CPT | Mod: CPTII,S$GLB,, | Performed by: DERMATOLOGY

## 2023-04-13 PROCEDURE — 1160F RVW MEDS BY RX/DR IN RCRD: CPT | Mod: CPTII,S$GLB,, | Performed by: DERMATOLOGY

## 2023-04-13 PROCEDURE — 3008F BODY MASS INDEX DOCD: CPT | Mod: CPTII,S$GLB,, | Performed by: DERMATOLOGY

## 2023-04-13 PROCEDURE — 99214 OFFICE O/P EST MOD 30 MIN: CPT | Mod: 25,S$GLB,, | Performed by: DERMATOLOGY

## 2023-04-13 PROCEDURE — 1160F PR REVIEW ALL MEDS BY PRESCRIBER/CLIN PHARMACIST DOCUMENTED: ICD-10-PCS | Mod: CPTII,S$GLB,, | Performed by: DERMATOLOGY

## 2023-04-13 PROCEDURE — 99999 PR PBB SHADOW E&M-EST. PATIENT-LVL III: CPT | Mod: PBBFAC,,, | Performed by: DERMATOLOGY

## 2023-04-13 RX ORDER — BETAMETHASONE DIPROPIONATE 0.5 MG/ML
LOTION, AUGMENTED TOPICAL
Qty: 60 ML | Refills: 3 | Status: SHIPPED | OUTPATIENT
Start: 2023-04-13 | End: 2023-04-17 | Stop reason: SDUPTHER

## 2023-04-13 RX ORDER — MINOXIDIL 2.5 MG/1
TABLET ORAL
Qty: 45 TABLET | Refills: 2 | Status: SHIPPED | OUTPATIENT
Start: 2023-04-13 | End: 2024-01-29

## 2023-04-13 RX ORDER — KETOCONAZOLE 20 MG/ML
SHAMPOO, SUSPENSION TOPICAL
Qty: 120 ML | Refills: 5 | Status: SHIPPED | OUTPATIENT
Start: 2023-04-13 | End: 2024-03-25 | Stop reason: SDUPTHER

## 2023-04-13 NOTE — PATIENT INSTRUCTIONS
Punch Biopsy Wound Care    Your doctor has performed a punch biopsy today.  A band aid and antibiotic ointment has been placed over the site.  This should remain in place for 24 hours.  It is recommended that you keep the area dry for the first 24 hours.  After 24 hours, you may remove the band aid and wash the area with warm soap and water and apply Vaseline jelly.  Many patients prefer to use Neosporin or Bacitracin ointment.  This is acceptable; however know that you can develop an allergy to this medication even if you have used it safely for years.  It is important to keep the area moist.  Letting it dry out and get air slows healing time, will worsen the scar, and make it more difficult to remove the stitches if they were placed.  Band aid is optional after first 24 hours.      If you notice increasing redness, tenderness, pain, or yellow drainage at the biopsy or surgical site, please notify your doctor.  These are signs of an infection.    If your biopsy/surgical site is bleeding, apply firm pressure for 15 minutes straight.  Repeat for another 15 minutes, if it is still bleeding.   If the surgical site continues to bleed, then please contact your doctor.     [facility]  [facility] - DERMATOLOGY  [address] [place] [address], [address] [address]  Dept: [phone]

## 2023-04-13 NOTE — PROGRESS NOTES
Subjective:      Patient ID:  Angel Mosquera is a 42 y.o. female who presents for   Chief Complaint   Patient presents with    Hair Loss     X1 month     LOV 10/4/21 (Bhavna) NUB, Skin Tag, Rosacea    1. Skin, left posterior shoulder, shave biopsy:   - MELANOCYTIC NEVUS, COMPOUND TYPE.     2.  Skin, right mid back, shave biopsy:   - MELANOCYTIC NEVUS, COMPOUND TYPE WITH ARCHITECTURAL DISORDER AND MILD   CYTOLOGIC ATYPIA (LORAINE'S NEVUS).   - MARGINS ARE NEGATIVE IN THE PLANES OF SECTION.       Patient here today for hair loss for 1 month, intense shedding, comes out in clumps  Patient states scalp burning for first 3 weeks.  Patient has been using OTC vitamins (viviscal) and treatments (minoxidil 2%)  With no improvements.  Started mounjaro 12/2022,  lost 45lbs in 4 months  Washes hair once a week, wears cap    COVID dec 1st (for second time), easy course    Hysterectomy, total, 12/2021, no hormonal supplementation, patient does not wish to consider  (Indication: Fibroids)      Derm HX:  Denies Phx NMSC  Denies Fhx MM    Current Outpatient Medications:   ·  multivitamin (THERAGRAN) per tablet, Take 1 tablet by mouth once daily., Disp: , Rfl:   ·  ALPRAZolam (XANAX) 0.25 MG tablet, Take 1 tablet (0.25 mg total) by mouth daily as needed for Anxiety., Disp: 30 tablet, Rfl: 0  ·  azelaic acid (FINACEA) 15 % gel, Apply to affected area(s) of face nightly, then moisturize (Patient taking differently: Apply to affected area(s) of face nightly, then moisturize), Disp: 50 g, Rfl: 3  ·  hydrOXYzine HCL (ATARAX) 25 MG tablet, Take 1 tablet (25 mg total) by mouth nightly as needed (Insomnia)., Disp: 30 tablet, Rfl: 3  ·  ibuprofen (ADVIL,MOTRIN) 800 MG tablet, Take 1 tablet (800 mg total) by mouth every 6 (six) hours as needed for Pain., Disp: 30 tablet, Rfl: 1  ·  ondansetron (ZOFRAN-ODT) 4 MG TbDL, Take 2 tablets (8 mg total) by mouth every 12 (twelve) hours as needed (nausea)., Disp: 10 tablet, Rfl: 3  ·  tirzepatide 7.5  mg/0.5 mL PnIj, Inject 7.5 mg into the skin every 7 days., Disp: 12 pen, Rfl: 1  ·  traMADoL (ULTRAM) 50 mg tablet, Take 2 tablets (100 mg total) by mouth every 6 (six) hours as needed for Pain. (Patient not taking: Reported on 4/12/2023), Disp: 30 tablet, Rfl: 0        Review of Systems   Constitutional:  Negative for fever, chills and fatigue.   Skin:  Positive for daily sunscreen use, activity-related sunscreen use and wears hat.     Objective:   Physical Exam   Constitutional: She appears well-developed and well-nourished.   Neurological: She is alert and oriented to person, place, and time.   Psychiatric: She has a normal mood and affect.   Skin:   Areas Examined (abnormalities noted in diagram):   Scalp / Hair Palpated and Inspected  Head / Face Inspection Performed          Diagram Legend     Erythematous scaling macule/papule c/w actinic keratosis       Vascular papule c/w angioma      Pigmented verrucoid papule/plaque c/w seborrheic keratosis      Yellow umbilicated papule c/w sebaceous hyperplasia      Irregularly shaped tan macule c/w lentigo     1-2 mm smooth white papules consistent with Milia      Movable subcutaneous cyst with punctum c/w epidermal inclusion cyst      Subcutaneous movable cyst c/w pilar cyst      Firm pink to brown papule c/w dermatofibroma      Pedunculated fleshy papule(s) c/w skin tag(s)      Evenly pigmented macule c/w junctional nevus     Mildly variegated pigmented, slightly irregular-bordered macule c/w mildly atypical nevus      Flesh colored to evenly pigmented papule c/w intradermal nevus       Pink pearly papule/plaque c/w basal cell carcinoma      Erythematous hyperkeratotic cursted plaque c/w SCC      Surgical scar with no sign of skin cancer recurrence      Open and closed comedones      Inflammatory papules and pustules      Verrucoid papule consistent consistent with wart     Erythematous eczematous patches and plaques     Dystrophic onycholytic nail with subungual  debris c/w onychomycosis     Umbilicated papule    Erythematous-base heme-crusted tan verrucoid plaque consistent with inflamed seborrheic keratosis     Erythematous Silvery Scaling Plaque c/w Psoriasis     See annotation   Latest Reference Range & Units 04/04/23 07:52   WBC 3.90 - 12.70 K/uL 5.69   RBC 4.00 - 5.40 M/uL 4.83   Hemoglobin 12.0 - 16.0 g/dL 12.5   Hematocrit 37.0 - 48.5 % 41.6   MCV 82 - 98 fL 86   MCH 27.0 - 31.0 pg 25.9 (L)   MCHC 32.0 - 36.0 g/dL 30.0 (L)   RDW 11.5 - 14.5 % 15.2 (H)   Platelets 150 - 450 K/uL 366   MPV 9.2 - 12.9 fL 10.1   Gran % 38.0 - 73.0 % 33.5 (L)   Lymph % 18.0 - 48.0 % 54.0 (H)   Mono % 4.0 - 15.0 % 8.4   Eosinophil % 0.0 - 8.0 % 3.0   Basophil % 0.0 - 1.9 % 0.9   Immature Granulocytes 0.0 - 0.5 % 0.2   Gran # (ANC) 1.8 - 7.7 K/uL 1.9   Lymph # 1.0 - 4.8 K/uL 3.1   Mono # 0.3 - 1.0 K/uL 0.5   Eos # 0.0 - 0.5 K/uL 0.2   Baso # 0.00 - 0.20 K/uL 0.05   Immature Grans (Abs) 0.00 - 0.04 K/uL 0.01   nRBC 0 /100 WBC 0   Differential Method  Automated   Sed Rate 0 - 20 mm/Hr 21 (H)   Sodium 136 - 145 mmol/L 142   Potassium 3.5 - 5.1 mmol/L 3.8   Chloride 95 - 110 mmol/L 106   CO2 23 - 29 mmol/L 29   Anion Gap 8 - 16 mmol/L 7 (L)   BUN 7 - 17 mg/dL 14   Creatinine 0.50 - 1.40 mg/dL 0.55   eGFR >60 mL/min/1.73 m^2 >60.0   Glucose 70 - 110 mg/dL 93   Calcium 8.7 - 10.5 mg/dL 9.3   Alkaline Phosphatase 38 - 126 U/L 85   PROTEIN TOTAL 6.0 - 8.4 g/dL 8.0   Albumin 3.5 - 5.2 g/dL 4.5   BILIRUBIN TOTAL 0.1 - 1.0 mg/dL 0.4   AST 15 - 46 U/L 35   ALT 10 - 44 U/L 39   CRP 0.0 - 8.2 mg/L 4.5   TSH 0.400 - 4.000 uIU/mL 0.161 (L)   Free T4 0.71 - 1.51 ng/dL 0.89   (L): Data is abnormally low  (H): Data is abnormally high     Latest Reference Range & Units 04/04/23 07:52 04/12/23 15:22   TSH 0.400 - 4.000 uIU/mL 0.161 (L) 1.049   T3, Free 2.3 - 4.2 pg/mL  2.7   Free T4 0.71 - 1.51 ng/dL 0.89 0.90   (L): Data is abnormally low    Assessment / Plan:      Pathology Orders:       Normal Orders This  Visit    Specimen to Pathology, Dermatology     Comments:    ALOPECIA ATTN DR KWON  Number of Specimens:->1  ------------------------->-------------------------  Spec 1 Procedure:->Biopsy  Spec 1 Clinical Impression:->acute intense shedding with  abnormal pull test, TE vs other  Spec 1 Source:->R posterior crown- TWO PUNCH FOR HORIZONTAL  AND VERTICAL SECTIONING    Questions:    Procedure Type: Dermatology and skin neoplasms    Number of Specimens: 1    ------------------------: -------------------------    Spec 1 Procedure: Biopsy    Spec 1 Clinical Impression: acute intense shedding with abnormal pull test, TE vs other    Spec 1 Source: R posterior crown- TWO PUNCH FOR HORIZONTAL AND VERTICAL SECTIONING    Release to patient:           Seborrheic dermatitis  -     ketoconazole (NIZORAL) 2 % shampoo; Lather scalp with medicated shampoo once a week - let sit on scalp at least 5 minutes prior to rinsing  Dispense: 120 mL; Refill: 5  -     augmented betamethasone (DIPROLENE) 0.05 % lotion; Apply to scalp nightly  Dispense: 60 mL; Refill: 3    Hair loss  -     Ambulatory referral/consult to Dermatology  -     Ambulatory referral/consult to Dermatology  -     minoxidiL (LONITEN) 2.5 MG tablet; 1/2 tab PO daily  Dispense: 45 tablet; Refill: 2  -     Specimen to Pathology, Dermatology    Favor TE, strikingly abnormal pull test, triggers: COVID inDec and 40+lbs weight loss on Mounjaro over a 4 mo time period  Patient very distraught- very thick hair at baseline as thinning is not perceptible to random observer  Has seen endocrinologist, reassured regarding thyroid function  I advise against high biotin supplementation (takes 10,000IU per day)- can affect measurements of TSH and other labs  Continue viviscal (already has biotin at 450% of RDV)  Biopsy today to confirm diagnosis  Has appointment with rheum in [place] 4/18/2023 ( Dr Jamal Finch)    Punch biopsy procedure note:  Punch biopsy performed after verbal consent  obtained. Area marked and prepped with alcohol. Approximately 1cc of 1% lidocaine with epinephrine injected. t 4 mm disposable punch used to remove Two adjacent lesions. Hemostasis obtained and biopsy site closed with 1 - 2 Prolene sutures. Wound care instructions reviewed with patient and handout given.      Recommend hormonal supplementation with GYN - patient had total hysterectomy age 40, sudden drop in estrogen may factor in hair thinning    Discussed side affects of  minoxidil: (used at very low dose, side effects are rare and usually mild if present)  Side effects that you should report to your doctor or health care professional as soon as possible:  chest pain, fast or irregular heartbeat, palpitations  difficulty breathing  dizziness or fainting spells  redness, blistering, peeling or loosening of the skin, including inside the mouth  skin rash or itching  stiff or swollen joints  sudden weight gain  swelling of the feet or legs  unusual weakness  Side effects that usually do not require medical attention (report to your doctor or health care professional if they continue or are bothersome):  headache  unusual hair growth, on the face, arm, and back               No follow-ups on file.

## 2023-04-14 ENCOUNTER — PATIENT MESSAGE (OUTPATIENT)
Dept: ENDOCRINOLOGY | Facility: CLINIC | Age: 43
End: 2023-04-14
Payer: COMMERCIAL

## 2023-04-14 LAB — TSH RECEP AB SER-ACNC: <1.1 IU/L (ref 0–1.75)

## 2023-04-17 ENCOUNTER — PATIENT MESSAGE (OUTPATIENT)
Dept: DERMATOLOGY | Facility: CLINIC | Age: 43
End: 2023-04-17
Payer: COMMERCIAL

## 2023-04-17 DIAGNOSIS — L21.9 SEBORRHEIC DERMATITIS: ICD-10-CM

## 2023-04-17 DIAGNOSIS — L71.9 ROSACEA: ICD-10-CM

## 2023-04-17 RX ORDER — BETAMETHASONE DIPROPIONATE 0.5 MG/ML
LOTION, AUGMENTED TOPICAL
Qty: 60 ML | Refills: 3 | Status: SHIPPED | OUTPATIENT
Start: 2023-04-17 | End: 2023-05-15 | Stop reason: SDUPTHER

## 2023-04-18 ENCOUNTER — OFFICE VISIT (OUTPATIENT)
Dept: RHEUMATOLOGY | Facility: CLINIC | Age: 43
End: 2023-04-18
Payer: COMMERCIAL

## 2023-04-18 ENCOUNTER — LAB VISIT (OUTPATIENT)
Dept: LAB | Facility: HOSPITAL | Age: 43
End: 2023-04-18
Attending: INTERNAL MEDICINE
Payer: COMMERCIAL

## 2023-04-18 VITALS
SYSTOLIC BLOOD PRESSURE: 117 MMHG | HEART RATE: 69 BPM | DIASTOLIC BLOOD PRESSURE: 80 MMHG | WEIGHT: 201.5 LBS | HEIGHT: 65 IN | BODY MASS INDEX: 33.57 KG/M2

## 2023-04-18 DIAGNOSIS — L65.9 ALOPECIA: Primary | ICD-10-CM

## 2023-04-18 DIAGNOSIS — M25.50 ARTHRALGIA, UNSPECIFIED JOINT: ICD-10-CM

## 2023-04-18 DIAGNOSIS — R70.0 ELEVATED SED RATE: ICD-10-CM

## 2023-04-18 DIAGNOSIS — L65.9 ALOPECIA: ICD-10-CM

## 2023-04-18 DIAGNOSIS — R76.8 HISTONE ANTIBODY POSITIVE: ICD-10-CM

## 2023-04-18 LAB
CCP AB SER IA-ACNC: <0.5 U/ML
CRP SERPL-MCNC: 3 MG/L (ref 0–8.2)
ERYTHROCYTE [SEDIMENTATION RATE] IN BLOOD BY PHOTOMETRIC METHOD: 16 MM/HR (ref 0–36)
HAV IGG SER QL IA: NORMAL
HBV CORE AB SERPL QL IA: NORMAL
HBV SURFACE AB SER-ACNC: >1000 MIU/ML
HBV SURFACE AB SER-ACNC: REACTIVE M[IU]/ML
HBV SURFACE AG SERPL QL IA: NORMAL
HCV AB SERPL QL IA: NORMAL
HIV 1+2 AB+HIV1 P24 AG SERPL QL IA: NORMAL

## 2023-04-18 PROCEDURE — 86235 NUCLEAR ANTIGEN ANTIBODY: CPT | Performed by: INTERNAL MEDICINE

## 2023-04-18 PROCEDURE — 86200 CCP ANTIBODY: CPT | Performed by: INTERNAL MEDICINE

## 2023-04-18 PROCEDURE — 86235 NUCLEAR ANTIGEN ANTIBODY: CPT | Mod: 59 | Performed by: INTERNAL MEDICINE

## 2023-04-18 PROCEDURE — 86704 HEP B CORE ANTIBODY TOTAL: CPT | Performed by: INTERNAL MEDICINE

## 2023-04-18 PROCEDURE — 1159F MED LIST DOCD IN RCRD: CPT | Mod: CPTII,S$GLB,, | Performed by: INTERNAL MEDICINE

## 2023-04-18 PROCEDURE — 86682 HELMINTH ANTIBODY: CPT | Performed by: INTERNAL MEDICINE

## 2023-04-18 PROCEDURE — 3008F PR BODY MASS INDEX (BMI) DOCUMENTED: ICD-10-PCS | Mod: CPTII,S$GLB,, | Performed by: INTERNAL MEDICINE

## 2023-04-18 PROCEDURE — 85730 THROMBOPLASTIN TIME PARTIAL: CPT | Performed by: INTERNAL MEDICINE

## 2023-04-18 PROCEDURE — 85732 THROMBOPLASTIN TIME PARTIAL: CPT | Performed by: INTERNAL MEDICINE

## 2023-04-18 PROCEDURE — 99999 PR PBB SHADOW E&M-EST. PATIENT-LVL IV: CPT | Mod: PBBFAC,,, | Performed by: INTERNAL MEDICINE

## 2023-04-18 PROCEDURE — 85652 RBC SED RATE AUTOMATED: CPT | Performed by: INTERNAL MEDICINE

## 2023-04-18 PROCEDURE — 85670 THROMBIN TIME PLASMA: CPT | Performed by: INTERNAL MEDICINE

## 2023-04-18 PROCEDURE — 3079F PR MOST RECENT DIASTOLIC BLOOD PRESSURE 80-89 MM HG: ICD-10-PCS | Mod: CPTII,S$GLB,, | Performed by: INTERNAL MEDICINE

## 2023-04-18 PROCEDURE — 86790 VIRUS ANTIBODY NOS: CPT | Performed by: INTERNAL MEDICINE

## 2023-04-18 PROCEDURE — 87389 HIV-1 AG W/HIV-1&-2 AB AG IA: CPT | Performed by: INTERNAL MEDICINE

## 2023-04-18 PROCEDURE — 86147 CARDIOLIPIN ANTIBODY EA IG: CPT | Performed by: INTERNAL MEDICINE

## 2023-04-18 PROCEDURE — 86803 HEPATITIS C AB TEST: CPT | Performed by: INTERNAL MEDICINE

## 2023-04-18 PROCEDURE — 99204 OFFICE O/P NEW MOD 45 MIN: CPT | Mod: S$GLB,,, | Performed by: INTERNAL MEDICINE

## 2023-04-18 PROCEDURE — 1160F PR REVIEW ALL MEDS BY PRESCRIBER/CLIN PHARMACIST DOCUMENTED: ICD-10-PCS | Mod: CPTII,S$GLB,, | Performed by: INTERNAL MEDICINE

## 2023-04-18 PROCEDURE — 86706 HEP B SURFACE ANTIBODY: CPT | Mod: 91 | Performed by: INTERNAL MEDICINE

## 2023-04-18 PROCEDURE — 87340 HEPATITIS B SURFACE AG IA: CPT | Performed by: INTERNAL MEDICINE

## 2023-04-18 PROCEDURE — 85613 RUSSELL VIPER VENOM DILUTED: CPT | Performed by: INTERNAL MEDICINE

## 2023-04-18 PROCEDURE — 83516 IMMUNOASSAY NONANTIBODY: CPT | Performed by: INTERNAL MEDICINE

## 2023-04-18 PROCEDURE — 3074F SYST BP LT 130 MM HG: CPT | Mod: CPTII,S$GLB,, | Performed by: INTERNAL MEDICINE

## 2023-04-18 PROCEDURE — 86787 VARICELLA-ZOSTER ANTIBODY: CPT | Performed by: INTERNAL MEDICINE

## 2023-04-18 PROCEDURE — 1159F PR MEDICATION LIST DOCUMENTED IN MEDICAL RECORD: ICD-10-PCS | Mod: CPTII,S$GLB,, | Performed by: INTERNAL MEDICINE

## 2023-04-18 PROCEDURE — 86140 C-REACTIVE PROTEIN: CPT | Performed by: INTERNAL MEDICINE

## 2023-04-18 PROCEDURE — 1160F RVW MEDS BY RX/DR IN RCRD: CPT | Mod: CPTII,S$GLB,, | Performed by: INTERNAL MEDICINE

## 2023-04-18 PROCEDURE — 86038 ANTINUCLEAR ANTIBODIES: CPT | Performed by: INTERNAL MEDICINE

## 2023-04-18 PROCEDURE — 3074F PR MOST RECENT SYSTOLIC BLOOD PRESSURE < 130 MM HG: ICD-10-PCS | Mod: CPTII,S$GLB,, | Performed by: INTERNAL MEDICINE

## 2023-04-18 PROCEDURE — 99999 PR PBB SHADOW E&M-EST. PATIENT-LVL IV: ICD-10-PCS | Mod: PBBFAC,,, | Performed by: INTERNAL MEDICINE

## 2023-04-18 PROCEDURE — 86592 SYPHILIS TEST NON-TREP QUAL: CPT | Performed by: INTERNAL MEDICINE

## 2023-04-18 PROCEDURE — 99204 PR OFFICE/OUTPT VISIT, NEW, LEVL IV, 45-59 MIN: ICD-10-PCS | Mod: S$GLB,,, | Performed by: INTERNAL MEDICINE

## 2023-04-18 PROCEDURE — 3008F BODY MASS INDEX DOCD: CPT | Mod: CPTII,S$GLB,, | Performed by: INTERNAL MEDICINE

## 2023-04-18 PROCEDURE — 86146 BETA-2 GLYCOPROTEIN ANTIBODY: CPT | Performed by: INTERNAL MEDICINE

## 2023-04-18 PROCEDURE — 3079F DIAST BP 80-89 MM HG: CPT | Mod: CPTII,S$GLB,, | Performed by: INTERNAL MEDICINE

## 2023-04-18 PROCEDURE — 36415 COLL VENOUS BLD VENIPUNCTURE: CPT | Performed by: INTERNAL MEDICINE

## 2023-04-18 RX ORDER — NICOTINE POLACRILEX 2 MG
GUM BUCCAL
COMMUNITY

## 2023-04-18 ASSESSMENT — ROUTINE ASSESSMENT OF PATIENT INDEX DATA (RAPID3)
PSYCHOLOGICAL DISTRESS SCORE: 4.4
PATIENT GLOBAL ASSESSMENT SCORE: 3
PAIN SCORE: 2
TOTAL RAPID3 SCORE: 1.67
AM STIFFNESS SCORE: 1, YES
MDHAQ FUNCTION SCORE: 0
FATIGUE SCORE: 2

## 2023-04-18 NOTE — PROGRESS NOTES
Subjective:       Patient ID: Angel Mosquera is a 43 y.o. female.    Chief Complaint:     HPI:  Angel Mosquera is a 43 y.o. female history rosacea, obesity, alopecia episode when mother  and HTN (HTN improved with losing weight) with joint pain since 2019.    Initially pain in hips.   Pain was thought to be due to invitro fertilization.  Told hip joints were lax.  Did PT which helped.   In summer 2022 developed random joint pains elbows, knees, spine, neck and fingers.  She has had autoimmune labs that were normal on two occasions.  Went to ortho who did x-rays that were all find.   Primary told her to take Relief Factor supplement to help with joint pains which helped 90%.  Thought was that pain was due to complete hysterectomy done for fibroids.  Ovaries removed due to concern she had for risk of ovarian cancer due to IVF and never having a fullterm pregnancy.   One month ago started lose hair.  Started Mounjora 2022 to lose weight.  She is concern the medication is the cause.  Doctor felt it could be rapid weight loss.  She saw dermatology who said she lost 30% of her hair and did a biopsy and started Minoxidil.     Two surgeries left foot surgeries one for plantar fasciitis () and Sparrow Neuroma ().      Niece with autoimmune hemolytic anemia.    Lupus Review of Systems  Alopecia: yes  Photosensitivity: flares roseace   Raynaud's: no  Oral or nasal ulcers: no  Rashes: no  No pleurisy or pericarditis.  No seizures, psychosis, or stroke.  No venous or arterial clots.  Pregnancy hx (if applicable): 3 miscarriages (13 weeks due to chromosomal issues, 8 weeks due to spontaneous loss, and 10 weeks due to fibroids)      Review of Systems   Constitutional:  Positive for fatigue.   HENT: Negative.     Eyes: Negative.    Respiratory: Negative.     Cardiovascular: Negative.    Gastrointestinal: Negative.    Endocrine: Negative.    Genitourinary: Negative.    Musculoskeletal:  Positive for  "arthralgias.   Skin:         Hair loss   Allergic/Immunologic: Negative.    Neurological: Negative.    Hematological: Negative.    Psychiatric/Behavioral: Negative.         Objective:   /80   Pulse 69   Ht 5' 5" (1.651 m)   Wt 91.4 kg (201 lb 8 oz)   LMP 12/09/2021   BMI 33.53 kg/m²      Physical Exam   Constitutional: normal appearance.   HENT:   Head: Normocephalic.   Right Ear: External ear normal.   Left Ear: External ear normal.   Nose: Nose normal.   Mouth/Throat: Mucous membranes are moist.   Eyes: Conjunctivae are normal.   Cardiovascular: Normal rate and regular rhythm.   Pulmonary/Chest: Effort normal and breath sounds normal.   Abdominal: Soft. Bowel sounds are normal.   Musculoskeletal:      Comments: 28 joint count: 0 swollen and 0 tender     Neurological: She is alert.   Skin:   Flaky scalp with mild erythema in multiple areas  No rash around ears, umbilicus and gluteal cleft   Psychiatric: Mood normal.          LABS    Component      Latest Ref Rng & Units 4/12/2023 4/4/2023 10/12/2022 8/5/2022   WBC      3.90 - 12.70 K/uL  5.69  7.60   RBC      4.00 - 5.40 M/uL  4.83  4.79   Hemoglobin      12.0 - 16.0 g/dL  12.5  12.3   Hematocrit      37.0 - 48.5 %  41.6  40.8   MCV      82 - 98 fL  86  85   MCH      27.0 - 31.0 pg  25.9 (L)  25.7 (L)   MCHC      32.0 - 36.0 g/dL  30.0 (L)  30.1 (L)   RDW      11.5 - 14.5 %  15.2 (H)  14.4   Platelets      150 - 450 K/uL  366  395   MPV      9.2 - 12.9 fL  10.1  10.3   Immature Granulocytes      0.0 - 0.5 %  0.2  0.1   Gran # (ANC)      1.8 - 7.7 K/uL  1.9  4.3   Immature Grans (Abs)      0.00 - 0.04 K/uL  0.01  0.01   Lymph #      1.0 - 4.8 K/uL  3.1  2.8   Mono #      0.3 - 1.0 K/uL  0.5  0.4   Eos #      0.0 - 0.5 K/uL  0.2  0.1   Baso #      0.00 - 0.20 K/uL  0.05  0.05   nRBC      0 /100 WBC  0  0   Gran %      38.0 - 73.0 %  33.5 (L)  56.3   Lymph %      18.0 - 48.0 %  54.0 (H)  36.6   Mono %      4.0 - 15.0 %  8.4  4.9   Eosinophil %      0.0 - " 8.0 %  3.0  1.4   Basophil %      0.0 - 1.9 %  0.9  0.7   Differential Method        Automated  Automated   Sodium      136 - 145 mmol/L  142  139   Potassium      3.5 - 5.1 mmol/L  3.8  3.6   Chloride      95 - 110 mmol/L  106  103   CO2      23 - 29 mmol/L  29  28   Glucose      70 - 110 mg/dL  93  136 (H)   BUN      7 - 17 mg/dL  14  10   Creatinine      0.50 - 1.40 mg/dL  0.55  0.8   Calcium      8.7 - 10.5 mg/dL  9.3  9.9   PROTEIN TOTAL      6.0 - 8.4 g/dL  8.0  8.2   Albumin      3.5 - 5.2 g/dL  4.5  4.1   BILIRUBIN TOTAL      0.1 - 1.0 mg/dL  0.4  0.4   Alkaline Phosphatase      38 - 126 U/L  85  104   AST      15 - 46 U/L  35  19   ALT      10 - 44 U/L  39  24   Anion Gap      8 - 16 mmol/L  7 (L)  8   eGFR      >60 mL/min/1.73 m:2  >60.0  >60.0   Rheumatoid Factor      0.0 - 15.0 IU/mL    <13.0   Sed Rate      0 - 20 mm/Hr  21 (H)  27   CRP      0.0 - 8.2 mg/L  4.5  5.2   Vitamin B-12      210 - 950 pg/mL   366    Vit D, 25-Hydroxy      30 - 96 ng/mL   21 (L)    TSH      0.400 - 4.000 uIU/mL 1.049 0.161 (L)     Free T4      0.71 - 1.51 ng/dL 0.90 0.89     Thyrotropin Receptor Ab      0.00 - 1.75 IU/L <1.10      T3, Free      2.3 - 4.2 pg/mL 2.7        Assessment:       Alopecia.  Hair loss sudden.  Episode of less hair loss when mother passed away.  Three months prior to episode niece hospitalized with autoimmune hemolytic anemia and this was very stressful.   Arthralgias  Fatigue  Family history psoriasis.   Maternal grandmother  Plan:       Labs.   Await biopsy of scalp from dermatology.   RTO 4 months/prn

## 2023-04-19 LAB
ANA SER QL IF: NORMAL
RPR SER QL: NORMAL
VARICELLA INTERPRETATION: POSITIVE
VARICELLA ZOSTER IGG: >4000 AU/ML

## 2023-04-20 LAB
ANTI-SSA ANTIBODY: 0.15 RATIO (ref 0–0.99)
ANTI-SSA INTERPRETATION: NEGATIVE
ANTI-SSB ANTIBODY: 0.1 RATIO (ref 0–0.99)
ANTI-SSB INTERPRETATION: NEGATIVE
APTT IMM NP PPP: 40 SEC (ref 32–48)
APTT P HEP NEUT PPP: ABNORMAL SEC (ref 32–48)
B2 GLYCOPROT1 IGA SER QL: 1.2 U/ML
B2 GLYCOPROT1 IGG SER QL: 1.3 U/ML
B2 GLYCOPROT1 IGM SER QL: <2.4 U/ML
CARDIOLIPIN IGG SER IA-ACNC: <9.4 GPL (ref 0–14.99)
CARDIOLIPIN IGM SER IA-ACNC: <9.4 MPL (ref 0–12.49)
CONFIRM APTT STACLOT: ABNORMAL
DRVVT SCREEN TO CONFIRM RATIO: ABNORMAL RATIO
HISTONE IGG SER IA-ACNC: 0.5 UNITS (ref 0–0.9)
LA 3 SCREEN W REFLEX-IMP: ABNORMAL
LA NT DPL PPP QL: ABNORMAL
MIXING DRVVT: ABNORMAL SEC (ref 33–44)
PROTHROMBIN TIME: 13.1 SEC (ref 12–15.5)
REPTILASE TIME: ABNORMAL SEC
SCREEN APTT: 52 SEC (ref 32–48)
SCREEN DRVVT: 39 SEC (ref 33–44)
STRONGYLOIDES ANTIBODY IGG: POSITIVE
THROMBIN TIME: 18.6 SEC (ref 14.7–19.5)

## 2023-04-21 ENCOUNTER — PATIENT MESSAGE (OUTPATIENT)
Dept: RHEUMATOLOGY | Facility: CLINIC | Age: 43
End: 2023-04-21
Payer: COMMERCIAL

## 2023-04-21 DIAGNOSIS — B78.9 STRONGYLOIDES STERCORALIS INFECTION: Primary | ICD-10-CM

## 2023-04-21 RX ORDER — IVERMECTIN 3 MG/1
3 TABLET ORAL DAILY
Qty: 2 TABLET | Refills: 0 | Status: SHIPPED | OUTPATIENT
Start: 2023-04-21 | End: 2023-04-23

## 2023-04-27 ENCOUNTER — TELEPHONE (OUTPATIENT)
Dept: DERMATOLOGY | Facility: CLINIC | Age: 43
End: 2023-04-27
Payer: COMMERCIAL

## 2023-04-27 ENCOUNTER — TELEPHONE (OUTPATIENT)
Dept: OBSTETRICS AND GYNECOLOGY | Facility: CLINIC | Age: 43
End: 2023-04-27
Payer: COMMERCIAL

## 2023-04-27 ENCOUNTER — PATIENT MESSAGE (OUTPATIENT)
Dept: DERMATOLOGY | Facility: CLINIC | Age: 43
End: 2023-04-27
Payer: COMMERCIAL

## 2023-04-27 ENCOUNTER — PATIENT MESSAGE (OUTPATIENT)
Dept: PRIMARY CARE CLINIC | Facility: CLINIC | Age: 43
End: 2023-04-27
Payer: COMMERCIAL

## 2023-04-27 DIAGNOSIS — N95.1 MENOPAUSAL SYMPTOMS: Primary | ICD-10-CM

## 2023-04-27 DIAGNOSIS — F41.9 ANXIETY: Primary | ICD-10-CM

## 2023-04-27 RX ORDER — ESTRADIOL 1 MG/G
0.5 GEL TOPICAL DAILY
Qty: 30 G | Refills: 12 | Status: SHIPPED | OUTPATIENT
Start: 2023-04-27 | End: 2024-01-08

## 2023-04-27 NOTE — TELEPHONE ENCOUNTER
----- Message from Raphael Villatoro MD sent at 4/26/2023 11:19 AM CDT -----  HE,  The most prominent finding is seb derm with mild follicular and adjacent epidermal spongiosis, follicular plugging, and shoulder parakeratosis with occasional neutrophils.  Numerous yeasts in the stratum corneum within and adjacent to follicular ostia.  Seb derm can, of course, cause telogen effluvium, but I am not the best at discerning the histologic nuances of non-scarring alopecias.  I am sending to [facility] for their interpretation of the alopecia component.  There is also a focal accumulation of mucin, but does not appear to be within the follicular epithelium, so not consistent with follicular mucinosis.  In the meantime, would aggressively treat seb derm if causing hair loss...  Thanks,  bv

## 2023-04-27 NOTE — TELEPHONE ENCOUNTER
Pt notified regarding culture result being sent to [facility].  She stated that the has an appt tomorrow with Dr. Love and will ask more questions there.

## 2023-04-27 NOTE — TELEPHONE ENCOUNTER
Please let patient know that her specimen were sent to [facility] for second opinion. Will take additional 1-2 weeks. Main features seen point to seborrheic dermatitis, which we are treating (keto shampoo and diprolene lotion)

## 2023-04-27 NOTE — TELEPHONE ENCOUNTER
Patient is having significant hair loss and anxiety/ depression.  She has lost weight, had covid and increased stress / panic attacks in the last few months  She had HYST WITH BSO in 2021 and was doing well up until recently with all this hair loss  Will send in divigel Rx to see if there is any improvement with the anxiety and hair loss AP

## 2023-04-28 ENCOUNTER — OFFICE VISIT (OUTPATIENT)
Dept: DERMATOLOGY | Facility: CLINIC | Age: 43
End: 2023-04-28
Payer: COMMERCIAL

## 2023-04-28 VITALS — WEIGHT: 191 LBS | HEIGHT: 65 IN | BODY MASS INDEX: 31.82 KG/M2

## 2023-04-28 DIAGNOSIS — L65.9 NON-SCARRING ALOPECIA: Primary | ICD-10-CM

## 2023-04-28 PROCEDURE — 1160F RVW MEDS BY RX/DR IN RCRD: CPT | Mod: CPTII,S$GLB,, | Performed by: DERMATOLOGY

## 2023-04-28 PROCEDURE — 3008F BODY MASS INDEX DOCD: CPT | Mod: CPTII,S$GLB,, | Performed by: DERMATOLOGY

## 2023-04-28 PROCEDURE — 1160F PR REVIEW ALL MEDS BY PRESCRIBER/CLIN PHARMACIST DOCUMENTED: ICD-10-PCS | Mod: CPTII,S$GLB,, | Performed by: DERMATOLOGY

## 2023-04-28 PROCEDURE — 1159F MED LIST DOCD IN RCRD: CPT | Mod: CPTII,S$GLB,, | Performed by: DERMATOLOGY

## 2023-04-28 PROCEDURE — 3008F PR BODY MASS INDEX (BMI) DOCUMENTED: ICD-10-PCS | Mod: CPTII,S$GLB,, | Performed by: DERMATOLOGY

## 2023-04-28 PROCEDURE — 1159F PR MEDICATION LIST DOCUMENTED IN MEDICAL RECORD: ICD-10-PCS | Mod: CPTII,S$GLB,, | Performed by: DERMATOLOGY

## 2023-04-28 PROCEDURE — 99999 PR PBB SHADOW E&M-EST. PATIENT-LVL III: ICD-10-PCS | Mod: PBBFAC,,, | Performed by: DERMATOLOGY

## 2023-04-28 PROCEDURE — 99213 OFFICE O/P EST LOW 20 MIN: CPT | Mod: S$GLB,,, | Performed by: DERMATOLOGY

## 2023-04-28 PROCEDURE — 99999 PR PBB SHADOW E&M-EST. PATIENT-LVL III: CPT | Mod: PBBFAC,,, | Performed by: DERMATOLOGY

## 2023-04-28 PROCEDURE — 99213 PR OFFICE/OUTPT VISIT, EST, LEVL III, 20-29 MIN: ICD-10-PCS | Mod: S$GLB,,, | Performed by: DERMATOLOGY

## 2023-04-28 NOTE — PROGRESS NOTES
Subjective:      Patient ID:  Angel Mosquera is a 43 y.o. female who presents for   Chief Complaint   Patient presents with    Follow-up     Hair loss     LOV 4/13/23-- Seborrheic Dermatitis    Patient here today for F/U on hair loss.  Using minoxidil, Ketoconazole shampoo and betamethasone, no help.    Derm HX:  Denies Phx of NMSC  Denies Fhx of MM    Current Outpatient Medications:   ·  ALPRAZolam (XANAX) 0.25 MG tablet, Take 1 tablet (0.25 mg total) by mouth daily as needed for Anxiety., Disp: 30 tablet, Rfl: 0  ·  augmented betamethasone (DIPROLENE) 0.05 % lotion, Apply to scalp nightly, Disp: 60 mL, Rfl: 3  ·  azelaic acid (FINACEA) 15 % gel, Apply to affected area(s) of face nightly, then moisturize (Patient taking differently: Apply to affected area(s) of face nightly, then moisturize), Disp: 50 g, Rfl: 3  ·  biotin 1 mg Cap, Take by mouth., Disp: , Rfl:   ·  estradioL (DIVIGEL) 1 mg/gram (0.1 %) topical gel, Place 0.5 g onto the skin once daily., Disp: 30 g, Rfl: 12  ·  hydrOXYzine HCL (ATARAX) 25 MG tablet, Take 1 tablet (25 mg total) by mouth nightly as needed (Insomnia)., Disp: 30 tablet, Rfl: 3  ·  ibuprofen (ADVIL,MOTRIN) 800 MG tablet, Take 1 tablet (800 mg total) by mouth every 6 (six) hours as needed for Pain., Disp: 30 tablet, Rfl: 1  ·  ketoconazole (NIZORAL) 2 % shampoo, Lather scalp with medicated shampoo once a week - let sit on scalp at least 5 minutes prior to rinsing, Disp: 120 mL, Rfl: 5  ·  minoxidiL (LONITEN) 2.5 MG tablet, 1/2 tab PO daily, Disp: 45 tablet, Rfl: 2  ·  multivitamin (THERAGRAN) per tablet, Take 1 tablet by mouth once daily., Disp: , Rfl:   ·  ondansetron (ZOFRAN-ODT) 4 MG TbDL, Take 2 tablets (8 mg total) by mouth every 12 (twelve) hours as needed (nausea)., Disp: 10 tablet, Rfl: 3  ·  tirzepatide 7.5 mg/0.5 mL PnIj, Inject 7.5 mg into the skin every 7 days., Disp: 12 pen, Rfl: 1  ·  traMADoL (ULTRAM) 50 mg tablet, Take 2 tablets (100 mg total) by mouth every 6 (six)  hours as needed for Pain., Disp: 30 tablet, Rfl: 0  ·  UNABLE TO FIND, RELIEF FACTOR (FISH OIL, TURMERIC, ICARLIN, RESVERATROL), Disp: , Rfl:     Current Facility-Administered Medications:   ·  sodium chloride 0.9% flush 10 mL, 10 mL, Intravenous, PRN, Mello Montez, JUSTICE  ·  sodium chloride 0.9% flush 10 mL, 10 mL, Intravenous, PRN, Mello Montez, JUSTICE        Review of Systems   Constitutional:  Negative for fever, chills and fatigue.   Skin:  Positive for daily sunscreen use, activity-related sunscreen use and wears hat.     Objective:   Physical Exam   Constitutional: She appears well-developed and well-nourished.   Neurological: She is alert and oriented to person, place, and time.   Psychiatric: She has a normal mood and affect.   Skin:   Areas Examined (abnormalities noted in diagram):   Scalp / Hair Palpated and Inspected  Head / Face Inspection Performed          Diagram Legend     Erythematous scaling macule/papule c/w actinic keratosis       Vascular papule c/w angioma      Pigmented verrucoid papule/plaque c/w seborrheic keratosis      Yellow umbilicated papule c/w sebaceous hyperplasia      Irregularly shaped tan macule c/w lentigo     1-2 mm smooth white papules consistent with Milia      Movable subcutaneous cyst with punctum c/w epidermal inclusion cyst      Subcutaneous movable cyst c/w pilar cyst      Firm pink to brown papule c/w dermatofibroma      Pedunculated fleshy papule(s) c/w skin tag(s)      Evenly pigmented macule c/w junctional nevus     Mildly variegated pigmented, slightly irregular-bordered macule c/w mildly atypical nevus      Flesh colored to evenly pigmented papule c/w intradermal nevus       Pink pearly papule/plaque c/w basal cell carcinoma      Erythematous hyperkeratotic cursted plaque c/w SCC      Surgical scar with no sign of skin cancer recurrence      Open and closed comedones      Inflammatory papules and pustules      Verrucoid papule consistent consistent with  wart     Erythematous eczematous patches and plaques     Dystrophic onycholytic nail with subungual debris c/w onychomycosis     Umbilicated papule    Erythematous-base heme-crusted tan verrucoid plaque consistent with inflamed seborrheic keratosis     Erythematous Silvery Scaling Plaque c/w Psoriasis     See annotation      Assessment / Plan:        Non-scarring alopecia    Reviewed preliminary read by dermatopathologist Alba. Prominent feature of seb derm, currently treating with keto shampoo, diprolene lotion, low dose minoxidil PO  Specimen sent to [facility] for further characterization of potential etiology (clinically favor TE)  Patient still very distraught, has not noticed decreased shedding yet (therapy started 2 weeks ago)  Extensive lab work done by me and previous MDs  Recent rheum visit with Dr Jamal Finch, note reviewed           No follow-ups on file.

## 2023-05-01 RX ORDER — VENLAFAXINE HYDROCHLORIDE 37.5 MG/1
37.5 CAPSULE, EXTENDED RELEASE ORAL DAILY
Qty: 30 CAPSULE | Refills: 11 | Status: SHIPPED | OUTPATIENT
Start: 2023-05-01 | End: 2024-01-08

## 2023-05-01 NOTE — TELEPHONE ENCOUNTER
SEnding in Effexor 37.5 mg.  Take 1 capsule daily for 7 days, then increase to 2 capsules daily for 7 days.  At that time, pt can message us for a further dose increase if symptoms are still present.

## 2023-05-08 ENCOUNTER — PATIENT MESSAGE (OUTPATIENT)
Dept: RHEUMATOLOGY | Facility: CLINIC | Age: 43
End: 2023-05-08
Payer: COMMERCIAL

## 2023-05-08 ENCOUNTER — PATIENT MESSAGE (OUTPATIENT)
Dept: DERMATOLOGY | Facility: CLINIC | Age: 43
End: 2023-05-08
Payer: COMMERCIAL

## 2023-05-08 LAB
FINAL PATHOLOGIC DIAGNOSIS: NORMAL
GROSS: NORMAL
Lab: NORMAL

## 2023-05-09 DIAGNOSIS — L21.9 SEBORRHEIC DERMATITIS: Primary | ICD-10-CM

## 2023-05-09 RX ORDER — FLUCONAZOLE 200 MG/1
TABLET ORAL
Qty: 4 TABLET | Refills: 0 | Status: SHIPPED | OUTPATIENT
Start: 2023-05-09 | End: 2023-06-08 | Stop reason: SDUPTHER

## 2023-05-15 ENCOUNTER — PATIENT MESSAGE (OUTPATIENT)
Dept: DERMATOLOGY | Facility: CLINIC | Age: 43
End: 2023-05-15
Payer: COMMERCIAL

## 2023-05-15 DIAGNOSIS — L21.9 SEBORRHEIC DERMATITIS: ICD-10-CM

## 2023-05-16 RX ORDER — BETAMETHASONE DIPROPIONATE 0.5 MG/ML
LOTION, AUGMENTED TOPICAL
Qty: 60 ML | Refills: 3 | Status: SHIPPED | OUTPATIENT
Start: 2023-05-16

## 2023-05-18 DIAGNOSIS — L21.9 SEBORRHEIC DERMATITIS: Primary | ICD-10-CM

## 2023-05-18 RX ORDER — FLUOCINONIDE TOPICAL SOLUTION USP, 0.05% 0.5 MG/ML
SOLUTION TOPICAL
Qty: 60 ML | Refills: 5 | Status: SHIPPED | OUTPATIENT
Start: 2023-05-18

## 2023-06-08 ENCOUNTER — OFFICE VISIT (OUTPATIENT)
Dept: DERMATOLOGY | Facility: CLINIC | Age: 43
End: 2023-06-08
Payer: COMMERCIAL

## 2023-06-08 VITALS — BODY MASS INDEX: 31.82 KG/M2 | HEIGHT: 65 IN | WEIGHT: 191 LBS

## 2023-06-08 DIAGNOSIS — L65.9 NON-SCARRING ALOPECIA: Primary | ICD-10-CM

## 2023-06-08 DIAGNOSIS — L21.9 SEBORRHEIC DERMATITIS: ICD-10-CM

## 2023-06-08 PROCEDURE — 1159F MED LIST DOCD IN RCRD: CPT | Mod: CPTII,S$GLB,, | Performed by: DERMATOLOGY

## 2023-06-08 PROCEDURE — 1159F PR MEDICATION LIST DOCUMENTED IN MEDICAL RECORD: ICD-10-PCS | Mod: CPTII,S$GLB,, | Performed by: DERMATOLOGY

## 2023-06-08 PROCEDURE — 99214 OFFICE O/P EST MOD 30 MIN: CPT | Mod: S$GLB,,, | Performed by: DERMATOLOGY

## 2023-06-08 PROCEDURE — 3008F PR BODY MASS INDEX (BMI) DOCUMENTED: ICD-10-PCS | Mod: CPTII,S$GLB,, | Performed by: DERMATOLOGY

## 2023-06-08 PROCEDURE — 3008F BODY MASS INDEX DOCD: CPT | Mod: CPTII,S$GLB,, | Performed by: DERMATOLOGY

## 2023-06-08 PROCEDURE — 1160F RVW MEDS BY RX/DR IN RCRD: CPT | Mod: CPTII,S$GLB,, | Performed by: DERMATOLOGY

## 2023-06-08 PROCEDURE — 1160F PR REVIEW ALL MEDS BY PRESCRIBER/CLIN PHARMACIST DOCUMENTED: ICD-10-PCS | Mod: CPTII,S$GLB,, | Performed by: DERMATOLOGY

## 2023-06-08 PROCEDURE — 99214 PR OFFICE/OUTPT VISIT, EST, LEVL IV, 30-39 MIN: ICD-10-PCS | Mod: S$GLB,,, | Performed by: DERMATOLOGY

## 2023-06-08 RX ORDER — FLUCONAZOLE 200 MG/1
TABLET ORAL
Qty: 4 TABLET | Refills: 0 | Status: SHIPPED | OUTPATIENT
Start: 2023-06-08 | End: 2024-01-08

## 2023-06-08 RX ORDER — SPIRONOLACTONE 50 MG/1
50 TABLET, FILM COATED ORAL DAILY
Qty: 30 TABLET | Refills: 11 | Status: SHIPPED | OUTPATIENT
Start: 2023-06-08 | End: 2024-06-07

## 2023-06-08 NOTE — PROGRESS NOTES
Subjective:      Patient ID:  Angel Mosquera is a 43 y.o. female who presents for   Chief Complaint   Patient presents with    Follow-up     Non-scarring Alopecia      LOV 4/28/23- Non-scarring Alopecia    Patient here today for follow up of non-scarring alopecia.  Continuing treatment from last visit, little better in the past week.  Minoxidil 1.25mg daily  Keto shampoo twice a week  Diprolene lotion nightly, few drops  Scalp not itchy    Wanda FINAL DIAGNOSIS     A. Right posterior crown, [id number], 04/13/2023: Seborrheic dermatitis with non-inflammatory telogen shift and miniaturization     COMMENT   GMS and PAS stains performed on part A demonstrate yeast forms within the stratum corneum and follicular ostium, supporting the diagnosis of seborrheic dermatitis. The horizontally sectioned specimen demonstrates a normal density and distribution of   follicles without associated inflammation. There is a telogen shift and follicular miniaturization. As such, this is could represent androgenetic alopecia. A telogen effluvium or the acute phase of an alopecia areata cannot be entirely excluded. Clinical    pathologic correlation is recommended.     Derm HX:  Denies Phx of NMSC  Denies Fhx of MM    Current Outpatient Medications:   ·  augmented betamethasone (DIPROLENE) 0.05 % lotion, Apply to scalp nightly, Disp: 60 mL, Rfl: 3  ·  azelaic acid (FINACEA) 15 % gel, Apply to affected area(s) of face nightly, then moisturize (Patient taking differently: Apply to affected area(s) of face nightly, then moisturize), Disp: 50 g, Rfl: 3  ·  biotin 1 mg Cap, Take by mouth., Disp: , Rfl:   ·  estradioL (DIVIGEL) 1 mg/gram (0.1 %) topical gel, Place 0.5 g onto the skin once daily., Disp: 30 g, Rfl: 12  ·  fluconazole (DIFLUCAN) 200 MG Tab, Take 1 pill PO 2 times per week x 2 weeks., Disp: 4 tablet, Rfl: 0  ·  fluocinonide (LIDEX) 0.05 % external solution, Thin film to scalp nightly, Disp: 60 mL, Rfl: 5  ·  hydrOXYzine HCL  (ATARAX) 25 MG tablet, Take 1 tablet (25 mg total) by mouth nightly as needed (Insomnia)., Disp: 30 tablet, Rfl: 3  ·  ibuprofen (ADVIL,MOTRIN) 800 MG tablet, Take 1 tablet (800 mg total) by mouth every 6 (six) hours as needed for Pain., Disp: 30 tablet, Rfl: 1  ·  ketoconazole (NIZORAL) 2 % shampoo, Lather scalp with medicated shampoo once a week - let sit on scalp at least 5 minutes prior to rinsing, Disp: 120 mL, Rfl: 5  ·  minoxidiL (LONITEN) 2.5 MG tablet, 1/2 tab PO daily, Disp: 45 tablet, Rfl: 2  ·  multivitamin (THERAGRAN) per tablet, Take 1 tablet by mouth once daily., Disp: , Rfl:   ·  ondansetron (ZOFRAN-ODT) 4 MG TbDL, Take 2 tablets (8 mg total) by mouth every 12 (twelve) hours as needed (nausea)., Disp: 10 tablet, Rfl: 3  ·  tirzepatide 7.5 mg/0.5 mL PnIj, Inject 7.5 mg into the skin every 7 days., Disp: 12 pen, Rfl: 1  ·  traMADoL (ULTRAM) 50 mg tablet, Take 2 tablets (100 mg total) by mouth every 6 (six) hours as needed for Pain., Disp: 30 tablet, Rfl: 0  ·  UNABLE TO FIND, RELIEF FACTOR (FISH OIL, TURMERIC, ICARLIN, RESVERATROL), Disp: , Rfl:   ·  venlafaxine (EFFEXOR-XR) 37.5 MG 24 hr capsule, Take 1 capsule (37.5 mg total) by mouth once daily., Disp: 30 capsule, Rfl: 11  ·  ALPRAZolam (XANAX) 0.25 MG tablet, Take 1 tablet (0.25 mg total) by mouth daily as needed for Anxiety., Disp: 30 tablet, Rfl: 0          Review of Systems   Constitutional:  Negative for fever, chills and fatigue.   Skin:  Positive for daily sunscreen use, activity-related sunscreen use and wears hat.   Hematologic/Lymphatic: Does not bruise/bleed easily.     Objective:   Physical Exam   Constitutional: She appears well-developed and well-nourished.   Neurological: She is alert and oriented to person, place, and time.   Psychiatric: She has a normal mood and affect.   Skin:   Areas Examined (abnormalities noted in diagram):   Scalp / Hair Palpated and Inspected  Head / Face Inspection Performed          Diagram Legend      Erythematous scaling macule/papule c/w actinic keratosis       Vascular papule c/w angioma      Pigmented verrucoid papule/plaque c/w seborrheic keratosis      Yellow umbilicated papule c/w sebaceous hyperplasia      Irregularly shaped tan macule c/w lentigo     1-2 mm smooth white papules consistent with Milia      Movable subcutaneous cyst with punctum c/w epidermal inclusion cyst      Subcutaneous movable cyst c/w pilar cyst      Firm pink to brown papule c/w dermatofibroma      Pedunculated fleshy papule(s) c/w skin tag(s)      Evenly pigmented macule c/w junctional nevus     Mildly variegated pigmented, slightly irregular-bordered macule c/w mildly atypical nevus      Flesh colored to evenly pigmented papule c/w intradermal nevus       Pink pearly papule/plaque c/w basal cell carcinoma      Erythematous hyperkeratotic cursted plaque c/w SCC      Surgical scar with no sign of skin cancer recurrence      Open and closed comedones      Inflammatory papules and pustules      Verrucoid papule consistent consistent with wart     Erythematous eczematous patches and plaques     Dystrophic onycholytic nail with subungual debris c/w onychomycosis     Umbilicated papule    Erythematous-base heme-crusted tan verrucoid plaque consistent with inflamed seborrheic keratosis     Erythematous Silvery Scaling Plaque c/w Psoriasis     See annotation      Assessment / Plan:        Non-scarring alopecia  -     spironolactone (ALDACTONE) 50 MG tablet; Take 1 tablet (50 mg total) by mouth once daily.  Dispense: 30 tablet; Refill: 11    Seborrheic dermatitis  -     fluconazole (DIFLUCAN) 200 MG Tab; Take 1 pill PO 2 times per week x 2 weeks.  Dispense: 4 tablet; Refill: 0    Still will increased pull test and greasy scale.   On PO minoxidil 1.25mg, aggressive seb derm treatment  Continue :  - keto shampoo BIW  - diprolene lotion nightly  - low dose PO minoxidil  Add spironolactone 50mg with plan to titer up as tolerated    Benefits and  risks of therapy with spironolactone include but are not limited to breakthrough bleeding, breast tenderness, and elevated potassium levels which may give symptoms of fatigue, palpitations, and nausea. Patient should limit potassium intake - avoid potassium supplements or salt substitutes, limit bananas and citrus fruits. Pregnancy must be avoided while taking spironolactone.     Hysterectomy 2021 (total), no HRT, has rx for low dose estrogen, debating whether to take. I advised in favor.             No follow-ups on file.

## 2023-06-13 ENCOUNTER — PATIENT MESSAGE (OUTPATIENT)
Dept: PRIMARY CARE CLINIC | Facility: CLINIC | Age: 43
End: 2023-06-13
Payer: COMMERCIAL

## 2023-06-13 DIAGNOSIS — Z12.31 SCREENING MAMMOGRAM, ENCOUNTER FOR: Primary | ICD-10-CM

## 2023-06-16 ENCOUNTER — PATIENT MESSAGE (OUTPATIENT)
Dept: PODIATRY | Facility: CLINIC | Age: 43
End: 2023-06-16
Payer: COMMERCIAL

## 2023-07-20 NOTE — TELEPHONE ENCOUNTER
Called and left a detailed message asking for a call back to discuss labs    Robaxin sent to pharmacy.

## 2023-08-22 ENCOUNTER — OFFICE VISIT (OUTPATIENT)
Dept: OPTOMETRY | Facility: CLINIC | Age: 43
End: 2023-08-22
Payer: COMMERCIAL

## 2023-08-22 DIAGNOSIS — G43.109 OCULAR MIGRAINE: ICD-10-CM

## 2023-08-22 DIAGNOSIS — H52.13 MYOPIA OF BOTH EYES: Primary | ICD-10-CM

## 2023-08-22 PROCEDURE — 92015 DETERMINE REFRACTIVE STATE: CPT | Mod: S$GLB,,, | Performed by: OPTOMETRIST

## 2023-08-22 PROCEDURE — 99999 PR PBB SHADOW E&M-EST. PATIENT-LVL III: CPT | Mod: PBBFAC,,, | Performed by: OPTOMETRIST

## 2023-08-22 PROCEDURE — 92014 PR EYE EXAM, EST PATIENT,COMPREHESV: ICD-10-PCS | Mod: S$GLB,,, | Performed by: OPTOMETRIST

## 2023-08-22 PROCEDURE — 92014 COMPRE OPH EXAM EST PT 1/>: CPT | Mod: S$GLB,,, | Performed by: OPTOMETRIST

## 2023-08-22 PROCEDURE — 92015 PR REFRACTION: ICD-10-PCS | Mod: S$GLB,,, | Performed by: OPTOMETRIST

## 2023-08-22 PROCEDURE — 99999 PR PBB SHADOW E&M-EST. PATIENT-LVL III: ICD-10-PCS | Mod: PBBFAC,,, | Performed by: OPTOMETRIST

## 2023-08-22 NOTE — PROGRESS NOTES
HPI    42 Y/o female is here for routine eye exam with C/o pt state she notices a   visiual disturbance occasionally pt states sometimes half of her vision   becomes dessorted  pt state she sometime see's cloudy vision   Pt denies pain and discomfort   No f/f    Eye med: no gtt   Last edited by Rosy Canela MA on 8/22/2023  8:06 AM.            Assessment /Plan     For exam results, see Encounter Report.    Myopia of both eyes    Ocular migraine      1. Myopia OU.  Wrote spex Rx for night driving.  Discussed CRIZAL for glare  2. Discussed impending presby  3. Rosacea hx  4. Oc migraine hx--one or two episodes a year    PLAN:    rtc 1 yr, or sooner if migs inc in frequency/severity/duration

## 2023-09-10 DIAGNOSIS — F51.01 PRIMARY INSOMNIA: ICD-10-CM

## 2023-09-11 RX ORDER — HYDROXYZINE HYDROCHLORIDE 25 MG/1
25 TABLET, FILM COATED ORAL NIGHTLY PRN
Qty: 30 TABLET | Refills: 3 | Status: SHIPPED | OUTPATIENT
Start: 2023-09-11

## 2023-09-21 ENCOUNTER — OFFICE VISIT (OUTPATIENT)
Dept: DERMATOLOGY | Facility: CLINIC | Age: 43
End: 2023-09-21
Payer: COMMERCIAL

## 2023-09-21 VITALS — BODY MASS INDEX: 31.81 KG/M2 | WEIGHT: 190.94 LBS | HEIGHT: 65 IN

## 2023-09-21 DIAGNOSIS — D22.9 MULTIPLE BENIGN NEVI: ICD-10-CM

## 2023-09-21 DIAGNOSIS — L81.4 SOLAR LENTIGO: ICD-10-CM

## 2023-09-21 DIAGNOSIS — Z86.018 HISTORY OF DYSPLASTIC NEVUS: Primary | ICD-10-CM

## 2023-09-21 DIAGNOSIS — Z12.83 SKIN CANCER SCREENING: ICD-10-CM

## 2023-09-21 DIAGNOSIS — L65.9 NON-SCARRING ALOPECIA: ICD-10-CM

## 2023-09-21 PROCEDURE — 99214 PR OFFICE/OUTPT VISIT, EST, LEVL IV, 30-39 MIN: ICD-10-PCS | Mod: S$GLB,,, | Performed by: DERMATOLOGY

## 2023-09-21 PROCEDURE — 1159F PR MEDICATION LIST DOCUMENTED IN MEDICAL RECORD: ICD-10-PCS | Mod: CPTII,S$GLB,, | Performed by: DERMATOLOGY

## 2023-09-21 PROCEDURE — 3008F PR BODY MASS INDEX (BMI) DOCUMENTED: ICD-10-PCS | Mod: CPTII,S$GLB,, | Performed by: DERMATOLOGY

## 2023-09-21 PROCEDURE — 3008F BODY MASS INDEX DOCD: CPT | Mod: CPTII,S$GLB,, | Performed by: DERMATOLOGY

## 2023-09-21 PROCEDURE — 1159F MED LIST DOCD IN RCRD: CPT | Mod: CPTII,S$GLB,, | Performed by: DERMATOLOGY

## 2023-09-21 PROCEDURE — 99214 OFFICE O/P EST MOD 30 MIN: CPT | Mod: S$GLB,,, | Performed by: DERMATOLOGY

## 2023-09-21 PROCEDURE — 1160F PR REVIEW ALL MEDS BY PRESCRIBER/CLIN PHARMACIST DOCUMENTED: ICD-10-PCS | Mod: CPTII,S$GLB,, | Performed by: DERMATOLOGY

## 2023-09-21 PROCEDURE — 1160F RVW MEDS BY RX/DR IN RCRD: CPT | Mod: CPTII,S$GLB,, | Performed by: DERMATOLOGY

## 2023-09-21 NOTE — PROGRESS NOTES
Subjective:      Patient ID:  Angel Mosquera is a 43 y.o. female who presents for   Chief Complaint   Patient presents with    Skin Check     TBSE     LOV 6/8/23 Non-scarring Alopecia    Patient here today for TBSE  Patient states hair stopped falling out the second week of August.     H/o atypical nevi  H/o tanning bed use, few sessions in 20s    10/2021 Dr. Huggins  1. Skin, left posterior shoulder, shave biopsy:   - MELANOCYTIC NEVUS, COMPOUND TYPE.   This lesion is benign.   2.  Skin, right mid back, shave biopsy:   - MELANOCYTIC NEVUS, COMPOUND TYPE WITH ARCHITECTURAL DISORDER AND MILD   CYTOLOGIC ATYPIA (LORAINE'S NEVUS).   - MARGINS ARE NEGATIVE IN THE PLANES OF SECTION.     Derm HX:  Denies Phx of NMSC  Denies Fhx of MM    Current Outpatient Medications:   ·  ALPRAZolam (XANAX) 0.25 MG tablet, Take 1 tablet (0.25 mg total) by mouth daily as needed for Anxiety., Disp: 30 tablet, Rfl: 0  ·  augmented betamethasone (DIPROLENE) 0.05 % lotion, Apply to scalp nightly, Disp: 60 mL, Rfl: 3  ·  azelaic acid (FINACEA) 15 % gel, Apply to affected area(s) of face nightly, then moisturize (Patient not taking: Reported on 8/22/2023), Disp: 50 g, Rfl: 3  ·  biotin 1 mg Cap, Take by mouth., Disp: , Rfl:   ·  estradioL (DIVIGEL) 1 mg/gram (0.1 %) topical gel, Place 0.5 g onto the skin once daily. (Patient not taking: Reported on 8/22/2023), Disp: 30 g, Rfl: 12  ·  fluconazole (DIFLUCAN) 200 MG Tab, Take 1 pill PO 2 times per week x 2 weeks. (Patient not taking: Reported on 8/22/2023), Disp: 4 tablet, Rfl: 0  ·  fluocinonide (LIDEX) 0.05 % external solution, Thin film to scalp nightly, Disp: 60 mL, Rfl: 5  ·  hydrOXYzine HCL (ATARAX) 25 MG tablet, Take 1 tablet (25 mg total) by mouth nightly as needed (Insomnia)., Disp: 30 tablet, Rfl: 3  ·  ibuprofen (ADVIL,MOTRIN) 800 MG tablet, Take 1 tablet (800 mg total) by mouth every 6 (six) hours as needed for Pain., Disp: 30 tablet, Rfl: 1  ·  ketoconazole (NIZORAL) 2 % shampoo,  Lather scalp with medicated shampoo once a week - let sit on scalp at least 5 minutes prior to rinsing, Disp: 120 mL, Rfl: 5  ·  minoxidiL (LONITEN) 2.5 MG tablet, 1/2 tab PO daily, Disp: 45 tablet, Rfl: 2  ·  multivitamin (THERAGRAN) per tablet, Take 1 tablet by mouth once daily., Disp: , Rfl:   ·  ondansetron (ZOFRAN-ODT) 4 MG TbDL, Take 2 tablets (8 mg total) by mouth every 12 (twelve) hours as needed (nausea). (Patient not taking: Reported on 8/22/2023), Disp: 10 tablet, Rfl: 3  ·  spironolactone (ALDACTONE) 50 MG tablet, Take 1 tablet (50 mg total) by mouth once daily., Disp: 30 tablet, Rfl: 11  ·  tirzepatide 7.5 mg/0.5 mL PnIj, Inject 7.5 mg into the skin every 7 days. (Patient not taking: Reported on 8/22/2023), Disp: 12 pen, Rfl: 1  ·  traMADoL (ULTRAM) 50 mg tablet, Take 2 tablets (100 mg total) by mouth every 6 (six) hours as needed for Pain. (Patient not taking: Reported on 8/22/2023), Disp: 30 tablet, Rfl: 0  ·  UNABLE TO FIND, RELIEF FACTOR (FISH OIL, TURMERIC, ICARLIN, RESVERATROL), Disp: , Rfl:   ·  venlafaxine (EFFEXOR-XR) 37.5 MG 24 hr capsule, Take 1 capsule (37.5 mg total) by mouth once daily. (Patient not taking: Reported on 8/22/2023), Disp: 30 capsule, Rfl: 11          Review of Systems   Constitutional:  Negative for fever, chills and fatigue.   Skin:  Positive for daily sunscreen use, activity-related sunscreen use and wears hat.   Hematologic/Lymphatic: Does not bruise/bleed easily.       Objective:   Physical Exam   Constitutional: She appears well-developed and well-nourished. No distress.   Neurological: She is alert and oriented to person, place, and time. She is not disoriented.   Psychiatric: She has a normal mood and affect.   Skin:   Areas Examined (abnormalities noted in diagram):   Scalp / Hair Palpated and Inspected  Head / Face Inspection Performed  Neck Inspection Performed  Chest / Axilla Inspection Performed  Abdomen Inspection Performed  Genitals / Buttocks / Groin  Inspection Performed  Back Inspection Performed  RUE Inspected  LUE Inspection Performed  RLE Inspected  LLE Inspection Performed  Nails and Digits Inspection Performed                         Diagram Legend     Erythematous scaling macule/papule c/w actinic keratosis       Vascular papule c/w angioma      Pigmented verrucoid papule/plaque c/w seborrheic keratosis      Yellow umbilicated papule c/w sebaceous hyperplasia      Irregularly shaped tan macule c/w lentigo     1-2 mm smooth white papules consistent with Milia      Movable subcutaneous cyst with punctum c/w epidermal inclusion cyst      Subcutaneous movable cyst c/w pilar cyst      Firm pink to brown papule c/w dermatofibroma      Pedunculated fleshy papule(s) c/w skin tag(s)      Evenly pigmented macule c/w junctional nevus     Mildly variegated pigmented, slightly irregular-bordered macule c/w mildly atypical nevus      Flesh colored to evenly pigmented papule c/w intradermal nevus       Pink pearly papule/plaque c/w basal cell carcinoma      Erythematous hyperkeratotic cursted plaque c/w SCC      Surgical scar with no sign of skin cancer recurrence      Open and closed comedones      Inflammatory papules and pustules      Verrucoid papule consistent consistent with wart     Erythematous eczematous patches and plaques     Dystrophic onycholytic nail with subungual debris c/w onychomycosis     Umbilicated papule    Erythematous-base heme-crusted tan verrucoid plaque consistent with inflamed seborrheic keratosis     Erythematous Silvery Scaling Plaque c/w Psoriasis     See annotation      Assessment / Plan:        History of dysplastic nevus  Skin cancer screening  Area of previous DN examined. Site well healed with no signs of recurrence.    Total body skin examination performed today including at least 12 points as noted in physical examination. No lesions suspicious for malignancy noted.    Multiple benign nevi  Careful dermoscopy evaluation of nevi  performed with none identified as needing biopsy today  Monitor for new mole or moles that are becoming bigger, darker, irritated, or developing irregular borders.     Solar lentigo  This is a benign hyperpigmented sun induced lesion. Daily sun protection will reduce the number of new lesions. Treatment of these benign lesions are considered cosmetic.    Non-scarring alopecia  Stabilized, no longer shedding  Main feature on path was seb derm (Ledezma read)  Still using keto shampoo once weekly  No longer needing diprolene lotion  Scalp exam normal  Continue minoxidil 1.25mg daily and spironolactone 50mg daily             No follow-ups on file.

## 2023-10-11 ENCOUNTER — PATIENT MESSAGE (OUTPATIENT)
Dept: PODIATRY | Facility: CLINIC | Age: 43
End: 2023-10-11
Payer: COMMERCIAL

## 2023-10-16 ENCOUNTER — PATIENT MESSAGE (OUTPATIENT)
Dept: OBSTETRICS AND GYNECOLOGY | Facility: CLINIC | Age: 43
End: 2023-10-16
Payer: COMMERCIAL

## 2023-10-24 DIAGNOSIS — R11.0 NAUSEA: ICD-10-CM

## 2023-10-24 RX ORDER — ONDANSETRON 4 MG/1
8 TABLET, ORALLY DISINTEGRATING ORAL EVERY 12 HOURS PRN
Qty: 10 TABLET | Refills: 3 | Status: SHIPPED | OUTPATIENT
Start: 2023-10-24

## 2023-10-24 NOTE — TELEPHONE ENCOUNTER
Care Due:                  Date            Visit Type   Department     Provider  --------------------------------------------------------------------------------                                EP -                              PRIMARY      [facility] PRIMARY  Last Visit: 12-      CARE ([facility])   CARE           [place]                              [facility]                              FOLLOWUP/OF  [facility] PRIMARY  Next Visit: 11-      FICE VISIT   CARE           [place]                                                            Last  Test          Frequency    Reason                     Performed    Due Date  --------------------------------------------------------------------------------    HBA1C.......  6 months...  tirzepatide..............  Not Found    Overdue    [employer] Embedded Care Due Messages. Reference number: [id number].   10/24/2023 6:10:31 AM CDT

## 2024-01-08 ENCOUNTER — OFFICE VISIT (OUTPATIENT)
Dept: PRIMARY CARE CLINIC | Facility: CLINIC | Age: 44
End: 2024-01-08
Payer: COMMERCIAL

## 2024-01-08 ENCOUNTER — PATIENT MESSAGE (OUTPATIENT)
Dept: PRIMARY CARE CLINIC | Facility: CLINIC | Age: 44
End: 2024-01-08

## 2024-01-08 VITALS
HEIGHT: 65 IN | DIASTOLIC BLOOD PRESSURE: 70 MMHG | WEIGHT: 218.94 LBS | BODY MASS INDEX: 36.48 KG/M2 | HEART RATE: 80 BPM | SYSTOLIC BLOOD PRESSURE: 130 MMHG | OXYGEN SATURATION: 98 %

## 2024-01-08 DIAGNOSIS — G62.9 NEUROPATHY: ICD-10-CM

## 2024-01-08 DIAGNOSIS — Z00.00 ANNUAL PHYSICAL EXAM: Primary | ICD-10-CM

## 2024-01-08 DIAGNOSIS — E66.01 MORBID OBESITY: ICD-10-CM

## 2024-01-08 DIAGNOSIS — E66.01 MORBID OBESITY: Primary | ICD-10-CM

## 2024-01-08 DIAGNOSIS — E05.90 SUBCLINICAL HYPERTHYROIDISM: ICD-10-CM

## 2024-01-08 DIAGNOSIS — Z00.00 ANNUAL PHYSICAL EXAM: ICD-10-CM

## 2024-01-08 DIAGNOSIS — F41.9 ANXIETY: ICD-10-CM

## 2024-01-08 PROCEDURE — 3075F SYST BP GE 130 - 139MM HG: CPT | Mod: CPTII,S$GLB,, | Performed by: INTERNAL MEDICINE

## 2024-01-08 PROCEDURE — 3008F BODY MASS INDEX DOCD: CPT | Mod: CPTII,S$GLB,, | Performed by: INTERNAL MEDICINE

## 2024-01-08 PROCEDURE — 99396 PREV VISIT EST AGE 40-64: CPT | Mod: S$GLB,,, | Performed by: INTERNAL MEDICINE

## 2024-01-08 PROCEDURE — 1159F MED LIST DOCD IN RCRD: CPT | Mod: CPTII,S$GLB,, | Performed by: INTERNAL MEDICINE

## 2024-01-08 PROCEDURE — 99999 PR PBB SHADOW E&M-EST. PATIENT-LVL IV: CPT | Mod: PBBFAC,,, | Performed by: INTERNAL MEDICINE

## 2024-01-08 PROCEDURE — 3078F DIAST BP <80 MM HG: CPT | Mod: CPTII,S$GLB,, | Performed by: INTERNAL MEDICINE

## 2024-01-08 RX ORDER — GABAPENTIN 100 MG/1
100 CAPSULE ORAL 3 TIMES DAILY
Qty: 90 CAPSULE | Refills: 11 | Status: SHIPPED | OUTPATIENT
Start: 2024-01-08 | End: 2025-01-07

## 2024-01-08 NOTE — PROGRESS NOTES
[facility] Note    Chief Complaint      Chief Complaint   Patient presents with    Annual Exam       History of Present Illness      Angel Mosquera is a 43 y.o. female with chronic conditions of anxiety, palpitations, bilateral foot pain  who presents today for: annual preventative visit. Has been following with Dr. Montez  Anxiety: prescribed effexor 4/2023 but did not start.  Symptoms have improved.  Hair loss has improved, on minoxidil and spironolactone. Following with Dr. Love, dermatology.    Diet:  Prepares own food mostly.  Limiting fatty foods and carbs.  Drinks plenty water   Exercise: Stays active but gym workouts limited by left foot pain.  Denies drinking and driving, drinking more than 4 drinks on occasion, drug use.     Flu shot UTD.  Tdap 2019. COVID vaccine UTD.  Shingrix due age 50.  Pneumonia vaccine due at 65.   Pap smear 2019. Mammogram 2021. DEXA due age 65.   Colon cancer screening due age 45.      Past Medical History:  Past Medical History:   Diagnosis Date    Hypertension     PONV (postoperative nausea and vomiting)     Rosacea        Past Surgical History:   has a past surgical history that includes Breast Augmentation (2005); mischarr; Dilation and curettage of uterus (N/A, 10/19/2019); Robot-assisted laparoscopic uterine myomectomy (N/A, 1/7/2020); Augmentation of breast (Bilateral, 2005); Cosmetic surgery (2005); xi robotic hysterectomy, with salpingo-oophorectomy (N/A, 12/15/2021); Endoscopic plantar fasciotomy (Left, 6/17/2022); and Surgical removal of Sparrow's neuroma (Left, 1/5/2023).    Family History:  family history includes Alcohol abuse in her father, maternal grandfather, and maternal uncle; Arthritis in her maternal grandmother; COPD in her maternal grandfather; Cancer in her paternal grandfather and paternal grandmother; Diabetes in her maternal grandmother and mother; Heart disease in her mother; Heart failure in her mother; Hyperlipidemia in her  mother; Hypertension in her mother; Psoriasis in her maternal grandmother.     Social History:  Social History     Tobacco Use    Smoking status: Never    Smokeless tobacco: Never   Substance Use Topics    Alcohol use: No    Drug use: No       I personally reviewed all past medical, surgical, social and family history.    Review of Systems   Constitutional:  Negative for chills, fever and malaise/fatigue.   Respiratory:  Negative for shortness of breath.    Cardiovascular:  Negative for chest pain.   Gastrointestinal:  Negative for constipation, diarrhea, nausea and vomiting.   Skin:  Negative for rash.   Neurological:  Negative for weakness.   All other systems reviewed and are negative.       Medications:  Outpatient Encounter Medications as of 1/8/2024   Medication Sig Dispense Refill    augmented betamethasone (DIPROLENE) 0.05 % lotion Apply to scalp nightly 60 mL 3    biotin 1 mg Cap Take by mouth.      fluocinonide (LIDEX) 0.05 % external solution Thin film to scalp nightly 60 mL 5    gabapentin (NEURONTIN) 100 MG capsule Take 1 capsule (100 mg total) by mouth 3 (three) times daily. 90 capsule 11    hydrOXYzine HCL (ATARAX) 25 MG tablet Take 1 tablet (25 mg total) by mouth nightly as needed (Insomnia). 30 tablet 3    ibuprofen (ADVIL,MOTRIN) 800 MG tablet Take 1 tablet (800 mg total) by mouth every 6 (six) hours as needed for Pain. 30 tablet 1    ketoconazole (NIZORAL) 2 % shampoo Lather scalp with medicated shampoo once a week - let sit on scalp at least 5 minutes prior to rinsing 120 mL 5    minoxidiL (LONITEN) 2.5 MG tablet 1/2 tab PO daily 45 tablet 2    multivitamin (THERAGRAN) per tablet Take 1 tablet by mouth once daily.      ondansetron (ZOFRAN-ODT) 4 MG TbDL Take 2 tablets (8 mg total) by mouth every 12 (twelve) hours as needed (nausea). 10 tablet 3    spironolactone (ALDACTONE) 50 MG tablet Take 1 tablet (50 mg total) by mouth once daily. 30 tablet 11    UNABLE TO FIND RELIEF FACTOR (FISH OIL,  TURMERIC, ICARLIN, RESVERATROL)      [DISCONTINUED] ALPRAZolam (XANAX) 0.25 MG tablet Take 1 tablet (0.25 mg total) by mouth daily as needed for Anxiety. 30 tablet 0    [DISCONTINUED] azelaic acid (FINACEA) 15 % gel Apply to affected area(s) of face nightly, then moisturize (Patient not taking: Reported on 8/22/2023) 50 g 3    [DISCONTINUED] estradioL (DIVIGEL) 1 mg/gram (0.1 %) topical gel Place 0.5 g onto the skin once daily. (Patient not taking: Reported on 8/22/2023) 30 g 12    [DISCONTINUED] fluconazole (DIFLUCAN) 200 MG Tab Take 1 pill PO 2 times per week x 2 weeks. (Patient not taking: Reported on 8/22/2023) 4 tablet 0    [DISCONTINUED] tirzepatide 7.5 mg/0.5 mL PnIj Inject 7.5 mg into the skin every 7 days. (Patient not taking: Reported on 8/22/2023) 12 pen 1    [DISCONTINUED] traMADoL (ULTRAM) 50 mg tablet Take 2 tablets (100 mg total) by mouth every 6 (six) hours as needed for Pain. (Patient not taking: Reported on 8/22/2023) 30 tablet 0    [DISCONTINUED] venlafaxine (EFFEXOR-XR) 37.5 MG 24 hr capsule Take 1 capsule (37.5 mg total) by mouth once daily. (Patient not taking: Reported on 8/22/2023) 30 capsule 11     Facility-Administered Encounter Medications as of 1/8/2024   Medication Dose Route Frequency Provider Last Rate Last Admin    sodium chloride 0.9% flush 10 mL  10 mL Intravenous PRN Mello Montez, DPM        sodium chloride 0.9% flush 10 mL  10 mL Intravenous PRN Mello Montez, JUSTICE           Allergies:  Review of patient's allergies indicates:  No Known Allergies    Health Maintenance:  Immunization History   Administered Date(s) Administered    COVID-19, MRNA, LN-S, PF (Pfizer) (Purple Cap) 12/30/2020, 01/20/2021    Influenza - Quadrivalent - PF *Preferred* (6 months and older) 11/04/2016, 10/09/2017, 10/26/2018, 10/23/2019, 10/19/2020, 11/24/2023    Meningococcal Conjugate 05/20/2013    Tdap 06/27/2019      Health Maintenance   Topic Date Due    Mammogram  07/17/2024    TETANUS  "VACCINE  06/27/2029    Hepatitis C Screening  Completed    Lipid Panel  Completed        Physical Exam      Vital Signs  Pulse: 80  SpO2: 98 %  BP: 130/70  BP Location: Left arm  Patient Position: Sitting  Pain Score: 0-No pain  Height and Weight  Height: 5' 5" (165.1 cm)  Weight: 99.3 kg (218 lb 14.7 oz)  BSA (Calculated - sq m): 2.13 sq meters  BMI (Calculated): 36.4  Weight in (lb) to have BMI = 25: 149.9]    Physical Exam  Vitals reviewed.   Constitutional:       Appearance: She is well-developed.   HENT:      Head: Normocephalic and atraumatic.      Right Ear: External ear normal.      Left Ear: External ear normal.   Cardiovascular:      Rate and Rhythm: Normal rate and regular rhythm.      Heart sounds: Normal heart sounds. No murmur heard.  Pulmonary:      Effort: Pulmonary effort is normal.      Breath sounds: Normal breath sounds. No wheezing or rales.   Abdominal:      General: Bowel sounds are normal. There is no distension.      Palpations: Abdomen is soft.      Tenderness: There is no abdominal tenderness.          Laboratory:  CBC:  Recent Labs   Lab 06/08/22  0715 08/05/22  1230 04/04/23  0752   WBC 8.36 7.60 5.69   RBC 4.88 4.79 4.83   Hemoglobin 12.3 12.3 12.5   Hematocrit 40.2 40.8 41.6   Platelets 378 395 366   MCV 82 85 86   MCH 25.2 L 25.7 L 25.9 L   MCHC 30.6 L 30.1 L 30.0 L     CMP:  Recent Labs   Lab 06/08/22  0715 08/05/22  1230 12/20/22  0855 04/04/23  0752   Glucose 101 136 H   < > 93   Calcium 9.1 9.9   < > 9.3   Albumin 4.5 4.1  --  4.5   Total Protein 8.1 8.2  --  8.0   Sodium 142 139   < > 142   Potassium 4.3 3.6   < > 3.8   CO2 25 28   < > 29   Chloride 110 103   < > 106   BUN 8 10   < > 14   Alkaline Phosphatase 100 104  --  85   ALT 26 24  --  39   AST 27 19  --  35   Total Bilirubin 0.4 0.4  --  0.4    < > = values in this interval not displayed.     URINALYSIS:  Recent Labs   Lab 05/02/22  0011 04/18/23  0950   Color, UA Yellow Yellow   Specific Gravity, UA 1.015 1.020   pH, UA " 6.0 6.0   Protein, UA Negative Negative   Nitrite, UA Negative Negative   Leukocytes, UA Negative Negative   Urobilinogen, UA Negative  --       LIPIDS:  Recent Labs   Lab 06/30/21  0719 12/27/21  1525 06/08/22  0715 04/04/23  0752 04/12/23  1522   TSH 0.878   < > 1.080 0.161 L 1.049   HDL 49  --  38 L  --   --    Cholesterol 173  --  174  --   --    Triglycerides 96  --  115  --   --    LDL Cholesterol 104.8  --  113.0  --   --    HDL/Cholesterol Ratio 28.3  --  21.8  --   --    Non-HDL Cholesterol 124  --  136  --   --    Total Cholesterol/HDL Ratio 3.5  --  4.6  --   --     < > = values in this interval not displayed.     TSH:  Recent Labs   Lab 06/08/22  0715 04/04/23  0752 04/12/23  1522   TSH 1.080 0.161 L 1.049     A1C:        Assessment/Plan     Angel Mosquera is a 43 y.o.female with:    1. Annual physical exam  Discussed diet and exercise, vaccines and cancer screening, risk factors.  Screening labs ordered.     2. Anxiety  Doing better without meds  3. Morbid obesity    4. Neuropathy  - gabapentin (NEURONTIN) 100 MG capsule; Take 1 capsule (100 mg total) by mouth 3 (three) times daily.  Dispense: 90 capsule; Refill: 11  Start gabapentin.    5. Subclinical hyperthyroidism  Update labs     Chronic conditions status updated as per HPI.  Other than changes above, cont current medications and maintain follow up with specialists.  No follow-ups on file.    Future Appointments   Date Time Provider Department Center   2/12/2024  4:15 PM Mello Montez DPM [facility] [place] [facility]   4/5/2024  3:00 PM [facility]       Tato Ramachandran MD  [facility]

## 2024-01-27 DIAGNOSIS — L65.9 HAIR LOSS: ICD-10-CM

## 2024-01-29 RX ORDER — MINOXIDIL 2.5 MG/1
TABLET ORAL
Qty: 45 TABLET | Refills: 2 | Status: SHIPPED | OUTPATIENT
Start: 2024-01-29 | End: 2024-02-07 | Stop reason: SDUPTHER

## 2024-02-07 ENCOUNTER — PATIENT MESSAGE (OUTPATIENT)
Dept: DERMATOLOGY | Facility: CLINIC | Age: 44
End: 2024-02-07
Payer: COMMERCIAL

## 2024-02-07 DIAGNOSIS — L65.9 HAIR LOSS: ICD-10-CM

## 2024-02-08 RX ORDER — MINOXIDIL 2.5 MG/1
TABLET ORAL
Qty: 45 TABLET | Refills: 2 | Status: SHIPPED | OUTPATIENT
Start: 2024-02-08

## 2024-03-08 ENCOUNTER — PATIENT MESSAGE (OUTPATIENT)
Dept: PRIMARY CARE CLINIC | Facility: CLINIC | Age: 44
End: 2024-03-08
Payer: COMMERCIAL

## 2024-03-08 DIAGNOSIS — K21.9 GERD WITHOUT ESOPHAGITIS: Primary | ICD-10-CM

## 2024-03-08 RX ORDER — PANTOPRAZOLE SODIUM 40 MG/1
40 TABLET, DELAYED RELEASE ORAL DAILY
Qty: 90 TABLET | Refills: 3 | Status: SHIPPED | OUTPATIENT
Start: 2024-03-08 | End: 2025-03-08

## 2024-03-24 ENCOUNTER — PATIENT MESSAGE (OUTPATIENT)
Dept: DERMATOLOGY | Facility: CLINIC | Age: 44
End: 2024-03-24
Payer: COMMERCIAL

## 2024-03-25 DIAGNOSIS — L21.9 SEBORRHEIC DERMATITIS: ICD-10-CM

## 2024-03-25 RX ORDER — KETOCONAZOLE 20 MG/ML
SHAMPOO, SUSPENSION TOPICAL
Qty: 120 ML | Refills: 5 | Status: SHIPPED | OUTPATIENT
Start: 2024-03-25 | End: 2024-04-15 | Stop reason: SDUPTHER

## 2024-04-04 ENCOUNTER — OFFICE VISIT (OUTPATIENT)
Dept: CARDIOLOGY | Facility: CLINIC | Age: 44
End: 2024-04-04
Payer: COMMERCIAL

## 2024-04-04 DIAGNOSIS — I10 HYPERTENSION, UNSPECIFIED TYPE: ICD-10-CM

## 2024-04-04 DIAGNOSIS — Z13.6 ENCOUNTER FOR SCREENING FOR CARDIOVASCULAR DISORDERS: ICD-10-CM

## 2024-04-04 DIAGNOSIS — R63.5 WEIGHT GAIN: ICD-10-CM

## 2024-04-04 DIAGNOSIS — R00.2 PALPITATIONS: Primary | ICD-10-CM

## 2024-04-04 PROCEDURE — 3044F HG A1C LEVEL LT 7.0%: CPT | Mod: CPTII,95,, | Performed by: PHYSICIAN ASSISTANT

## 2024-04-04 PROCEDURE — 1159F MED LIST DOCD IN RCRD: CPT | Mod: CPTII,95,, | Performed by: PHYSICIAN ASSISTANT

## 2024-04-04 PROCEDURE — 99214 OFFICE O/P EST MOD 30 MIN: CPT | Mod: 95,,, | Performed by: PHYSICIAN ASSISTANT

## 2024-04-04 RX ORDER — CARVEDILOL 3.12 MG/1
3.12 TABLET ORAL 2 TIMES DAILY WITH MEALS
Qty: 180 TABLET | Refills: 3 | Status: SHIPPED | OUTPATIENT
Start: 2024-04-04 | End: 2025-04-04

## 2024-04-04 NOTE — PROGRESS NOTES
The patient location is: [place]  The chief complaint leading to consultation is: Palpitations     Visit type: audiovisual    Face to Face time with patient: 30 minutes of total time spent on the encounter, which includes face to face time and non-face to face time preparing to see the patient (eg, review of tests), Obtaining and/or reviewing separately obtained history, Documenting clinical information in the electronic or other health record, Independently interpreting results (not separately reported) and communicating results to the patient/family/caregiver, or Care coordination (not separately reported).     Each patient to whom he or she provides medical services by telemedicine is:  (1) informed of the relationship between the physician and patient and the respective role of any other health care provider with respect to management of the patient; and (2) notified that he or she may decline to receive medical services by telemedicine and may withdraw from such care at any time.    Notes:         Cardiology Clinic Note  Reason for Visit: Palpitations, rapid heart rate     HPI:     Angel Mosquera is a 43 y.o. F, who presents I virtual visit to discuss recent palpitations and elevated heart rate.  She had an episode last week where her heart rate reached 130 while walking and persisted for more than a few minutes.  Since her last cardiology visit she was able to lose significant weight on Mounjaro.  She has been taking carvedilol 3.125 mg b.i.d., but this was discontinued with weight loss.  Unfortunately Mounjaro caused her to have hair loss and she had to discontinue this medication.  Subsequently she has had weight gain.  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  She is sometimes having systolic values in the 140s.  She has a significant family history of CAD.  Her mother had an MI at the age of 50.     ROS:    Pertinent ROS included in HPI and below.  PMH:      Past Medical History:   Diagnosis Date    Hypertension     PONV (postoperative nausea and vomiting)     Rosacea      Past Surgical History:   Procedure Laterality Date    AUGMENTATION OF BREAST Bilateral 2005    Breast Augmentation  2005    COSMETIC SURGERY  2005    Breast augmentation    DILATION AND CURETTAGE OF UTERUS N/A 10/19/2019    Procedure: DILATION AND CURETTAGE, UTERUS;  Surgeon: Frances Culp MD;  Location: [facility] OR;  Service: OB/GYN;  Laterality: N/A;  with suction    ENDOSCOPIC PLANTAR FASCIOTOMY Left 6/17/2022    Procedure: FASCIOTOMY, PLANTAR, ENDOSCOPIC;  Surgeon: Mello Montez DPM;  Location: [facility] OR;  Service: Podiatry;  Laterality: Left;  endoscopic plantar fasciotomy kit    mischarr      ROBOT-ASSISTED LAPAROSCOPIC UTERINE MYOMECTOMY N/A 1/7/2020    Procedure: ROBOTIC MYOMECTOMY, UTERUS;  Surgeon: Frances Culp MD;  Location: [facility];  Service: OB/GYN;  Laterality: N/A;    SURGICAL REMOVAL OF FERNANDEZ'S NEUROMA Left 1/5/2023    Procedure: EXCISION, FERNANDEZ'S NEUROMA;  Surgeon: Mello Montez DPM;  Location: [facility] OR;  Service: Podiatry;  Laterality: Left;    XI ROBOTIC HYSTERECTOMY, WITH SALPINGO-OOPHORECTOMY N/A 12/15/2021    Procedure: XI ROBOTIC HYSTERECTOMY,WITH SALPINGO-OOPHORECTOMY;  Surgeon: Carla Aguero MD;  Location: [facility];  Service: OB/GYN;  Laterality: N/A;     Allergies:   Review of patient's allergies indicates:  No Known Allergies  Medications:     Current Outpatient Medications:     augmented betamethasone (DIPROLENE) 0.05 % lotion, Apply to scalp nightly, Disp: 60 mL, Rfl: 3    biotin 1 mg Cap, Take by mouth., Disp: , Rfl:     fluocinonide (LIDEX) 0.05 % external solution, Thin film to scalp nightly, Disp: 60 mL, Rfl: 5    gabapentin (NEURONTIN) 100 MG capsule, Take 1 capsule (100 mg total) by mouth 3 (three) times daily., Disp: 90 capsule, Rfl: 11    hydrOXYzine HCL (ATARAX) 25 MG tablet, Take 1 tablet (25 mg total) by mouth nightly as needed  (Insomnia)., Disp: 30 tablet, Rfl: 3    ibuprofen (ADVIL,MOTRIN) 800 MG tablet, Take 1 tablet (800 mg total) by mouth every 6 (six) hours as needed for Pain., Disp: 30 tablet, Rfl: 1    ketoconazole (NIZORAL) 2 % shampoo, Lather scalp with medicated shampoo once a week - let sit on scalp at least 5 minutes prior to rinsing, Disp: 120 mL, Rfl: 5    minoxidiL (LONITEN) 2.5 MG tablet, TAKE 1/2 TABLET BY MOUTH DAILY, Disp: 45 tablet, Rfl: 2    multivitamin (THERAGRAN) per tablet, Take 1 tablet by mouth once daily., Disp: , Rfl:     ondansetron (ZOFRAN-ODT) 4 MG TbDL, Take 2 tablets (8 mg total) by mouth every 12 (twelve) hours as needed (nausea)., Disp: 10 tablet, Rfl: 3    pantoprazole (PROTONIX) 40 MG tablet, Take 1 tablet (40 mg total) by mouth once daily., Disp: 90 tablet, Rfl: 3    spironolactone (ALDACTONE) 50 MG tablet, Take 1 tablet (50 mg total) by mouth once daily., Disp: 30 tablet, Rfl: 11    carvediloL (COREG) 3.125 MG tablet, Take 1 tablet (3.125 mg total) by mouth 2 (two) times daily with meals., Disp: 180 tablet, Rfl: 3    UNABLE TO FIND, RELIEF FACTOR (FISH OIL, TURMERIC, ICARLIN, RESVERATROL), Disp: , Rfl:   No current facility-administered medications for this visit.   Social History:     Social History     Tobacco Use    Smoking status: Never    Smokeless tobacco: Never   Substance Use Topics    Alcohol use: No     Family History:     Family History   Problem Relation Age of Onset    Diabetes Mother     Hypertension Mother     Heart failure Mother         viral?    Heart disease Mother     Hyperlipidemia Mother     Alcohol abuse Father     Alcohol abuse Maternal Uncle     Psoriasis Maternal Grandmother     Arthritis Maternal Grandmother     Diabetes Maternal Grandmother     Alcohol abuse Maternal Grandfather     COPD Maternal Grandfather     Cancer Paternal Grandmother         Lung Cancer    Cancer Paternal Grandfather         Pancreatic Cancer    Cancer Neg Hx     Ovarian cancer Neg Hx     Melanoma  Neg Hx     Heart attack Neg Hx     Stroke Neg Hx     Colon cancer Neg Hx     Breast cancer Neg Hx     Blindness Neg Hx     Amblyopia Neg Hx     Cataracts Neg Hx     Glaucoma Neg Hx     Macular degeneration Neg Hx     Retinal detachment Neg Hx     Strabismus Neg Hx     Inflammatory bowel disease Neg Hx     Kidney disease Neg Hx     Lupus Neg Hx     Rheum arthritis Neg Hx      Physical Exam:     Physical not exam was not performed due this encounter being a virtual visit.    Labs:     Blood Tests:  Lab Results   Component Value Date     01/10/2024    K 4.5 01/10/2024     01/10/2024    CO2 25 01/10/2024    BUN 19 (H) 01/10/2024    CREATININE 0.83 01/10/2024    GLU 97 01/10/2024    HGBA1C 5.4 01/10/2024    AST 24 01/10/2024    ALT 25 01/10/2024    ALBUMIN 4.5 01/10/2024    PROT 8.1 01/10/2024    BILITOT 0.4 01/10/2024    WBC 9.77 01/10/2024    HGB 13.0 01/10/2024    HCT 42.3 01/10/2024    MCV 87 01/10/2024     01/10/2024    TSH 1.360 01/10/2024       Lab Results   Component Value Date    CHOL 161 01/10/2024    HDL 45 01/10/2024    TRIG 62 01/10/2024       Lab Results   Component Value Date    LDLCALC 103.6 01/10/2024       Urine Tests:  Lab Results   Component Value Date    COLORU Yellow 04/18/2023    APPEARANCEUA Clear 04/18/2023    PHUR 6.0 04/18/2023    SPECGRAV 1.020 04/18/2023    PROTEINUA Negative 04/18/2023    GLUCUA Negative 04/18/2023    KETONESU Negative 04/18/2023    BILIRUBINUA Negative 04/18/2023    OCCULTUA Negative 04/18/2023    NITRITE Negative 04/18/2023    UROBILINOGEN Negative 05/02/2022    LEUKOCYTESUR Negative 04/18/2023    PROTEINURINE <7 04/18/2023    CREATRANDUR 122.0 04/18/2023    UTPCR Unable to calculate 04/18/2023       Imaging:     Echocardiogram  None    Stress testing  None    Cath Lab  None    Other  24 hour holter monitor     Diary    The diary was properly completed.   There were occasional hookup related artifacts. Overall, the study was of adequate quality. The tape  was adequate (0 days , 24 hours, 0 minutes).        There was an episode of palpitations reported. The corresponding rhythm strips revealed the following: the rhythm was sinus tachycardia at 119 bpm.     There was an episode of palpitations reported. The corresponding rhythm strips revealed the following: the rhythm was sinus tachycardia at 124 bpm.     There was an episode of palpitations reported. The corresponding rhythm strips revealed the following: the rhythm was sinus tachycardia at 117 bpm.     There was an episode of palpitations reported. The corresponding rhythm strips revealed the following: the rhythm was sinus rhythm at 81 bpm.     There was an episode of palpitations reported. The corresponding rhythm strips revealed the following: the rhythm was sinus tachycardia at 109 bpm.     Rhythm    Predominant Rhythm  Sinus rhythm with heart rates varying between 57 and 141 BPM with an average of 90 BPM.     Maximum heart rate recorded at: 10:42 CST.     Minimum heart rate recorded at 03:45 CST on day 1.     PVC    Ventricular Arrhythmias  There were very rare PVCs totalling 2 and averaging 0.08 per hour.     PAC    Supraventricular Arrhythmias  There were very rare PACs totalling 3 and averaging 0.13 per hour.       [id number]  Sinus bradycardia   Normal ECG   When compared with ECG of 27-DEC-2021 15:05,   Vent. rate has decreased BY  32 BPM     Assessment:     1. Palpitations    2. Encounter for screening for cardiovascular disorders    3. Hypertension, unspecified type    4. Weight gain        Plan:     Palpitations  -     carvediloL (COREG) 3.125 MG tablet; Take 1 tablet (3.125 mg total) by mouth 2 (two) times daily with meals.  Dispense: 180 tablet; Refill: 3  -     Holter monitor - 48 hour; Future  Will start coreg for palpitations and elevated BP readings  Call with BP log in 1-2 weeks    Encounter for screening for cardiovascular disorders  -     CT Cardiac Scoring; Future; Expected date:  04/04/2024    Hypertension, unspecified type  Begin carvedilol as above   Recommend low sodium diet     Weight gain  Weight loss through a combination of diet and exercise encouraged  Recommend 150 minutes a week (30 minutes per day, 5 days per week) of moderate intensity exercise        Signed:  Padmaja Guillory PA-C  Cardiology     4/4/2024 11:56 AM    Follow-up:     Future Appointments   Date Time Provider Department Center   4/15/2024  4:00 PM Mlaou Love MD [facility] DERM [place]   5/6/2024  2:15 PM Mello Montez, DPM [facility] [place] [facility]   5/23/2024  9:45 AM Carla Aguero MD [facility] OBGYN [place]   8/23/2024  3:15 PM Malou Love MD [facility] [place]

## 2024-04-05 ENCOUNTER — HOSPITAL ENCOUNTER (OUTPATIENT)
Dept: RADIOLOGY | Facility: HOSPITAL | Age: 44
Discharge: HOME | End: 2024-04-05
Attending: PHYSICIAN ASSISTANT
Payer: COMMERCIAL

## 2024-04-05 ENCOUNTER — PATIENT MESSAGE (OUTPATIENT)
Dept: CARDIOLOGY | Facility: CLINIC | Age: 44
End: 2024-04-05
Payer: COMMERCIAL

## 2024-04-05 ENCOUNTER — HOSPITAL ENCOUNTER (OUTPATIENT)
Dept: CARDIOLOGY | Facility: HOSPITAL | Age: 44
Discharge: HOME | End: 2024-04-05
Attending: PHYSICIAN ASSISTANT
Payer: COMMERCIAL

## 2024-04-05 DIAGNOSIS — Z13.6 ENCOUNTER FOR SCREENING FOR CARDIOVASCULAR DISORDERS: ICD-10-CM

## 2024-04-05 DIAGNOSIS — R00.2 PALPITATIONS: ICD-10-CM

## 2024-04-05 PROCEDURE — 75571 CT HRT W/O DYE W/CA TEST: CPT | Mod: TC

## 2024-04-05 PROCEDURE — 93227 XTRNL ECG REC<48 HR R&I: CPT | Mod: ,,, | Performed by: INTERNAL MEDICINE

## 2024-04-05 PROCEDURE — 75571 CT HRT W/O DYE W/CA TEST: CPT | Mod: 26,,, | Performed by: RADIOLOGY

## 2024-04-05 PROCEDURE — 93226 XTRNL ECG REC<48 HR SCAN A/R: CPT | Mod: PO

## 2024-04-08 LAB
OHS CV EVENT MONITOR DAY: 0
OHS CV HOLTER LENGTH DECIMAL HOURS: 48
OHS CV HOLTER LENGTH HOURS: 48
OHS CV HOLTER LENGTH MINUTES: 0
OHS CV HOLTER SINUS AVERAGE HR: 88
OHS CV HOLTER SINUS MAX HR: 143
OHS CV HOLTER SINUS MIN HR: 49

## 2024-04-15 ENCOUNTER — OFFICE VISIT (OUTPATIENT)
Dept: DERMATOLOGY | Facility: CLINIC | Age: 44
End: 2024-04-15
Payer: COMMERCIAL

## 2024-04-15 VITALS — BODY MASS INDEX: 36.48 KG/M2 | HEIGHT: 65 IN | WEIGHT: 218.94 LBS

## 2024-04-15 DIAGNOSIS — L21.9 SEBORRHEIC DERMATITIS: ICD-10-CM

## 2024-04-15 DIAGNOSIS — L65.9 NON-SCARRING ALOPECIA: Primary | ICD-10-CM

## 2024-04-15 PROCEDURE — 1160F RVW MEDS BY RX/DR IN RCRD: CPT | Mod: CPTII,S$GLB,, | Performed by: DERMATOLOGY

## 2024-04-15 PROCEDURE — 3044F HG A1C LEVEL LT 7.0%: CPT | Mod: CPTII,S$GLB,, | Performed by: DERMATOLOGY

## 2024-04-15 PROCEDURE — 1159F MED LIST DOCD IN RCRD: CPT | Mod: CPTII,S$GLB,, | Performed by: DERMATOLOGY

## 2024-04-15 PROCEDURE — 3008F BODY MASS INDEX DOCD: CPT | Mod: CPTII,S$GLB,, | Performed by: DERMATOLOGY

## 2024-04-15 PROCEDURE — 99214 OFFICE O/P EST MOD 30 MIN: CPT | Mod: S$GLB,,, | Performed by: DERMATOLOGY

## 2024-04-15 RX ORDER — KETOCONAZOLE 20 MG/ML
SHAMPOO, SUSPENSION TOPICAL
Qty: 120 ML | Refills: 5 | Status: SHIPPED | OUTPATIENT
Start: 2024-04-15

## 2024-04-15 RX ORDER — BETAMETHASONE DIPROPIONATE 0.5 MG/ML
LOTION, AUGMENTED TOPICAL
Qty: 60 ML | Refills: 3 | Status: SHIPPED | OUTPATIENT
Start: 2024-04-15

## 2024-04-15 RX ORDER — SPIRONOLACTONE 50 MG/1
100 TABLET, FILM COATED ORAL DAILY
Qty: 180 TABLET | Refills: 3 | Status: SHIPPED | OUTPATIENT
Start: 2024-04-15 | End: 2025-04-15

## 2024-04-15 NOTE — PROGRESS NOTES
Subjective:      Patient ID:  Angel Mosquera is a 43 y.o. female who presents for   Chief Complaint   Patient presents with    Follow-up     Hair loss     Alopecia     LOV 9/21/23 Nevi, Lentigo, Non-Scarring Alopecia    Patient here today for F/U on Alopecia.  Pt taking Minoxidil 1.25mg tablet and Spironolactone 50mg tablet.  Pt states hair loss recently began again in March of 2024    Diagnosed with Covid February 2024, contributing factor?   Easy course, recovered at home,     New med: gabapentin takes max of 200mg daily, started mid January 04/2023  [facility] FINAL DIAGNOSIS   A. Right posterior crown, [id number], 04/13/2023: Seborrheic dermatitis with non-inflammatory telogen shift and miniaturization   COMMENT   GMS and PAS stains performed on part A demonstrate yeast forms within the stratum corneum and follicular ostium, supporting the diagnosis of seborrheic dermatitis. The horizontally sectioned specimen demonstrates a normal density and distribution of   follicles without associated inflammation. There is a telogen shift and follicular miniaturization. As such, this is could represent androgenetic alopecia. A telogen effluvium or the acute phase of an alopecia areata cannot be entirely excluded. Clinical    pathologic correlation is recommended.       Derm Hx:  Denies Phx of NMSC  Denies Fhx of MM    Current Outpatient Medications:   ·  augmented betamethasone (DIPROLENE) 0.05 % lotion, Apply to scalp nightly, Disp: 60 mL, Rfl: 3  ·  biotin 1 mg Cap, Take by mouth., Disp: , Rfl:   ·  carvediloL (COREG) 3.125 MG tablet, Take 1 tablet (3.125 mg total) by mouth 2 (two) times daily with meals., Disp: 180 tablet, Rfl: 3  ·  fluocinonide (LIDEX) 0.05 % external solution, Thin film to scalp nightly, Disp: 60 mL, Rfl: 5  ·  gabapentin (NEURONTIN) 100 MG capsule, Take 1 capsule (100 mg total) by mouth 3 (three) times daily., Disp: 90 capsule, Rfl: 11  ·  hydrOXYzine HCL (ATARAX) 25 MG tablet, Take 1 tablet  (25 mg total) by mouth nightly as needed (Insomnia)., Disp: 30 tablet, Rfl: 3  ·  ibuprofen (ADVIL,MOTRIN) 800 MG tablet, Take 1 tablet (800 mg total) by mouth every 6 (six) hours as needed for Pain., Disp: 30 tablet, Rfl: 1  ·  ketoconazole (NIZORAL) 2 % shampoo, Lather scalp with medicated shampoo once a week - let sit on scalp at least 5 minutes prior to rinsing, Disp: 120 mL, Rfl: 5  ·  minoxidiL (LONITEN) 2.5 MG tablet, TAKE 1/2 TABLET BY MOUTH DAILY, Disp: 45 tablet, Rfl: 2  ·  multivitamin (THERAGRAN) per tablet, Take 1 tablet by mouth once daily., Disp: , Rfl:   ·  ondansetron (ZOFRAN-ODT) 4 MG TbDL, Take 2 tablets (8 mg total) by mouth every 12 (twelve) hours as needed (nausea)., Disp: 10 tablet, Rfl: 3  ·  pantoprazole (PROTONIX) 40 MG tablet, Take 1 tablet (40 mg total) by mouth once daily., Disp: 90 tablet, Rfl: 3  ·  spironolactone (ALDACTONE) 50 MG tablet, Take 1 tablet (50 mg total) by mouth once daily., Disp: 30 tablet, Rfl: 11  ·  UNABLE TO FIND, RELIEF FACTOR (FISH OIL, TURMERIC, ICARLIN, RESVERATROL), Disp: , Rfl:           Review of Systems   Constitutional:  Negative for fever, chills and fatigue.   Skin:  Positive for activity-related sunscreen use. Negative for daily sunscreen use.       Objective:   Physical Exam   Constitutional: She appears well-developed and well-nourished.   Neurological: She is alert and oriented to person, place, and time.   Psychiatric: She has a normal mood and affect.        Diagram Legend     Erythematous scaling macule/papule c/w actinic keratosis       Vascular papule c/w angioma      Pigmented verrucoid papule/plaque c/w seborrheic keratosis      Yellow umbilicated papule c/w sebaceous hyperplasia      Irregularly shaped tan macule c/w lentigo     1-2 mm smooth white papules consistent with Milia      Movable subcutaneous cyst with punctum c/w epidermal inclusion cyst      Subcutaneous movable cyst c/w pilar cyst      Firm pink to brown papule c/w dermatofibroma       Pedunculated fleshy papule(s) c/w skin tag(s)      Evenly pigmented macule c/w junctional nevus     Mildly variegated pigmented, slightly irregular-bordered macule c/w mildly atypical nevus      Flesh colored to evenly pigmented papule c/w intradermal nevus       Pink pearly papule/plaque c/w basal cell carcinoma      Erythematous hyperkeratotic cursted plaque c/w SCC      Surgical scar with no sign of skin cancer recurrence      Open and closed comedones      Inflammatory papules and pustules      Verrucoid papule consistent consistent with wart     Erythematous eczematous patches and plaques     Dystrophic onycholytic nail with subungual debris c/w onychomycosis     Umbilicated papule    Erythematous-base heme-crusted tan verrucoid plaque consistent with inflamed seborrheic keratosis     Erythematous Silvery Scaling Plaque c/w Psoriasis     See annotation      Assessment / Plan:        Angel was seen today for follow-up and alopecia.    Diagnoses and all orders for this visit:    Non-scarring alopecia  -     spironolactone (ALDACTONE) 50 MG tablet; Take 2 tablets (100 mg total) by mouth once daily.    Seborrheic dermatitis  -     augmented betamethasone (DIPROLENE) 0.05 % lotion; Apply to scalp nightly  -     ketoconazole (NIZORAL) 2 % shampoo; Lather scalp with medicated shampoo once a week - let sit on scalp at least 5 minutes prior to rinsing    Reassuring scalp exam  Similar episode last year in spring, biopsied and stabilized  Scant scale, minimum erythema, normal pull test (1 telogen hair per tug)  Wash hair twice weekly  Increase john to 100mg daily  Continue minoxidil 1.25mg daily (never discontinued)    Seasonal shedding?  No new PO med with TE side effect  Reassurance             No follow-ups on file.

## 2024-04-22 ENCOUNTER — OCCUPATIONAL HEALTH (OUTPATIENT)
Dept: URGENT CARE | Facility: CLINIC | Age: 44
End: 2024-04-22

## 2024-04-22 ENCOUNTER — PATIENT MESSAGE (OUTPATIENT)
Dept: PRIMARY CARE CLINIC | Facility: CLINIC | Age: 44
End: 2024-04-22
Payer: COMMERCIAL

## 2024-04-22 ENCOUNTER — OFFICE VISIT (OUTPATIENT)
Dept: URGENT CARE | Facility: CLINIC | Age: 44
End: 2024-04-22
Payer: COMMERCIAL

## 2024-04-22 VITALS
SYSTOLIC BLOOD PRESSURE: 133 MMHG | HEART RATE: 86 BPM | BODY MASS INDEX: 38.58 KG/M2 | DIASTOLIC BLOOD PRESSURE: 88 MMHG | WEIGHT: 226 LBS | OXYGEN SATURATION: 98 % | HEIGHT: 64 IN | RESPIRATION RATE: 18 BRPM

## 2024-04-22 DIAGNOSIS — M54.50 ACUTE MIDLINE LOW BACK PAIN WITHOUT SCIATICA: ICD-10-CM

## 2024-04-22 DIAGNOSIS — Z02.83 ENCOUNTER FOR DRUG SCREENING: Primary | ICD-10-CM

## 2024-04-22 DIAGNOSIS — Z02.6 ENCOUNTER RELATED TO WORKER'S COMPENSATION CLAIM: ICD-10-CM

## 2024-04-22 DIAGNOSIS — S39.012A ACUTE MYOFASCIAL STRAIN OF LUMBAR REGION, INITIAL ENCOUNTER: Primary | ICD-10-CM

## 2024-04-22 LAB
CTP QC/QA: YES
POC 10 PANEL DRUG SCREEN: NEGATIVE

## 2024-04-22 PROCEDURE — 99203 OFFICE O/P NEW LOW 30 MIN: CPT | Mod: S$GLB,,, | Performed by: STUDENT IN AN ORGANIZED HEALTH CARE EDUCATION/TRAINING PROGRAM

## 2024-04-22 PROCEDURE — 72100 X-RAY EXAM L-S SPINE 2/3 VWS: CPT | Mod: S$GLB,,, | Performed by: RADIOLOGY

## 2024-04-22 PROCEDURE — 80305 DRUG TEST PRSMV DIR OPT OBS: CPT | Mod: S$GLB,,,

## 2024-04-22 RX ORDER — NAPROXEN 500 MG/1
500 TABLET ORAL 2 TIMES DAILY WITH MEALS
Qty: 30 TABLET | Refills: 0 | Status: SHIPPED | OUTPATIENT
Start: 2024-04-22 | End: 2024-05-07 | Stop reason: SDUPTHER

## 2024-04-22 RX ORDER — METHOCARBAMOL 500 MG/1
500 TABLET, FILM COATED ORAL NIGHTLY PRN
Qty: 30 TABLET | Refills: 0 | Status: SHIPPED | OUTPATIENT
Start: 2024-04-22 | End: 2024-05-29

## 2024-04-22 NOTE — PROGRESS NOTES
Subjective:      Patient ID: Angel Mosquera is a 44 y.o. female.    Chief Complaint: Back Pain    Patient's place of employment - [employer] [place]  Patient's job title - RN  Date of injury - 04-14-24  Body part injured including left or right - Back  Injury Mechanism - Lifting  What they were doing when they got hurt - Assisting another nurse with a 300+ patient and hurt her back  What they did immediately after - Reported  Pain scale right now - 0-7/10 with certain movements    See MA note above. Parvez KEE note:  Angel says she was using the assist techniques to reposition a patient to lift him in the bed with the bedsheets, but as she was doing so she hurt her back. The patient was reportedly 300+ lbs. Since then, she has had pain in the center of her lower back that is noticed with certain movements. Flexion is the worst, no pain with standing or walking but she does notice a sharp stabbing pain with coughing or sneezing.  No radiation of pain, b/b incontinence, numbness, or tingling. No LE weakness. No awakening from sleep. No prior back injuries or surgeries. No other reported complaints or concerns at this time.     Back Pain  Pertinent negatives include no numbness.       Musculoskeletal:  Positive for pain, joint pain, abnormal ROM of joint, back pain, muscle cramps and muscle ache. Negative for trauma and joint swelling.   Skin:  Negative for erythema and bruising.   Neurological:  Negative for numbness and tingling.     Objective:     Physical Exam  Vitals and nursing note reviewed.   Constitutional:       General: She is not in acute distress.     Appearance: She is not ill-appearing.   HENT:      Head: Normocephalic.   Eyes:      Conjunctiva/sclera: Conjunctivae normal.   Pulmonary:      Effort: No respiratory distress.   Musculoskeletal:      Lumbar back: Tenderness (midline) and bony tenderness present. Negative right straight leg raise test and negative left straight leg raise test.      Comments: Able to move  freely in all planes with pain reported only with forward flexion. 5/5 strength bilateral lower extremities. NVI, normal gait.   Skin:     General: Skin is warm and dry.      Findings: No erythema.   Neurological:      Mental Status: She is alert and oriented to person, place, and time.   Psychiatric:         Attention and Perception: Attention normal.         Mood and Affect: Mood normal.         Behavior: Behavior normal.        Assessment:      1. Acute myofascial strain of lumbar region, initial encounter    2. Acute midline low back pain without sciatica    3. Encounter related to worker's compensation claim      EXAMINATION:  XR LUMBAR SPINE 2 OR 3 VIEWS     CLINICAL HISTORY:  Low back pain, unspecified     TECHNIQUE:  Lumbar spine radiograph series.     COMPARISON:  07/28/2020     FINDINGS:  Lumbar spine vertebral body heights and sagittal alignment appear maintained.  No advanced discogenic abnormality.  SI joints appear symmetric.  No evidence for lytic or blastic lesion.     Impression:     As above.        Electronically signed by:Cain Cuellar  Date:                                            04/22/2024  Time:                                           17:00    Plan:     No acute findings on xrays. Discussed lumbar strains and expected course/duration of symptoms. Medications discussed as well as potential adverse effects, use as directed. Ok to take 2 methocarbamol at bedtime if needed. Begin gentle back stretching exercises as tolerated. Radiologist's interpretation pending at the completion of the visit. The patient will be notified of any acute abnormalities noted in the report. Work restrictions discussed. F/u in 2 weeks for re-eval. Ok to RTC sooner if needed.       Medications Ordered This Encounter   Medications    methocarbamoL (ROBAXIN) 500 MG Tab     Sig: Take 1 tablet (500 mg total) by mouth nightly as needed (muscle spasms).     Dispense:  30 tablet     Refill:  0     Worker's Comp     naproxen (NAPROSYN) 500 MG tablet     Sig: Take 1 tablet (500 mg total) by mouth 2 (two) times daily with meals.     Dispense:  30 tablet     Refill:  0     Worker's Comp     Patient Instructions: Attention not to aggravate affected area (Perform back flexion stretching exercises AS TOLERATED)   Restrictions: No lifting/pushing/pulling more than 50 lbs, Avoid frequent bending/lifting/twisting  Follow up in about 2 weeks (around 5/6/2024).    I spent a total of 35 minutes on the day of the visit.   This includes face to face time and non-face to face time preparing to see the patient (eg, review of tests, prior records/notes), obtaining and/or reviewing separately obtained history, documenting clinical information in the electronic or other health record, independently interpreting results and communicating results to the patient and significant other.

## 2024-04-22 NOTE — LETTER
[facility]  [address] [address]  Phone: [phone]  Fax: [phone]  [employer]: [id number]    Pt Name: Angel Mosquera  Injury Date: 04/14/2024   Employee ID: [id number] Date of First Treatment: 04/22/2024   Company: [employer]      Appointment Time:  Arrived: 3:52 PM    Provider: Joy Clark MD Time Out:5:06 PM      Office Treatment:   1. Acute myofascial strain of lumbar region, initial encounter    2. Acute midline low back pain without sciatica    3. Encounter related to worker's compensation claim      Medications Ordered This Encounter   Medications    methocarbamoL (ROBAXIN) 500 MG Tab    naproxen (NAPROSYN) 500 MG tablet        Patient Instructions: Attention not to aggravate affected area (Perform back flexion stretching exercises AS TOLERATED)      Restrictions: No lifting/pushing/pulling more than 50 lbs, Avoid frequent bending/lifting/twisting     Return Appointment: 5/6/2024 at 8:45 AM [facility]

## 2024-05-06 ENCOUNTER — OFFICE VISIT (OUTPATIENT)
Dept: URGENT CARE | Facility: CLINIC | Age: 44
End: 2024-05-06
Payer: COMMERCIAL

## 2024-05-06 VITALS
RESPIRATION RATE: 14 BRPM | DIASTOLIC BLOOD PRESSURE: 86 MMHG | OXYGEN SATURATION: 98 % | HEIGHT: 63 IN | WEIGHT: 226 LBS | HEART RATE: 71 BPM | SYSTOLIC BLOOD PRESSURE: 134 MMHG | BODY MASS INDEX: 40.04 KG/M2

## 2024-05-06 DIAGNOSIS — M54.50 ACUTE MIDLINE LOW BACK PAIN WITHOUT SCIATICA: Primary | ICD-10-CM

## 2024-05-06 DIAGNOSIS — Z02.6 ENCOUNTER RELATED TO WORKER'S COMPENSATION CLAIM: ICD-10-CM

## 2024-05-06 PROCEDURE — 99214 OFFICE O/P EST MOD 30 MIN: CPT | Mod: S$GLB,,, | Performed by: STUDENT IN AN ORGANIZED HEALTH CARE EDUCATION/TRAINING PROGRAM

## 2024-05-06 RX ORDER — MELOXICAM 15 MG/1
15 TABLET ORAL DAILY
Qty: 30 TABLET | Refills: 0 | Status: SHIPPED | OUTPATIENT
Start: 2024-05-06 | End: 2024-05-07

## 2024-05-06 RX ORDER — LIDOCAINE 50 MG/G
1 PATCH TOPICAL DAILY
Qty: 30 PATCH | Refills: 0 | Status: SHIPPED | OUTPATIENT
Start: 2024-05-06

## 2024-05-06 RX ORDER — METHOCARBAMOL 750 MG/1
750 TABLET, FILM COATED ORAL NIGHTLY PRN
Qty: 30 TABLET | Refills: 0 | Status: SHIPPED | OUTPATIENT
Start: 2024-05-06 | End: 2024-05-29

## 2024-05-06 NOTE — PROGRESS NOTES
Subjective:      Patient ID: Angel Mosquera is a 44 y.o. female.    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  Improved, same, or worse - same  Pain Scale right now (1-10) -  6/10    See MA note above. Begin MD note:  She reports 6/10 pain with movement only. Denies numbness, tingling, radiating pain down to extremities, weakness, no bowel/bladder function, no weakness. The location of pain is still in the center of her low back and she has some tightness around it. Takes the methocarbamol after work and naproxen BID. Requesting lidocaine patches for daytime use for more relief. Her work restrictions are being accommodated.       Back Pain  Pertinent negatives include no bladder incontinence, bowel incontinence or numbness.       Gastrointestinal:  Negative for bowel incontinence.   Genitourinary:  Negative for bladder incontinence.   Musculoskeletal:  Positive for pain, back pain, muscle cramps and muscle ache. Negative for abnormal ROM of joint.   Neurological:  Negative for numbness and tingling.     Objective:     Physical Exam  Vitals and nursing note reviewed.   Constitutional:       General: She is not in acute distress.     Appearance: She is not ill-appearing.   HENT:      Head: Normocephalic.   Eyes:      Conjunctiva/sclera: Conjunctivae normal.   Pulmonary:      Effort: No respiratory distress.   Musculoskeletal:      Lumbar back: No tenderness. Decreased range of motion (flexion limited to approx 30 degrees. Normal ROM all other planes). Negative right straight leg raise test and negative left straight leg raise test.        Back:    Skin:     General: Skin is warm and dry.   Neurological:      Mental Status: She is alert and  oriented to person, place, and time.   Psychiatric:         Attention and Perception: Attention normal.         Mood and Affect: Mood normal.         Behavior: Behavior normal.        Assessment:      1. Acute midline low back pain without sciatica    2. Encounter related to worker's compensation claim      Plan:     No red flag or alarm features in patient history, but given reported RADHA and symptoms, concern for disc herniation vs annular tear. Will continue with conservative management as it has been 3 weeks since the DOI. Adjusting medication regimen to a different NSAID and higher dose of the muscle relaxer. Also provided patient with requested lidocaine patches. Proper use of all meds were discussed as well as potential adverse effects. Requesting PT at this time for a guided program to help decrease pain and improve ROM. Will consider MRI at f/u visit in 3 weeks if she does not have any improvement despite continued conservative measures at 6 weeks.     Patient and her SO voiced understanding and agreement with the plan of care and did not have any questions or concerns prior to completion of the visit.   Medications Ordered This Encounter   Medications    LIDOcaine (LIDODERM) 5 %     Sig: Place 1 patch onto the skin once daily. Remove & Discard patch within 12 hours or as directed by MD     Dispense:  30 patch     Refill:  0    meloxicam (MOBIC) 15 MG tablet     Sig: Take 1 tablet (15 mg total) by mouth once daily.     Dispense:  30 tablet     Refill:  0    methocarbamoL (ROBAXIN) 750 MG Tab     Sig: Take 1 tablet (750 mg total) by mouth nightly as needed (back spasms).     Dispense:  30 tablet     Refill:  0     Patient Instructions: PT to be scheduled once authorized, Attention not to aggravate affected area   Restrictions: Avoid frequent bending/lifting/twisting, No lifting/pushing/pulling more than 50 lbs  Follow up in about 23 days (around 5/29/2024).

## 2024-05-06 NOTE — LETTER
[facility]  [address] [address]  Phone: [phone]  Fax: [phone]  [employer]: [id number]    Pt Name: Angel Mosquera  Injury Date: 04/14/2024   Employee ID: [id number] Date of Treatment: 05/06/2024   Company: [employer]      Appointment Time: 08:45 AM Arrived: 8:59 AM    Provider: Joy Clark MD Time Out:9:58 AM      Office Treatment:   1. Acute midline low back pain without sciatica    2. Encounter related to worker's compensation claim      Medications Ordered This Encounter   Medications    LIDOcaine (LIDODERM) 5 %    meloxicam (MOBIC) 15 MG tablet    methocarbamoL (ROBAXIN) 750 MG Tab        Patient Instructions: PT to be scheduled once authorized, Attention not to aggravate affected area      Restrictions: Avoid frequent bending/lifting/twisting, No lifting/pushing/pulling more than 50 lbs     Return Appointment: 5/29/2024 at 9:45 AM [facility]

## 2024-05-07 DIAGNOSIS — M54.50 ACUTE MIDLINE LOW BACK PAIN WITHOUT SCIATICA: ICD-10-CM

## 2024-05-07 RX ORDER — NAPROXEN 500 MG/1
500 TABLET ORAL 2 TIMES DAILY WITH MEALS
Qty: 60 TABLET | Refills: 1 | Status: SHIPPED | OUTPATIENT
Start: 2024-05-07

## 2024-05-07 NOTE — TELEPHONE ENCOUNTER
Notified by  staff that the patient would like to resume use of naproxen in place of the meloxicam that was prescribed yesterday. Says her pain seems to be increased with the meloxicam. New RX printed. Patient will reportedly  tomorrow.

## 2024-05-09 ENCOUNTER — OFFICE VISIT (OUTPATIENT)
Dept: URGENT CARE | Facility: CLINIC | Age: 44
End: 2024-05-09
Payer: COMMERCIAL

## 2024-05-09 VITALS
RESPIRATION RATE: 14 BRPM | BODY MASS INDEX: 40.57 KG/M2 | HEART RATE: 79 BPM | HEIGHT: 63 IN | SYSTOLIC BLOOD PRESSURE: 128 MMHG | WEIGHT: 229 LBS | OXYGEN SATURATION: 98 % | DIASTOLIC BLOOD PRESSURE: 81 MMHG

## 2024-05-09 DIAGNOSIS — M54.59 DISCOGENIC LUMBAR PAIN: ICD-10-CM

## 2024-05-09 DIAGNOSIS — M54.50 ACUTE MIDLINE LOW BACK PAIN WITHOUT SCIATICA: Primary | ICD-10-CM

## 2024-05-09 DIAGNOSIS — Z02.6 ENCOUNTER RELATED TO WORKER'S COMPENSATION CLAIM: ICD-10-CM

## 2024-05-09 PROCEDURE — 96372 THER/PROPH/DIAG INJ SC/IM: CPT | Mod: S$GLB,,, | Performed by: STUDENT IN AN ORGANIZED HEALTH CARE EDUCATION/TRAINING PROGRAM

## 2024-05-09 PROCEDURE — 99214 OFFICE O/P EST MOD 30 MIN: CPT | Mod: 25,S$GLB,, | Performed by: STUDENT IN AN ORGANIZED HEALTH CARE EDUCATION/TRAINING PROGRAM

## 2024-05-09 RX ORDER — DEXAMETHASONE SODIUM PHOSPHATE 100 MG/10ML
10 INJECTION INTRAMUSCULAR; INTRAVENOUS
Status: COMPLETED | OUTPATIENT
Start: 2024-05-09 | End: 2024-05-09

## 2024-05-09 RX ORDER — KETOROLAC TROMETHAMINE 30 MG/ML
30 INJECTION, SOLUTION INTRAMUSCULAR; INTRAVENOUS
Status: COMPLETED | OUTPATIENT
Start: 2024-05-09 | End: 2024-05-09

## 2024-05-09 RX ORDER — ACETAMINOPHEN 500 MG
1000 TABLET ORAL EVERY 8 HOURS PRN
Qty: 60 TABLET | Refills: 0 | Status: SHIPPED | OUTPATIENT
Start: 2024-05-09

## 2024-05-09 RX ADMIN — KETOROLAC TROMETHAMINE 30 MG: 30 INJECTION, SOLUTION INTRAMUSCULAR; INTRAVENOUS at 12:05

## 2024-05-09 RX ADMIN — DEXAMETHASONE SODIUM PHOSPHATE 10 MG: 100 INJECTION INTRAMUSCULAR; INTRAVENOUS at 12:05

## 2024-05-09 NOTE — LETTER
[facility]  [address] [address]  Phone: [phone]  Fax: [phone]  [employer]: [id number]    Pt Name: Angel Mosquera  Injury Date: 04/14/2024   Employee ID: [id number] Date of Treatment: 05/09/2024   Company: [employer]      Appointment Time:  Arrived: 10:51 AM    Provider: Joy Clark MD Time Out:12:25 PM      Office Treatment:   1. Acute midline low back pain without sciatica    2. Encounter related to worker's compensation claim    3. Discogenic lumbar pain      Medications Ordered This Encounter   Medications    acetaminophen (TYLENOL) 500 MG tablet    dexAMETHasone injection 10 mg    ketorolac injection 30 mg        Patient Instructions: Attention not to aggravate affected area, MRI to be scheduled once authorized, Begin Physical Therapy      Restrictions: No lifting/pushing/pulling more than 50 lbs, Avoid frequent bending/lifting/twisting     Return Appointment: 5/29/2024 at 9:45 [facility]

## 2024-05-09 NOTE — PROGRESS NOTES
Subjective:      Patient ID: Angel Mosquera is a 44 y.o. female.    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 Begin MD note:  Pain has increased in intensity and is now the worst pain of her life. The pain is radiating to her bilateral back and is described as sharp. She also now has alternating numbness to buttocks left greater right. No tingling or burning. Increased with coughing, rising from seated to standing, sneezing, and forward bending. Laying in bed also hurts. Of note, none of the aforementioned symptoms were present at her visit 3 days ago. Symptoms are acutely increased and patient denies falls or other trauma. She called the day after her appointment because the meloxicam wasn't helping and requested to be put back on naproxen. I printed the prescription the same day and she says she'll pick it up from the  before she leaves. Denies b/b dysfunction, le weakness, or saddle anesthesia.     Note, PT was approved and she begins next week.     Back Pain  Pertinent negatives include no bladder incontinence, bowel incontinence or numbness.       Gastrointestinal:  Negative for bowel incontinence.   Genitourinary:  Negative for bladder incontinence.   Musculoskeletal:  Positive for pain, abnormal ROM of joint, back pain, muscle cramps and muscle ache. Negative for arthritis.   Neurological:  Negative for numbness and tingling.     Objective:     Physical Exam  Vitals and nursing note reviewed.   Constitutional:       General: She is in acute distress.      Appearance: She is not ill-appearing.      Comments: Tearful due to reported pain and increasing discomfort.   HENT:       Head: Normocephalic.   Eyes:      Conjunctiva/sclera: Conjunctivae normal.   Pulmonary:      Effort: No respiratory distress.   Musculoskeletal:      Comments: Increased pain with forward flexion. Bilateral rotation causes pain in the center and rotation to the right causes pain in left buttock. Negative SLR. Single leg stance on left leg increases numbness in the buttock. Focal tenderness lumbar spine.    Skin:     General: Skin is warm and dry.   Neurological:      Mental Status: She is alert and oriented to person, place, and time.   Psychiatric:         Attention and Perception: Attention normal.         Mood and Affect: Mood normal.         Behavior: Behavior normal.        Assessment:      1. Acute midline low back pain without sciatica    2. Encounter related to worker's compensation claim    3. Discogenic lumbar pain      Plan:     Patient presents 3 days after last visit due to increasingly severe pain now associated with paraesthesias. Hx concerning for discogenic pain, cannot exclude annular tear as a result of trauma. Given new radicular symptoms and increasing severity of pain without aggravation, MRI ordered for further evaluation. IM injections of Toradol and Decadron given today to treat inflammation and Rx for tylenol to take during the day in between naproxen doses. Continue muscle relaxer at home. Proper use and potential side effects of all meds discussed. We also discussed narcotic pain medication but agreed to defer for now. Will monitor with current treatment and advance if needed, conservatively.    We discussed work limitations and the patient says they are sufficient and her employer is accommodating. She returns to work tomorrow and feels that she'll be able to do so due to the restrictions. Also wishes not miss work if she can avoid it. Keep previously scheduled f/u appointment. Ok to RTC sooner if needed. ED for increasing or alarm symptoms.     Medications Ordered This Encounter    Medications    acetaminophen (TYLENOL) 500 MG tablet     Sig: Take 2 tablets (1,000 mg total) by mouth every 8 (eight) hours as needed for Pain.     Dispense:  60 tablet     Refill:  0    dexAMETHasone injection 10 mg    ketorolac injection 30 mg     Patient Instructions: Attention not to aggravate affected area, MRI to be scheduled once authorized, Begin Physical Therapy   Restrictions: No lifting/pushing/pulling more than 50 lbs, Avoid frequent bending/lifting/twisting  Follow up for Keep current f/u visit for 5/29.    I spent a total of 30 minutes on the day of the visit. This includes face to face time and non-face to face time preparing to see the patient (eg, review of tests, prior records/notes), obtaining and/or reviewing separately obtained history, documenting clinical information in the electronic or other health record.

## 2024-05-10 ENCOUNTER — PATIENT MESSAGE (OUTPATIENT)
Dept: PRIMARY CARE CLINIC | Facility: CLINIC | Age: 44
End: 2024-05-10
Payer: COMMERCIAL

## 2024-05-10 DIAGNOSIS — G62.9 NEUROPATHY: ICD-10-CM

## 2024-05-10 RX ORDER — GABAPENTIN 100 MG/1
200 CAPSULE ORAL 3 TIMES DAILY
Qty: 540 CAPSULE | Refills: 3 | Status: SHIPPED | OUTPATIENT
Start: 2024-05-10 | End: 2025-05-10

## 2024-05-10 NOTE — TELEPHONE ENCOUNTER
Pt has increased Gabapentin from 100 to 200mg, states she is running out and would need a new script. Requesting if the script can be changed to 200mg

## 2024-05-12 ENCOUNTER — HOSPITAL ENCOUNTER (EMERGENCY)
Facility: HOSPITAL | Age: 44
Discharge: HOME | End: 2024-05-12
Attending: EMERGENCY MEDICINE
Payer: COMMERCIAL

## 2024-05-12 VITALS
RESPIRATION RATE: 18 BRPM | SYSTOLIC BLOOD PRESSURE: 157 MMHG | OXYGEN SATURATION: 100 % | HEIGHT: 65 IN | DIASTOLIC BLOOD PRESSURE: 75 MMHG | BODY MASS INDEX: 38.15 KG/M2 | TEMPERATURE: 98 F | HEART RATE: 71 BPM | WEIGHT: 229 LBS

## 2024-05-12 DIAGNOSIS — M54.41 ACUTE BILATERAL LOW BACK PAIN WITH RIGHT-SIDED SCIATICA: Primary | ICD-10-CM

## 2024-05-12 PROCEDURE — 96372 THER/PROPH/DIAG INJ SC/IM: CPT | Performed by: PHYSICIAN ASSISTANT

## 2024-05-12 PROCEDURE — 25000003 PHARM REV CODE 250: Performed by: PHYSICIAN ASSISTANT

## 2024-05-12 PROCEDURE — 99284 EMERGENCY DEPT VISIT MOD MDM: CPT | Mod: 25

## 2024-05-12 PROCEDURE — 63600175 PHARM REV CODE 636 W HCPCS: Performed by: PHYSICIAN ASSISTANT

## 2024-05-12 RX ORDER — KETOROLAC TROMETHAMINE 30 MG/ML
15 INJECTION, SOLUTION INTRAMUSCULAR; INTRAVENOUS
Status: COMPLETED | OUTPATIENT
Start: 2024-05-12 | End: 2024-05-12

## 2024-05-12 RX ORDER — TRAMADOL HYDROCHLORIDE 50 MG/1
50 TABLET ORAL EVERY 6 HOURS PRN
Qty: 12 TABLET | Refills: 0 | Status: SHIPPED | OUTPATIENT
Start: 2024-05-12 | End: 2024-05-15

## 2024-05-12 RX ORDER — LIDOCAINE 50 MG/G
1 PATCH TOPICAL DAILY
Qty: 15 PATCH | Refills: 0 | Status: SHIPPED | OUTPATIENT
Start: 2024-05-12 | End: 2024-05-27

## 2024-05-12 RX ORDER — ACETAMINOPHEN 500 MG
1000 TABLET ORAL
Status: COMPLETED | OUTPATIENT
Start: 2024-05-12 | End: 2024-05-12

## 2024-05-12 RX ORDER — METHYLPREDNISOLONE 4 MG/1
TABLET ORAL
Qty: 1 EACH | Refills: 0 | Status: SHIPPED | OUTPATIENT
Start: 2024-05-12 | End: 2024-06-02

## 2024-05-12 RX ADMIN — KETOROLAC TROMETHAMINE 15 MG: 30 INJECTION, SOLUTION INTRAMUSCULAR at 11:05

## 2024-05-12 RX ADMIN — ACETAMINOPHEN 1000 MG: 500 TABLET ORAL at 11:05

## 2024-05-12 NOTE — DISCHARGE INSTRUCTIONS
You were seen in the emergency department for lower back pain.  There were no indication for emergent MRI and I recommend continuing with your outpatient MRI as soon as you can schedule it.  If you develop any inability to control your bowel or bladder, numbness or tingling to the inner thighs/genitals or weakness you may return to the ED sooner for further evaluation.    Please continue the naproxen and Tylenol as well as the Robaxin for your symptoms.  Please start gabapentin 100 mg 3 times a day and after 1 week you may increase to 200 mg 3 times a day.  I have prescribed you short course of oral steroids to help reduce the inflammation.

## 2024-05-12 NOTE — ED NOTES
4/14 was pulling up large patient and hurt lower back. Pain has been increasing this week, hard to sit, pain is now radiating down L leg. Given steroid shots and toradol thurs. Pain is now radiating to R leg. EVELIO knee weakness/numbness, more so on L side. Needs pain relief    Patient identifiers for Angel Mosquera 44 y.o. female checked and correct.  Chief Complaint   Patient presents with    Back Pain     C/o back pain, work injury 4/18, progressively worsening, reports numbness and weakness to bilateral lower extremities over last few days     Past Medical History:   Diagnosis Date    Hypertension     PONV (postoperative nausea and vomiting)     Rosacea      Allergies reported: Review of patient's allergies indicates:  No Known Allergies    Appearance: Pt awake, alert & oriented to person, place & time. Pt in no acute distress at present time. Pt is clean and well groomed with clothes appropriately fastened.   Skin: Skin warm, dry & intact. Color consistent with ethnicity. Mucous membranes moist. No breakdown or brusing noted.   Musculoskeletal: Patient moving all extremities well, no obvious swelling or deformities noted.   Respiratory: Respirations spontaneous, even, and non-labored. Visible chest rise noted. Airway is open and patent. No accessory muscle use noted.   Neurologic: Sensation is intact. Speech is clear and appropriate. Eyes open spontaneously, behavior appropriate to situation, follows commands, facial expression symmetrical, bilateral hand grasp equal and even, purposeful motor response noted.  Cardiac: All peripheral pulses present. No Bilateral lower extremity edema. Cap refill is <3 seconds.  Abdomen: Abdomen soft, non distended, non tender to palpation.   : Pt voids independently, denies dysuria, hematuria, frequency.

## 2024-05-12 NOTE — ED PROVIDER NOTES
Encounter Date: 5/12/2024       History     Chief Complaint   Patient presents with    Back Pain     C/o back pain, work injury 4/18, progressively worsening, reports numbness and weakness to bilateral lower extremities over last few days     44-year-old female with past medical history of hypertension presents ED for lower back pain.  Has been seeing occupational medicine for lower back pain after lifting a heavy patient on 04/19.  States she is now having pain to the low back, bilateral buttocks radiating down the right thigh.  States she was pains he occasionally feels weak.  Denies any fevers/chills, history of IVDU, loss of bowel or bladder saddle anesthesia.  Has been taking intermittent gabapentin, naproxen, Robaxin.  Also received a Decadron drop 2 days ago with some initial relief however states the pain then came back.  She was a outpatient MRI ordered but has not scheduled it yet and is scheduled to start physical therapy next week for this.        Review of patient's allergies indicates:  No Known Allergies  Past Medical History:   Diagnosis Date    Hypertension     PONV (postoperative nausea and vomiting)     Rosacea      Past Surgical History:   Procedure Laterality Date    AUGMENTATION OF BREAST Bilateral 2005    Breast Augmentation  2005    COSMETIC SURGERY  2005    Breast augmentation    DILATION AND CURETTAGE OF UTERUS N/A 10/19/2019    Procedure: DILATION AND CURETTAGE, UTERUS;  Surgeon: Frances Culp MD;  Location: [facility] OR;  Service: OB/GYN;  Laterality: N/A;  with suction    ENDOSCOPIC PLANTAR FASCIOTOMY Left 6/17/2022    Procedure: FASCIOTOMY, PLANTAR, ENDOSCOPIC;  Surgeon: Mello Montez DPM;  Location: [facility] OR;  Service: Podiatry;  Laterality: Left;  endoscopic plantar fasciotomy kit    mischarr      ROBOT-ASSISTED LAPAROSCOPIC UTERINE MYOMECTOMY N/A 1/7/2020    Procedure: ROBOTIC MYOMECTOMY, UTERUS;  Surgeon: Frances Culp MD;  Location: [facility] OR;  Service: OB/GYN;   Laterality: N/A;    SURGICAL REMOVAL OF FERNANDEZ'S NEUROMA Left 1/5/2023    Procedure: EXCISION, FERNANDEZ'S NEUROMA;  Surgeon: Mello Montez DPM;  Location: [facility] OR;  Service: Podiatry;  Laterality: Left;    XI ROBOTIC HYSTERECTOMY, WITH SALPINGO-OOPHORECTOMY N/A 12/15/2021    Procedure: XI ROBOTIC HYSTERECTOMY,WITH SALPINGO-OOPHORECTOMY;  Surgeon: Carla Aguero MD;  Location: [facility] OR;  Service: OB/GYN;  Laterality: N/A;     Family History   Problem Relation Name Age of Onset    Diabetes Mother Heather Diaz     Hypertension Mother Heather Diaz     Heart failure Mother Heather Diaz         viral?    Heart disease Mother Heather Diaz     Hyperlipidemia Mother Heather Diaz     Alcohol abuse Father Nixon Diaz     Alcohol abuse Maternal Uncle Saúlradha Avila     Psoriasis Maternal Grandmother Heather Austin     Arthritis Maternal Grandmother Heather Austin     Diabetes Maternal Grandmother Heather Austin     Alcohol abuse Maternal Grandfather Josh Austin     COPD Maternal Grandfather Josh Austin     Cancer Paternal Grandmother Nely Diaz         Lung Cancer    Cancer Paternal Grandfather Lamine Diaz         Pancreatic Cancer    Cancer Neg Hx      Ovarian cancer Neg Hx      Melanoma Neg Hx      Heart attack Neg Hx      Stroke Neg Hx      Colon cancer Neg Hx      Breast cancer Neg Hx      Blindness Neg Hx      Amblyopia Neg Hx      Cataracts Neg Hx      Glaucoma Neg Hx      Macular degeneration Neg Hx      Retinal detachment Neg Hx      Strabismus Neg Hx      Inflammatory bowel disease Neg Hx      Kidney disease Neg Hx      Lupus Neg Hx      Rheum arthritis Neg Hx       Social History     Tobacco Use    Smoking status: Never    Smokeless tobacco: Never   Substance Use Topics    Alcohol use: No    Drug use: No     Review of Systems   Constitutional:  Negative for chills and fever.   HENT:  Negative for sore throat.    Respiratory:  Negative for cough and shortness of breath.    Cardiovascular:  Negative for chest pain.    Gastrointestinal:  Negative for abdominal pain.   Genitourinary:  Negative for difficulty urinating and dysuria.   Musculoskeletal:  Positive for back pain.   Neurological:  Negative for weakness.   Psychiatric/Behavioral:  Negative for confusion.        Physical Exam     Initial Vitals [05/12/24 1056]   BP Pulse Resp Temp SpO2   (!) 157/75 71 18 97.9 °F (36.6 °C) 100 %      MAP       --         Physical Exam    Nursing note and vitals reviewed.  Constitutional: She appears well-developed and well-nourished.   HENT:   Head: Normocephalic and atraumatic.   Eyes: Conjunctivae are normal.   Neck: Neck supple.   Normal range of motion.  Cardiovascular:  Normal rate.           Pulmonary/Chest: Breath sounds normal.   Abdominal: Abdomen is soft. There is no abdominal tenderness.   Musculoskeletal:         General: Normal range of motion.      Cervical back: Normal range of motion and neck supple.      Comments: No midline C, T spine tenderness.  Minimal L-spine tenderness with tenderness over the bilateral glute.  Positive straight leg test to the right leg.  Lower extremities are neurovascularly intact.  5/5 strength in the bilateral lower extremities.     Neurological: She is alert and oriented to person, place, and time. GCS score is 15. GCS eye subscore is 4. GCS verbal subscore is 5. GCS motor subscore is 6.   Skin: Skin is warm and dry.         ED Course   Procedures  Labs Reviewed - No data to display       Imaging Results    None          Medications   ketorolac injection 15 mg (15 mg Intramuscular Given 5/12/24 1141)   acetaminophen tablet 1,000 mg (1,000 mg Oral Given 5/12/24 1144)     Medical Decision Making  44-year-old female presents ED for lower back pain.      Differential includes but not limited to lumbar strain, cauda equina, sciatica     Patient denies any red flag symptoms.  She has no weakness or focal neurological deficits and low suspicion for cauda equina cord compression.  She is planning to  obtain an outpatient MRI which was ordered by Occupational Medicine.  No indication for emergent MRI at this time and patient states they will continue with outpatient imaging.  Discussed continuing multimodal pain management she has been doing 200 mg of gabapentin p.r.n. and we discussed starting scheduled gabapentin 100 mg t.i.d. and kidney increase to 200 t.i.d. after a week.  Will give a short course of tramadol for breakthrough pain as needed.  Will give a shot of Toradol here in the ED today and a Medrol Dosepak.  Discussed continuing her home naproxen, Tylenol and Robaxin regiment.  We discussed the expected course and management of low back pain including physical therapy, pain management and potentially neurosurgery and she was counseled on signs of cord compression and when to return to the ED.    Risk  OTC drugs.  Prescription drug management.                                      Clinical Impression:  Final diagnoses:  [M54.41] Acute bilateral low back pain with right-sided sciatica (Primary)          ED Disposition Condition    Discharge Stable          ED Prescriptions       Medication Sig Dispense Start Date End Date Auth. Provider    methylPREDNISolone (MEDROL DOSEPACK) 4 mg tablet Take as directed on packaging 1 each 5/12/2024 6/2/2024 Ollie Powell PA-C    LIDOcaine (LIDODERM) 5 % Place 1 patch onto the skin once daily. Remove & Discard patch within 12 hours or as directed by MD for 15 days 15 patch 5/12/2024 5/27/2024 Ollie Powell PA-C    traMADoL (ULTRAM) 50 mg tablet Take 1 tablet (50 mg total) by mouth every 6 (six) hours as needed for Pain. 12 tablet 5/12/2024 5/15/2024 Ollie Powell PA-C          Follow-up Information       Follow up With Specialties Details Why Contact Info    Tato Ramachandran MD Internal Medicine Schedule an appointment as soon as possible for a visit   [address]  [place] [address] [phone]               Ollie Powell PA-C  05/12/24 2158       Ollie Powell  JOHANNY  05/12/24 1147

## 2024-05-14 ENCOUNTER — TELEPHONE (OUTPATIENT)
Dept: URGENT CARE | Facility: CLINIC | Age: 44
End: 2024-05-14
Payer: COMMERCIAL

## 2024-05-14 DIAGNOSIS — M51.36 ANNULAR TEAR OF LUMBAR DISC: Primary | ICD-10-CM

## 2024-05-14 DIAGNOSIS — Z02.6 ENCOUNTER RELATED TO WORKER'S COMPENSATION CLAIM: ICD-10-CM

## 2024-05-14 NOTE — TELEPHONE ENCOUNTER
Called to review MRI results, no answer. Left VM without details and recommended continuing current medications and begin PT as scheduled on 5/16. Ok to return call if needed. Keep current f/u appointment unless she needs to be seen sooner.       Note: reviewed ED visit note from 2 days ago. Patient requested increased gabapentin dose from her PCP which she has been on a prn basis prior to current work related injury. MRI with mild fissuring of annulus fibrosis in lumbar region without disc bulging or protrusion. See report for details.

## 2024-05-16 PROBLEM — M62.81 DECREASED MUSCLE STRENGTH: Status: ACTIVE | Noted: 2024-05-16

## 2024-05-16 PROBLEM — R68.89 IMPAIRED TOLERANCE OF ACTIVITY: Status: ACTIVE | Noted: 2024-05-16

## 2024-05-28 DIAGNOSIS — M79.672 LEFT FOOT PAIN: Primary | ICD-10-CM

## 2024-05-29 ENCOUNTER — OFFICE VISIT (OUTPATIENT)
Dept: URGENT CARE | Facility: CLINIC | Age: 44
End: 2024-05-29
Payer: COMMERCIAL

## 2024-05-29 VITALS
DIASTOLIC BLOOD PRESSURE: 80 MMHG | HEART RATE: 66 BPM | HEIGHT: 65 IN | WEIGHT: 229 LBS | BODY MASS INDEX: 38.15 KG/M2 | SYSTOLIC BLOOD PRESSURE: 119 MMHG | RESPIRATION RATE: 18 BRPM | OXYGEN SATURATION: 98 %

## 2024-05-29 DIAGNOSIS — M54.50 ACUTE MIDLINE LOW BACK PAIN WITHOUT SCIATICA: Primary | ICD-10-CM

## 2024-05-29 DIAGNOSIS — M51.36 ANNULAR TEAR OF LUMBAR DISC: ICD-10-CM

## 2024-05-29 DIAGNOSIS — Z02.6 ENCOUNTER RELATED TO WORKER'S COMPENSATION CLAIM: ICD-10-CM

## 2024-05-29 PROCEDURE — 99214 OFFICE O/P EST MOD 30 MIN: CPT | Mod: S$GLB,,, | Performed by: STUDENT IN AN ORGANIZED HEALTH CARE EDUCATION/TRAINING PROGRAM

## 2024-05-29 RX ORDER — METHOCARBAMOL 750 MG/1
750 TABLET, FILM COATED ORAL NIGHTLY PRN
Qty: 30 TABLET | Refills: 1 | Status: SHIPPED | OUTPATIENT
Start: 2024-05-29

## 2024-05-29 NOTE — LETTER
[facility]  [address] [address]  Phone: [phone]  Fax: [phone]  [employer]: [id number]    Pt Name: Angel Mosquera  Injury Date: 04/14/2024   Employee ID: [id number] Date of Treatment: 05/29/2024   Company: [employer]      Appointment Time: 09:45 AM Arrived: 9:45 AM    Provider: Joy Clark MD Time Out:10:50 AM     Office Treatment:   1. Acute midline low back pain without sciatica    2. Encounter related to worker's compensation claim      Medications Ordered This Encounter   Medications    methocarbamoL (ROBAXIN) 750 MG Tab        Patient Instructions: Continue Physical Therapy (Continue daily home exercise program.)      Restrictions: Avoid frequent bending/lifting/twisting, No lifting/pushing/pulling more than 50 lbs     Return Appointment: 06/13/2024 at 11:00 AM [facility] with Dr. Clark [facility]

## 2024-05-29 NOTE — PROGRESS NOTES
Subjective:      Patient ID: Angel Mosquera is a 44 y.o. female.    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 Begin MD note:  After her last visit 3 weeks ago, she went to the ED 3 days later and had another Toradol shot and was prescribed a medrol dose pack. She had dry needling in therapy after that and says that is what ultimately helped to decrease her pain. She gets a little radiation of pain on the left side but no longer on the right.  Overall she feels much improved and is very pleased with her progress and physical therapy.  She reports compliance with her exercise program.  States she does no longer requires persistent use of the naproxen and methocarbamol and is able to space the medications out more.  Her employer is still accommodating her work restrictions that she has no complaints regarding work.  No new or worsening complaints at this time.        Back Pain  Associated symptoms include numbness.       Musculoskeletal:  Positive for pain, abnormal ROM of joint, back pain, muscle cramps and muscle ache. Negative for trauma, joint pain and joint swelling.   Neurological:  Positive for numbness and tingling.     Objective:     Physical Exam  Vitals and nursing note reviewed.   Constitutional:       General: She is not in acute distress.     Appearance: She is not ill-appearing.   HENT:      Head: Normocephalic.   Eyes:      Conjunctiva/sclera: Conjunctivae normal.   Pulmonary:      Effort: No respiratory distress.   Musculoskeletal:      Comments: Lumbar range of motion slightly limited with forward flexion but improved from previous exams and able to perform movements fully in all  other planes.   Skin:     General: Skin is warm and dry.   Neurological:      Mental Status: She is alert and oriented to person, place, and time.      Coordination: Coordination normal.      Gait: Gait normal.   Psychiatric:         Attention and Perception: Attention normal.         Mood and Affect: Mood normal.         Behavior: Behavior normal.        Assessment:      1. Acute midline low back pain without sciatica    2. Encounter related to worker's compensation claim      EXAMINATION:  MRI LUMBAR SPINE WITHOUT CONTRAST     CLINICAL HISTORY:  Low back pain, trauma;.  Encounter for examination for insurance purposes     TECHNIQUE:  Sagittal and axial T1 and T2 W images     COMPARISON:  Lumbar spine series 04/22/2024.     FINDINGS:  Anatomic lumbar alignment.  Degenerative change including desiccation and/or mild decreased height L2-3, L3-4 and L4-5.     L2-3, minimal annular bulging with subtle increased signal posterior annulus which may reflect annular fissure although without protrusion.     L3-4, minimal annular bulging with subtle increased signal posterior annulus which may reflect annular fissure although without protrusion.     L4-5, subtle increased T2 signal posterior annulus may reflect annular fissure without protrusion however.     No evidence of disc herniation, canal compromise or foraminal compromise is seen remaining lumbar disc levels.  Conus medullaris has normal contour and signal.  No focal marrow lesion appreciated.     Impression:     Minimal annular bulging with possible posterior annular fissuring although without associated protrusion L2-3 and L3-4 and with question of posterior annular fissure although with no associated protrusion L4-5.        Electronically signed by:Padmaja Martinez MD  Date:                                            05/13/2024  Time:                                           16:03    Plan:     We reviewed her MRI results and reassurance was provided regarding her  prognosis.  She has made significant improvements with physical therapy and has been encouraged to continue her home exercise program.  Noted that she has 6 remaining visits.  Patient informed that I will request additional PT sessions if needed based on her progress notes and any communication with her physical therapist.  Otherwise, if she continues to improve with symptom resolution at the completion of her 12 visits, I anticipate being able to release her for trial of regular duty by her next visit.  Patient voiced understanding and agreement with the plan of care and did not have any questions or concerns this time.        Medications Ordered This Encounter   Medications    methocarbamoL (ROBAXIN) 750 MG Tab     Sig: Take 1 tablet (750 mg total) by mouth nightly as needed (back spasms).     Dispense:  30 tablet     Refill:  1     Patient Instructions: Continue Physical Therapy (Continue daily home exercise program.)   Restrictions: Avoid frequent bending/lifting/twisting, No lifting/pushing/pulling more than 50 lbs  Follow up in about 3 weeks (around 6/19/2024).    I spent a total of 30 minutes on the day of the visit. This includes face to face time and non-face to face time preparing to see the patient (eg, review of tests, prior records/notes), obtaining and/or reviewing separately obtained history, documenting clinical information in the electronic or other health record.

## 2024-06-04 ENCOUNTER — PATIENT MESSAGE (OUTPATIENT)
Dept: DERMATOLOGY | Facility: CLINIC | Age: 44
End: 2024-06-04

## 2024-06-04 ENCOUNTER — PATIENT MESSAGE (OUTPATIENT)
Dept: ADMINISTRATIVE | Facility: HOSPITAL | Age: 44
End: 2024-06-04
Payer: COMMERCIAL

## 2024-06-04 ENCOUNTER — PATIENT MESSAGE (OUTPATIENT)
Dept: PRIMARY CARE CLINIC | Facility: CLINIC | Age: 44
End: 2024-06-04
Payer: COMMERCIAL

## 2024-06-04 DIAGNOSIS — Z12.31 SCREENING MAMMOGRAM, ENCOUNTER FOR: Primary | ICD-10-CM

## 2024-06-10 ENCOUNTER — TELEPHONE (OUTPATIENT)
Dept: URGENT CARE | Facility: CLINIC | Age: 44
End: 2024-06-10
Payer: COMMERCIAL

## 2024-06-10 DIAGNOSIS — M54.50 ACUTE MIDLINE LOW BACK PAIN WITHOUT SCIATICA: ICD-10-CM

## 2024-06-10 DIAGNOSIS — M51.36 ANNULAR TEAR OF LUMBAR DISC: Primary | ICD-10-CM

## 2024-06-10 DIAGNOSIS — Z02.6 ENCOUNTER RELATED TO WORKER'S COMPENSATION CLAIM: ICD-10-CM

## 2024-06-10 NOTE — TELEPHONE ENCOUNTER
Received request from patient's physical therapist, Lisa Cheung for additional PT visits, 3x week x 2 weeks. I agree with need for a few sessions for continuation of treatment to help meet goals with strength and endurance exercises.     Order placed today.

## 2024-06-11 ENCOUNTER — OFFICE VISIT (OUTPATIENT)
Dept: URGENT CARE | Facility: CLINIC | Age: 44
End: 2024-06-11
Payer: COMMERCIAL

## 2024-06-11 VITALS
SYSTOLIC BLOOD PRESSURE: 134 MMHG | BODY MASS INDEX: 38.15 KG/M2 | WEIGHT: 229 LBS | HEART RATE: 90 BPM | RESPIRATION RATE: 18 BRPM | OXYGEN SATURATION: 99 % | DIASTOLIC BLOOD PRESSURE: 88 MMHG | TEMPERATURE: 98 F | HEIGHT: 65 IN

## 2024-06-11 DIAGNOSIS — Z02.6 ENCOUNTER RELATED TO WORKER'S COMPENSATION CLAIM: ICD-10-CM

## 2024-06-11 DIAGNOSIS — M51.36 ANNULAR TEAR OF LUMBAR DISC: ICD-10-CM

## 2024-06-11 DIAGNOSIS — M54.50 ACUTE MIDLINE LOW BACK PAIN WITHOUT SCIATICA: Primary | ICD-10-CM

## 2024-06-11 PROCEDURE — 96372 THER/PROPH/DIAG INJ SC/IM: CPT | Mod: S$GLB,,, | Performed by: STUDENT IN AN ORGANIZED HEALTH CARE EDUCATION/TRAINING PROGRAM

## 2024-06-11 PROCEDURE — 99214 OFFICE O/P EST MOD 30 MIN: CPT | Mod: 25,S$GLB,, | Performed by: STUDENT IN AN ORGANIZED HEALTH CARE EDUCATION/TRAINING PROGRAM

## 2024-06-11 RX ORDER — KETOROLAC TROMETHAMINE 30 MG/ML
30 INJECTION, SOLUTION INTRAMUSCULAR; INTRAVENOUS
Status: COMPLETED | OUTPATIENT
Start: 2024-06-11 | End: 2024-06-11

## 2024-06-11 RX ADMIN — KETOROLAC TROMETHAMINE 30 MG: 30 INJECTION, SOLUTION INTRAMUSCULAR; INTRAVENOUS at 03:06

## 2024-06-11 NOTE — LETTER
[facility]  [address] [address]  Phone: [phone]  Fax: [phone]  [employer]: [id number]    Pt Name: Angel Mosquera  Injury Date: 04/14/2024   Employee ID: [id number] Date of Treatment: 06/11/2024   Company: [employer]      Appointment Time: 02:45 PM Arrived: 2:39 PM    Provider: Joy Clark MD Time Out:3:34PM      Office Treatment:   1. Acute midline low back pain without sciatica    2. Encounter related to worker's compensation claim    3. Annular tear of lumbar disc      Medications Ordered This Encounter   Medications    ketorolac injection 30 mg           Restrictions: No lifting/pushing/pulling more than 50 lbs, Avoid frequent bending/lifting/twisting, Home today (Excuse from work on 6/11/24. Ok to return as scheduled after this date)     Return Appointment: 7/3/2024 at 8:30 AM [facility]

## 2024-06-11 NOTE — PROGRESS NOTES
Subjective:      Patient ID: Angel Mosquera is a 44 y.o. female.    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 Begin MD note:  Ms. Mosquera returns earlier than her next scheduled appointment due to an acute flare in her back pain after PT yesterday. Says she had new exercises with the PTA involving use of heavy weight. She was pushing the weight in a squatting position and she felt fine,  but when she pulled the weight she felt instant pain in her lower back. Same location as her initial injury and triggered by the same movement/activity. Took naproxen and tramadol with mild relief. Would like a steroid and toradol injection.      Patient's therapist is aware of the incident and is modifying her exercises accordingly moving forward.     Back Pain  Pertinent negatives include no numbness.       Musculoskeletal:  Positive for pain, joint pain, abnormal ROM of joint, back pain, muscle cramps and muscle ache. Negative for trauma and joint swelling.   Neurological:  Negative for numbness and tingling.     Objective:     Physical Exam  Vitals and nursing note reviewed.   Constitutional:       General: She is not in acute distress.     Appearance: She is not ill-appearing.   HENT:      Head: Normocephalic.   Eyes:      Conjunctiva/sclera: Conjunctivae normal.   Pulmonary:      Effort: No respiratory distress.   Musculoskeletal:      Comments: Focal midline lumbar pain at approx L3 with forward flexion only. Not reproducible with palpation.    Skin:     General: Skin is warm and dry.   Neurological:      Mental Status: She is  alert and oriented to person, place, and time.   Psychiatric:         Attention and Perception: Attention normal.         Mood and Affect: Mood normal.         Behavior: Behavior normal.        Assessment:      1. Acute midline low back pain without sciatica    2. Encounter related to worker's compensation claim    3. Annular tear of lumbar disc      Plan:     Ms. Mosquera had a setback in PT with a pulling exercise that was not yet supposed to perform due to a miscommunication. We agreed to try Toradol first to treat her acute pain. She can f/u in 2 days (in [place]) for a steroid injection if needed if pain remains severe. Continue home meds prn. New PT order was placed yesterday in telephone encounter and already approved. She plans to stop by PT to schedule her approved visits over the next 2 weeks. Patient to f/u in approx 3 weeks. Ok to push office visit back if needed if PT isn't completed.     Work excuse provided to excuse her from work tonight. She is off work tomorrow and will resume work as scheduled on 6/13. Angel was in agreement with the plan of care and did not have any further questions or concerns prior to completion of the visit.       Medications Ordered This Encounter   Medications    ketorolac injection 30 mg         Restrictions: No lifting/pushing/pulling more than 50 lbs, Avoid frequent bending/lifting/twisting, Home today (Excuse from work on 6/11/24. Ok to return as scheduled after this date)  Follow up in about 22 days (around 7/3/2024).    I spent a total of 30 minutes on the day of the visit. This includes face to face time and non-face to face time preparing to see the patient (eg, review of tests, prior records/notes), obtaining and/or reviewing separately obtained history, documenting clinical information in the electronic or other health record, independently interpreting results and communicating results to the patient.

## 2024-06-19 ENCOUNTER — OFFICE VISIT (OUTPATIENT)
Dept: DERMATOLOGY | Facility: CLINIC | Age: 44
End: 2024-06-19
Payer: COMMERCIAL

## 2024-06-19 VITALS — BODY MASS INDEX: 38.16 KG/M2 | HEIGHT: 65 IN | WEIGHT: 229.06 LBS

## 2024-06-19 DIAGNOSIS — L65.9 HAIR LOSS: Primary | ICD-10-CM

## 2024-06-19 PROCEDURE — 3044F HG A1C LEVEL LT 7.0%: CPT | Mod: CPTII,S$GLB,, | Performed by: DERMATOLOGY

## 2024-06-19 PROCEDURE — 1159F MED LIST DOCD IN RCRD: CPT | Mod: CPTII,S$GLB,, | Performed by: DERMATOLOGY

## 2024-06-19 PROCEDURE — 3008F BODY MASS INDEX DOCD: CPT | Mod: CPTII,S$GLB,, | Performed by: DERMATOLOGY

## 2024-06-19 PROCEDURE — 99213 OFFICE O/P EST LOW 20 MIN: CPT | Mod: S$GLB,,, | Performed by: DERMATOLOGY

## 2024-06-19 PROCEDURE — 1160F RVW MEDS BY RX/DR IN RCRD: CPT | Mod: CPTII,S$GLB,, | Performed by: DERMATOLOGY

## 2024-06-19 NOTE — PROGRESS NOTES
Subjective:      Patient ID:  Angel Mosquera is a 44 y.o. female who presents for   Chief Complaint   Patient presents with    Hair Loss     LOV: 4/15/24 nonscarring alopecia     Patient here today for F/U on Alopeci that started march 2024  Still using the ketoconazole shampoo but not the augmented betamethasone lotion   Feels like the hair loss is about the same, but scalp is no longer itchy, not scaly    Diagnosed with Covid February 2024     Current Outpatient Medications:   ·  acetaminophen (TYLENOL) 500 MG tablet, Take 2 tablets (1,000 mg total) by mouth every 8 (eight) hours as needed for Pain., Disp: 60 tablet, Rfl: 0  ·  augmented betamethasone (DIPROLENE) 0.05 % lotion, Apply to scalp nightly, Disp: 60 mL, Rfl: 3  ·  biotin 1 mg Cap, Take by mouth., Disp: , Rfl:   ·  carvediloL (COREG) 3.125 MG tablet, Take 1 tablet (3.125 mg total) by mouth 2 (two) times daily with meals., Disp: 180 tablet, Rfl: 3  ·  fluocinonide (LIDEX) 0.05 % external solution, Thin film to scalp nightly, Disp: 60 mL, Rfl: 5  ·  gabapentin (NEURONTIN) 100 MG capsule, Take 2 capsules (200 mg total) by mouth 3 (three) times daily., Disp: 540 capsule, Rfl: 3  ·  hydrOXYzine HCL (ATARAX) 25 MG tablet, Take 1 tablet (25 mg total) by mouth nightly as needed (Insomnia)., Disp: 30 tablet, Rfl: 3  ·  ibuprofen (ADVIL,MOTRIN) 800 MG tablet, Take 1 tablet (800 mg total) by mouth every 6 (six) hours as needed for Pain., Disp: 30 tablet, Rfl: 1  ·  ketoconazole (NIZORAL) 2 % shampoo, Lather scalp with medicated shampoo once a week - let sit on scalp at least 5 minutes prior to rinsing, Disp: 120 mL, Rfl: 5  ·  LIDOcaine (LIDODERM) 5 %, Place 1 patch onto the skin once daily. Remove & Discard patch within 12 hours or as directed by MD, Disp: 30 patch, Rfl: 0  ·  methocarbamoL (ROBAXIN) 750 MG Tab, Take 1 tablet (750 mg total) by mouth nightly as needed (back spasms)., Disp: 30 tablet, Rfl: 1  ·  minoxidiL (LONITEN) 2.5 MG tablet, TAKE 1/2  TABLET BY MOUTH DAILY, Disp: 45 tablet, Rfl: 2  ·  multivitamin (THERAGRAN) per tablet, Take 1 tablet by mouth once daily., Disp: , Rfl:   ·  naproxen (NAPROSYN) 500 MG tablet, Take 1 tablet (500 mg total) by mouth 2 (two) times daily with meals., Disp: 60 tablet, Rfl: 1  ·  ondansetron (ZOFRAN-ODT) 4 MG TbDL, Take 2 tablets (8 mg total) by mouth every 12 (twelve) hours as needed (nausea)., Disp: 10 tablet, Rfl: 3  ·  pantoprazole (PROTONIX) 40 MG tablet, Take 1 tablet (40 mg total) by mouth once daily., Disp: 90 tablet, Rfl: 3  ·  spironolactone (ALDACTONE) 50 MG tablet, Take 2 tablets (100 mg total) by mouth once daily., Disp: 180 tablet, Rfl: 3  ·  UNABLE TO FIND, RELIEF FACTOR (FISH OIL, TURMERIC, ICARLIN, RESVERATROL), Disp: , Rfl:           Review of Systems   Constitutional:  Negative for fever, chills and fatigue.   Skin:  Positive for activity-related sunscreen use. Negative for itching, rash, dry skin and daily sunscreen use.       Objective:   Physical Exam   Constitutional: She appears well-developed and well-nourished.   Neurological: She is alert and oriented to person, place, and time.   Psychiatric: She has a normal mood and affect.   Skin:   Areas Examined (abnormalities noted in diagram):   Scalp / Hair Palpated and Inspected  Head / Face Inspection Performed            Diagram Legend     Erythematous scaling macule/papule c/w actinic keratosis       Vascular papule c/w angioma      Pigmented verrucoid papule/plaque c/w seborrheic keratosis      Yellow umbilicated papule c/w sebaceous hyperplasia      Irregularly shaped tan macule c/w lentigo     1-2 mm smooth white papules consistent with Milia      Movable subcutaneous cyst with punctum c/w epidermal inclusion cyst      Subcutaneous movable cyst c/w pilar cyst      Firm pink to brown papule c/w dermatofibroma      Pedunculated fleshy papule(s) c/w skin tag(s)      Evenly pigmented macule c/w junctional nevus     Mildly variegated pigmented,  slightly irregular-bordered macule c/w mildly atypical nevus      Flesh colored to evenly pigmented papule c/w intradermal nevus       Pink pearly papule/plaque c/w basal cell carcinoma      Erythematous hyperkeratotic cursted plaque c/w SCC      Surgical scar with no sign of skin cancer recurrence      Open and closed comedones      Inflammatory papules and pustules      Verrucoid papule consistent consistent with wart     Erythematous eczematous patches and plaques     Dystrophic onycholytic nail with subungual debris c/w onychomycosis     Umbilicated papule    Erythematous-base heme-crusted tan verrucoid plaque consistent with inflamed seborrheic keratosis     Erythematous Silvery Scaling Plaque c/w Psoriasis     See annotation      Assessment / Plan:        Hair loss    Stabilized  TE + seb derm  On PO minoxidil, spironolactoneuses keto shampoo  Rsume diprolene lotion 3 times weekly           No follow-ups on file.

## 2024-06-26 NOTE — PROGRESS NOTES
CC: left foot pain    44 y.o. Female presents today for evaluation of her bilateral plantar foot pain, left worse than right. She states she started fertility treatments in 2019 and was in physical therapy around that time due to hip pain. She feels as though her foot pain may have originated from compensation for her foot pain. She states she was initially evaluated by a podiatrist and had several plantar fascia injections before ultimately having a plantar fasciotomy with Dr. Montez 2022. She states she then developed a neuroma post-surgery she attributes to changing her gait and walking on her forefoot. 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 She states her foot pain has never improved following injections and surgical intervention. She was referred by Lisa Cheung in physical therapy.   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     PAST MEDICAL HISTORY:   Past Medical History:   Diagnosis Date    Hypertension     PONV (postoperative nausea and vomiting)     Rosacea        PAST SURGICAL HISTORY:   Past Surgical History:   Procedure Laterality Date    AUGMENTATION OF BREAST Bilateral 2005    Breast Augmentation      COSMETIC SURGERY      Breast augmentation    DILATION AND CURETTAGE OF UTERUS N/A 10/19/2019    Procedure: DILATION AND CURETTAGE, UTERUS;  Surgeon: Frances Culp MD;  Location: [facility] OR;  Service: OB/GYN;  Laterality: N/A;  with suction    ENDOSCOPIC PLANTAR FASCIOTOMY Left 2022    Procedure: FASCIOTOMY, PLANTAR, ENDOSCOPIC;  Surgeon: Mello Montez DPM;  Location: [facility] OR;  Service: Podiatry;   Laterality: Left;  endoscopic plantar fasciotomy kit    mischarr      ROBOT-ASSISTED LAPAROSCOPIC UTERINE MYOMECTOMY N/A 1/7/2020    Procedure: ROBOTIC MYOMECTOMY, UTERUS;  Surgeon: Frances Culp MD;  Location: [facility] OR;  Service: OB/GYN;  Laterality: N/A;    SURGICAL REMOVAL OF FERNANDEZ'S NEUROMA Left 1/5/2023    Procedure: EXCISION, FERNANDEZ'S NEUROMA;  Surgeon: Mello Montez DPM;  Location: [facility] OR;  Service: Podiatry;  Laterality: Left;    XI ROBOTIC HYSTERECTOMY, WITH SALPINGO-OOPHORECTOMY N/A 12/15/2021    Procedure: XI ROBOTIC HYSTERECTOMY,WITH SALPINGO-OOPHORECTOMY;  Surgeon: Carla Aguero MD;  Location: [facility] OR;  Service: OB/GYN;  Laterality: N/A;       FAMILY HISTORY:   Family History   Problem Relation Name Age of Onset    Diabetes Mother Heather Diaz     Hypertension Mother Heather Diaz     Heart failure Mother Heather Diaz         viral?    Heart disease Mother Heather Diaz     Hyperlipidemia Mother Heather Diaz     Alcohol abuse Father Nixon Diaz     Alcohol abuse Maternal Uncle Saúl Austin     Psoriasis Maternal Grandmother Heather Austin     Arthritis Maternal Grandmother Heather Austin     Diabetes Maternal Grandmother Heather Austin     Alcohol abuse Maternal Grandfather Josh Austin     COPD Maternal Grandfather Josh Austin     Cancer Paternal Grandmother Nely Diaz         Lung Cancer    Cancer Paternal Grandfather Lamine Diaz         Pancreatic Cancer    Cancer Neg Hx      Ovarian cancer Neg Hx      Melanoma Neg Hx      Heart attack Neg Hx      Stroke Neg Hx      Colon cancer Neg Hx      Breast cancer Neg Hx      Blindness Neg Hx      Amblyopia Neg Hx      Cataracts Neg Hx      Glaucoma Neg Hx      Macular degeneration Neg Hx      Retinal detachment Neg Hx      Strabismus Neg Hx      Inflammatory bowel disease Neg Hx      Kidney disease Neg Hx      Lupus Neg Hx      Rheum arthritis Neg Hx         SOCIAL HISTORY:   Social History     Socioeconomic History    Marital status:    Occupational  History    Occupation: Nurse     Employer: [employer]     Comment: Oncology   Tobacco Use    Smoking status: Never    Smokeless tobacco: Never   Substance and Sexual Activity    Alcohol use: No    Drug use: No    Sexual activity: Yes     Partners: Male     Birth control/protection: None     Comment: Vasectomy   Other Topics Concern    Are you pregnant or think you may be? No    Breast-feeding No     Social Determinants of Health     Financial Resource Strain: Low Risk  (4/4/2024)    Overall Financial Resource Strain (CARDIA)     Difficulty of Paying Living Expenses: Not hard at all   Food Insecurity: No Food Insecurity (4/4/2024)    Hunger Vital Sign     Worried About Running Out of Food in the Last Year: Never true     Ran Out of Food in the Last Year: Never true   Transportation Needs: No Transportation Needs (4/4/2024)    PRAPARE - Transportation     Lack of Transportation (Medical): No     Lack of Transportation (Non-Medical): No   Physical Activity: Unknown (4/4/2024)    Exercise Vital Sign     Days of Exercise per Week: 0 days   Stress: Stress Concern Present (4/4/2024)    [place] [facility] [employer] - Occupational Stress Questionnaire     Feeling of Stress : Rather much   Housing Stability: Low Risk  (4/4/2024)    Housing Stability Vital Sign     Unable to Pay for Housing in the Last Year: No     Number of Places Lived in the Last Year: 1     Unstable Housing in the Last Year: No       MEDICATIONS:     Current Outpatient Medications:     acetaminophen (TYLENOL) 500 MG tablet, Take 2 tablets (1,000 mg total) by mouth every 8 (eight) hours as needed for Pain., Disp: 60 tablet, Rfl: 0    augmented betamethasone (DIPROLENE) 0.05 % lotion, Apply to scalp nightly, Disp: 60 mL, Rfl: 3    biotin 1 mg Cap, Take by mouth., Disp: , Rfl:     carvediloL (COREG) 3.125 MG tablet, Take 1 tablet (3.125 mg total) by mouth 2 (two) times daily with meals., Disp: 180 tablet, Rfl: 3    fluocinonide  "(LIDEX) 0.05 % external solution, Thin film to scalp nightly, Disp: 60 mL, Rfl: 5    gabapentin (NEURONTIN) 100 MG capsule, Take 2 capsules (200 mg total) by mouth 3 (three) times daily., Disp: 540 capsule, Rfl: 3    hydrOXYzine HCL (ATARAX) 25 MG tablet, Take 1 tablet (25 mg total) by mouth nightly as needed (Insomnia)., Disp: 30 tablet, Rfl: 3    ibuprofen (ADVIL,MOTRIN) 800 MG tablet, Take 1 tablet (800 mg total) by mouth every 6 (six) hours as needed for Pain., Disp: 30 tablet, Rfl: 1    ketoconazole (NIZORAL) 2 % shampoo, Lather scalp with medicated shampoo once a week - let sit on scalp at least 5 minutes prior to rinsing, Disp: 120 mL, Rfl: 5    LIDOcaine (LIDODERM) 5 %, Place 1 patch onto the skin once daily. Remove & Discard patch within 12 hours or as directed by MD, Disp: 30 patch, Rfl: 0    methocarbamoL (ROBAXIN) 750 MG Tab, Take 1 tablet (750 mg total) by mouth nightly as needed (back spasms)., Disp: 30 tablet, Rfl: 1    minoxidiL (LONITEN) 2.5 MG tablet, TAKE 1/2 TABLET BY MOUTH DAILY, Disp: 45 tablet, Rfl: 2    multivitamin (THERAGRAN) per tablet, Take 1 tablet by mouth once daily., Disp: , Rfl:     naproxen (NAPROSYN) 500 MG tablet, Take 1 tablet (500 mg total) by mouth 2 (two) times daily with meals., Disp: 60 tablet, Rfl: 1    ondansetron (ZOFRAN-ODT) 4 MG TbDL, Take 2 tablets (8 mg total) by mouth every 12 (twelve) hours as needed (nausea)., Disp: 10 tablet, Rfl: 3    pantoprazole (PROTONIX) 40 MG tablet, Take 1 tablet (40 mg total) by mouth once daily., Disp: 90 tablet, Rfl: 3    spironolactone (ALDACTONE) 50 MG tablet, Take 2 tablets (100 mg total) by mouth once daily., Disp: 180 tablet, Rfl: 3    UNABLE TO FIND, RELIEF FACTOR (FISH OIL, TURMERIC, ICARLIN, RESVERATROL), Disp: , Rfl:     ALLERGIES:   Review of patient's allergies indicates:  No Known Allergies     PHYSICAL EXAMINATION:  /81   Ht 5' 5" (1.651 m)   Wt 103.9 kg (229 lb 0.9 oz)   LMP 12/09/2021   BMI 38.12 kg/m²   Vitals " signs and nursing note have been reviewed.  General: In no acute distress, well developed, well nourished, no diaphoresis  Eyes: EOM full and smooth, no eye redness or discharge  HENT: normocephalic and atraumatic, neck supple, trachea midline, no nasal discharge, no external ear redness or discharge  Cardiovascular: 2+ and symmetric radial and DP pulses bilaterally, no LE edema  Lungs: respirations non-labored, no conversational dyspnea   Abd: non-distended, no rigidity  MSK: no amputation or deformity, no swelling of extremities  Neuro: AAOx3, CN2-12 grossly intact  Skin: No rashes, warm and dry  Psychiatric: cooperative, pleasant, mood and affect appropriate for age    ANKLE: left   The affected ankle is compared to the contralateral ankle.    Observation:    There is no edema, erythema, or ecchymosis.   Shoes reveal a normal wear pattern.  No structural deformities including pes planus/cavus, hallux valgus, or toe deformities.   Negative too-many toes sign.  Normal callus pattern on the feet bilaterally.    Achilles tendon and calcaneal insertion reveals no deformities  No leg or intrinsic foot muscle atrophy.  Squatting reveals symmetric pronation of the bilateral feet.   Able to rise on toes with symmetric supination.    Normal gait without evidence of antalgia.    ROM: (expected degree)   Active dorsiflexion to 20° bilaterally.    Active plantarflexion to 50° bilaterally.    Active ankle inversion to 35° bilaterally.    Active ankle eversion to 15° bilaterally.    Full active flexion/extension of the toes bilaterally.   Heel cords without tightness bilaterally.    Tenderness To Palpation   No tenderness at the ATFL, CFL, PTFL, or deltoid ligaments  No tenderness over the distal anterior syndesmosis, distal tibia/fibula, fibular head/shaft  No tenderness at medial or lateral malleoli   No tenderness at navicular, cuboid, cuneiforms, talus  + tenderness at the medial right calcaneous at the plantar fascia  attachment  + tenderness over the left medial arch along fascial thickening/scarring  + callous bottom of left foot at distal 5th metatarsal head  No tenderness along the metatarsals or phalanges  No tenderness at the Achilles tendon calcaneal insertion  No tenderness at the tibialis posterior, flexor digitorum longus, flexor hallucis longus   No tenderness at the peroneal tendons    Strength Testing: (*pain)  Dorsiflexion - 5/5  Platarflexion - 5/5  Resisted Inversion - 5/5  Resisted Eversion - 5/5  Great Toe Extension - 5/5  Great Toe Flexion - 5/5    Special Tests:  Anterior talar drawer - negative and without dimpling  Talar tilt - negative    Heel tap test - negative  Distal tib/fib squeeze test - negative  Bryant squeeze test - negative    Metatarsal squeeze test - negative  Midfoot stress test - negative  Calcaneal squeeze test - negative    No subluxation of the peroneal tendons with resisted eversion.    Structural Exam:  Midfoot myofascial restriction bilaterally, more pronounced on the left  Fascial herniated trigger point at the right medial calcaneus at the plantar fascia attachment.  Fascial trigger band along the left medial arch of the foot  Fascial herniated trigger point at the proximal medial arch on the left      Key   F= Flexed   E = Extended   R = Rotated   N = Neutral   S = Sidebent   TTA = tissue texture abnormality   L/R/B (last letter) = left/right/bilateral   HTP = fascial herniated trigger point   TB = fascial trigger band   CD = fascial continuum distortion         Vascular/Sensory Exam:  DP and PT pulses intact BL  No skin changes, no abnormal hair distribution  Sensation intact to light touch throughout the saphenous, sural, superficial peroneal, deep peroneal, and tibial nerve distributions  Negative Tinel's test over tarsal tunnel  Capillary refill intact <2 seconds in all toes bilaterally.      IMAGIN. X-ray ordered due to bilateral foot pain   2. X-ray images were reviewed  personally by me and then directly with patient.  3. FINDINGS: X-ray images obtained demonstrate plantar calcaneal spur bilaterally, no acute fracture or dislocation  4. IMPRESSION: As above.       ASSESSMENT:      ICD-10-CM ICD-9-CM   1. Right foot pain  M79.671 729.5   2. Left foot pain  M79.672 729.5   3. History of foot surgery  Z98.890 V15.29   4. Plantar fasciitis, bilateral  M72.2 728.71   5. Poor body mechanics  R29.898 781.99   6. Somatic dysfunction of lower extremity  M99.06 739.6         PLAN:  1-3. Bilateral foot pain/plantar fasciitis -   4. History of left foot surgery  5. Poor body mechanics    - Angel admits to bilateral foot pain, left worse than right beginning in 2019 she attributes to compensation from walking differently due to right hip pain. 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     - XRs ordered in the office today and images were personally reviewed with the patient. See above for further detail.    - Symptoms, exam, and imaging are most consistent with fascial restriction and myofascial tightness secondary to poor body mechanics, history of foot surgery on the left and over compensation.  We discussed the importance of decreasing inflammation and strengthening and stabilizing to help promote and maintain symptom improvement/resolution.  This is commonly accomplished with a short course of an anti-inflammatory and icing in addition to osteopathic manipulation, a home exercise program or physical therapy.    - Based on her description/body language of pain and somatic dysfunction identified on exam, I discussed osteopathic manipulation as a treatment option today.  She consents to evaluation and treatment.  See below.    - [employer] handout provided.     - HEP for plantar fasciitis prescribed today. Handouts provided, explained, and exercises were demonstrated as needed. Encouraged to do daily. [id number] HOME EXERCISE PROGRAM (HEP):  The patient  was taught a homegoing physical therapy regimen as described above by provider with assistance of sports medicine assistant. The patient demonstrated understanding of the exercises and proper technique of their execution. This interaction took 15 minutes.       6. Somatic dysfunction of lower extremity region -    - OMT 1-2 regions. Oral consent obtained. Reviewed benefits and potential side effects. OMT indicated today due to signs and symptoms as well as local and remote somatic dysfunction findings and their related neurokinetic, lymphatic, fascial and/or arteriovenous body connections. OMT techniques used: Myofascial Release, Muscle Energy, Fascial Distortion Model, and Articulatory. Treatment was tolerated well. Improvement noted in segmental mobility post-treatment in dysfunctional regions. There were no adverse events and no complications immediately following treatment. Advised plenty of water to help alleviate soreness.      Future planning includes - possibly more OMT if helpful and if indicated, diagnostic ultrasound of the foot to further assess the painful palpable fascia/scar tissue    All questions were answered to the best of my ability and all concerns were addressed at this time.    Follow up in 3-4 weeks for above, or sooner if needed.      This note is dictated using the M*Modal Fluency Direct word recognition program. There are word recognition mistakes that are occasionally missed on review.

## 2024-06-27 ENCOUNTER — APPOINTMENT (OUTPATIENT)
Dept: RADIOLOGY | Facility: OTHER | Age: 44
End: 2024-06-27
Attending: ORTHOPAEDIC SURGERY
Payer: COMMERCIAL

## 2024-06-27 ENCOUNTER — OFFICE VISIT (OUTPATIENT)
Dept: SPORTS MEDICINE | Facility: CLINIC | Age: 44
End: 2024-06-27
Payer: COMMERCIAL

## 2024-06-27 VITALS
SYSTOLIC BLOOD PRESSURE: 109 MMHG | DIASTOLIC BLOOD PRESSURE: 81 MMHG | BODY MASS INDEX: 38.16 KG/M2 | WEIGHT: 229.06 LBS | HEIGHT: 65 IN

## 2024-06-27 DIAGNOSIS — M79.672 LEFT FOOT PAIN: ICD-10-CM

## 2024-06-27 DIAGNOSIS — M99.06 SOMATIC DYSFUNCTION OF LOWER EXTREMITY: ICD-10-CM

## 2024-06-27 DIAGNOSIS — M72.2 PLANTAR FASCIITIS, BILATERAL: ICD-10-CM

## 2024-06-27 DIAGNOSIS — Z98.890 HISTORY OF FOOT SURGERY: ICD-10-CM

## 2024-06-27 DIAGNOSIS — M79.671 RIGHT FOOT PAIN: Primary | ICD-10-CM

## 2024-06-27 DIAGNOSIS — R29.898 POOR BODY MECHANICS: ICD-10-CM

## 2024-06-27 PROCEDURE — 3079F DIAST BP 80-89 MM HG: CPT | Mod: CPTII,S$GLB,, | Performed by: ORTHOPAEDIC SURGERY

## 2024-06-27 PROCEDURE — 3008F BODY MASS INDEX DOCD: CPT | Mod: CPTII,S$GLB,, | Performed by: ORTHOPAEDIC SURGERY

## 2024-06-27 PROCEDURE — 1159F MED LIST DOCD IN RCRD: CPT | Mod: CPTII,S$GLB,, | Performed by: ORTHOPAEDIC SURGERY

## 2024-06-27 PROCEDURE — 73630 X-RAY EXAM OF FOOT: CPT | Mod: TC,PN,LT

## 2024-06-27 PROCEDURE — 98925 OSTEOPATH MANJ 1-2 REGIONS: CPT | Mod: S$GLB,,, | Performed by: ORTHOPAEDIC SURGERY

## 2024-06-27 PROCEDURE — 1160F RVW MEDS BY RX/DR IN RCRD: CPT | Mod: CPTII,S$GLB,, | Performed by: ORTHOPAEDIC SURGERY

## 2024-06-27 PROCEDURE — 3074F SYST BP LT 130 MM HG: CPT | Mod: CPTII,S$GLB,, | Performed by: ORTHOPAEDIC SURGERY

## 2024-06-27 PROCEDURE — 99204 OFFICE O/P NEW MOD 45 MIN: CPT | Mod: 25,S$GLB,, | Performed by: ORTHOPAEDIC SURGERY

## 2024-06-27 PROCEDURE — 97110 THERAPEUTIC EXERCISES: CPT | Mod: S$GLB,,, | Performed by: ORTHOPAEDIC SURGERY

## 2024-06-27 PROCEDURE — 99999 PR PBB SHADOW E&M-EST. PATIENT-LVL IV: CPT | Mod: PBBFAC,,, | Performed by: ORTHOPAEDIC SURGERY

## 2024-06-27 PROCEDURE — 3044F HG A1C LEVEL LT 7.0%: CPT | Mod: CPTII,S$GLB,, | Performed by: ORTHOPAEDIC SURGERY

## 2024-07-03 ENCOUNTER — OFFICE VISIT (OUTPATIENT)
Dept: URGENT CARE | Facility: CLINIC | Age: 44
End: 2024-07-03
Payer: COMMERCIAL

## 2024-07-03 VITALS
OXYGEN SATURATION: 100 % | BODY MASS INDEX: 38.15 KG/M2 | HEIGHT: 65 IN | WEIGHT: 229 LBS | DIASTOLIC BLOOD PRESSURE: 95 MMHG | HEART RATE: 67 BPM | RESPIRATION RATE: 18 BRPM | SYSTOLIC BLOOD PRESSURE: 133 MMHG | TEMPERATURE: 98 F

## 2024-07-03 DIAGNOSIS — M51.36 ANNULAR TEAR OF LUMBAR DISC: ICD-10-CM

## 2024-07-03 DIAGNOSIS — Z02.6 ENCOUNTER RELATED TO WORKER'S COMPENSATION CLAIM: ICD-10-CM

## 2024-07-03 DIAGNOSIS — M54.50 ACUTE MIDLINE LOW BACK PAIN WITHOUT SCIATICA: Primary | ICD-10-CM

## 2024-07-03 PROCEDURE — 99214 OFFICE O/P EST MOD 30 MIN: CPT | Mod: S$GLB,,, | Performed by: STUDENT IN AN ORGANIZED HEALTH CARE EDUCATION/TRAINING PROGRAM

## 2024-07-03 NOTE — PROGRESS NOTES
Subjective:      Patient ID: Angel Mosquera is a 44 y.o. female.    Chief Complaint: Back Injury    Patient's place of employment - [employer] [place]  Patient's job title - RN  Date of Injury - 04-14-24  Body part injured - Back  Current work status per last visit - : Avoid frequent bending/lifting/twisting, No lifting/pushing/pulling more than 50 lbs  Improved, same, or worse - Was Improving but went to PT and did a Pulling Exercise and was in horrible pain  Pain Scale right now (1-10) -  0/10    Patient states no pain or other problems with this injury.      See MA note above. Begin MD note:  Working on strengthening in therapy and has one PT session left that she can schedule if needed. States therapist recommended use of a TENS unit at home given the benefits and pain relief she has gained from it in therapy. She denies back pain or limitations with ROM. No new complaints or concerns. Feels ready to resume regular duty.     Back Pain  This is a new problem. The current episode started 1 to 4 weeks ago. The pain does not radiate. The pain is at a severity of 0/10. The patient is experiencing no pain. Pertinent negatives include no numbness.       Musculoskeletal:  Positive for back pain.   Neurological:  Negative for numbness.     Objective:     Physical Exam  Vitals and nursing note reviewed.   Constitutional:       General: She is not in acute distress.     Appearance: She is not ill-appearing.   HENT:      Head: Normocephalic.   Eyes:      Conjunctiva/sclera: Conjunctivae normal.   Pulmonary:      Effort: No respiratory distress.   Musculoskeletal:      Lumbar back: No tenderness or bony tenderness. Normal range of motion. Negative right straight leg raise test and negative left straight leg raise test.   Skin:     General: Skin is warm and dry.   Neurological:      Mental Status: She is alert and oriented to person, place, and time.   Psychiatric:         Attention and Perception: Attention normal.         Mood and  Affect: Mood normal.         Behavior: Behavior normal.        Assessment:      1. Acute midline low back pain without sciatica    2. Annular tear of lumbar disc    3. Encounter related to worker's compensation claim      Plan:     Symptoms resolved, therapy near completion and benign exam today. Ok to finish last PT session. I approve the use of the TENS unit and will sign the order once received from her therapist. Returned to regular unrestricted duty and discharged from [place]. Patient voiced understanding and agreement with the POC and did not have any questions prior to discharge.        Patient Instructions:  (Continue daily home exercises. Ok to complete PT last PT session when scheduled.)   Restrictions: Regular Duty, Discharged from Occupational Health  Follow up if symptoms worsen or fail to improve.    I spent a total of 30 minutes on the day of the visit. This includes face to face time and non-face to face time preparing to see the patient (eg, review of tests, prior records/notes), obtaining and/or reviewing separately obtained history, documenting clinical information in the electronic or other health record, independently interpreting results and communicating results to the patient.

## 2024-07-03 NOTE — LETTER
[facility]  [address] [address]  Phone: [phone]  Fax: [phone]  [facility] [employer]: [email or web address]    Pt Name: Angel Mosquera  Injury Date: 04/14/2024   Employee ID: [id number] Date of Treatment: 07/03/2024   Company: [employer]      Appointment Time: 08:15 AM Arrived: 8:28 am   Provider: Joy Clark MD Time Out:9:00 am     Office Treatment:   1. Acute midline low back pain without sciatica    2. Annular tear of lumbar disc    3. Encounter related to worker's compensation claim          Patient Instructions:  (Continue daily home exercises. Ok to complete PT last PT session when scheduled.)        Restrictions: Regular Duty, Discharged from Occupational Health     Return Appointment: PRN    Return if symptoms worsen or fail to improve.    KW

## 2024-07-08 ENCOUNTER — TELEPHONE (OUTPATIENT)
Dept: OBSTETRICS AND GYNECOLOGY | Facility: CLINIC | Age: 44
End: 2024-07-08
Payer: COMMERCIAL

## 2024-07-08 DIAGNOSIS — N39.0 URINARY TRACT INFECTION WITHOUT HEMATURIA, SITE UNSPECIFIED: Primary | ICD-10-CM

## 2024-07-08 RX ORDER — NITROFURANTOIN 25; 75 MG/1; MG/1
100 CAPSULE ORAL 2 TIMES DAILY
Qty: 10 CAPSULE | Refills: 0 | Status: SHIPPED | OUTPATIENT
Start: 2024-07-08 | End: 2024-07-13

## 2024-07-20 ENCOUNTER — PATIENT MESSAGE (OUTPATIENT)
Dept: PRIMARY CARE CLINIC | Facility: CLINIC | Age: 44
End: 2024-07-20
Payer: COMMERCIAL

## 2024-07-22 ENCOUNTER — OFFICE VISIT (OUTPATIENT)
Dept: SPORTS MEDICINE | Facility: CLINIC | Age: 44
End: 2024-07-22
Payer: COMMERCIAL

## 2024-07-22 VITALS — BODY MASS INDEX: 38.16 KG/M2 | HEIGHT: 65 IN | WEIGHT: 229.06 LBS

## 2024-07-22 DIAGNOSIS — M79.672 LEFT FOOT PAIN: ICD-10-CM

## 2024-07-22 DIAGNOSIS — M99.06 SOMATIC DYSFUNCTION OF LOWER EXTREMITY: ICD-10-CM

## 2024-07-22 DIAGNOSIS — R29.898 POOR BODY MECHANICS: ICD-10-CM

## 2024-07-22 DIAGNOSIS — Z98.890 HISTORY OF FOOT SURGERY: ICD-10-CM

## 2024-07-22 DIAGNOSIS — M79.671 RIGHT FOOT PAIN: Primary | ICD-10-CM

## 2024-07-22 DIAGNOSIS — M72.2 PLANTAR FASCIITIS, BILATERAL: ICD-10-CM

## 2024-07-22 PROCEDURE — 98925 OSTEOPATH MANJ 1-2 REGIONS: CPT | Mod: S$GLB,,, | Performed by: ORTHOPAEDIC SURGERY

## 2024-07-22 PROCEDURE — 1160F RVW MEDS BY RX/DR IN RCRD: CPT | Mod: CPTII,S$GLB,, | Performed by: ORTHOPAEDIC SURGERY

## 2024-07-22 PROCEDURE — 3008F BODY MASS INDEX DOCD: CPT | Mod: CPTII,S$GLB,, | Performed by: ORTHOPAEDIC SURGERY

## 2024-07-22 PROCEDURE — 3044F HG A1C LEVEL LT 7.0%: CPT | Mod: CPTII,S$GLB,, | Performed by: ORTHOPAEDIC SURGERY

## 2024-07-22 PROCEDURE — 1159F MED LIST DOCD IN RCRD: CPT | Mod: CPTII,S$GLB,, | Performed by: ORTHOPAEDIC SURGERY

## 2024-07-22 PROCEDURE — 99214 OFFICE O/P EST MOD 30 MIN: CPT | Mod: 25,S$GLB,, | Performed by: ORTHOPAEDIC SURGERY

## 2024-07-22 PROCEDURE — 99999 PR PBB SHADOW E&M-EST. PATIENT-LVL III: CPT | Mod: PBBFAC,,, | Performed by: ORTHOPAEDIC SURGERY

## 2024-07-22 NOTE — PROGRESS NOTES
"CC: left foot pain    Angel is here today for follow up evaluation of her bilateral foot pain. Patient reports her pain is 1/10 today and states she is feeling 50% improved since her initial visit which she attributes to OMT and compliance with her HEP. She was able to walk 20,000 steps on vacation without significant pain during or following and is very pleased with this as usually she would be "in tears" after walking this much. She also has been able to tolerate walking barefoot more since her last visit.    Recall from visit on 2024  44 y.o. Female presents today for evaluation of her bilateral plantar foot pain, left worse than right. She states she started fertility treatments in 2019 and was in physical therapy around that time due to hip pain. She feels as though her foot pain may have originated from compensation for her foot pain. She states she was initially evaluated by a podiatrist and had several plantar fascia injections before ultimately having a plantar fasciotomy with Dr. Montez 2022. She states she then developed a neuroma post-surgery she attributes to changing her gait and walking on her forefoot. 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 She states her foot pain has never improved following injections and surgical intervention. She was referred by Lisa Cheung in physical therapy.   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     PAST MEDICAL HISTORY:   Past Medical History:   Diagnosis Date    Hypertension     PONV (postoperative nausea and vomiting)     Rosacea        PAST SURGICAL HISTORY:   Past Surgical History: "   Procedure Laterality Date    AUGMENTATION OF BREAST Bilateral 2005    Breast Augmentation  2005    COSMETIC SURGERY  2005    Breast augmentation    DILATION AND CURETTAGE OF UTERUS N/A 10/19/2019    Procedure: DILATION AND CURETTAGE, UTERUS;  Surgeon: Frances Culp MD;  Location: [facility];  Service: OB/GYN;  Laterality: N/A;  with suction    ENDOSCOPIC PLANTAR FASCIOTOMY Left 6/17/2022    Procedure: FASCIOTOMY, PLANTAR, ENDOSCOPIC;  Surgeon: Mello Montez DPM;  Location: [facility] OR;  Service: Podiatry;  Laterality: Left;  endoscopic plantar fasciotomy kit    mischarr      ROBOT-ASSISTED LAPAROSCOPIC UTERINE MYOMECTOMY N/A 1/7/2020    Procedure: ROBOTIC MYOMECTOMY, UTERUS;  Surgeon: Frances Culp MD;  Location: [facility];  Service: OB/GYN;  Laterality: N/A;    SURGICAL REMOVAL OF FERNANDEZ'S NEUROMA Left 1/5/2023    Procedure: EXCISION, FERNANDEZ'S NEUROMA;  Surgeon: Mello Montez DPM;  Location: [facility] OR;  Service: Podiatry;  Laterality: Left;    XI ROBOTIC HYSTERECTOMY, WITH SALPINGO-OOPHORECTOMY N/A 12/15/2021    Procedure: XI ROBOTIC HYSTERECTOMY,WITH SALPINGO-OOPHORECTOMY;  Surgeon: Carla Aguero MD;  Location: [facility];  Service: OB/GYN;  Laterality: N/A;       FAMILY HISTORY:   Family History   Problem Relation Name Age of Onset    Diabetes Mother Heather Diaz     Hypertension Mother Heather Diaz     Heart failure Mother Heather Diaz         viral?    Heart disease Mother Heather Diaz     Hyperlipidemia Mother Heather Diaz     Alcohol abuse Father Nixon Diaz     Alcohol abuse Maternal Uncle Saúl Austin     Psoriasis Maternal Grandmother Heather Austin     Arthritis Maternal Grandmother Heather Austin     Diabetes Maternal Grandmother Heather Austin     Alcohol abuse Maternal Grandfather Josh Austin     COPD Maternal Grandfather Josh Austin     Cancer Paternal Grandmother Nely Diaz         Lung Cancer    Cancer Paternal Grandfather Lamine Diaz         Pancreatic Cancer    Cancer Neg Hx      Ovarian  cancer Neg Hx      Melanoma Neg Hx      Heart attack Neg Hx      Stroke Neg Hx      Colon cancer Neg Hx      Breast cancer Neg Hx      Blindness Neg Hx      Amblyopia Neg Hx      Cataracts Neg Hx      Glaucoma Neg Hx      Macular degeneration Neg Hx      Retinal detachment Neg Hx      Strabismus Neg Hx      Inflammatory bowel disease Neg Hx      Kidney disease Neg Hx      Lupus Neg Hx      Rheum arthritis Neg Hx         SOCIAL HISTORY:   Social History     Socioeconomic History    Marital status:    Occupational History    Occupation: Nurse     Employer: [employer]     Comment: Oncology   Tobacco Use    Smoking status: Never    Smokeless tobacco: Never   Substance and Sexual Activity    Alcohol use: No    Drug use: No    Sexual activity: Yes     Partners: Male     Birth control/protection: None     Comment: Vasectomy   Other Topics Concern    Are you pregnant or think you may be? No    Breast-feeding No     Social Determinants of Health     Financial Resource Strain: Low Risk  (4/4/2024)    Overall Financial Resource Strain (CARDIA)     Difficulty of Paying Living Expenses: Not hard at all   Food Insecurity: No Food Insecurity (4/4/2024)    Hunger Vital Sign     Worried About Running Out of Food in the Last Year: Never true     Ran Out of Food in the Last Year: Never true   Transportation Needs: No Transportation Needs (4/4/2024)    PRAPARE - Transportation     Lack of Transportation (Medical): No     Lack of Transportation (Non-Medical): No   Physical Activity: Unknown (4/4/2024)    Exercise Vital Sign     Days of Exercise per Week: 0 days   Stress: Stress Concern Present (4/4/2024)    [employer] - Occupational Stress Questionnaire     Feeling of Stress : Rather much   Housing Stability: Low Risk  (4/4/2024)    Housing Stability Vital Sign     Unable to Pay for Housing in the Last Year: No     Number of Places Lived in the Last Year: 1     Unstable Housing in the  Last Year: No       MEDICATIONS:     Current Outpatient Medications:     acetaminophen (TYLENOL) 500 MG tablet, Take 2 tablets (1,000 mg total) by mouth every 8 (eight) hours as needed for Pain., Disp: 60 tablet, Rfl: 0    augmented betamethasone (DIPROLENE) 0.05 % lotion, Apply to scalp nightly, Disp: 60 mL, Rfl: 3    biotin 1 mg Cap, Take by mouth., Disp: , Rfl:     carvediloL (COREG) 3.125 MG tablet, Take 1 tablet (3.125 mg total) by mouth 2 (two) times daily with meals., Disp: 180 tablet, Rfl: 3    fluocinonide (LIDEX) 0.05 % external solution, Thin film to scalp nightly, Disp: 60 mL, Rfl: 5    gabapentin (NEURONTIN) 100 MG capsule, Take 2 capsules (200 mg total) by mouth 3 (three) times daily., Disp: 540 capsule, Rfl: 3    hydrOXYzine HCL (ATARAX) 25 MG tablet, Take 1 tablet (25 mg total) by mouth nightly as needed (Insomnia)., Disp: 30 tablet, Rfl: 3    ibuprofen (ADVIL,MOTRIN) 800 MG tablet, Take 1 tablet (800 mg total) by mouth every 6 (six) hours as needed for Pain., Disp: 30 tablet, Rfl: 1    ketoconazole (NIZORAL) 2 % shampoo, Lather scalp with medicated shampoo once a week - let sit on scalp at least 5 minutes prior to rinsing, Disp: 120 mL, Rfl: 5    LIDOcaine (LIDODERM) 5 %, Place 1 patch onto the skin once daily. Remove & Discard patch within 12 hours or as directed by MD, Disp: 30 patch, Rfl: 0    methocarbamoL (ROBAXIN) 750 MG Tab, Take 1 tablet (750 mg total) by mouth nightly as needed (back spasms)., Disp: 30 tablet, Rfl: 1    minoxidiL (LONITEN) 2.5 MG tablet, TAKE 1/2 TABLET BY MOUTH DAILY, Disp: 45 tablet, Rfl: 2    multivitamin (THERAGRAN) per tablet, Take 1 tablet by mouth once daily., Disp: , Rfl:     naproxen (NAPROSYN) 500 MG tablet, Take 1 tablet (500 mg total) by mouth 2 (two) times daily with meals., Disp: 60 tablet, Rfl: 1    ondansetron (ZOFRAN-ODT) 4 MG TbDL, Take 2 tablets (8 mg total) by mouth every 12 (twelve) hours as needed (nausea)., Disp: 10 tablet, Rfl: 3    pantoprazole  "(PROTONIX) 40 MG tablet, Take 1 tablet (40 mg total) by mouth once daily., Disp: 90 tablet, Rfl: 3    spironolactone (ALDACTONE) 50 MG tablet, Take 2 tablets (100 mg total) by mouth once daily., Disp: 180 tablet, Rfl: 3    UNABLE TO FIND, RELIEF FACTOR (FISH OIL, TURMERIC, ICARLIN, RESVERATROL), Disp: , Rfl:     ALLERGIES:   Review of patient's allergies indicates:  No Known Allergies     PHYSICAL EXAMINATION:  Ht 5' 5" (1.651 m)   Wt 103.9 kg (229 lb 0.9 oz)   LMP 12/09/2021   BMI 38.12 kg/m²   Vitals signs and nursing note have been reviewed.  General: In no acute distress, well developed, well nourished, no diaphoresis  Eyes: EOM full and smooth, no eye redness or discharge  HENT: normocephalic and atraumatic, neck supple, trachea midline, no nasal discharge, no external ear redness or discharge  Cardiovascular: 2+ and symmetric radial and DP pulses bilaterally, no LE edema  Lungs: respirations non-labored, no conversational dyspnea   Abd: non-distended, no rigidity  MSK: no amputation or deformity, no swelling of extremities  Neuro: AAOx3, CN2-12 grossly intact  Skin: No rashes, warm and dry  Psychiatric: cooperative, pleasant, mood and affect appropriate for age    ANKLE: left   The affected ankle is compared to the contralateral ankle.    Observation:    There is no edema, erythema, or ecchymosis.   Shoes reveal a normal wear pattern.  No structural deformities including pes planus/cavus, hallux valgus, or toe deformities.   Negative too-many toes sign.  Normal callus pattern on the feet bilaterally.    Achilles tendon and calcaneal insertion reveals no deformities  No leg or intrinsic foot muscle atrophy.  Squatting reveals symmetric pronation of the bilateral feet.   Able to rise on toes with symmetric supination.    Normal gait without evidence of antalgia.    ROM: (expected degree)   Active dorsiflexion to 20° bilaterally.    Active plantarflexion to 50° bilaterally.    Active ankle inversion to 35° " bilaterally.    Active ankle eversion to 15° bilaterally.    Full active flexion/extension of the toes bilaterally.   Heel cords without tightness bilaterally.    Tenderness To Palpation   No tenderness at the ATFL, CFL, PTFL, or deltoid ligaments  No tenderness over the distal anterior syndesmosis, distal tibia/fibula, fibular head/shaft  No tenderness at medial or lateral malleoli   No tenderness at navicular, cuboid, cuneiforms, talus  + tenderness at the medial right calcaneous at the plantar fascia attachment  + tenderness over the left medial arch along fascial thickening/scarring  + callous bottom of left foot at distal 5th metatarsal head  No tenderness along the metatarsals or phalanges  No tenderness at the Achilles tendon calcaneal insertion  No tenderness at the tibialis posterior, flexor digitorum longus, flexor hallucis longus   No tenderness at the peroneal tendons    Strength Testing: (*pain)  Dorsiflexion - 5/5  Platarflexion - 5/5  Resisted Inversion - 5/5  Resisted Eversion - 5/5  Great Toe Extension - 5/5  Great Toe Flexion - 5/5    Special Tests:  Anterior talar drawer - negative and without dimpling  Talar tilt - negative    Heel tap test - negative  Distal tib/fib squeeze test - negative  Bryant squeeze test - negative    Metatarsal squeeze test - negative  Midfoot stress test - negative  Calcaneal squeeze test - negative    No subluxation of the peroneal tendons with resisted eversion.    Structural Exam:  Midfoot joint restrictions  Fascial herniated trigger point at the right medial calcaneus mid bone  Fascial herniated trigger point at the left proximal medial calcaneous      Key   F= Flexed   E = Extended   R = Rotated   N = Neutral   S = Sidebent   TTA = tissue texture abnormality   L/R/B (last letter) = left/right/bilateral   HTP = fascial herniated trigger point   TB = fascial trigger band   CD = fascial continuum distortion         Vascular/Sensory Exam:  DP and PT pulses intact  BL  No skin changes, no abnormal hair distribution  Sensation intact to light touch throughout the saphenous, sural, superficial peroneal, deep peroneal, and tibial nerve distributions  Negative Tinel's test over tarsal tunnel  Capillary refill intact <2 seconds in all toes bilaterally.      IMAGIN. X-ray obtained 2024 due to bilateral foot pain   2. X-ray images were reviewed personally by me and then directly with patient.  3. FINDINGS: X-ray images obtained demonstrate plantar calcaneal spur bilaterally, no acute fracture or dislocation  4. IMPRESSION: As above.       ASSESSMENT:      ICD-10-CM ICD-9-CM   1. Right foot pain  M79.671 729.5   2. Left foot pain  M79.672 729.5   3. Plantar fasciitis, bilateral  M72.2 728.71   4. History of foot surgery  Z98.890 V15.29   5. Poor body mechanics  R29.898 781.99   6. Somatic dysfunction of lower extremity  M99.06 739.6           PLAN:  1-3. Bilateral foot pain/plantar fasciitis - improved  4. History of left foot surgery  5. Poor body mechanics    - Angel admits to bilateral foot pain, left worse than right beginning in 2019 she attributes to compensation from walking differently due to right hip pain. 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     - She is now feeling 50% improved which she attributes to OMT and compliance with her HEP. She was able to walk 20,000 steps while on vacation without significant pain and has increased tolerance walking barefoot and is very pleased with her symptom improvement.     - Symptoms, exam, and imaging are most consistent with fascial restriction and myofascial tightness secondary to poor body mechanics, history of foot surgery on the left and over compensation.  We discussed the importance of decreasing inflammation and strengthening and stabilizing to help promote and maintain symptom improvement/resolution.  This is commonly accomplished with a short course of an anti-inflammatory  and icing in addition to osteopathic manipulation, a home exercise program or physical therapy.    - Based on her description/body language of pain and somatic dysfunction identified on exam, I discussed osteopathic manipulation as a treatment option today.  She consents to evaluation and treatment.  See below.    - She has not purchased the [employer] and as she is better I recommend holding off on this at this time, but reconsider if she does not continue to improve or if symptoms worsen.     - Continue with HEP for plantar fasciitis prescribed at initial visit.       6. Somatic dysfunction of lower extremity region -    - OMT 1-2 regions. Oral consent obtained. Reviewed benefits and potential side effects. OMT indicated today due to signs and symptoms as well as local and remote somatic dysfunction findings and their related neurokinetic, lymphatic, fascial and/or arteriovenous body connections. OMT techniques used: Myofascial Release, Fascial Distortion Model, and Articulatory. Treatment was tolerated well. Improvement noted in segmental mobility post-treatment in dysfunctional regions. There were no adverse events and no complications immediately following treatment. Advised plenty of water to help alleviate soreness.      Future planning includes - possibly more OMT if helpful and if indicated, diagnostic ultrasound of the foot to further assess the painful palpable fascia/scar tissue, hydrodissection if all else fails    All questions were answered to the best of my ability and all concerns were addressed at this time.    Follow up in 4 weeks for above, or sooner if needed.      This note is dictated using the M*Modal Fluency Direct word recognition program. There are word recognition mistakes that are occasionally missed on review.        Total time spent face-to face with patient counseling or coordinating care including prognosis, differential diagnosis, risks and benefits of treatment, instructions,  compliance risk reductions as well as non-face-to-face time personally spent reviewing medial record, medical documentation, and coordination of care.     EST MINUTES X   41445 10-19    59994 20-29    22494 30-39 31   [id number] 40-54    NEW     17114 15-29    90579 30-44    91107 45-59    11204 60-74    PHONE      5-10    [id number] 11-20    [id number] 21-30

## 2024-08-04 ENCOUNTER — PATIENT MESSAGE (OUTPATIENT)
Dept: SPORTS MEDICINE | Facility: CLINIC | Age: 44
End: 2024-08-04
Payer: COMMERCIAL

## 2024-08-05 NOTE — PROGRESS NOTES
"CC: left foot pain    Angel is here today for follow up evaluation of her bilateral foot pain. Patient reports her pain is 6/10 today. She appreciated great improvement in her pain following OMT at her initial visit, but did not appreciate as significant improvement after her most recent visit. She states she is feeling overall still improved compared to her initial visit, but is concerned as she is now having pain while at rest.     Recall from visit on 2024  Angel is here today for follow up evaluation of her bilateral foot pain. Patient reports her pain is 1/10 today and states she is feeling 50% improved since her initial visit which she attributes to OMT and compliance with her HEP. She was able to walk 20,000 steps on vacation without significant pain during or following and is very pleased with this as usually she would be "in tears" after walking this much. She also has been able to tolerate walking barefoot more since her last visit.    Recall from visit on 2024  44 y.o. Female presents today for evaluation of her bilateral plantar foot pain, left worse than right. She states she started fertility treatments in 2019 and was in physical therapy around that time due to hip pain. She feels as though her foot pain may have originated from compensation for her foot pain. She states she was initially evaluated by a podiatrist and had several plantar fascia injections before ultimately having a plantar fasciotomy with Dr. Montez 2022. She states she then developed a neuroma post-surgery she attributes to changing her gait and walking on her forefoot. 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 She states her foot pain has never improved following injections and surgical intervention. She was referred by Lisa Cheung in physical therapy.   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[place] - [employer]     PAST MEDICAL HISTORY:   Past Medical History:   Diagnosis Date    Hypertension     PONV (postoperative nausea and vomiting)     Rosacea        PAST SURGICAL HISTORY:   Past Surgical History:   Procedure Laterality Date    AUGMENTATION OF BREAST Bilateral 2005    Breast Augmentation  2005    COSMETIC SURGERY  2005    Breast augmentation    DILATION AND CURETTAGE OF UTERUS N/A 10/19/2019    Procedure: DILATION AND CURETTAGE, UTERUS;  Surgeon: Frances Culp MD;  Location: [facility];  Service: OB/GYN;  Laterality: N/A;  with suction    ENDOSCOPIC PLANTAR FASCIOTOMY Left 6/17/2022    Procedure: FASCIOTOMY, PLANTAR, ENDOSCOPIC;  Surgeon: Mello Montez DPM;  Location: [facility] OR;  Service: Podiatry;  Laterality: Left;  endoscopic plantar fasciotomy kit    mischarr      ROBOT-ASSISTED LAPAROSCOPIC UTERINE MYOMECTOMY N/A 1/7/2020    Procedure: ROBOTIC MYOMECTOMY, UTERUS;  Surgeon: Frances Culp MD;  Location: [facility] OR;  Service: OB/GYN;  Laterality: N/A;    SURGICAL REMOVAL OF FERNANDEZ'S NEUROMA Left 1/5/2023    Procedure: EXCISION, FERNANDEZ'S NEUROMA;  Surgeon: Mello Montez DPM;  Location: [facility] OR;  Service: Podiatry;  Laterality: Left;    XI ROBOTIC HYSTERECTOMY, WITH SALPINGO-OOPHORECTOMY N/A 12/15/2021    Procedure: XI ROBOTIC HYSTERECTOMY,WITH SALPINGO-OOPHORECTOMY;  Surgeon: Carla Aguero MD;  Location: [facility];  Service: OB/GYN;  Laterality: N/A;       FAMILY HISTORY:   Family History   Problem Relation Name Age of Onset    Diabetes Mother Heather Diaz     Hypertension Mother Heather Diaz     Heart failure Mother Heather Diaz         viral?    Heart disease Mother Heather Diaz     Hyperlipidemia Mother Heather Diaz     Alcohol abuse Father Nixon Diaz      Alcohol abuse Maternal Uncle Saúl Avila     Psoriasis Maternal Grandmother Heather Lisase     Arthritis Maternal Grandmother Heather Lisase     Diabetes Maternal Grandmother Heather Austin     Alcohol abuse Maternal Grandfather Josh Austin     COPD Maternal Grandfather Josh Avila     Cancer Paternal Grandmother Nely Diaz         Lung Cancer    Cancer Paternal Grandfather Lamine Diaz         Pancreatic Cancer    Cancer Neg Hx      Ovarian cancer Neg Hx      Melanoma Neg Hx      Heart attack Neg Hx      Stroke Neg Hx      Colon cancer Neg Hx      Breast cancer Neg Hx      Blindness Neg Hx      Amblyopia Neg Hx      Cataracts Neg Hx      Glaucoma Neg Hx      Macular degeneration Neg Hx      Retinal detachment Neg Hx      Strabismus Neg Hx      Inflammatory bowel disease Neg Hx      Kidney disease Neg Hx      Lupus Neg Hx      Rheum arthritis Neg Hx         SOCIAL HISTORY:   Social History     Socioeconomic History    Marital status:    Occupational History    Occupation: Nurse     Employer: [employer]     Comment: Oncology   Tobacco Use    Smoking status: Never    Smokeless tobacco: Never   Substance and Sexual Activity    Alcohol use: No    Drug use: No    Sexual activity: Yes     Partners: Male     Birth control/protection: None     Comment: Vasectomy   Other Topics Concern    Are you pregnant or think you may be? No    Breast-feeding No     Social Determinants of Health     Financial Resource Strain: Low Risk  (4/4/2024)    Overall Financial Resource Strain (CARDIA)     Difficulty of Paying Living Expenses: Not hard at all   Food Insecurity: No Food Insecurity (4/4/2024)    Hunger Vital Sign     Worried About Running Out of Food in the Last Year: Never true     Ran Out of Food in the Last Year: Never true   Transportation Needs: No Transportation Needs (4/4/2024)    PRAPARE - Transportation     Lack of Transportation (Medical): No     Lack of Transportation (Non-Medical): No   Physical  Activity: Unknown (4/4/2024)    Exercise Vital Sign     Days of Exercise per Week: 0 days   Stress: Stress Concern Present (4/4/2024)    [employer] [place] [employer] - Occupational Stress Questionnaire     Feeling of Stress : Rather much   Housing Stability: Low Risk  (4/4/2024)    Housing Stability Vital Sign     Unable to Pay for Housing in the Last Year: No     Number of Places Lived in the Last Year: 1     Unstable Housing in the Last Year: No       MEDICATIONS:     Current Outpatient Medications:     acetaminophen (TYLENOL) 500 MG tablet, Take 2 tablets (1,000 mg total) by mouth every 8 (eight) hours as needed for Pain., Disp: 60 tablet, Rfl: 0    augmented betamethasone (DIPROLENE) 0.05 % lotion, Apply to scalp nightly, Disp: 60 mL, Rfl: 3    biotin 1 mg Cap, Take by mouth., Disp: , Rfl:     carvediloL (COREG) 3.125 MG tablet, Take 1 tablet (3.125 mg total) by mouth 2 (two) times daily with meals., Disp: 180 tablet, Rfl: 3    fluocinonide (LIDEX) 0.05 % external solution, Thin film to scalp nightly, Disp: 60 mL, Rfl: 5    gabapentin (NEURONTIN) 100 MG capsule, Take 2 capsules (200 mg total) by mouth 3 (three) times daily., Disp: 540 capsule, Rfl: 3    hydrOXYzine HCL (ATARAX) 25 MG tablet, Take 1 tablet (25 mg total) by mouth nightly as needed (Insomnia)., Disp: 30 tablet, Rfl: 3    ibuprofen (ADVIL,MOTRIN) 800 MG tablet, Take 1 tablet (800 mg total) by mouth every 6 (six) hours as needed for Pain., Disp: 30 tablet, Rfl: 1    ketoconazole (NIZORAL) 2 % shampoo, Lather scalp with medicated shampoo once a week - let sit on scalp at least 5 minutes prior to rinsing, Disp: 120 mL, Rfl: 5    LIDOcaine (LIDODERM) 5 %, Place 1 patch onto the skin once daily. Remove & Discard patch within 12 hours or as directed by MD, Disp: 30 patch, Rfl: 0    methocarbamoL (ROBAXIN) 750 MG Tab, Take 1 tablet (750 mg total) by mouth nightly as needed (back spasms)., Disp: 30 tablet, Rfl: 1    minoxidiL (LONITEN) 2.5 MG  tablet, TAKE 1/2 TABLET BY MOUTH DAILY, Disp: 45 tablet, Rfl: 2    multivitamin (THERAGRAN) per tablet, Take 1 tablet by mouth once daily., Disp: , Rfl:     naproxen (NAPROSYN) 500 MG tablet, Take 1 tablet (500 mg total) by mouth 2 (two) times daily with meals., Disp: 60 tablet, Rfl: 1    ondansetron (ZOFRAN-ODT) 4 MG TbDL, Take 2 tablets (8 mg total) by mouth every 12 (twelve) hours as needed (nausea)., Disp: 10 tablet, Rfl: 3    pantoprazole (PROTONIX) 40 MG tablet, Take 1 tablet (40 mg total) by mouth once daily., Disp: 90 tablet, Rfl: 3    spironolactone (ALDACTONE) 50 MG tablet, Take 2 tablets (100 mg total) by mouth once daily., Disp: 180 tablet, Rfl: 3    UNABLE TO FIND, RELIEF FACTOR (FISH OIL, TURMERIC, ICARLIN, RESVERATROL), Disp: , Rfl:     ALLERGIES:   Review of patient's allergies indicates:  No Known Allergies     PHYSICAL EXAMINATION:  /81   Pulse 82   Wt (!) 141 kg (310 lb 13.6 oz)   LMP 12/09/2021   BMI 51.73 kg/m²   Vitals signs and nursing note have been reviewed.  General: In no acute distress, well developed, well nourished, no diaphoresis  Eyes: EOM full and smooth, no eye redness or discharge  HENT: normocephalic and atraumatic, neck supple, trachea midline, no nasal discharge, no external ear redness or discharge  Cardiovascular: 2+ and symmetric radial and DP pulses bilaterally, no LE edema  Lungs: respirations non-labored, no conversational dyspnea   Abd: non-distended, no rigidity  MSK: no amputation or deformity, no swelling of extremities  Neuro: AAOx3, CN2-12 grossly intact  Skin: No rashes, warm and dry  Psychiatric: cooperative, pleasant, mood and affect appropriate for age    ANKLE: left   The affected ankle is compared to the contralateral ankle.    Observation:    There is no edema, erythema, or ecchymosis.   Shoes reveal a normal wear pattern.  No structural deformities including pes planus/cavus, hallux valgus, or toe deformities.   Negative too-many toes sign.  Normal  callus pattern on the feet bilaterally.    Achilles tendon and calcaneal insertion reveals no deformities  No leg or intrinsic foot muscle atrophy.  Squatting reveals symmetric pronation of the bilateral feet.   Able to rise on toes with symmetric supination.    Normal gait without evidence of antalgia.    ROM: (expected degree)   Active dorsiflexion to 20° bilaterally.    Active plantarflexion to 50° bilaterally.    Active ankle inversion to 35° bilaterally.    Active ankle eversion to 15° bilaterally.    Full active flexion/extension of the toes bilaterally.   Heel cords without tightness bilaterally.    Tenderness To Palpation   No tenderness at the ATFL, CFL, PTFL, or deltoid ligaments  No tenderness over the distal anterior syndesmosis, distal tibia/fibula, fibular head/shaft  No tenderness at medial or lateral malleoli   No tenderness at navicular, cuboid, cuneiforms, talus  + tenderness at the medial right calcaneous at the plantar fascia attachment  No tenderness over the left medial arch along fascial thickening/scarring  + callous bottom of left foot at distal 5th metatarsal head  No tenderness along the metatarsals or phalanges  No tenderness at the Achilles tendon calcaneal insertion  No tenderness at the tibialis posterior, flexor digitorum longus, flexor hallucis longus   No tenderness at the peroneal tendons    Strength Testing: (*pain)  Dorsiflexion - 5/5  Platarflexion - 5/5  Resisted Inversion - 5/5  Resisted Eversion - 5/5  Great Toe Extension - 5/5  Great Toe Flexion - 5/5    Special Tests:  Anterior talar drawer - negative and without dimpling  Talar tilt - negative    Heel tap test - negative  Distal tib/fib squeeze test - negative  Bryant squeeze test - negative    Metatarsal squeeze test - negative  Midfoot stress test - negative  Calcaneal squeeze test - negative    No subluxation of the peroneal tendons with resisted eversion.    Structural Exam:  Midfoot joint restriction  bilaterally  Dropped cuboid on the right  CD at the right medial calcaneus at the plantar fascia attachment  CD at the left proximal medial calcaneous      Key   F= Flexed   E = Extended   R = Rotated   N = Neutral   S = Sidebent   TTA = tissue texture abnormality   L/R/B (last letter) = left/right/bilateral   HTP = fascial herniated trigger point   TB = fascial trigger band   CD = fascial continuum distortion         Vascular/Sensory Exam:  DP and PT pulses intact BL  No skin changes, no abnormal hair distribution  Sensation intact to light touch throughout the saphenous, sural, superficial peroneal, deep peroneal, and tibial nerve distributions  Negative Tinel's test over tarsal tunnel  Capillary refill intact <2 seconds in all toes bilaterally.      IMAGIN. X-ray obtained 2024 due to bilateral foot pain   2. X-ray images were reviewed personally by me and then directly with patient.  3. FINDINGS: X-ray images obtained demonstrate plantar calcaneal spur bilaterally, no acute fracture or dislocation  4. IMPRESSION: As above.       ASSESSMENT:      ICD-10-CM ICD-9-CM   1. Right foot pain  M79.671 729.5   2. Left foot pain  M79.672 729.5   3. Plantar fasciitis, bilateral  M72.2 728.71   4. History of foot surgery  Z98.890 V15.29   5. Poor body mechanics  R29.898 781.99   6. Somatic dysfunction of lower extremity  M99.06 739.6           PLAN:  1-3. Bilateral foot pain/plantar fasciitis - improved, recent exacerbation  4. History of left foot surgery  5. Poor body mechanics    - Angel admits to bilateral foot pain, left worse than right beginning in 2019 she attributes to compensation from walking differently due to right hip pain. 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     - She states her pain has been severe since her last visit. She was previously 50% improved after OMT following her initial visit and was very pleased with this. She remains somewhat improved,  but states she is now appreciating pain while at rest but not as intense as before.     - Symptoms, exam, and imaging are still most consistent with fascial restriction and myofascial tightness secondary to poor body mechanics, history of foot surgery on the left and over compensation.  We discussed the importance of decreasing inflammation and strengthening and stabilizing to help promote and maintain symptom improvement/resolution.  This is commonly accomplished with a short course of an anti-inflammatory and icing in addition to osteopathic manipulation, a home exercise program or physical therapy.    - Based on her description/body language of pain and somatic dysfunction identified on exam, I discussed osteopathic manipulation as a treatment option today.  She consents to evaluation and treatment.  See below.    - Reconsider Wirt Insoles as her pain has been worsening since her last visit.     - Continue with HEP for plantar fasciitis prescribed at initial visit.       6. Somatic dysfunction of lower extremity region -    - OMT 1-2 regions. Oral consent obtained. Reviewed benefits and potential side effects. OMT indicated today due to signs and symptoms as well as local and remote somatic dysfunction findings and their related neurokinetic, lymphatic, fascial and/or arteriovenous body connections. OMT techniques used: Myofascial Release, Fascial Distortion Model, and Articulatory. Treatment was tolerated well. Improvement noted in segmental mobility post-treatment in dysfunctional regions. There were no adverse events and no complications immediately following treatment. Advised plenty of water to help alleviate soreness.      Future planning includes - possibly more OMT if helpful and if indicated, diagnostic ultrasound of the foot to further assess the painful palpable fascia/scar tissue, hydrodissection if all else fails    All questions were answered to the best of my ability and all concerns were  addressed at this time.    Follow up in 3-4 weeks for above, or sooner if needed.      This note is dictated using the M*Modal Fluency Direct word recognition program. There are word recognition mistakes that are occasionally missed on review.      Total time spent face-to face with patient counseling or coordinating care including prognosis, differential diagnosis, risks and benefits of treatment, instructions, compliance risk reductions as well as non-face-to-face time personally spent reviewing medial record, medical documentation, and coordination of care.     EST MINUTES X   [id number] 10-19    [id number] 20-29    [id number] 30-39 30   [id number] 40-54    NEW     [id number] 15-29    60300 30-44    88026 45-59    97048 60-74    PHONE      5-10    [id number] 11-20    [id number] 21-30

## 2024-08-06 ENCOUNTER — OFFICE VISIT (OUTPATIENT)
Dept: OBSTETRICS AND GYNECOLOGY | Facility: CLINIC | Age: 44
End: 2024-08-06
Payer: COMMERCIAL

## 2024-08-06 VITALS
BODY MASS INDEX: 38.28 KG/M2 | WEIGHT: 229.75 LBS | DIASTOLIC BLOOD PRESSURE: 76 MMHG | SYSTOLIC BLOOD PRESSURE: 106 MMHG | HEIGHT: 65 IN

## 2024-08-06 DIAGNOSIS — Z90.710 S/P HYSTERECTOMY: ICD-10-CM

## 2024-08-06 DIAGNOSIS — Z01.419 ENCOUNTER FOR GYNECOLOGICAL EXAMINATION WITHOUT ABNORMAL FINDING: Primary | ICD-10-CM

## 2024-08-06 DIAGNOSIS — R63.5 WEIGHT GAIN: ICD-10-CM

## 2024-08-06 DIAGNOSIS — Z12.31 ENCOUNTER FOR SCREENING MAMMOGRAM FOR BREAST CANCER: ICD-10-CM

## 2024-08-06 PROCEDURE — 3008F BODY MASS INDEX DOCD: CPT | Mod: CPTII,S$GLB,, | Performed by: OBSTETRICS & GYNECOLOGY

## 2024-08-06 PROCEDURE — 99396 PREV VISIT EST AGE 40-64: CPT | Mod: S$GLB,,, | Performed by: OBSTETRICS & GYNECOLOGY

## 2024-08-06 PROCEDURE — 3078F DIAST BP <80 MM HG: CPT | Mod: CPTII,S$GLB,, | Performed by: OBSTETRICS & GYNECOLOGY

## 2024-08-06 PROCEDURE — 1159F MED LIST DOCD IN RCRD: CPT | Mod: CPTII,S$GLB,, | Performed by: OBSTETRICS & GYNECOLOGY

## 2024-08-06 PROCEDURE — 99999 PR PBB SHADOW E&M-EST. PATIENT-LVL IV: CPT | Mod: PBBFAC,,, | Performed by: OBSTETRICS & GYNECOLOGY

## 2024-08-06 PROCEDURE — 1160F RVW MEDS BY RX/DR IN RCRD: CPT | Mod: CPTII,S$GLB,, | Performed by: OBSTETRICS & GYNECOLOGY

## 2024-08-06 PROCEDURE — 3074F SYST BP LT 130 MM HG: CPT | Mod: CPTII,S$GLB,, | Performed by: OBSTETRICS & GYNECOLOGY

## 2024-08-06 PROCEDURE — 3044F HG A1C LEVEL LT 7.0%: CPT | Mod: CPTII,S$GLB,, | Performed by: OBSTETRICS & GYNECOLOGY

## 2024-08-12 ENCOUNTER — OFFICE VISIT (OUTPATIENT)
Dept: SPORTS MEDICINE | Facility: CLINIC | Age: 44
End: 2024-08-12
Payer: COMMERCIAL

## 2024-08-12 VITALS
WEIGHT: 293 LBS | BODY MASS INDEX: 51.73 KG/M2 | SYSTOLIC BLOOD PRESSURE: 122 MMHG | DIASTOLIC BLOOD PRESSURE: 81 MMHG | HEART RATE: 82 BPM

## 2024-08-12 DIAGNOSIS — M72.2 PLANTAR FASCIITIS, BILATERAL: ICD-10-CM

## 2024-08-12 DIAGNOSIS — M99.06 SOMATIC DYSFUNCTION OF LOWER EXTREMITY: ICD-10-CM

## 2024-08-12 DIAGNOSIS — R29.898 POOR BODY MECHANICS: ICD-10-CM

## 2024-08-12 DIAGNOSIS — M79.672 LEFT FOOT PAIN: ICD-10-CM

## 2024-08-12 DIAGNOSIS — M79.671 RIGHT FOOT PAIN: Primary | ICD-10-CM

## 2024-08-12 DIAGNOSIS — Z98.890 HISTORY OF FOOT SURGERY: ICD-10-CM

## 2024-08-12 PROCEDURE — 1159F MED LIST DOCD IN RCRD: CPT | Mod: CPTII,S$GLB,, | Performed by: ORTHOPAEDIC SURGERY

## 2024-08-12 PROCEDURE — 99214 OFFICE O/P EST MOD 30 MIN: CPT | Mod: 25,S$GLB,, | Performed by: ORTHOPAEDIC SURGERY

## 2024-08-12 PROCEDURE — 3044F HG A1C LEVEL LT 7.0%: CPT | Mod: CPTII,S$GLB,, | Performed by: ORTHOPAEDIC SURGERY

## 2024-08-12 PROCEDURE — 98925 OSTEOPATH MANJ 1-2 REGIONS: CPT | Mod: S$GLB,,, | Performed by: ORTHOPAEDIC SURGERY

## 2024-08-12 PROCEDURE — 3008F BODY MASS INDEX DOCD: CPT | Mod: CPTII,S$GLB,, | Performed by: ORTHOPAEDIC SURGERY

## 2024-08-12 PROCEDURE — 3079F DIAST BP 80-89 MM HG: CPT | Mod: CPTII,S$GLB,, | Performed by: ORTHOPAEDIC SURGERY

## 2024-08-12 PROCEDURE — 1160F RVW MEDS BY RX/DR IN RCRD: CPT | Mod: CPTII,S$GLB,, | Performed by: ORTHOPAEDIC SURGERY

## 2024-08-12 PROCEDURE — 99999 PR PBB SHADOW E&M-EST. PATIENT-LVL III: CPT | Mod: PBBFAC,,, | Performed by: ORTHOPAEDIC SURGERY

## 2024-08-12 PROCEDURE — 3074F SYST BP LT 130 MM HG: CPT | Mod: CPTII,S$GLB,, | Performed by: ORTHOPAEDIC SURGERY

## 2024-08-19 ENCOUNTER — PATIENT MESSAGE (OUTPATIENT)
Dept: DERMATOLOGY | Facility: CLINIC | Age: 44
End: 2024-08-19
Payer: COMMERCIAL

## 2024-08-23 ENCOUNTER — PATIENT MESSAGE (OUTPATIENT)
Dept: PRIMARY CARE CLINIC | Facility: CLINIC | Age: 44
End: 2024-08-23
Payer: COMMERCIAL

## 2024-08-23 DIAGNOSIS — Z98.82 BREAST IMPLANT STATUS: Primary | ICD-10-CM

## 2024-08-26 ENCOUNTER — TELEPHONE (OUTPATIENT)
Dept: PLASTIC SURGERY | Facility: CLINIC | Age: 44
End: 2024-08-26
Payer: COMMERCIAL

## 2024-09-03 ENCOUNTER — OFFICE VISIT (OUTPATIENT)
Dept: SPORTS MEDICINE | Facility: CLINIC | Age: 44
End: 2024-09-03
Payer: COMMERCIAL

## 2024-09-03 ENCOUNTER — PATIENT MESSAGE (OUTPATIENT)
Dept: SPORTS MEDICINE | Facility: CLINIC | Age: 44
End: 2024-09-03

## 2024-09-03 VITALS
WEIGHT: 231.25 LBS | HEIGHT: 65 IN | SYSTOLIC BLOOD PRESSURE: 108 MMHG | DIASTOLIC BLOOD PRESSURE: 75 MMHG | BODY MASS INDEX: 38.53 KG/M2 | HEART RATE: 67 BPM

## 2024-09-03 DIAGNOSIS — M79.672 LEFT FOOT PAIN: ICD-10-CM

## 2024-09-03 DIAGNOSIS — M72.2 PLANTAR FASCIITIS, BILATERAL: ICD-10-CM

## 2024-09-03 DIAGNOSIS — R29.898 POOR BODY MECHANICS: ICD-10-CM

## 2024-09-03 DIAGNOSIS — Z98.890 HISTORY OF FOOT SURGERY: ICD-10-CM

## 2024-09-03 DIAGNOSIS — M99.06 SOMATIC DYSFUNCTION OF LOWER EXTREMITY: ICD-10-CM

## 2024-09-03 DIAGNOSIS — M79.671 RIGHT FOOT PAIN: Primary | ICD-10-CM

## 2024-09-03 PROCEDURE — 3074F SYST BP LT 130 MM HG: CPT | Mod: CPTII,S$GLB,, | Performed by: ORTHOPAEDIC SURGERY

## 2024-09-03 PROCEDURE — 3044F HG A1C LEVEL LT 7.0%: CPT | Mod: CPTII,S$GLB,, | Performed by: ORTHOPAEDIC SURGERY

## 2024-09-03 PROCEDURE — 3008F BODY MASS INDEX DOCD: CPT | Mod: CPTII,S$GLB,, | Performed by: ORTHOPAEDIC SURGERY

## 2024-09-03 PROCEDURE — 1159F MED LIST DOCD IN RCRD: CPT | Mod: CPTII,S$GLB,, | Performed by: ORTHOPAEDIC SURGERY

## 2024-09-03 PROCEDURE — 1160F RVW MEDS BY RX/DR IN RCRD: CPT | Mod: CPTII,S$GLB,, | Performed by: ORTHOPAEDIC SURGERY

## 2024-09-03 PROCEDURE — 99999 PR PBB SHADOW E&M-EST. PATIENT-LVL III: CPT | Mod: PBBFAC,,, | Performed by: ORTHOPAEDIC SURGERY

## 2024-09-03 PROCEDURE — 3078F DIAST BP <80 MM HG: CPT | Mod: CPTII,S$GLB,, | Performed by: ORTHOPAEDIC SURGERY

## 2024-09-03 PROCEDURE — 99214 OFFICE O/P EST MOD 30 MIN: CPT | Mod: 25,S$GLB,, | Performed by: ORTHOPAEDIC SURGERY

## 2024-09-03 PROCEDURE — 98925 OSTEOPATH MANJ 1-2 REGIONS: CPT | Mod: S$GLB,,, | Performed by: ORTHOPAEDIC SURGERY

## 2024-09-03 RX ORDER — IBUPROFEN 800 MG/1
800 TABLET ORAL EVERY 6 HOURS PRN
Qty: 30 TABLET | Refills: 1 | Status: SHIPPED | OUTPATIENT
Start: 2024-09-03

## 2024-09-03 NOTE — PROGRESS NOTES
"CC: left foot pain    Angel is here today for follow up evaluation of her bilateral foot pain. Patient reports her pain is 3/10 today. She admits to great improvement in her pain following OMT at her most recent visit and states she is now feeling 75% improved. She states she worked 18 of the last 21 nights and has been able to workout without appreciating an increase in her pain. She is very pleased with her progress at this time. She is going on a trip to [place] in October and would like to follow up again prior to this as she has been significantly improved with our current treatment plan.     Recall from visit on 08/12/2024   Angel is here today for follow up evaluation of her bilateral foot pain. Patient reports her pain is 6/10 today. She appreciated great improvement in her pain following OMT at her initial visit, but did not appreciate as significant improvement after her most recent visit. She states she is feeling overall still improved compared to her initial visit, but is concerned as she is now having pain while at rest.     Recall from visit on 07/22/2024  Angel is here today for follow up evaluation of her bilateral foot pain. Patient reports her pain is 1/10 today and states she is feeling 50% improved since her initial visit which she attributes to OMT and compliance with her HEP. She was able to walk 20,000 steps on vacation without significant pain during or following and is very pleased with this as usually she would be "in tears" after walking this much. She also has been able to tolerate walking barefoot more since her last visit.    Recall from visit on 06/27/2024  44 y.o. Female presents today for evaluation of her bilateral plantar foot pain, left worse than right. She states she started fertility treatments in 2019 and was in physical therapy around that time due to hip pain. She feels as though her foot pain may have originated from compensation for her foot pain. She states she was " initially evaluated by a podiatrist and had several plantar fascia injections before ultimately having a plantar fasciotomy with Dr. Montez 2022. She states she then developed a neuroma post-surgery she attributes to changing her gait and walking on her forefoot. 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 She states her foot pain has never improved following injections and surgical intervention. She was referred by Lisa Cheung in physical therapy.   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[employer] - [employer]     PAST MEDICAL HISTORY:   Past Medical History:   Diagnosis Date    Hypertension     PONV (postoperative nausea and vomiting)     Rosacea        PAST SURGICAL HISTORY:   Past Surgical History:   Procedure Laterality Date    AUGMENTATION OF BREAST Bilateral 2005    Breast Augmentation      COSMETIC SURGERY      Breast augmentation    DILATION AND CURETTAGE OF UTERUS N/A 10/19/2019    Procedure: DILATION AND CURETTAGE, UTERUS;  Surgeon: Frances Culp MD;  Location: [facility] OR;  Service: OB/GYN;  Laterality: N/A;  with suction    ENDOSCOPIC PLANTAR FASCIOTOMY Left 2022    Procedure: FASCIOTOMY, PLANTAR, ENDOSCOPIC;  Surgeon: Mello Montez DPM;  Location: [facility] OR;  Service: Podiatry;  Laterality: Left;  endoscopic plantar fasciotomy kit    mischarr      ROBOT-ASSISTED LAPAROSCOPIC UTERINE MYOMECTOMY N/A 2020    Procedure: ROBOTIC MYOMECTOMY, UTERUS;  Surgeon: Frances Culp MD;  Location: [facility] OR;  Service: OB/GYN;  Laterality: N/A;    SURGICAL REMOVAL OF SPARROW'S NEUROMA Left 2023    Procedure: EXCISION, JIM'S  NEUROMA;  Surgeon: Mello Montez DPM;  Location: [facility] OR;  Service: Podiatry;  Laterality: Left;    XI ROBOTIC HYSTERECTOMY, WITH SALPINGO-OOPHORECTOMY N/A 12/15/2021    Procedure: XI ROBOTIC HYSTERECTOMY,WITH SALPINGO-OOPHORECTOMY;  Surgeon: Carla Aguero MD;  Location: [facility] OR;  Service: OB/GYN;  Laterality: N/A;       FAMILY HISTORY:   Family History   Problem Relation Name Age of Onset    Diabetes Mother Heather Diaz     Hypertension Mother Heather Diaz     Heart failure Mother Heather Diaz         viral?    Heart disease Mother Heather Diaz     Hyperlipidemia Mother Heather Diaz     Alcohol abuse Father Nixon Diaz     Alcohol abuse Maternal Uncle Saúl Avila     Psoriasis Maternal Grandmother Heather Austin     Arthritis Maternal Grandmother Heather Austin     Diabetes Maternal Grandmother Heather Austin     Alcohol abuse Maternal Grandfather Josh Austin     COPD Maternal Grandfather Josh Austin     Cancer Paternal Grandmother Nely Diaz         Lung Cancer    Cancer Paternal Grandfather Lamine Diaz         Pancreatic Cancer    Cancer Neg Hx      Ovarian cancer Neg Hx      Melanoma Neg Hx      Heart attack Neg Hx      Stroke Neg Hx      Colon cancer Neg Hx      Breast cancer Neg Hx      Blindness Neg Hx      Amblyopia Neg Hx      Cataracts Neg Hx      Glaucoma Neg Hx      Macular degeneration Neg Hx      Retinal detachment Neg Hx      Strabismus Neg Hx      Inflammatory bowel disease Neg Hx      Kidney disease Neg Hx      Lupus Neg Hx      Rheum arthritis Neg Hx         SOCIAL HISTORY:   Social History     Socioeconomic History    Marital status:    Occupational History    Occupation: Nurse     Employer: [employer]     Comment: Oncology   Tobacco Use    Smoking status: Never    Smokeless tobacco: Never   Substance and Sexual Activity    Alcohol use: No    Drug use: No    Sexual activity: Yes     Partners: Male     Birth control/protection: None     Comment: Vasectomy   Other Topics Concern     Are you pregnant or think you may be? No    Breast-feeding No     Social Determinants of Health     Financial Resource Strain: Low Risk  (4/4/2024)    Overall Financial Resource Strain (CARDIA)     Difficulty of Paying Living Expenses: Not hard at all   Food Insecurity: No Food Insecurity (4/4/2024)    Hunger Vital Sign     Worried About Running Out of Food in the Last Year: Never true     Ran Out of Food in the Last Year: Never true   Transportation Needs: No Transportation Needs (4/4/2024)    PRAPARE - Transportation     Lack of Transportation (Medical): No     Lack of Transportation (Non-Medical): No   Physical Activity: Unknown (4/4/2024)    Exercise Vital Sign     Days of Exercise per Week: 0 days   Stress: Stress Concern Present (4/4/2024)    [place] [facility] [employer] - Occupational Stress Questionnaire     Feeling of Stress : Rather much   Housing Stability: Low Risk  (4/4/2024)    Housing Stability Vital Sign     Unable to Pay for Housing in the Last Year: No     Number of Places Lived in the Last Year: 1     Unstable Housing in the Last Year: No       MEDICATIONS:     Current Outpatient Medications:     acetaminophen (TYLENOL) 500 MG tablet, Take 2 tablets (1,000 mg total) by mouth every 8 (eight) hours as needed for Pain., Disp: 60 tablet, Rfl: 0    augmented betamethasone (DIPROLENE) 0.05 % lotion, Apply to scalp nightly, Disp: 60 mL, Rfl: 3    biotin 1 mg Cap, Take by mouth., Disp: , Rfl:     carvediloL (COREG) 3.125 MG tablet, Take 1 tablet (3.125 mg total) by mouth 2 (two) times daily with meals., Disp: 180 tablet, Rfl: 3    fluocinonide (LIDEX) 0.05 % external solution, Thin film to scalp nightly, Disp: 60 mL, Rfl: 5    gabapentin (NEURONTIN) 100 MG capsule, Take 2 capsules (200 mg total) by mouth 3 (three) times daily., Disp: 540 capsule, Rfl: 3    hydrOXYzine HCL (ATARAX) 25 MG tablet, Take 1 tablet (25 mg total) by mouth nightly as needed (Insomnia)., Disp: 30 tablet, Rfl: 3     "ibuprofen (ADVIL,MOTRIN) 800 MG tablet, Take 1 tablet (800 mg total) by mouth every 6 (six) hours as needed for Pain., Disp: 30 tablet, Rfl: 1    ketoconazole (NIZORAL) 2 % shampoo, Lather scalp with medicated shampoo once a week - let sit on scalp at least 5 minutes prior to rinsing, Disp: 120 mL, Rfl: 5    LIDOcaine (LIDODERM) 5 %, Place 1 patch onto the skin once daily. Remove & Discard patch within 12 hours or as directed by MD, Disp: 30 patch, Rfl: 0    methocarbamoL (ROBAXIN) 750 MG Tab, Take 1 tablet (750 mg total) by mouth nightly as needed (back spasms)., Disp: 30 tablet, Rfl: 1    minoxidiL (LONITEN) 2.5 MG tablet, TAKE 1/2 TABLET BY MOUTH DAILY, Disp: 45 tablet, Rfl: 2    multivitamin (THERAGRAN) per tablet, Take 1 tablet by mouth once daily., Disp: , Rfl:     naproxen (NAPROSYN) 500 MG tablet, Take 1 tablet (500 mg total) by mouth 2 (two) times daily with meals., Disp: 60 tablet, Rfl: 1    ondansetron (ZOFRAN-ODT) 4 MG TbDL, Take 2 tablets (8 mg total) by mouth every 12 (twelve) hours as needed (nausea)., Disp: 10 tablet, Rfl: 3    pantoprazole (PROTONIX) 40 MG tablet, Take 1 tablet (40 mg total) by mouth once daily., Disp: 90 tablet, Rfl: 3    spironolactone (ALDACTONE) 50 MG tablet, Take 2 tablets (100 mg total) by mouth once daily., Disp: 180 tablet, Rfl: 3    UNABLE TO FIND, RELIEF FACTOR (FISH OIL, TURMERIC, ICARLIN, RESVERATROL), Disp: , Rfl:     ALLERGIES:   Review of patient's allergies indicates:  No Known Allergies     PHYSICAL EXAMINATION:  /75   Pulse 67   Ht 5' 5" (1.651 m)   Wt 104.9 kg (231 lb 4.2 oz)   LMP 12/09/2021   BMI 38.48 kg/m²   Vitals signs and nursing note have been reviewed.  General: In no acute distress, well developed, well nourished, no diaphoresis  Eyes: EOM full and smooth, no eye redness or discharge  HENT: normocephalic and atraumatic, neck supple, trachea midline, no nasal discharge, no external ear redness or discharge  Cardiovascular: 2+ and symmetric " radial and DP pulses bilaterally, no LE edema  Lungs: respirations non-labored, no conversational dyspnea   Abd: non-distended, no rigidity  MSK: no amputation or deformity, no swelling of extremities  Neuro: AAOx3, CN2-12 grossly intact  Skin: No rashes, warm and dry  Psychiatric: cooperative, pleasant, mood and affect appropriate for age    ANKLE: left   The affected ankle is compared to the contralateral ankle.    Observation:    There is no edema, erythema, or ecchymosis.   Shoes reveal a normal wear pattern.  No structural deformities including pes planus/cavus, hallux valgus, or toe deformities.   Negative too-many toes sign.  Normal callus pattern on the feet bilaterally.    Achilles tendon and calcaneal insertion reveals no deformities  No leg or intrinsic foot muscle atrophy.  Squatting reveals symmetric pronation of the bilateral feet.   Able to rise on toes with symmetric supination.    Normal gait without evidence of antalgia.    ROM: (expected degree)   Active dorsiflexion to 20° bilaterally.    Active plantarflexion to 50° bilaterally.    Active ankle inversion to 35° bilaterally.    Active ankle eversion to 15° bilaterally.    Full active flexion/extension of the toes bilaterally.   Heel cords without tightness bilaterally.    Tenderness To Palpation   No tenderness at the ATFL, CFL, PTFL, or deltoid ligaments  No tenderness over the distal anterior syndesmosis, distal tibia/fibula, fibular head/shaft  No tenderness at medial or lateral malleoli   No tenderness at navicular, cuboid, cuneiforms, talus  + tenderness at the medial left calcaneous at the plantar fascia attachment  No tenderness over the left medial arch along fascial thickening/scarring  + callous bottom of left foot at distal 5th metatarsal head  No tenderness along the metatarsals or phalanges  No tenderness at the Achilles tendon calcaneal insertion  No tenderness at the tibialis posterior, flexor digitorum longus, flexor hallucis  longus   No tenderness at the peroneal tendons    Strength Testing: (*pain)  Dorsiflexion - 5/5  Platarflexion - 5/5  Resisted Inversion - 5/5  Resisted Eversion - 5/5  Great Toe Extension - 5/5  Great Toe Flexion - 5/5    Special Tests:  Anterior talar drawer - negative and without dimpling  Talar tilt - negative    Heel tap test - negative  Distal tib/fib squeeze test - negative  Bryant squeeze test - negative    Metatarsal squeeze test - negative  Midfoot stress test - negative  Calcaneal squeeze test - negative    No subluxation of the peroneal tendons with resisted eversion.    Structural Exam:  Midfoot joint restrictions left > right  HTP at the left medial calcaneus at the plantar fascia attachment  CD at the right medial calcaneous at the plantar fascia attachment      Key   F= Flexed   E = Extended   R = Rotated   N = Neutral   S = Sidebent   TTA = tissue texture abnormality   L/R/B (last letter) = left/right/bilateral   HTP = fascial herniated trigger point   TB = fascial trigger band   CD = fascial continuum distortion         Vascular/Sensory Exam:  DP and PT pulses intact BL  No skin changes, no abnormal hair distribution  Sensation intact to light touch throughout the saphenous, sural, superficial peroneal, deep peroneal, and tibial nerve distributions  Negative Tinel's test over tarsal tunnel  Capillary refill intact <2 seconds in all toes bilaterally.      IMAGIN. X-ray obtained 2024 due to bilateral foot pain   2. X-ray images were reviewed personally by me and then directly with patient.  3. FINDINGS: X-ray images obtained demonstrate plantar calcaneal spur bilaterally, no acute fracture or dislocation  4. IMPRESSION: As above.       ASSESSMENT:      ICD-10-CM ICD-9-CM   1. Right foot pain  M79.671 729.5   2. Left foot pain  M79.672 729.5   3. Plantar fasciitis, bilateral  M72.2 728.71   4. History of foot surgery  Z98.890 V15.29   5. Poor body mechanics  R29.898 781.99   6. Somatic  dysfunction of lower extremity  M99.06 739.6         PLAN:  1-3. Bilateral foot pain/plantar fasciitis - improved  4. History of left foot surgery  5. Poor body mechanics    - Angel admits to bilateral foot pain, left worse than right beginning in 2019 she attributes to compensation from walking differently due to right hip pain. 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     - She states she is now feeling 75% improved and is very pleased with her progress. She has worked 18 of the last 21 nights and been able to workout without appreciating an increase in pain which is not usual for her as her pain usually worsens with the more time she spends on her feet at work.     - Symptoms, exam, and imaging are still most consistent with fascial restriction and myofascial tightness secondary to poor body mechanics, history of foot surgery on the left and over compensation.  We discussed the importance of decreasing inflammation and strengthening and stabilizing to help promote and maintain symptom improvement/resolution.  This is commonly accomplished with a short course of an anti-inflammatory and icing in addition to osteopathic manipulation, a home exercise program or physical therapy.    - Based on her description/body language of pain and somatic dysfunction identified on exam, I discussed osteopathic manipulation as a treatment option today.  She consents to evaluation and treatment.  See below.    - Continue with HEP for plantar fasciitis prescribed at initial visit.     - Ibuprofen 800mg refilled.      6. Somatic dysfunction of lower extremity region -    - OMT 1-2 regions. Oral consent obtained. Reviewed benefits and potential side effects. OMT indicated today due to signs and symptoms as well as local and remote somatic dysfunction findings and their related neurokinetic, lymphatic, fascial and/or arteriovenous body connections. OMT techniques used: Soft Tissue, Myofascial  Release, Fascial Distortion Model, and Articulatory. Treatment was tolerated well. Improvement noted in segmental mobility post-treatment in dysfunctional regions. There were no adverse events and no complications immediately following treatment. Advised plenty of water to help alleviate soreness.      Future planning includes - possibly more OMT if helpful and if indicated, diagnostic ultrasound of the foot to further assess the painful palpable fascia/scar tissue, hydrodissection if all else fails    All questions were answered to the best of my ability and all concerns were addressed at this time.    Follow up in 3-4 weeks for above, or sooner if needed.      This note is dictated using the M*Modal Fluency Direct word recognition program. There are word recognition mistakes that are occasionally missed on review.

## 2024-09-03 NOTE — ADDENDUM NOTE
Addended by: ROBERT DAVENPORT on: 9/3/2024 12:08 PM     Modules accepted: Orders, Level of Service

## 2024-09-04 ENCOUNTER — OFFICE VISIT (OUTPATIENT)
Dept: PLASTIC SURGERY | Facility: CLINIC | Age: 44
End: 2024-09-04
Payer: COMMERCIAL

## 2024-09-04 VITALS
SYSTOLIC BLOOD PRESSURE: 121 MMHG | WEIGHT: 231.25 LBS | HEART RATE: 62 BPM | DIASTOLIC BLOOD PRESSURE: 71 MMHG | HEIGHT: 65 IN | BODY MASS INDEX: 38.53 KG/M2

## 2024-09-04 DIAGNOSIS — T85.43XA RUPTURE OF IMPLANT OF RIGHT BREAST, INITIAL ENCOUNTER: Primary | ICD-10-CM

## 2024-09-04 PROCEDURE — 3008F BODY MASS INDEX DOCD: CPT | Mod: CPTII,S$GLB,, | Performed by: SURGERY

## 2024-09-04 PROCEDURE — 99203 OFFICE O/P NEW LOW 30 MIN: CPT | Mod: S$GLB,,, | Performed by: SURGERY

## 2024-09-04 PROCEDURE — 99999 PR PBB SHADOW E&M-EST. PATIENT-LVL III: CPT | Mod: PBBFAC,,, | Performed by: SURGERY

## 2024-09-04 PROCEDURE — 3074F SYST BP LT 130 MM HG: CPT | Mod: CPTII,S$GLB,, | Performed by: SURGERY

## 2024-09-04 PROCEDURE — 1159F MED LIST DOCD IN RCRD: CPT | Mod: CPTII,S$GLB,, | Performed by: SURGERY

## 2024-09-04 PROCEDURE — 3044F HG A1C LEVEL LT 7.0%: CPT | Mod: CPTII,S$GLB,, | Performed by: SURGERY

## 2024-09-04 PROCEDURE — 3078F DIAST BP <80 MM HG: CPT | Mod: CPTII,S$GLB,, | Performed by: SURGERY

## 2024-09-04 NOTE — PROGRESS NOTES
Plastic Surgery Clinic New Patient History and Physical        Subjective:      Angel Mosquera is a 44 y.o. year old female who presents to the Plastic Surgery Clinic on 09/04/2024 for consultation regarding breast implant removal. Patient reports she had subpectoral saline implants done in 2006. She denies any issues with them, however she would like them removed at this time. She is not interested in having them replaced. She reports that the L side has been more uncomfortable in the last couple of weeks, but denies pain, asymmetry, redness, or drainage. Patient is employed as a nurse here at [employer]. Denies fever, chills, nausea, vomiting, or other systemic signs of infection.    Mammogram done on 6/4/24 is without concern, BI-RADS 1.    Vitals:    09/04/24 0840   BP: 121/71   Pulse: 62        Review of patient's allergies indicates:  No Known Allergies    Current Outpatient Medications on File Prior to Visit   Medication Sig Dispense Refill    acetaminophen (TYLENOL) 500 MG tablet Take 2 tablets (1,000 mg total) by mouth every 8 (eight) hours as needed for Pain. 60 tablet 0    augmented betamethasone (DIPROLENE) 0.05 % lotion Apply to scalp nightly 60 mL 3    biotin 1 mg Cap Take by mouth.      carvediloL (COREG) 3.125 MG tablet Take 1 tablet (3.125 mg total) by mouth 2 (two) times daily with meals. 180 tablet 3    fluocinonide (LIDEX) 0.05 % external solution Thin film to scalp nightly 60 mL 5    gabapentin (NEURONTIN) 100 MG capsule Take 2 capsules (200 mg total) by mouth 3 (three) times daily. 540 capsule 3    hydrOXYzine HCL (ATARAX) 25 MG tablet Take 1 tablet (25 mg total) by mouth nightly as needed (Insomnia). 30 tablet 3    ibuprofen (ADVIL,MOTRIN) 800 MG tablet Take 1 tablet (800 mg total) by mouth every 6 (six) hours as needed for Pain. 30 tablet 1    ketoconazole (NIZORAL) 2 % shampoo Lather scalp with medicated shampoo once a week - let sit on scalp at least 5 minutes prior to rinsing 120 mL 5     LIDOcaine (LIDODERM) 5 % Place 1 patch onto the skin once daily. Remove & Discard patch within 12 hours or as directed by MD 30 patch 0    methocarbamoL (ROBAXIN) 750 MG Tab Take 1 tablet (750 mg total) by mouth nightly as needed (back spasms). 30 tablet 1    minoxidiL (LONITEN) 2.5 MG tablet TAKE 1/2 TABLET BY MOUTH DAILY 45 tablet 2    multivitamin (THERAGRAN) per tablet Take 1 tablet by mouth once daily.      naproxen (NAPROSYN) 500 MG tablet Take 1 tablet (500 mg total) by mouth 2 (two) times daily with meals. 60 tablet 1    ondansetron (ZOFRAN-ODT) 4 MG TbDL Take 2 tablets (8 mg total) by mouth every 12 (twelve) hours as needed (nausea). 10 tablet 3    pantoprazole (PROTONIX) 40 MG tablet Take 1 tablet (40 mg total) by mouth once daily. 90 tablet 3    spironolactone (ALDACTONE) 50 MG tablet Take 2 tablets (100 mg total) by mouth once daily. 180 tablet 3    UNABLE TO FIND RELIEF FACTOR (FISH OIL, TURMERIC, ICARLIN, RESVERATROL)       No current facility-administered medications on file prior to visit.       Patient Active Problem List   Diagnosis    Rosacea    Bilateral hip pain    Female infertility    Weakness    Pain in pelvis    Morbid obesity    Dorsalgia, unspecified    Swimmer's ear of right side    Impaired functional mobility and activity tolerance    Primary insomnia    Anxiety    sp RA-TLH/BS    COVID    Plantar fasciitis of left foot    Chronic pain in left foot    Impaired functional mobility, balance, gait, and endurance    Sparrow's neuroma of left foot    Abnormal thyroid function test    Hair loss    Impaired tolerance of activity    Decreased muscle strength       Past Surgical History:   Procedure Laterality Date    AUGMENTATION OF BREAST Bilateral 2005    Breast Augmentation  2005    COSMETIC SURGERY  2005    Breast augmentation    DILATION AND CURETTAGE OF UTERUS N/A 10/19/2019    Procedure: DILATION AND CURETTAGE, UTERUS;  Surgeon: Frances Culp MD;  Location: [facility];  Service:  OB/GYN;  Laterality: N/A;  with suction    ENDOSCOPIC PLANTAR FASCIOTOMY Left 6/17/2022    Procedure: FASCIOTOMY, PLANTAR, ENDOSCOPIC;  Surgeon: Mello Montez DPM;  Location: [facility] OR;  Service: Podiatry;  Laterality: Left;  endoscopic plantar fasciotomy kit    mischarr      ROBOT-ASSISTED LAPAROSCOPIC UTERINE MYOMECTOMY N/A 1/7/2020    Procedure: ROBOTIC MYOMECTOMY, UTERUS;  Surgeon: Frances Culp MD;  Location: [facility] [place] OR;  Service: OB/GYN;  Laterality: N/A;    SURGICAL REMOVAL OF FERNANDEZ'S NEUROMA Left 1/5/2023    Procedure: EXCISION, FERNANDEZ'S NEUROMA;  Surgeon: Mello Motnez DPM;  Location: [facility] OR;  Service: Podiatry;  Laterality: Left;    XI ROBOTIC HYSTERECTOMY, WITH SALPINGO-OOPHORECTOMY N/A 12/15/2021    Procedure: XI ROBOTIC HYSTERECTOMY,WITH SALPINGO-OOPHORECTOMY;  Surgeon: Carla Aguero MD;  Location: [facility] [place], operated [facility] OR;  Service: OB/GYN;  Laterality: N/A;       Social History     Socioeconomic History    Marital status:    Occupational History    Occupation: Nurse     Employer: [employer]     Comment: Oncology   Tobacco Use    Smoking status: Never    Smokeless tobacco: Never   Substance and Sexual Activity    Alcohol use: No    Drug use: No    Sexual activity: Yes     Partners: Male     Birth control/protection: None     Comment: Vasectomy   Other Topics Concern    Are you pregnant or think you may be? No    Breast-feeding No     Social Determinants of Health     Financial Resource Strain: Low Risk  (4/4/2024)    Overall Financial Resource Strain (CARDIA)     Difficulty of Paying Living Expenses: Not hard at all   Food Insecurity: No Food Insecurity (4/4/2024)    Hunger Vital Sign     Worried About Running Out of Food in the Last Year: Never true     Ran Out of Food in the Last Year: Never true   Transportation Needs: No Transportation Needs (4/4/2024)    PRAPARE - Transportation     Lack of Transportation (Medical): No     Lack of Transportation (Non-Medical): No    Physical Activity: Unknown (4/4/2024)    Exercise Vital Sign     Days of Exercise per Week: 0 days   Stress: Stress Concern Present (4/4/2024)    [employer] [place] [employer] - Occupational Stress Questionnaire     Feeling of Stress : Rather much   Housing Stability: Low Risk  (4/4/2024)    Housing Stability Vital Sign     Unable to Pay for Housing in the Last Year: No     Number of Places Lived in the Last Year: 1     Unstable Housing in the Last Year: No           Review of Systems: 12pt ROS negative other than pertinent findings documented in history.    Objective:     Physical Exam:  Vitals:    09/04/24 0840   BP: 121/71   Pulse: 62       General: Alert; No acute distress  Cardiovascular: Regular rate   Respiratory: Normal respiratory effort. Chest rise symmetric.   Abdomen: Soft, nontender, nondistended  Extremity: Moves all extremities equally.  Neurologic: No focal deficit. Speech normal  Skin: warm, dry  Breast: Bilateral breast implants, intact, symmetric         Assessment:       No diagnosis found.    Plan:   44 y.o. female with HTN, here for consultation for removal of breast implants.     - Discussed removal of implants vs removal of implants with mastopexy. Patient is leaning towards removal alone, but will think about it and let us know what she would like.   - Risks, benefits and alternatives to surgery were discussed. Will submit for insurance authorization.  - Office staff to coordinate surgery date once insurance authorization obtained - she is getting  in December and would like to schedule surgery sometime in January      All questions were answered. The patient was advised to call the clinic with any questions or concerns prior to their next visit.     Patient was seen and discussed with attending surgeon, Dr. Raymond Jackson MD   General Surgery PGY-1  9/4/2024

## 2024-09-17 DIAGNOSIS — G62.9 NEUROPATHY: ICD-10-CM

## 2024-09-17 DIAGNOSIS — M72.2 PLANTAR FASCIITIS, BILATERAL: ICD-10-CM

## 2024-09-18 DIAGNOSIS — M72.2 PLANTAR FASCIITIS, BILATERAL: ICD-10-CM

## 2024-09-18 RX ORDER — GABAPENTIN 100 MG/1
200 CAPSULE ORAL 3 TIMES DAILY
Qty: 540 CAPSULE | Refills: 0 | Status: SHIPPED | OUTPATIENT
Start: 2024-09-18 | End: 2025-09-18

## 2024-09-18 RX ORDER — IBUPROFEN 800 MG/1
800 TABLET ORAL EVERY 6 HOURS PRN
Qty: 30 TABLET | Refills: 1 | OUTPATIENT
Start: 2024-09-18

## 2024-09-18 NOTE — TELEPHONE ENCOUNTER
No care due was identified.  [facility] Embedded Care Due Messages. Reference number: [id number].   9/17/2024 11:41:42 PM CDT

## 2024-09-19 RX ORDER — IBUPROFEN 800 MG/1
800 TABLET ORAL EVERY 6 HOURS PRN
Qty: 30 TABLET | Refills: 1 | Status: SHIPPED | OUTPATIENT
Start: 2024-09-19

## 2024-10-01 ENCOUNTER — OFFICE VISIT (OUTPATIENT)
Dept: SPORTS MEDICINE | Facility: CLINIC | Age: 44
End: 2024-10-01
Payer: COMMERCIAL

## 2024-10-01 VITALS
DIASTOLIC BLOOD PRESSURE: 79 MMHG | HEIGHT: 65 IN | BODY MASS INDEX: 37.78 KG/M2 | HEART RATE: 66 BPM | SYSTOLIC BLOOD PRESSURE: 118 MMHG | WEIGHT: 226.75 LBS

## 2024-10-01 DIAGNOSIS — M99.06 SOMATIC DYSFUNCTION OF LOWER EXTREMITY: ICD-10-CM

## 2024-10-01 DIAGNOSIS — Z98.890 HISTORY OF FOOT SURGERY: ICD-10-CM

## 2024-10-01 DIAGNOSIS — M79.671 RIGHT FOOT PAIN: Primary | ICD-10-CM

## 2024-10-01 DIAGNOSIS — R29.898 POOR BODY MECHANICS: ICD-10-CM

## 2024-10-01 DIAGNOSIS — M79.672 LEFT FOOT PAIN: ICD-10-CM

## 2024-10-01 DIAGNOSIS — M72.2 PLANTAR FASCIITIS, BILATERAL: ICD-10-CM

## 2024-10-01 PROCEDURE — 98925 OSTEOPATH MANJ 1-2 REGIONS: CPT | Mod: S$GLB,,, | Performed by: ORTHOPAEDIC SURGERY

## 2024-10-01 PROCEDURE — 99213 OFFICE O/P EST LOW 20 MIN: CPT | Mod: 25,S$GLB,, | Performed by: ORTHOPAEDIC SURGERY

## 2024-10-01 PROCEDURE — 1160F RVW MEDS BY RX/DR IN RCRD: CPT | Mod: CPTII,S$GLB,, | Performed by: ORTHOPAEDIC SURGERY

## 2024-10-01 PROCEDURE — 3008F BODY MASS INDEX DOCD: CPT | Mod: CPTII,S$GLB,, | Performed by: ORTHOPAEDIC SURGERY

## 2024-10-01 PROCEDURE — 3074F SYST BP LT 130 MM HG: CPT | Mod: CPTII,S$GLB,, | Performed by: ORTHOPAEDIC SURGERY

## 2024-10-01 PROCEDURE — 3078F DIAST BP <80 MM HG: CPT | Mod: CPTII,S$GLB,, | Performed by: ORTHOPAEDIC SURGERY

## 2024-10-01 PROCEDURE — 99999 PR PBB SHADOW E&M-EST. PATIENT-LVL IV: CPT | Mod: PBBFAC,,, | Performed by: ORTHOPAEDIC SURGERY

## 2024-10-01 PROCEDURE — 3044F HG A1C LEVEL LT 7.0%: CPT | Mod: CPTII,S$GLB,, | Performed by: ORTHOPAEDIC SURGERY

## 2024-10-01 PROCEDURE — 1159F MED LIST DOCD IN RCRD: CPT | Mod: CPTII,S$GLB,, | Performed by: ORTHOPAEDIC SURGERY

## 2024-10-01 NOTE — PROGRESS NOTES
"CC: left foot pain    Angel is here today for follow up evaluation of her bilateral foot pain. Patient reports her pain is 3/10 today and states she is feeling 80% improved which she attributes to OMT and compliance with her HEP. She states she is leaving for [place] in 2 days and is interested in being evaluated for more OMT as this has historically been helpful for her.     Recall from visit on 09/03/2024  Angel is here today for follow up evaluation of her bilateral foot pain. Patient reports her pain is 3/10 today. She admits to great improvement in her pain following OMT at her most recent visit and states she is now feeling 75% improved. She states she worked 18 of the last 21 nights and has been able to workout without appreciating an increase in her pain. She is very pleased with her progress at this time. She is going on a trip to [place] in October and would like to follow up again prior to this as she has been significantly improved with our current treatment plan.     Recall from visit on 08/12/2024   Angel is here today for follow up evaluation of her bilateral foot pain. Patient reports her pain is 6/10 today. She appreciated great improvement in her pain following OMT at her initial visit, but did not appreciate as significant improvement after her most recent visit. She states she is feeling overall still improved compared to her initial visit, but is concerned as she is now having pain while at rest.     Recall from visit on 07/22/2024  Angel is here today for follow up evaluation of her bilateral foot pain. Patient reports her pain is 1/10 today and states she is feeling 50% improved since her initial visit which she attributes to OMT and compliance with her HEP. She was able to walk 20,000 steps on vacation without significant pain during or following and is very pleased with this as usually she would be "in tears" after walking this much. She also has been able to tolerate walking barefoot more " since her last visit.    Recall from visit on 2024  44 y.o. Female presents today for evaluation of her bilateral plantar foot pain, left worse than right. She states she started fertility treatments in 2019 and was in physical therapy around that time due to hip pain. She feels as though her foot pain may have originated from compensation for her foot pain. She states she was initially evaluated by a podiatrist and had several plantar fascia injections before ultimately having a plantar fasciotomy with Dr. Montez 2022. She states she then developed a neuroma post-surgery she attributes to changing her gait and walking on her forefoot. 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 She states her foot pain has never improved following injections and surgical intervention. She was referred by Lisa Cheung in physical therapy.   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     PAST MEDICAL HISTORY:   Past Medical History:   Diagnosis Date    Hypertension     PONV (postoperative nausea and vomiting)     Rosacea        PAST SURGICAL HISTORY:   Past Surgical History:   Procedure Laterality Date    AUGMENTATION OF BREAST Bilateral 2005    Breast Augmentation      COSMETIC SURGERY      Breast augmentation    DILATION AND CURETTAGE OF UTERUS N/A 10/19/2019    Procedure: DILATION AND CURETTAGE, UTERUS;  Surgeon: Frances Culp MD;  Location: [facility];  Service: OB/GYN;  Laterality: N/A;  with suction    ENDOSCOPIC PLANTAR FASCIOTOMY Left 2022    Procedure: FASCIOTOMY, PLANTAR, ENDOSCOPIC;  Surgeon: Mello Montez DPM;  Location:  [facility] OR;  Service: Podiatry;  Laterality: Left;  endoscopic plantar fasciotomy kit    mischarr      ROBOT-ASSISTED LAPAROSCOPIC UTERINE MYOMECTOMY N/A 1/7/2020    Procedure: ROBOTIC MYOMECTOMY, UTERUS;  Surgeon: Frances Culp MD;  Location: [facility] OR;  Service: OB/GYN;  Laterality: N/A;    SURGICAL REMOVAL OF FERNANDEZ'S NEUROMA Left 1/5/2023    Procedure: EXCISION, FERNANDEZ'S NEUROMA;  Surgeon: Mello Montez DPM;  Location: [facility] OR;  Service: Podiatry;  Laterality: Left;    XI ROBOTIC HYSTERECTOMY, WITH SALPINGO-OOPHORECTOMY N/A 12/15/2021    Procedure: XI ROBOTIC HYSTERECTOMY,WITH SALPINGO-OOPHORECTOMY;  Surgeon: Carla Aguero MD;  Location: [facility] OR;  Service: OB/GYN;  Laterality: N/A;       FAMILY HISTORY:   Family History   Problem Relation Name Age of Onset    Diabetes Mother Heather Diaz     Hypertension Mother Heather Diaz     Heart failure Mother Heather Diaz         viral?    Heart disease Mother Heather Diaz     Hyperlipidemia Mother Heather Diaz     Alcohol abuse Father Nixon Diaz     Alcohol abuse Maternal Uncle Saúl Austin     Psoriasis Maternal Grandmother Heather Austin     Arthritis Maternal Grandmother Heather Austin     Diabetes Maternal Grandmother Heather Austin     Alcohol abuse Maternal Grandfather Josh Austin     COPD Maternal Grandfather Josh Austin     Cancer Paternal Grandmother Nely Diaz         Lung Cancer    Cancer Paternal Grandfather Lamine Diaz         Pancreatic Cancer    Cancer Neg Hx      Ovarian cancer Neg Hx      Melanoma Neg Hx      Heart attack Neg Hx      Stroke Neg Hx      Colon cancer Neg Hx      Breast cancer Neg Hx      Blindness Neg Hx      Amblyopia Neg Hx      Cataracts Neg Hx      Glaucoma Neg Hx      Macular degeneration Neg Hx      Retinal detachment Neg Hx      Strabismus Neg Hx      Inflammatory bowel disease Neg Hx      Kidney disease Neg Hx      Lupus Neg Hx      Rheum arthritis Neg Hx         SOCIAL HISTORY:   Social History     Socioeconomic History    Marital  status:    Occupational History    Occupation: Nurse     Employer: [employer]     Comment: Oncology   Tobacco Use    Smoking status: Never    Smokeless tobacco: Never   Substance and Sexual Activity    Alcohol use: No    Drug use: No    Sexual activity: Yes     Partners: Male     Birth control/protection: None     Comment: Vasectomy   Other Topics Concern    Are you pregnant or think you may be? No    Breast-feeding No     Social Drivers of Health     Financial Resource Strain: Low Risk  (4/4/2024)    Overall Financial Resource Strain (CARDIA)     Difficulty of Paying Living Expenses: Not hard at all   Food Insecurity: No Food Insecurity (4/4/2024)    Hunger Vital Sign     Worried About Running Out of Food in the Last Year: Never true     Ran Out of Food in the Last Year: Never true   Transportation Needs: No Transportation Needs (4/4/2024)    PRAPARE - Transportation     Lack of Transportation (Medical): No     Lack of Transportation (Non-Medical): No   Physical Activity: Unknown (4/4/2024)    Exercise Vital Sign     Days of Exercise per Week: 0 days   Stress: Stress Concern Present (4/4/2024)    [employer] [employer] - Occupational Stress Questionnaire     Feeling of Stress : Rather much   Housing Stability: Low Risk  (4/4/2024)    Housing Stability Vital Sign     Unable to Pay for Housing in the Last Year: No     Number of Places Lived in the Last Year: 1     Unstable Housing in the Last Year: No       MEDICATIONS:     Current Outpatient Medications:     acetaminophen (TYLENOL) 500 MG tablet, Take 2 tablets (1,000 mg total) by mouth every 8 (eight) hours as needed for Pain., Disp: 60 tablet, Rfl: 0    augmented betamethasone (DIPROLENE) 0.05 % lotion, Apply to scalp nightly, Disp: 60 mL, Rfl: 3    biotin 1 mg Cap, Take by mouth., Disp: , Rfl:     carvediloL (COREG) 3.125 MG tablet, Take 1 tablet (3.125 mg total) by mouth 2 (two) times daily with meals., Disp: 180 tablet,  "Rfl: 3    fluocinonide (LIDEX) 0.05 % external solution, Thin film to scalp nightly, Disp: 60 mL, Rfl: 5    gabapentin (NEURONTIN) 100 MG capsule, Take 2 capsules (200 mg total) by mouth 3 (three) times daily., Disp: 540 capsule, Rfl: 0    hydrOXYzine HCL (ATARAX) 25 MG tablet, Take 1 tablet (25 mg total) by mouth nightly as needed (Insomnia)., Disp: 30 tablet, Rfl: 3    ibuprofen (ADVIL,MOTRIN) 800 MG tablet, Take 1 tablet (800 mg total) by mouth every 6 (six) hours as needed for Pain., Disp: 30 tablet, Rfl: 1    ketoconazole (NIZORAL) 2 % shampoo, Lather scalp with medicated shampoo once a week - let sit on scalp at least 5 minutes prior to rinsing, Disp: 120 mL, Rfl: 5    LIDOcaine (LIDODERM) 5 %, Place 1 patch onto the skin once daily. Remove & Discard patch within 12 hours or as directed by MD, Disp: 30 patch, Rfl: 0    methocarbamoL (ROBAXIN) 750 MG Tab, Take 1 tablet (750 mg total) by mouth nightly as needed (back spasms)., Disp: 30 tablet, Rfl: 1    minoxidiL (LONITEN) 2.5 MG tablet, TAKE 1/2 TABLET BY MOUTH DAILY, Disp: 45 tablet, Rfl: 2    multivitamin (THERAGRAN) per tablet, Take 1 tablet by mouth once daily., Disp: , Rfl:     naproxen (NAPROSYN) 500 MG tablet, Take 1 tablet (500 mg total) by mouth 2 (two) times daily with meals., Disp: 60 tablet, Rfl: 1    ondansetron (ZOFRAN-ODT) 4 MG TbDL, Take 2 tablets (8 mg total) by mouth every 12 (twelve) hours as needed (nausea)., Disp: 10 tablet, Rfl: 3    pantoprazole (PROTONIX) 40 MG tablet, Take 1 tablet (40 mg total) by mouth once daily., Disp: 90 tablet, Rfl: 3    spironolactone (ALDACTONE) 50 MG tablet, Take 2 tablets (100 mg total) by mouth once daily., Disp: 180 tablet, Rfl: 3    UNABLE TO FIND, RELIEF FACTOR (FISH OIL, TURMERIC, ICARLIN, RESVERATROL), Disp: , Rfl:     ALLERGIES:   Review of patient's allergies indicates:  No Known Allergies     PHYSICAL EXAMINATION:  /79   Pulse 66   Ht 5' 5" (1.651 m)   Wt 102.9 kg (226 lb 11.9 oz)   LMP " 12/09/2021   BMI 37.73 kg/m²   Vitals signs and nursing note have been reviewed.  General: In no acute distress, well developed, well nourished, no diaphoresis  Eyes: EOM full and smooth, no eye redness or discharge  HENT: normocephalic and atraumatic, neck supple, trachea midline, no nasal discharge, no external ear redness or discharge  Cardiovascular: 2+ and symmetric radial and DP pulses bilaterally, no LE edema  Lungs: respirations non-labored, no conversational dyspnea   Abd: non-distended, no rigidity  MSK: no amputation or deformity, no swelling of extremities  Neuro: AAOx3, CN2-12 grossly intact  Skin: No rashes, warm and dry  Psychiatric: cooperative, pleasant, mood and affect appropriate for age    ANKLE: left   The affected ankle is compared to the contralateral ankle.    Observation:    There is no edema, erythema, or ecchymosis.   Shoes reveal a normal wear pattern.  No structural deformities including pes planus/cavus, hallux valgus, or toe deformities.   Negative too-many toes sign.  Normal callus pattern on the feet bilaterally.    Achilles tendon and calcaneal insertion reveals no deformities  No leg or intrinsic foot muscle atrophy.  Squatting reveals symmetric pronation of the bilateral feet.   Able to rise on toes with symmetric supination.    Normal gait without evidence of antalgia.    ROM: (expected degree)   Active dorsiflexion to 20° bilaterally.    Active plantarflexion to 50° bilaterally.    Active ankle inversion to 35° bilaterally.    Active ankle eversion to 15° bilaterally.    Full active flexion/extension of the toes bilaterally.   Heel cords without tightness bilaterally.    Tenderness To Palpation   No tenderness at the ATFL, CFL, PTFL, or deltoid ligaments  No tenderness over the distal anterior syndesmosis, distal tibia/fibula, fibular head/shaft  No tenderness at medial or lateral malleoli   No tenderness at navicular, cuboid, cuneiforms, talus  + tenderness at the medial left  calcaneous at the plantar fascia attachment  No tenderness over the left medial arch along fascial thickening/scarring  + callous bottom of left foot at distal 5th metatarsal head  No tenderness along the metatarsals or phalanges  No tenderness at the Achilles tendon calcaneal insertion  No tenderness at the tibialis posterior, flexor digitorum longus, flexor hallucis longus   No tenderness at the peroneal tendons    Strength Testing: (*pain)  Dorsiflexion - 5/5  Platarflexion - 5/5  Resisted Inversion - 5/5  Resisted Eversion - 5/5  Great Toe Extension - 5/5  Great Toe Flexion - 5/5    Special Tests:  Anterior talar drawer - negative and without dimpling  Talar tilt - negative    Heel tap test - negative  Distal tib/fib squeeze test - negative  Bryant squeeze test - negative    Metatarsal squeeze test - negative  Midfoot stress test - negative  Calcaneal squeeze test - negative    No subluxation of the peroneal tendons with resisted eversion.    Structural Exam:  Midfoot joint restrictions bilaterally, left greater than right  HTP at the left medial calcaneus at the plantar fascia attachment  CD at the right inferior calcaneous at the plantar fascia attachment  MFR left medial arch of foot    Key   F= Flexed   E = Extended   R = Rotated   N = Neutral   S = Sidebent   TTA = tissue texture abnormality   L/R/B (last letter) = left/right/bilateral   HTP = fascial herniated trigger point   TB = fascial trigger band   CD = fascial continuum distortion         Vascular/Sensory Exam:  DP and PT pulses intact BL  No skin changes, no abnormal hair distribution  Sensation intact to light touch throughout the saphenous, sural, superficial peroneal, deep peroneal, and tibial nerve distributions  Negative Tinel's test over tarsal tunnel  Capillary refill intact <2 seconds in all toes bilaterally.      IMAGIN. X-ray obtained 2024 due to bilateral foot pain   2. X-ray images were reviewed personally by me and then  directly with patient.  3. FINDINGS: X-ray images obtained demonstrate plantar calcaneal spur bilaterally, no acute fracture or dislocation  4. IMPRESSION: As above.       ASSESSMENT:      ICD-10-CM ICD-9-CM   1. Right foot pain  M79.671 729.5   2. Left foot pain  M79.672 729.5   3. Plantar fasciitis, bilateral  M72.2 728.71   4. History of foot surgery  Z98.890 V15.29   5. Poor body mechanics  R29.898 781.99   6. Somatic dysfunction of lower extremity  M99.06 739.6         PLAN:  1-3. Bilateral foot pain/plantar fasciitis - improved  4. History of left foot surgery  5. Poor body mechanics    - Angel admits to bilateral foot pain, left worse than right beginning in 2019 she attributes to compensation from walking differently due to right hip pain. 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     - She states she is now feeling 80% improved and is very pleased with her progress. She states her primary complaint today is left foot pain. She is leaving for [place] in 2 days and is interested in being evaluated for more OMT today if indicated.    - Symptoms, exam, and imaging are still most consistent with fascial restriction and myofascial tightness secondary to poor body mechanics, history of foot surgery on the left and over compensation.  We discussed the importance of decreasing inflammation and strengthening and stabilizing to help promote and maintain symptom improvement/resolution.  This is commonly accomplished with a short course of an anti-inflammatory and icing in addition to osteopathic manipulation, a home exercise program or physical therapy.    - Based on her description/body language of pain and somatic dysfunction identified on exam, I discussed osteopathic manipulation as a treatment option today.  She consents to evaluation and treatment.  See below.    - Continue with HEP for plantar fasciitis prescribed at initial visit.     - Continue with Ibuprofen 800mg as  needed.      6. Somatic dysfunction of lower extremity region -    - OMT 1-2 regions. Oral consent obtained. Reviewed benefits and potential side effects. OMT indicated today due to signs and symptoms as well as local and remote somatic dysfunction findings and their related neurokinetic, lymphatic, fascial and/or arteriovenous body connections. OMT techniques used: Soft Tissue, Myofascial Release, Fascial Distortion Model, and Articulatory. Treatment was tolerated well. Improvement noted in segmental mobility post-treatment in dysfunctional regions. There were no adverse events and no complications immediately following treatment. Advised plenty of water to help alleviate soreness.      Future planning includes - possibly more OMT if helpful and if indicated, diagnostic ultrasound of the foot to further assess the painful palpable fascia/scar tissue, hydrodissection if all else fails    All questions were answered to the best of my ability and all concerns were addressed at this time.    Follow up in 3-4 weeks for above, or sooner if needed.      This note is dictated using the M*Modal Fluency Direct word recognition program. There are word recognition mistakes that are occasionally missed on review.

## 2024-10-22 ENCOUNTER — OFFICE VISIT (OUTPATIENT)
Dept: DERMATOLOGY | Facility: CLINIC | Age: 44
End: 2024-10-22
Payer: COMMERCIAL

## 2024-10-22 VITALS — HEIGHT: 65 IN | WEIGHT: 226.88 LBS | BODY MASS INDEX: 37.8 KG/M2

## 2024-10-22 DIAGNOSIS — Z86.018 HISTORY OF DYSPLASTIC NEVUS: Primary | ICD-10-CM

## 2024-10-22 DIAGNOSIS — L81.4 SOLAR LENTIGO: ICD-10-CM

## 2024-10-22 DIAGNOSIS — D22.9 MULTIPLE BENIGN NEVI: ICD-10-CM

## 2024-10-22 DIAGNOSIS — L65.9 NON-SCARRING ALOPECIA: ICD-10-CM

## 2024-10-22 DIAGNOSIS — L21.9 SEBORRHEIC DERMATITIS: ICD-10-CM

## 2024-10-22 DIAGNOSIS — Z12.83 SKIN CANCER SCREENING: ICD-10-CM

## 2024-10-22 PROCEDURE — 1160F RVW MEDS BY RX/DR IN RCRD: CPT | Mod: CPTII,S$GLB,, | Performed by: DERMATOLOGY

## 2024-10-22 PROCEDURE — 3008F BODY MASS INDEX DOCD: CPT | Mod: CPTII,S$GLB,, | Performed by: DERMATOLOGY

## 2024-10-22 PROCEDURE — 1159F MED LIST DOCD IN RCRD: CPT | Mod: CPTII,S$GLB,, | Performed by: DERMATOLOGY

## 2024-10-22 PROCEDURE — 3044F HG A1C LEVEL LT 7.0%: CPT | Mod: CPTII,S$GLB,, | Performed by: DERMATOLOGY

## 2024-10-22 PROCEDURE — 99213 OFFICE O/P EST LOW 20 MIN: CPT | Mod: S$GLB,,, | Performed by: DERMATOLOGY

## 2024-10-22 NOTE — PROGRESS NOTES
Subjective:      Patient ID:  Angel Mosquera is a 44 y.o. female who presents for   Chief Complaint   Patient presents with    Skin Check     TBSC      LOV 6/19/24 Hair Loss    Patient here today for TBSC.    Pt controlling hair loss with Diprolene, Ketoconazole Shampoo, Lidex, Spironolactone, Minoxidil. No concerns.     Derm Hx:  Denies Phx of NMSC  Denies Fhx of MM    Current Outpatient Medications:   ·  acetaminophen (TYLENOL) 500 MG tablet, Take 2 tablets (1,000 mg total) by mouth every 8 (eight) hours as needed for Pain., Disp: 60 tablet, Rfl: 0  ·  augmented betamethasone (DIPROLENE) 0.05 % lotion, Apply to scalp nightly, Disp: 60 mL, Rfl: 3  ·  biotin 1 mg Cap, Take by mouth., Disp: , Rfl:   ·  carvediloL (COREG) 3.125 MG tablet, Take 1 tablet (3.125 mg total) by mouth 2 (two) times daily with meals., Disp: 180 tablet, Rfl: 3  ·  fluocinonide (LIDEX) 0.05 % external solution, Thin film to scalp nightly, Disp: 60 mL, Rfl: 5  ·  gabapentin (NEURONTIN) 100 MG capsule, Take 2 capsules (200 mg total) by mouth 3 (three) times daily., Disp: 540 capsule, Rfl: 0  ·  hydrOXYzine HCL (ATARAX) 25 MG tablet, Take 1 tablet (25 mg total) by mouth nightly as needed (Insomnia)., Disp: 30 tablet, Rfl: 3  ·  ibuprofen (ADVIL,MOTRIN) 800 MG tablet, Take 1 tablet (800 mg total) by mouth every 6 (six) hours as needed for Pain., Disp: 30 tablet, Rfl: 1  ·  ketoconazole (NIZORAL) 2 % shampoo, Lather scalp with medicated shampoo once a week - let sit on scalp at least 5 minutes prior to rinsing, Disp: 120 mL, Rfl: 5  ·  LIDOcaine (LIDODERM) 5 %, Place 1 patch onto the skin once daily. Remove & Discard patch within 12 hours or as directed by MD, Disp: 30 patch, Rfl: 0  ·  methocarbamoL (ROBAXIN) 750 MG Tab, Take 1 tablet (750 mg total) by mouth nightly as needed (back spasms)., Disp: 30 tablet, Rfl: 1  ·  minoxidiL (LONITEN) 2.5 MG tablet, TAKE 1/2 TABLET BY MOUTH DAILY, Disp: 45 tablet, Rfl: 2  ·  multivitamin (THERAGRAN) per  tablet, Take 1 tablet by mouth once daily., Disp: , Rfl:   ·  naproxen (NAPROSYN) 500 MG tablet, Take 1 tablet (500 mg total) by mouth 2 (two) times daily with meals., Disp: 60 tablet, Rfl: 1  ·  ondansetron (ZOFRAN-ODT) 4 MG TbDL, Take 2 tablets (8 mg total) by mouth every 12 (twelve) hours as needed (nausea)., Disp: 10 tablet, Rfl: 3  ·  pantoprazole (PROTONIX) 40 MG tablet, Take 1 tablet (40 mg total) by mouth once daily., Disp: 90 tablet, Rfl: 3  ·  spironolactone (ALDACTONE) 50 MG tablet, Take 2 tablets (100 mg total) by mouth once daily., Disp: 180 tablet, Rfl: 3  ·  UNABLE TO FIND, RELIEF FACTOR (FISH OIL, TURMERIC, ICARLIN, RESVERATROL), Disp: , Rfl:           Review of Systems   Constitutional:  Negative for fever, chills and fatigue.   Skin:  Positive for activity-related sunscreen use. Negative for itching, rash, dry skin and daily sunscreen use.       Objective:   Physical Exam   Constitutional: She appears well-developed and well-nourished. No distress.   Neurological: She is alert and oriented to person, place, and time. She is not disoriented.   Psychiatric: She has a normal mood and affect.   Skin:   Areas Examined (abnormalities noted in diagram):   Scalp / Hair Palpated and Inspected  Head / Face Inspection Performed  Neck Inspection Performed  Chest / Axilla Inspection Performed  Abdomen Inspection Performed  Genitals / Buttocks / Groin Inspection Performed  Back Inspection Performed  RUE Inspected  LUE Inspection Performed  RLE Inspected  LLE Inspection Performed  Nails and Digits Inspection Performed                         Diagram Legend     Erythematous scaling macule/papule c/w actinic keratosis       Vascular papule c/w angioma      Pigmented verrucoid papule/plaque c/w seborrheic keratosis      Yellow umbilicated papule c/w sebaceous hyperplasia      Irregularly shaped tan macule c/w lentigo     1-2 mm smooth white papules consistent with Milia      Movable subcutaneous cyst with punctum  c/w epidermal inclusion cyst      Subcutaneous movable cyst c/w pilar cyst      Firm pink to brown papule c/w dermatofibroma      Pedunculated fleshy papule(s) c/w skin tag(s)      Evenly pigmented macule c/w junctional nevus     Mildly variegated pigmented, slightly irregular-bordered macule c/w mildly atypical nevus      Flesh colored to evenly pigmented papule c/w intradermal nevus       Pink pearly papule/plaque c/w basal cell carcinoma      Erythematous hyperkeratotic cursted plaque c/w SCC      Surgical scar with no sign of skin cancer recurrence      Open and closed comedones      Inflammatory papules and pustules      Verrucoid papule consistent consistent with wart     Erythematous eczematous patches and plaques     Dystrophic onycholytic nail with subungual debris c/w onychomycosis     Umbilicated papule    Erythematous-base heme-crusted tan verrucoid plaque consistent with inflamed seborrheic keratosis     Erythematous Silvery Scaling Plaque c/w Psoriasis     See annotation      Assessment / Plan:        History of dysplastic nevus  Skin cancer screening  Area of previous DN examined. Site well healed with no signs of recurrence.    Total body skin examination performed today including at least 12 points as noted in physical examination. No lesions suspicious for malignancy noted.    Multiple benign nevi  Careful dermoscopy evaluation of nevi performed with none identified as needing biopsy today  Monitor for new mole or moles that are becoming bigger, darker, irritated, or developing irregular borders.     Solar lentigo  This is a benign hyperpigmented sun induced lesion. Daily sun protection will reduce the number of new lesions. Treatment of these benign lesions are considered cosmetic.    Seborrheic dermatitis  Well controlled    Non-scarring alopecia  Stabilized, normal pull test  Continue spironolactone 100mg daily and minoxidil 1.25mg    Patient instructed in importance in daily broad spectrum sun  protection of at least spf 30. Mineral sunscreen ingredients preferred (Zinc +/- Titanium) and can be found OTC.   Patient encouraged to wear hat for all outdoor exposure.   Also discussed sun avoidance and use of protective clothing.           No follow-ups on file.

## 2024-10-25 ENCOUNTER — PATIENT MESSAGE (OUTPATIENT)
Dept: SPORTS MEDICINE | Facility: CLINIC | Age: 44
End: 2024-10-25
Payer: COMMERCIAL

## 2024-11-11 ENCOUNTER — OFFICE VISIT (OUTPATIENT)
Dept: SPORTS MEDICINE | Facility: CLINIC | Age: 44
End: 2024-11-11
Payer: COMMERCIAL

## 2024-11-11 VITALS
HEART RATE: 75 BPM | WEIGHT: 228.75 LBS | DIASTOLIC BLOOD PRESSURE: 78 MMHG | BODY MASS INDEX: 38.11 KG/M2 | HEIGHT: 65 IN | SYSTOLIC BLOOD PRESSURE: 115 MMHG

## 2024-11-11 DIAGNOSIS — M99.06 SOMATIC DYSFUNCTION OF LOWER EXTREMITY: ICD-10-CM

## 2024-11-11 DIAGNOSIS — R29.898 POOR BODY MECHANICS: ICD-10-CM

## 2024-11-11 DIAGNOSIS — M72.2 PLANTAR FASCIITIS, BILATERAL: ICD-10-CM

## 2024-11-11 DIAGNOSIS — M79.671 RIGHT FOOT PAIN: Primary | ICD-10-CM

## 2024-11-11 DIAGNOSIS — M79.672 LEFT FOOT PAIN: ICD-10-CM

## 2024-11-11 DIAGNOSIS — Z98.890 HISTORY OF FOOT SURGERY: ICD-10-CM

## 2024-11-11 PROCEDURE — 1159F MED LIST DOCD IN RCRD: CPT | Mod: CPTII,S$GLB,, | Performed by: ORTHOPAEDIC SURGERY

## 2024-11-11 PROCEDURE — 1160F RVW MEDS BY RX/DR IN RCRD: CPT | Mod: CPTII,S$GLB,, | Performed by: ORTHOPAEDIC SURGERY

## 2024-11-11 PROCEDURE — 3078F DIAST BP <80 MM HG: CPT | Mod: CPTII,S$GLB,, | Performed by: ORTHOPAEDIC SURGERY

## 2024-11-11 PROCEDURE — 99213 OFFICE O/P EST LOW 20 MIN: CPT | Mod: 25,S$GLB,, | Performed by: ORTHOPAEDIC SURGERY

## 2024-11-11 PROCEDURE — 3074F SYST BP LT 130 MM HG: CPT | Mod: CPTII,S$GLB,, | Performed by: ORTHOPAEDIC SURGERY

## 2024-11-11 PROCEDURE — 98925 OSTEOPATH MANJ 1-2 REGIONS: CPT | Mod: S$GLB,,, | Performed by: ORTHOPAEDIC SURGERY

## 2024-11-11 PROCEDURE — 3044F HG A1C LEVEL LT 7.0%: CPT | Mod: CPTII,S$GLB,, | Performed by: ORTHOPAEDIC SURGERY

## 2024-11-11 PROCEDURE — 99999 PR PBB SHADOW E&M-EST. PATIENT-LVL IV: CPT | Mod: PBBFAC,,, | Performed by: ORTHOPAEDIC SURGERY

## 2024-11-11 PROCEDURE — 3008F BODY MASS INDEX DOCD: CPT | Mod: CPTII,S$GLB,, | Performed by: ORTHOPAEDIC SURGERY

## 2024-11-11 NOTE — PROGRESS NOTES
CC: left foot pain    Angel is here today for follow up evaluation of her bilateral foot pain. Patient reports her pain is 4/10 today. She states she was able to walk around [place] and denies appreciating increased pain with this activity and is very pleased by this. She notes an increase in her pain over the past 2 weeks she attributes to working and length of time since her last visit.    Recall from visit on 10/01/2024  Angel is here today for follow up evaluation of her bilateral foot pain. Patient reports her pain is 3/10 today and states she is feeling 80% improved which she attributes to OMT and compliance with her HEP. She states she is leaving for [place] in 2 days and is interested in being evaluated for more OMT as this has historically been helpful for her.       PAST MEDICAL HISTORY:   Past Medical History:   Diagnosis Date    Hypertension     PONV (postoperative nausea and vomiting)     Rosacea        PAST SURGICAL HISTORY:   Past Surgical History:   Procedure Laterality Date    AUGMENTATION OF BREAST Bilateral 2005    Breast Augmentation  2005    COSMETIC SURGERY  2005    Breast augmentation    DILATION AND CURETTAGE OF UTERUS N/A 10/19/2019    Procedure: DILATION AND CURETTAGE, UTERUS;  Surgeon: Frances Culp MD;  Location: [facility] OR;  Service: OB/GYN;  Laterality: N/A;  with suction    ENDOSCOPIC PLANTAR FASCIOTOMY Left 6/17/2022    Procedure: FASCIOTOMY, PLANTAR, ENDOSCOPIC;  Surgeon: Mello Montez DPM;  Location: [facility] OR;  Service: Podiatry;  Laterality: Left;  endoscopic plantar fasciotomy kit    mischarr      ROBOT-ASSISTED LAPAROSCOPIC UTERINE MYOMECTOMY N/A 1/7/2020    Procedure: ROBOTIC MYOMECTOMY, UTERUS;  Surgeon: Frances Culp MD;  Location: [facility] OR;  Service: OB/GYN;  Laterality: N/A;    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    Heart disease Mother Heather Diaz     Hyperlipidemia Mother Heather Diaz     Alcohol abuse Father Nixon Diaz     Alcohol abuse Maternal Uncle Saúl Austin     Psoriasis Maternal Grandmother Heather Austin     Arthritis Maternal Grandmother Heather Austin     Diabetes Maternal Grandmother Heather Austin     Alcohol abuse Maternal Grandfather Josh Austin     COPD Maternal Grandfather Josh Austin     Cancer Paternal Grandmother Nely Diaz         Lung Cancer    Cancer Paternal Grandfather Lamine Diaz         Pancreatic Cancer    Cancer Neg Hx      Ovarian cancer Neg Hx      Melanoma Neg Hx      Heart attack Neg Hx      Stroke Neg Hx      Colon cancer Neg Hx      Breast cancer Neg Hx      Blindness Neg Hx      Amblyopia Neg Hx      Cataracts Neg Hx      Glaucoma Neg Hx      Macular degeneration Neg Hx      Retinal detachment Neg Hx      Strabismus Neg Hx      Inflammatory bowel disease Neg Hx      Kidney disease Neg Hx      Lupus Neg Hx      Rheum arthritis Neg Hx         SOCIAL HISTORY:   Social History     Socioeconomic History    Marital status:    Occupational History    Occupation: Nurse     Employer: [employer]     Comment: Oncology   Tobacco Use    Smoking status: Never    Smokeless tobacco: Never   Substance and Sexual Activity    Alcohol use: No    Drug use: No    Sexual activity: Yes     Partners: Male     Birth control/protection: None     Comment: Vasectomy   Other Topics Concern    Are you pregnant or think you may be? No    Breast-feeding No     Social Drivers of Health      Financial Resource Strain: Low Risk  (4/4/2024)    Overall Financial Resource Strain (CARDIA)     Difficulty of Paying Living Expenses: Not hard at all   Food Insecurity: No Food Insecurity (4/4/2024)    Hunger Vital Sign     Worried About Running Out of Food in the Last Year: Never true     Ran Out of Food in the Last Year: Never true   Transportation Needs: No Transportation Needs (4/4/2024)    PRAPARE - Transportation     Lack of Transportation (Medical): No     Lack of Transportation (Non-Medical): No   Physical Activity: Unknown (4/4/2024)    Exercise Vital Sign     Days of Exercise per Week: 0 days   Stress: Stress Concern Present (4/4/2024)    [employer] [employer] - Occupational Stress Questionnaire     Feeling of Stress : Rather much   Housing Stability: Low Risk  (4/4/2024)    Housing Stability Vital Sign     Unable to Pay for Housing in the Last Year: No     Number of Places Lived in the Last Year: 1     Unstable Housing in the Last Year: No       MEDICATIONS:     Current Outpatient Medications:     acetaminophen (TYLENOL) 500 MG tablet, Take 2 tablets (1,000 mg total) by mouth every 8 (eight) hours as needed for Pain., Disp: 60 tablet, Rfl: 0    augmented betamethasone (DIPROLENE) 0.05 % lotion, Apply to scalp nightly, Disp: 60 mL, Rfl: 3    biotin 1 mg Cap, Take by mouth., Disp: , Rfl:     carvediloL (COREG) 3.125 MG tablet, Take 1 tablet (3.125 mg total) by mouth 2 (two) times daily with meals., Disp: 180 tablet, Rfl: 3    fluocinonide (LIDEX) 0.05 % external solution, Thin film to scalp nightly, Disp: 60 mL, Rfl: 5    gabapentin (NEURONTIN) 100 MG capsule, Take 2 capsules (200 mg total) by mouth 3 (three) times daily., Disp: 540 capsule, Rfl: 0    hydrOXYzine HCL (ATARAX) 25 MG tablet, Take 1 tablet (25 mg total) by mouth nightly as needed (Insomnia)., Disp: 30 tablet, Rfl: 3    ibuprofen (ADVIL,MOTRIN) 800 MG tablet, Take 1 tablet (800 mg total) by mouth every 6 (six) hours as  "needed for Pain., Disp: 30 tablet, Rfl: 1    ketoconazole (NIZORAL) 2 % shampoo, Lather scalp with medicated shampoo once a week - let sit on scalp at least 5 minutes prior to rinsing, Disp: 120 mL, Rfl: 5    LIDOcaine (LIDODERM) 5 %, Place 1 patch onto the skin once daily. Remove & Discard patch within 12 hours or as directed by MD, Disp: 30 patch, Rfl: 0    methocarbamoL (ROBAXIN) 750 MG Tab, Take 1 tablet (750 mg total) by mouth nightly as needed (back spasms)., Disp: 30 tablet, Rfl: 1    minoxidiL (LONITEN) 2.5 MG tablet, TAKE 1/2 TABLET BY MOUTH DAILY, Disp: 45 tablet, Rfl: 2    multivitamin (THERAGRAN) per tablet, Take 1 tablet by mouth once daily., Disp: , Rfl:     naproxen (NAPROSYN) 500 MG tablet, Take 1 tablet (500 mg total) by mouth 2 (two) times daily with meals., Disp: 60 tablet, Rfl: 1    ondansetron (ZOFRAN-ODT) 4 MG TbDL, Take 2 tablets (8 mg total) by mouth every 12 (twelve) hours as needed (nausea)., Disp: 10 tablet, Rfl: 3    pantoprazole (PROTONIX) 40 MG tablet, Take 1 tablet (40 mg total) by mouth once daily., Disp: 90 tablet, Rfl: 3    spironolactone (ALDACTONE) 50 MG tablet, Take 2 tablets (100 mg total) by mouth once daily., Disp: 180 tablet, Rfl: 3    UNABLE TO FIND, RELIEF FACTOR (FISH OIL, TURMERIC, ICARLIN, RESVERATROL), Disp: , Rfl:     ALLERGIES:   Review of patient's allergies indicates:  No Known Allergies     PHYSICAL EXAMINATION:  /78   Pulse 75   Ht 5' 5" (1.651 m)   Wt 103.8 kg (228 lb 11.6 oz)   LMP 12/09/2021   BMI 38.06 kg/m²   Vitals signs and nursing note have been reviewed.  General: In no acute distress, well developed, well nourished, no diaphoresis  Eyes: EOM full and smooth, no eye redness or discharge  HENT: normocephalic and atraumatic, neck supple, trachea midline, no nasal discharge, no external ear redness or discharge  Cardiovascular: 2+ and symmetric radial and DP pulses bilaterally, no LE edema  Lungs: respirations non-labored, no conversational " dyspnea   Abd: non-distended, no rigidity  MSK: no amputation or deformity, no swelling of extremities  Neuro: AAOx3, CN2-12 grossly intact  Skin: No rashes, warm and dry  Psychiatric: cooperative, pleasant, mood and affect appropriate for age    ANKLE: left   The affected ankle is compared to the contralateral ankle.    Observation:    There is no edema, erythema, or ecchymosis.   Shoes reveal a normal wear pattern.  No structural deformities including pes planus/cavus, hallux valgus, or toe deformities.   Negative too-many toes sign.  Normal callus pattern on the feet bilaterally.    Achilles tendon and calcaneal insertion reveals no deformities  No leg or intrinsic foot muscle atrophy.  Squatting reveals symmetric pronation of the bilateral feet.   Able to rise on toes with symmetric supination.    Normal gait without evidence of antalgia.    ROM: (expected degree)   Active dorsiflexion to 20° bilaterally.    Active plantarflexion to 50° bilaterally.    Active ankle inversion to 35° bilaterally.    Active ankle eversion to 15° bilaterally.    Full active flexion/extension of the toes bilaterally.   Heel cords without tightness bilaterally.    Tenderness To Palpation   No tenderness at the ATFL, CFL, PTFL, or deltoid ligaments  No tenderness over the distal anterior syndesmosis, distal tibia/fibula, fibular head/shaft  No tenderness at medial or lateral malleoli   No tenderness at navicular, cuboid, cuneiforms, talus  + tenderness at the medial left calcaneous at the plantar fascia attachment  + tenderness at the inferior right calcaneous at the plantar fascia attachment  No tenderness over the left medial arch along fascial thickening/scarring  + callous bottom of left foot at distal 5th metatarsal head  No tenderness along the metatarsals or phalanges  No tenderness at the Achilles tendon calcaneal insertion  No tenderness at the tibialis posterior, flexor digitorum longus, flexor hallucis longus   No tenderness  at the peroneal tendons    Strength Testing: (*pain)  Dorsiflexion - 5/5  Platarflexion - 5/5  Resisted Inversion - 5/5  Resisted Eversion - 5/5  Great Toe Extension - 5/5  Great Toe Flexion - 5/5    Special Tests:  Anterior talar drawer - negative and without dimpling  Talar tilt - negative    Heel tap test - negative  Distal tib/fib squeeze test - negative  Bryant squeeze test - negative    Metatarsal squeeze test - negative  Midfoot stress test - negative  Calcaneal squeeze test - negative    No subluxation of the peroneal tendons with resisted eversion.    Structural Exam:  Midfoot joint restriction left  Dropped cuboid right  HTP at the left medial calcaneus at the plantar fascia attachment  HTP posterior left calcaneous  TB left medial arch        Key   F= Flexed   E = Extended   R = Rotated   N = Neutral   S = Sidebent   TTA = tissue texture abnormality   L/R/B (last letter) = left/right/bilateral   HTP = fascial herniated trigger point   TB = fascial trigger band   CD = fascial continuum distortion         Vascular/Sensory Exam:  DP and PT pulses intact BL  No skin changes, no abnormal hair distribution  Sensation intact to light touch throughout the saphenous, sural, superficial peroneal, deep peroneal, and tibial nerve distributions  Negative Tinel's test over tarsal tunnel  Capillary refill intact <2 seconds in all toes bilaterally.      IMAGIN. X-ray obtained 2024 due to bilateral foot pain   2. X-ray images were reviewed personally by me and then directly with patient.  3. FINDINGS: X-ray images obtained demonstrate plantar calcaneal spur bilaterally, no acute fracture or dislocation  4. IMPRESSION: As above.       ASSESSMENT:      ICD-10-CM ICD-9-CM   1. Right foot pain  M79.671 729.5   2. Left foot pain  M79.672 729.5   3. Plantar fasciitis, bilateral  M72.2 728.71   4. History of foot surgery  Z98.890 V15.29   5. Poor body mechanics  R29.898 781.99   6. Somatic dysfunction of lower  extremity  M99.06 739.6           PLAN:  1-3. Bilateral foot pain/plantar fasciitis - recent exacerbation  4. History of left foot surgery  5. Poor body mechanics    - Angel admits to bilateral foot pain, left worse than right beginning in 2019 she attributes to compensation from walking differently due to right hip pain. 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     - She states she was feeling greatly improved following her most recent visit and was able to walk at [place] without appreciating an increase in her pain. She notes a gradual return of her pain over the past 2 weeks she attributes to work and length of time since her last follow up.    - Symptoms, exam, and imaging are still most consistent with fascial restriction and myofascial tightness secondary to poor body mechanics, history of foot surgery on the left and over compensation.  She has done very well with OMT but her work continues to increase symptoms due to extended time on her feet coupled with the anatomic changes that she now has post surgery.    - Based on her description/body language of pain and somatic dysfunction identified on exam, I discussed osteopathic manipulation as a treatment option today.  She consents to evaluation and treatment.  See below.    - Continue with HEP for plantar fasciitis prescribed at initial visit.     - Continue with Ibuprofen 800mg as needed.      6. Somatic dysfunction of lower extremity region -    - OMT 1-2 regions. Oral consent obtained. Reviewed benefits and potential side effects. OMT indicated today due to signs and symptoms as well as local and remote somatic dysfunction findings and their related neurokinetic, lymphatic, fascial and/or arteriovenous body connections. OMT techniques used: Soft Tissue, Myofascial Release, Fascial Distortion Model, and Articulatory. Treatment was tolerated well. Improvement noted in segmental mobility post-treatment in dysfunctional  regions. There were no adverse events and no complications immediately following treatment. Advised plenty of water to help alleviate soreness.      Future planning includes - possibly more OMT if helpful and if indicated, diagnostic ultrasound of the foot to further assess the painful palpable fascia/scar tissue, hydrodissection if all else fails    All questions were answered to the best of my ability and all concerns were addressed at this time.    Follow up in 3-4 weeks for above, or sooner if needed.      This note is dictated using the M*Modal Fluency Direct word recognition program. There are word recognition mistakes that are occasionally missed on review.

## 2024-11-13 DIAGNOSIS — L65.9 HAIR LOSS: ICD-10-CM

## 2024-11-13 RX ORDER — MINOXIDIL 2.5 MG/1
TABLET ORAL
Qty: 45 TABLET | Refills: 2 | Status: SHIPPED | OUTPATIENT
Start: 2024-11-13

## 2024-11-21 ENCOUNTER — PATIENT MESSAGE (OUTPATIENT)
Dept: OBSTETRICS AND GYNECOLOGY | Facility: CLINIC | Age: 44
End: 2024-11-21
Payer: COMMERCIAL

## 2024-11-21 ENCOUNTER — TELEPHONE (OUTPATIENT)
Dept: PRIMARY CARE CLINIC | Facility: CLINIC | Age: 44
End: 2024-11-21
Payer: COMMERCIAL

## 2024-11-21 NOTE — TELEPHONE ENCOUNTER
----- Message from Odette sent at 11/21/2024  4:11 PM CST -----  Regarding: PT ADVICE  Contact: PT  Pt called regarding scheduling an appt for nose bleeds. Next available appt with NP is 12.3.24, pt would like to be seen sooner. Symptom Screen suggest 24 hours.    Please advise. Pt can be reached at [phone]

## 2024-12-03 ENCOUNTER — OFFICE VISIT (OUTPATIENT)
Dept: SPORTS MEDICINE | Facility: CLINIC | Age: 44
End: 2024-12-03
Payer: COMMERCIAL

## 2024-12-03 VITALS
BODY MASS INDEX: 38.21 KG/M2 | HEART RATE: 64 BPM | HEIGHT: 65 IN | DIASTOLIC BLOOD PRESSURE: 68 MMHG | WEIGHT: 229.38 LBS | SYSTOLIC BLOOD PRESSURE: 120 MMHG

## 2024-12-03 DIAGNOSIS — R29.898 POOR BODY MECHANICS: ICD-10-CM

## 2024-12-03 DIAGNOSIS — M99.06 SOMATIC DYSFUNCTION OF LOWER EXTREMITY: ICD-10-CM

## 2024-12-03 DIAGNOSIS — M79.672 LEFT FOOT PAIN: ICD-10-CM

## 2024-12-03 DIAGNOSIS — M72.2 PLANTAR FASCIITIS, BILATERAL: ICD-10-CM

## 2024-12-03 DIAGNOSIS — M79.671 RIGHT FOOT PAIN: Primary | ICD-10-CM

## 2024-12-03 DIAGNOSIS — Z98.890 HISTORY OF FOOT SURGERY: ICD-10-CM

## 2024-12-03 PROCEDURE — 98925 OSTEOPATH MANJ 1-2 REGIONS: CPT | Mod: S$GLB,,, | Performed by: ORTHOPAEDIC SURGERY

## 2024-12-03 PROCEDURE — 3078F DIAST BP <80 MM HG: CPT | Mod: CPTII,S$GLB,, | Performed by: ORTHOPAEDIC SURGERY

## 2024-12-03 PROCEDURE — 3044F HG A1C LEVEL LT 7.0%: CPT | Mod: CPTII,S$GLB,, | Performed by: ORTHOPAEDIC SURGERY

## 2024-12-03 PROCEDURE — 3074F SYST BP LT 130 MM HG: CPT | Mod: CPTII,S$GLB,, | Performed by: ORTHOPAEDIC SURGERY

## 2024-12-03 PROCEDURE — 99999 PR PBB SHADOW E&M-EST. PATIENT-LVL IV: CPT | Mod: PBBFAC,,, | Performed by: ORTHOPAEDIC SURGERY

## 2024-12-03 PROCEDURE — 99213 OFFICE O/P EST LOW 20 MIN: CPT | Mod: 25,S$GLB,, | Performed by: ORTHOPAEDIC SURGERY

## 2024-12-03 PROCEDURE — 1159F MED LIST DOCD IN RCRD: CPT | Mod: CPTII,S$GLB,, | Performed by: ORTHOPAEDIC SURGERY

## 2024-12-03 PROCEDURE — 3008F BODY MASS INDEX DOCD: CPT | Mod: CPTII,S$GLB,, | Performed by: ORTHOPAEDIC SURGERY

## 2024-12-03 PROCEDURE — 1160F RVW MEDS BY RX/DR IN RCRD: CPT | Mod: CPTII,S$GLB,, | Performed by: ORTHOPAEDIC SURGERY

## 2024-12-03 NOTE — PROGRESS NOTES
CC: bilateral foot pain    George is here today for follow up evaluation of her bilateral foot pain. She reports her pain is 3/10 today, located at her heel. She reports improvement following OMT at last visit but endorses continued inflammation feeling of her heels bilaterally. Takes ibuprofen 800mg as needed which is somewhat helpful. No new injuries or symptoms in the interval.     Recall from visit on 11/11/24  Angel is here today for follow up evaluation of her bilateral foot pain. Patient reports her pain is 4/10 today. She states she was able to walk around [place] and denies appreciating increased pain with this activity and is very pleased by this. She notes an increase in her pain over the past 2 weeks she attributes to working and length of time since her last visit.    Recall from visit on 10/01/2024  Angel is here today for follow up evaluation of her bilateral foot pain. Patient reports her pain is 3/10 today and states she is feeling 80% improved which she attributes to OMT and compliance with her HEP. She states she is leaving for [place] in 2 days and is interested in being evaluated for more OMT as this has historically been helpful for her.       PAST MEDICAL HISTORY:   Past Medical History:   Diagnosis Date    Hypertension     PONV (postoperative nausea and vomiting)     Rosacea        PAST SURGICAL HISTORY:   Past Surgical History:   Procedure Laterality Date    AUGMENTATION OF BREAST Bilateral 2005    Breast Augmentation  2005    COSMETIC SURGERY  2005    Breast augmentation    DILATION AND CURETTAGE OF UTERUS N/A 10/19/2019    Procedure: DILATION AND CURETTAGE, UTERUS;  Surgeon: Frances Culp MD;  Location: [facility] OR;  Service: OB/GYN;  Laterality: N/A;  with suction    ENDOSCOPIC PLANTAR FASCIOTOMY Left 6/17/2022    Procedure: FASCIOTOMY, PLANTAR, ENDOSCOPIC;  Surgeon: Mello Montez DPM;  Location: [facility] OR;  Service: Podiatry;  Laterality: Left;  endoscopic plantar fasciotomy kit     mischarr      ROBOT-ASSISTED LAPAROSCOPIC UTERINE MYOMECTOMY N/A 1/7/2020    Procedure: ROBOTIC MYOMECTOMY, UTERUS;  Surgeon: Frances Culp MD;  Location: [facility] OR;  Service: OB/GYN;  Laterality: N/A;    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    Heart disease Mother Heather Diaz     Hyperlipidemia Mother Heather Diaz     Alcohol abuse Father Nixon Diaz     Alcohol abuse Maternal Uncle Saúl Austin     Psoriasis Maternal Grandmother Heather Austin     Arthritis Maternal Grandmother Heather Austin     Diabetes Maternal Grandmother Heather Austin     Alcohol abuse Maternal Grandfather Josh Austin     COPD Maternal Grandfather Josh Austin     Cancer Paternal Grandmother Nely Diaz         Lung Cancer    Cancer Paternal Grandfather Lamine Diaz         Pancreatic Cancer    Cancer Neg Hx      Ovarian cancer Neg Hx      Melanoma Neg Hx      Heart attack Neg Hx      Stroke Neg Hx      Colon cancer Neg Hx      Breast cancer Neg Hx      Blindness Neg Hx      Amblyopia Neg Hx      Cataracts Neg Hx      Glaucoma Neg Hx      Macular degeneration Neg Hx      Retinal detachment Neg Hx      Strabismus Neg Hx      Inflammatory bowel disease Neg Hx      Kidney disease Neg Hx      Lupus Neg Hx      Rheum arthritis Neg Hx         SOCIAL HISTORY:   Social History     Socioeconomic History    Marital status:    Occupational History    Occupation: Nurse     Employer: [employer]     Comment: Oncology   Tobacco Use    Smoking status: Never    Smokeless tobacco: Never   Substance and Sexual Activity    Alcohol use: No    Drug use: No    Sexual activity: Yes     Partners: Male     Birth control/protection: None     Comment: Vasectomy   Other Topics Concern    Are you pregnant or think you may be? No    Breast-feeding No     Social Drivers of Health     Financial Resource Strain: Low Risk  (4/4/2024)    Overall Financial Resource Strain (CARDIA)     Difficulty of Paying Living Expenses: Not hard at all   Food Insecurity: No Food Insecurity (4/4/2024)    Hunger Vital Sign     Worried About Running Out of Food in the Last Year: Never true     Ran Out of Food in the Last Year: Never true   Transportation Needs: No Transportation Needs (4/4/2024)    PRAPARE - Transportation     Lack of Transportation (Medical): No     Lack of Transportation (Non-Medical): No   Physical Activity: Unknown (4/4/2024)    Exercise Vital Sign     Days of Exercise per Week: 0 days   Stress: Stress Concern Present (4/4/2024)    [employer] - Occupational Stress Questionnaire     Feeling of Stress : Rather much   Housing Stability: Low Risk  (4/4/2024)    Housing Stability Vital Sign     Unable to Pay for Housing in the Last Year: No     Number of Places Lived in the Last Year: 1     Unstable Housing in the Last Year: No       MEDICATIONS:     Current Outpatient Medications:     acetaminophen (TYLENOL) 500 MG tablet, Take 2 tablets (1,000 mg total) by mouth every 8 (eight) hours as needed for Pain., Disp: 60 tablet, Rfl: 0    augmented betamethasone (DIPROLENE) 0.05 % lotion, Apply to scalp nightly, Disp: 60 mL, Rfl: 3    biotin 1 mg Cap, Take by mouth., Disp: , Rfl:     carvediloL (COREG) 3.125 MG tablet, Take 1 tablet (3.125 mg total) by mouth 2 (two) times daily with meals., Disp: 180 tablet, Rfl: 3    fluocinonide (LIDEX) 0.05 % external solution, Thin film to scalp  "nightly, Disp: 60 mL, Rfl: 5    gabapentin (NEURONTIN) 100 MG capsule, Take 2 capsules (200 mg total) by mouth 3 (three) times daily., Disp: 540 capsule, Rfl: 0    hydrOXYzine HCL (ATARAX) 25 MG tablet, Take 1 tablet (25 mg total) by mouth nightly as needed (Insomnia)., Disp: 30 tablet, Rfl: 3    ibuprofen (ADVIL,MOTRIN) 800 MG tablet, Take 1 tablet (800 mg total) by mouth every 6 (six) hours as needed for Pain., Disp: 30 tablet, Rfl: 1    ketoconazole (NIZORAL) 2 % shampoo, Lather scalp with medicated shampoo once a week - let sit on scalp at least 5 minutes prior to rinsing, Disp: 120 mL, Rfl: 5    LIDOcaine (LIDODERM) 5 %, Place 1 patch onto the skin once daily. Remove & Discard patch within 12 hours or as directed by MD, Disp: 30 patch, Rfl: 0    methocarbamoL (ROBAXIN) 750 MG Tab, Take 1 tablet (750 mg total) by mouth nightly as needed (back spasms)., Disp: 30 tablet, Rfl: 1    minoxidiL (LONITEN) 2.5 MG tablet, TAKE 1/2 TABLET BY MOUTH DAILY, Disp: 45 tablet, Rfl: 2    multivitamin (THERAGRAN) per tablet, Take 1 tablet by mouth once daily., Disp: , Rfl:     naproxen (NAPROSYN) 500 MG tablet, Take 1 tablet (500 mg total) by mouth 2 (two) times daily with meals., Disp: 60 tablet, Rfl: 1    ondansetron (ZOFRAN-ODT) 4 MG TbDL, Take 2 tablets (8 mg total) by mouth every 12 (twelve) hours as needed (nausea)., Disp: 10 tablet, Rfl: 3    pantoprazole (PROTONIX) 40 MG tablet, Take 1 tablet (40 mg total) by mouth once daily., Disp: 90 tablet, Rfl: 3    spironolactone (ALDACTONE) 50 MG tablet, Take 2 tablets (100 mg total) by mouth once daily., Disp: 180 tablet, Rfl: 3    UNABLE TO FIND, RELIEF FACTOR (FISH OIL, TURMERIC, ICARLIN, RESVERATROL), Disp: , Rfl:     ALLERGIES:   Review of patient's allergies indicates:  No Known Allergies     PHYSICAL EXAMINATION:  /68   Pulse 64   Ht 5' 5" (1.651 m)   Wt 104.1 kg (229 lb 6.2 oz)   LMP 12/09/2021   BMI 38.17 kg/m²   Vitals signs and nursing note have been " reviewed.  General: In no acute distress, well developed, well nourished, no diaphoresis  Eyes: EOM full and smooth, no eye redness or discharge  HENT: normocephalic and atraumatic, neck supple, trachea midline, no nasal discharge, no external ear redness or discharge  Cardiovascular: 2+ and symmetric radial and DP pulses bilaterally, no LE edema  Lungs: respirations non-labored, no conversational dyspnea   Abd: non-distended, no rigidity  MSK: no amputation or deformity, no swelling of extremities  Neuro: AAOx3, CN2-12 grossly intact  Skin: No rashes, warm and dry  Psychiatric: cooperative, pleasant, mood and affect appropriate for age    ANKLE: left   The affected ankle is compared to the contralateral ankle.    Observation:    There is no edema, erythema, or ecchymosis.   Shoes reveal a normal wear pattern.  No structural deformities including pes planus/cavus, hallux valgus, or toe deformities.   Negative too-many toes sign.  Normal callus pattern on the feet bilaterally.    Achilles tendon and calcaneal insertion reveals no deformities  No leg or intrinsic foot muscle atrophy.  Squatting reveals symmetric pronation of the bilateral feet.   Able to rise on toes with symmetric supination.    Normal gait without evidence of antalgia.    ROM: (expected degree)   Active dorsiflexion to 20° bilaterally.    Active plantarflexion to 50° bilaterally.    Active ankle inversion to 35° bilaterally.    Active ankle eversion to 15° bilaterally.    Full active flexion/extension of the toes bilaterally.   Heel cords without tightness bilaterally.    Tenderness To Palpation   No tenderness at the ATFL, CFL, PTFL, or deltoid ligaments  No tenderness over the distal anterior syndesmosis, distal tibia/fibula, fibular head/shaft  No tenderness at medial or lateral malleoli   No tenderness at navicular, cuboid, cuneiforms, talus  + tenderness at the medial left calcaneous at the plantar fascia attachment  + tenderness at the  inferior right calcaneous at the plantar fascia attachment  No tenderness over the left medial arch along fascial thickening/scarring  + callous bottom of left foot at distal 5th metatarsal head  No tenderness along the metatarsals or phalanges  No tenderness at the Achilles tendon calcaneal insertion  No tenderness at the tibialis posterior, flexor digitorum longus, flexor hallucis longus   No tenderness at the peroneal tendons    Strength Testing: (*pain)  Dorsiflexion - 5/5  Platarflexion - 5/5  Resisted Inversion - 5/5  Resisted Eversion - 5/5  Great Toe Extension - 5/5  Great Toe Flexion - 5/5    Special Tests:  Anterior talar drawer - negative and without dimpling  Talar tilt - negative    Heel tap test - negative  Distal tib/fib squeeze test - negative  Bryant squeeze test - negative    Metatarsal squeeze test - negative  Midfoot stress test - negative  Calcaneal squeeze test - negative    No subluxation of the peroneal tendons with resisted eversion.    Structural Exam:  Midfoot joint restriction left > right  HTP at the left medial calcaneus at the plantar fascia attachment  HTP posterior left calcaneous  TB left medial arch distal to proximal  Anterior tibiotalar joint on the right.      Key   F= Flexed   E = Extended   R = Rotated   N = Neutral   S = Sidebent   TTA = tissue texture abnormality   L/R/B (last letter) = left/right/bilateral   HTP = fascial herniated trigger point   TB = fascial trigger band   CD = fascial continuum distortion         Vascular/Sensory Exam:  DP and PT pulses intact BL  No skin changes, no abnormal hair distribution  Sensation intact to light touch throughout the saphenous, sural, superficial peroneal, deep peroneal, and tibial nerve distributions  Negative Tinel's test over tarsal tunnel  Capillary refill intact <2 seconds in all toes bilaterally.      IMAGIN. X-ray obtained 2024 due to bilateral foot pain   2. X-ray images were reviewed personally by me and then  directly with patient.  3. FINDINGS: X-ray images obtained demonstrate plantar calcaneal spur bilaterally, no acute fracture or dislocation  4. IMPRESSION: As above.       ASSESSMENT:      ICD-10-CM ICD-9-CM   1. Right foot pain  M79.671 729.5   2. Left foot pain  M79.672 729.5   3. Plantar fasciitis, bilateral  M72.2 728.71   4. History of foot surgery  Z98.890 V15.29   5. Poor body mechanics  R29.898 781.99   6. Somatic dysfunction of lower extremity  M99.06 739.6       PLAN:  1-3. Bilateral foot pain/plantar fasciitis - improving  4. History of left foot surgery  5. Poor body mechanics    - Angel admits to bilateral foot pain, left worse than right beginning in 2019 she attributes to compensation from walking differently due to right hip pain. 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     - She states she had slight improvement following last visit a few weeks ago but endorses continued pain.     - Symptoms, exam, and imaging are still most consistent with fascial restriction and myofascial tightness secondary to poor body mechanics, history of foot surgery on the left and over compensation. She has done very well with OMT but her work continues to increase symptoms due to extended time on her feet coupled with the anatomic changes that she now has post surgery.    - Based on her description/body language of pain and somatic dysfunction identified on exam, I discussed osteopathic manipulation as a treatment option today.  She consents to evaluation and treatment.  See below.    - Continue with HEP for plantar fasciitis prescribed at initial visit.     - Continue with Ibuprofen 800mg as needed.      6. Somatic dysfunction of lower extremity region -    - OMT 1-2 regions. Oral consent obtained. Reviewed benefits and potential side effects. OMT indicated today due to signs and symptoms as well as local and remote somatic dysfunction findings and their related neurokinetic,  lymphatic, fascial and/or arteriovenous body connections. OMT techniques used: Soft Tissue, Myofascial Release, Fascial Distortion Model, and Articulatory. Treatment was tolerated well. Improvement noted in segmental mobility post-treatment in dysfunctional regions. There were no adverse events and no complications immediately following treatment. Advised plenty of water to help alleviate soreness.      Future planning includes - possibly more OMT if helpful and if indicated, diagnostic ultrasound of the foot to further assess the painful palpable fascia/scar tissue, hydrodissection if all else fails    All questions were answered to the best of my ability and all concerns were addressed at this time.    Follow up in 3-4 weeks for above, or sooner if needed.    This note is dictated using the M*Modal Fluency Direct word recognition program. There are word recognition mistakes that are occasionally missed on review.

## 2024-12-11 DIAGNOSIS — G62.9 NEUROPATHY: ICD-10-CM

## 2024-12-12 RX ORDER — GABAPENTIN 100 MG/1
200 CAPSULE ORAL 3 TIMES DAILY
Qty: 540 CAPSULE | Refills: 3 | Status: SHIPPED | OUTPATIENT
Start: 2024-12-12 | End: 2025-12-07

## 2024-12-12 NOTE — TELEPHONE ENCOUNTER
No care due was identified.  Health [facility] Embedded Care Due Messages. Reference number: [id number].   12/11/2024 9:58:09 PM CST

## 2024-12-20 ENCOUNTER — OFFICE VISIT (OUTPATIENT)
Dept: SPORTS MEDICINE | Facility: CLINIC | Age: 44
End: 2024-12-20
Payer: COMMERCIAL

## 2024-12-20 VITALS
WEIGHT: 227.38 LBS | BODY MASS INDEX: 37.88 KG/M2 | HEIGHT: 65 IN | DIASTOLIC BLOOD PRESSURE: 76 MMHG | HEART RATE: 76 BPM | SYSTOLIC BLOOD PRESSURE: 114 MMHG

## 2024-12-20 DIAGNOSIS — M72.2 PLANTAR FASCIITIS, BILATERAL: ICD-10-CM

## 2024-12-20 DIAGNOSIS — Z98.890 HISTORY OF FOOT SURGERY: ICD-10-CM

## 2024-12-20 DIAGNOSIS — M99.06 SOMATIC DYSFUNCTION OF LOWER EXTREMITY: ICD-10-CM

## 2024-12-20 DIAGNOSIS — M79.672 LEFT FOOT PAIN: ICD-10-CM

## 2024-12-20 DIAGNOSIS — M79.671 RIGHT FOOT PAIN: Primary | ICD-10-CM

## 2024-12-20 DIAGNOSIS — R29.898 POOR BODY MECHANICS: ICD-10-CM

## 2024-12-20 PROCEDURE — 1159F MED LIST DOCD IN RCRD: CPT | Mod: CPTII,S$GLB,, | Performed by: ORTHOPAEDIC SURGERY

## 2024-12-20 PROCEDURE — 3074F SYST BP LT 130 MM HG: CPT | Mod: CPTII,S$GLB,, | Performed by: ORTHOPAEDIC SURGERY

## 2024-12-20 PROCEDURE — 3044F HG A1C LEVEL LT 7.0%: CPT | Mod: CPTII,S$GLB,, | Performed by: ORTHOPAEDIC SURGERY

## 2024-12-20 PROCEDURE — 98925 OSTEOPATH MANJ 1-2 REGIONS: CPT | Mod: S$GLB,,, | Performed by: ORTHOPAEDIC SURGERY

## 2024-12-20 PROCEDURE — 99999 PR PBB SHADOW E&M-EST. PATIENT-LVL IV: CPT | Mod: PBBFAC,,, | Performed by: ORTHOPAEDIC SURGERY

## 2024-12-20 PROCEDURE — 99214 OFFICE O/P EST MOD 30 MIN: CPT | Mod: 25,S$GLB,, | Performed by: ORTHOPAEDIC SURGERY

## 2024-12-20 PROCEDURE — 3078F DIAST BP <80 MM HG: CPT | Mod: CPTII,S$GLB,, | Performed by: ORTHOPAEDIC SURGERY

## 2024-12-20 PROCEDURE — 3008F BODY MASS INDEX DOCD: CPT | Mod: CPTII,S$GLB,, | Performed by: ORTHOPAEDIC SURGERY

## 2024-12-20 PROCEDURE — 1160F RVW MEDS BY RX/DR IN RCRD: CPT | Mod: CPTII,S$GLB,, | Performed by: ORTHOPAEDIC SURGERY

## 2024-12-20 NOTE — PROGRESS NOTES
CC: bilateral foot pain    George is here today for follow up evaluation of her bilateral foot pain. She reports her pain is 2/10 today, located at her heels . She reports improvement following OMT at last visit but not fully resolved. 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 She is getting  next week and is here requesting OMT. Takes ibuprofen 800mg as needed which is somewhat helpful. No new injuries or symptoms in the interval.     Recall from visit on 11/11/24  George is here today for follow up evaluation of her bilateral foot pain. She reports her pain is 3/10 today, located at her heel. She reports improvement following OMT at last visit but endorses continued inflammation feeling of her heels bilaterally. Takes ibuprofen 800mg as needed which is somewhat helpful. No new injuries or symptoms in the interval.     Recall from visit on 11/11/24  Angel is here today for follow up evaluation of her bilateral foot pain. Patient reports her pain is 4/10 today. She states she was able to walk around [place] and denies appreciating increased pain with this activity and is very pleased by this. She notes an increase in her pain over the past 2 weeks she attributes to working and length of time since her last visit.    Recall from visit on 10/01/2024  Angel is here today for follow up evaluation of her bilateral foot pain. Patient reports her pain is 3/10 today and states she is feeling 80% improved which she attributes to OMT and compliance with her HEP. She states she is leaving for [place] in 2 days and is interested in being evaluated for more OMT as this has historically been helpful for her.       PAST MEDICAL HISTORY:   Past Medical History:   Diagnosis Date    Hypertension     PONV (postoperative nausea and vomiting)     Rosacea        PAST SURGICAL HISTORY:   Past Surgical History:   Procedure Laterality Date    AUGMENTATION OF BREAST  Ok continue to monitor-  call if swelling is looking more red Bilateral 2005    Breast Augmentation  2005    COSMETIC SURGERY  2005    Breast augmentation    DILATION AND CURETTAGE OF UTERUS N/A 10/19/2019    Procedure: DILATION AND CURETTAGE, UTERUS;  Surgeon: Frances Culp MD;  Location: [facility] OR;  Service: OB/GYN;  Laterality: N/A;  with suction    ENDOSCOPIC PLANTAR FASCIOTOMY Left 6/17/2022    Procedure: FASCIOTOMY, PLANTAR, ENDOSCOPIC;  Surgeon: Mello Montez DPM;  Location: [facility] OR;  Service: Podiatry;  Laterality: Left;  endoscopic plantar fasciotomy kit    mischarr      ROBOT-ASSISTED LAPAROSCOPIC UTERINE MYOMECTOMY N/A 1/7/2020    Procedure: ROBOTIC MYOMECTOMY, UTERUS;  Surgeon: Frances Culp MD;  Location: [facility] OR;  Service: OB/GYN;  Laterality: N/A;    SURGICAL REMOVAL OF FERNANDEZ'S NEUROMA Left 1/5/2023    Procedure: EXCISION, FERNANDEZ'S NEUROMA;  Surgeon: Mello Montez DPM;  Location: [facility] OR;  Service: Podiatry;  Laterality: Left;    XI ROBOTIC HYSTERECTOMY, WITH SALPINGO-OOPHORECTOMY N/A 12/15/2021    Procedure: XI ROBOTIC HYSTERECTOMY,WITH SALPINGO-OOPHORECTOMY;  Surgeon: Carla Aguero MD;  Location: [facility];  Service: OB/GYN;  Laterality: N/A;       FAMILY HISTORY:   Family History   Problem Relation Name Age of Onset    Diabetes Mother Heather Diaz     Hypertension Mother Heather Diaz     Heart failure Mother Heather Diaz         viral?    Heart disease Mother Heather Diaz     Hyperlipidemia Mother Heather Diaz     Alcohol abuse Father Nixon Diaz     Alcohol abuse Maternal Uncle Saúl Austin     Psoriasis Maternal Grandmother Heather Austin     Arthritis Maternal Grandmother Heather Austin     Diabetes Maternal Grandmother Heather Austin     Alcohol abuse Maternal Grandfather Josh Austin     COPD Maternal Grandfather Josh Austin     Cancer Paternal Grandmother Nely Diaz         Lung Cancer    Cancer Paternal Grandfather Lamine Diaz         Pancreatic Cancer    Cancer Neg Hx      Ovarian cancer Neg Hx      Melanoma Neg Hx      Heart attack Neg  Hx      Stroke Neg Hx      Colon cancer Neg Hx      Breast cancer Neg Hx      Blindness Neg Hx      Amblyopia Neg Hx      Cataracts Neg Hx      Glaucoma Neg Hx      Macular degeneration Neg Hx      Retinal detachment Neg Hx      Strabismus Neg Hx      Inflammatory bowel disease Neg Hx      Kidney disease Neg Hx      Lupus Neg Hx      Rheum arthritis Neg Hx         SOCIAL HISTORY:   Social History     Socioeconomic History    Marital status:    Occupational History    Occupation: Nurse     Employer: [employer]     Comment: Oncology   Tobacco Use    Smoking status: Never    Smokeless tobacco: Never   Substance and Sexual Activity    Alcohol use: No    Drug use: No    Sexual activity: Yes     Partners: Male     Birth control/protection: None     Comment: Vasectomy   Other Topics Concern    Are you pregnant or think you may be? No    Breast-feeding No     Social Drivers of Health     Financial Resource Strain: Low Risk  (4/4/2024)    Overall Financial Resource Strain (CARDIA)     Difficulty of Paying Living Expenses: Not hard at all   Food Insecurity: No Food Insecurity (4/4/2024)    Hunger Vital Sign     Worried About Running Out of Food in the Last Year: Never true     Ran Out of Food in the Last Year: Never true   Transportation Needs: No Transportation Needs (4/4/2024)    PRAPARE - Transportation     Lack of Transportation (Medical): No     Lack of Transportation (Non-Medical): No   Physical Activity: Unknown (4/4/2024)    Exercise Vital Sign     Days of Exercise per Week: 0 days   Stress: Stress Concern Present (4/4/2024)    [employer] [employer] - Occupational Stress Questionnaire     Feeling of Stress : Rather much   Housing Stability: Low Risk  (4/4/2024)    Housing Stability Vital Sign     Unable to Pay for Housing in the Last Year: No     Number of Places Lived in the Last Year: 1     Unstable Housing in the Last Year: No       MEDICATIONS:     Current Outpatient  Medications:     acetaminophen (TYLENOL) 500 MG tablet, Take 2 tablets (1,000 mg total) by mouth every 8 (eight) hours as needed for Pain., Disp: 60 tablet, Rfl: 0    augmented betamethasone (DIPROLENE) 0.05 % lotion, Apply to scalp nightly, Disp: 60 mL, Rfl: 3    biotin 1 mg Cap, Take by mouth., Disp: , Rfl:     carvediloL (COREG) 3.125 MG tablet, Take 1 tablet (3.125 mg total) by mouth 2 (two) times daily with meals., Disp: 180 tablet, Rfl: 3    fluocinonide (LIDEX) 0.05 % external solution, Thin film to scalp nightly, Disp: 60 mL, Rfl: 5    gabapentin (NEURONTIN) 100 MG capsule, Take 2 capsules (200 mg total) by mouth 3 (three) times daily., Disp: 540 capsule, Rfl: 3    hydrOXYzine HCL (ATARAX) 25 MG tablet, Take 1 tablet (25 mg total) by mouth nightly as needed (Insomnia)., Disp: 30 tablet, Rfl: 3    ibuprofen (ADVIL,MOTRIN) 800 MG tablet, Take 1 tablet (800 mg total) by mouth every 6 (six) hours as needed for Pain., Disp: 30 tablet, Rfl: 1    ketoconazole (NIZORAL) 2 % shampoo, Lather scalp with medicated shampoo once a week - let sit on scalp at least 5 minutes prior to rinsing, Disp: 120 mL, Rfl: 5    LIDOcaine (LIDODERM) 5 %, Place 1 patch onto the skin once daily. Remove & Discard patch within 12 hours or as directed by MD, Disp: 30 patch, Rfl: 0    methocarbamoL (ROBAXIN) 750 MG Tab, Take 1 tablet (750 mg total) by mouth nightly as needed (back spasms)., Disp: 30 tablet, Rfl: 1    minoxidiL (LONITEN) 2.5 MG tablet, TAKE 1/2 TABLET BY MOUTH DAILY, Disp: 45 tablet, Rfl: 2    multivitamin (THERAGRAN) per tablet, Take 1 tablet by mouth once daily., Disp: , Rfl:     naproxen (NAPROSYN) 500 MG tablet, Take 1 tablet (500 mg total) by mouth 2 (two) times daily with meals., Disp: 60 tablet, Rfl: 1    ondansetron (ZOFRAN-ODT) 4 MG TbDL, Take 2 tablets (8 mg total) by mouth every 12 (twelve) hours as needed (nausea)., Disp: 10 tablet, Rfl: 3    pantoprazole (PROTONIX) 40 MG tablet, Take 1 tablet (40 mg total) by  "mouth once daily., Disp: 90 tablet, Rfl: 3    spironolactone (ALDACTONE) 50 MG tablet, Take 2 tablets (100 mg total) by mouth once daily., Disp: 180 tablet, Rfl: 3    UNABLE TO FIND, RELIEF FACTOR (FISH OIL, TURMERIC, ICARLIN, RESVERATROL), Disp: , Rfl:     ALLERGIES:   Review of patient's allergies indicates:  No Known Allergies     PHYSICAL EXAMINATION:  /76   Pulse 76   Ht 5' 5" (1.651 m)   Wt 103.1 kg (227 lb 6.5 oz)   LMP 12/09/2021   BMI 37.84 kg/m²   Vitals signs and nursing note have been reviewed.  General: In no acute distress, well developed, well nourished, no diaphoresis  Eyes: EOM full and smooth, no eye redness or discharge  HENT: normocephalic and atraumatic, neck supple, trachea midline, no nasal discharge, no external ear redness or discharge  Cardiovascular: 2+ and symmetric radial and DP pulses bilaterally, no LE edema  Lungs: respirations non-labored, no conversational dyspnea   Abd: non-distended, no rigidity  MSK: no amputation or deformity, no swelling of extremities  Neuro: AAOx3, CN2-12 grossly intact  Skin: No rashes, warm and dry  Psychiatric: cooperative, pleasant, mood and affect appropriate for age    ANKLE: left   The affected ankle is compared to the contralateral ankle.    Observation:    There is no edema, erythema, or ecchymosis.   Shoes reveal a normal wear pattern.  No structural deformities including pes planus/cavus, hallux valgus, or toe deformities.   Negative too-many toes sign.  Normal callus pattern on the feet bilaterally.    Achilles tendon and calcaneal insertion reveals no deformities  No leg or intrinsic foot muscle atrophy.  Squatting reveals symmetric pronation of the bilateral feet.   Able to rise on toes with symmetric supination.    Normal gait without evidence of antalgia.    ROM: (expected degree)   Active dorsiflexion to 20° bilaterally.    Active plantarflexion to 50° bilaterally.    Active ankle inversion to 35° bilaterally.    Active ankle " eversion to 15° bilaterally.    Full active flexion/extension of the toes bilaterally.   Heel cords without tightness bilaterally.    Tenderness To Palpation   No tenderness at the ATFL, CFL, PTFL, or deltoid ligaments  No tenderness over the distal anterior syndesmosis, distal tibia/fibula, fibular head/shaft  No tenderness at medial or lateral malleoli   No tenderness at navicular, cuboid, cuneiforms, talus  + tenderness at the medial left calcaneous at the plantar fascia attachment  + tenderness at the inferior right calcaneous at the plantar fascia attachment  No tenderness over the left medial arch along fascial thickening/scarring  + callous bottom of left foot at distal 5th metatarsal head  No tenderness along the metatarsals or phalanges  No tenderness at the Achilles tendon calcaneal insertion  No tenderness at the tibialis posterior, flexor digitorum longus, flexor hallucis longus   No tenderness at the peroneal tendons    Strength Testing: (*pain)  Dorsiflexion - 5/5  Platarflexion - 5/5  Resisted Inversion - 5/5  Resisted Eversion - 5/5  Great Toe Extension - 5/5  Great Toe Flexion - 5/5    Special Tests:  Anterior talar drawer - negative and without dimpling  Talar tilt - negative    Heel tap test - negative  Distal tib/fib squeeze test - negative  Bryant squeeze test - negative    Metatarsal squeeze test - negative  Midfoot stress test - negative  Calcaneal squeeze test - negative    No subluxation of the peroneal tendons with resisted eversion.    Structural Exam:  Midfoot joint restriction left > right  HTP at the left medial calcaneus at the plantar fascia attachment  HTP right sinus tarsi  Anterior tibiotalar joint bilaterally      Key   F= Flexed   E = Extended   R = Rotated   N = Neutral   S = Sidebent   TTA = tissue texture abnormality   L/R/B (last letter) = left/right/bilateral   HTP = fascial herniated trigger point   TB = fascial trigger band   CD = fascial continuum distortion          Vascular/Sensory Exam:  DP and PT pulses intact BL  No skin changes, no abnormal hair distribution  Sensation intact to light touch throughout the saphenous, sural, superficial peroneal, deep peroneal, and tibial nerve distributions  Negative Tinel's test over tarsal tunnel  Capillary refill intact <2 seconds in all toes bilaterally.      IMAGIN. X-ray obtained 2024 due to bilateral foot pain   2. X-ray images were reviewed personally by me and then directly with patient.  3. FINDINGS: X-ray images obtained demonstrate plantar calcaneal spur bilaterally, no acute fracture or dislocation  4. IMPRESSION: As above.       ASSESSMENT:      ICD-10-CM ICD-9-CM   1. Right foot pain  M79.671 729.5   2. Left foot pain  M79.672 729.5   3. Plantar fasciitis, bilateral  M72.2 728.71   4. History of foot surgery  Z98.890 V15.29   5. Poor body mechanics  R29.898 781.99   6. Somatic dysfunction of lower extremity  M99.06 739.6         PLAN:  1-3. Bilateral foot pain/plantar fasciitis - improving  4. History of left foot surgery  5. Poor body mechanics    - Angel admits to bilateral foot pain, left worse than right beginning in 2019 she attributes to compensation from walking differently due to right hip pain. 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     - She appreciates improvement following last visit but endorses continued symptoms that worse with activity. She is getting  in a week and would like to have less foot pain than usual and she has found this improvement with OMT.     - Symptoms, exam, and imaging are still most consistent with fascial restriction and myofascial tightness secondary to poor body mechanics, history of foot surgery on the left and over compensation. She has done very well with OMT but her work continues to increase symptoms due to extended time on her feet coupled with the anatomic changes that she now has post surgery.    - Based on her  description/body language of pain and somatic dysfunction identified on exam, I discussed osteopathic manipulation as a treatment option today.  She consents to evaluation and treatment.  See below.    - Continue with HEP for plantar fasciitis prescribed at initial visit.     - Continue with Ibuprofen 800mg as needed.      6. Somatic dysfunction of lower extremity region -    - OMT 1-2 regions. Oral consent obtained. Reviewed benefits and potential side effects. OMT indicated today due to signs and symptoms as well as local and remote somatic dysfunction findings and their related neurokinetic, lymphatic, fascial and/or arteriovenous body connections. OMT techniques used: Myofascial Release, Muscle Energy, Fascial Distortion Model, and Articulatory. Treatment was tolerated well. Improvement noted in segmental mobility post-treatment in dysfunctional regions. There were no adverse events and no complications immediately following treatment. Advised plenty of water to help alleviate soreness.      Future planning includes - possibly more OMT if helpful and if indicated, diagnostic ultrasound of the foot to further assess the painful palpable fascia/scar tissue, hydrodissection if all else fails    All questions were answered to the best of my ability and all concerns were addressed at this time.    Follow up in 4-6 weeks for above, or sooner if needed.    This note is dictated using the M*Modal Fluency Direct word recognition program. There are word recognition mistakes that are occasionally missed on review.    Total time spent face-to face with patient counseling or coordinating care including prognosis, differential diagnosis, risks and benefits of treatment, instructions, compliance risk reductions as well as non-face-to-face time personally spent reviewing medial record, medical documentation, and coordination of care.     EST MINUTES X   73765 10-19    45074 20-29    [id number] 30-39 33   [id number] 40-54    NEW     [id number]  15-29    98649 30-44    87873 45-59    31085 60-74    PHONE      5-10    [id number] 11-20    [id number] 21-30

## 2025-01-13 ENCOUNTER — OFFICE VISIT (OUTPATIENT)
Dept: SPORTS MEDICINE | Facility: CLINIC | Age: 45
End: 2025-01-13
Payer: COMMERCIAL

## 2025-01-13 VITALS — DIASTOLIC BLOOD PRESSURE: 84 MMHG | SYSTOLIC BLOOD PRESSURE: 122 MMHG

## 2025-01-13 DIAGNOSIS — M99.06 SOMATIC DYSFUNCTION OF LOWER EXTREMITY: ICD-10-CM

## 2025-01-13 DIAGNOSIS — M79.671 RIGHT FOOT PAIN: Primary | ICD-10-CM

## 2025-01-13 DIAGNOSIS — R29.898 POOR BODY MECHANICS: ICD-10-CM

## 2025-01-13 DIAGNOSIS — M72.2 PLANTAR FASCIITIS, BILATERAL: ICD-10-CM

## 2025-01-13 DIAGNOSIS — M79.672 LEFT FOOT PAIN: ICD-10-CM

## 2025-01-13 DIAGNOSIS — R22.41 LOCALIZED SWELLING, MASS, OR LUMP OF RIGHT LOWER EXTREMITY: ICD-10-CM

## 2025-01-13 DIAGNOSIS — Z98.890 HISTORY OF FOOT SURGERY: ICD-10-CM

## 2025-01-13 PROCEDURE — 1160F RVW MEDS BY RX/DR IN RCRD: CPT | Mod: CPTII,S$GLB,, | Performed by: ORTHOPAEDIC SURGERY

## 2025-01-13 PROCEDURE — 99214 OFFICE O/P EST MOD 30 MIN: CPT | Mod: 25,S$GLB,, | Performed by: ORTHOPAEDIC SURGERY

## 2025-01-13 PROCEDURE — 99999 PR PBB SHADOW E&M-EST. PATIENT-LVL III: CPT | Mod: PBBFAC,,, | Performed by: ORTHOPAEDIC SURGERY

## 2025-01-13 PROCEDURE — 98925 OSTEOPATH MANJ 1-2 REGIONS: CPT | Mod: S$GLB,,, | Performed by: ORTHOPAEDIC SURGERY

## 2025-01-13 PROCEDURE — 1159F MED LIST DOCD IN RCRD: CPT | Mod: CPTII,S$GLB,, | Performed by: ORTHOPAEDIC SURGERY

## 2025-01-13 PROCEDURE — 3074F SYST BP LT 130 MM HG: CPT | Mod: CPTII,S$GLB,, | Performed by: ORTHOPAEDIC SURGERY

## 2025-01-13 PROCEDURE — 3079F DIAST BP 80-89 MM HG: CPT | Mod: CPTII,S$GLB,, | Performed by: ORTHOPAEDIC SURGERY

## 2025-01-13 NOTE — PROGRESS NOTES
CC: bilateral foot pain    Angel is here today for follow up evaluation of her bilateral foot pain. Patient reports her pain is 3/10 today. She has historically appreciated great improvement in her pain with OMT and is interested in being evaluated for more of this today. She also notes an area of swelling affecting her right lateral dorsal midfoot and is concerned about this today. She admits to similar symptoms and exam in the past on the left which ended up being a neuroma that was improved with surgical removal. She denies any new injury or trauma. She admits to the plantar fascia feeling better on both sides today.    Recall from visit on 12/20/2024  George is here today for follow up evaluation of her bilateral foot pain. She reports her pain is 2/10 today, located at her heels. She reports improvement following OMT at last visit but not fully resolved. 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 She is getting  next week and is here requesting OMT. Takes ibuprofen 800mg as needed which is somewhat helpful. No new injuries or symptoms in the interval.     Recall from visit on 11/11/24  George is here today for follow up evaluation of her bilateral foot pain. She reports her pain is 3/10 today, located at her heel. She reports improvement following OMT at last visit but endorses continued inflammation feeling of her heels bilaterally. Takes ibuprofen 800mg as needed which is somewhat helpful. No new injuries or symptoms in the interval.       PAST MEDICAL HISTORY:   Past Medical History:   Diagnosis Date    Hypertension     PONV (postoperative nausea and vomiting)     Rosacea        PAST SURGICAL HISTORY:   Past Surgical History:   Procedure Laterality Date    AUGMENTATION OF BREAST Bilateral 2005    Breast Augmentation  2005    COSMETIC SURGERY  2005    Breast augmentation    DILATION AND CURETTAGE OF UTERUS N/A 10/19/2019    Procedure:  DILATION AND CURETTAGE, UTERUS;  Surgeon: Frances Culp MD;  Location: [facility] OR;  Service: OB/GYN;  Laterality: N/A;  with suction    ENDOSCOPIC PLANTAR FASCIOTOMY Left 6/17/2022    Procedure: FASCIOTOMY, PLANTAR, ENDOSCOPIC;  Surgeon: Mello Montez DPM;  Location: [facility] OR;  Service: Podiatry;  Laterality: Left;  endoscopic plantar fasciotomy kit    mischarr      ROBOT-ASSISTED LAPAROSCOPIC UTERINE MYOMECTOMY N/A 1/7/2020    Procedure: ROBOTIC MYOMECTOMY, UTERUS;  Surgeon: Frances Culp MD;  Location: [facility] OR;  Service: OB/GYN;  Laterality: N/A;    SURGICAL REMOVAL OF FERNANDEZ'S NEUROMA Left 1/5/2023    Procedure: EXCISION, FERNANDEZ'S NEUROMA;  Surgeon: Mello Montez DPM;  Location: [facility] OR;  Service: Podiatry;  Laterality: Left;    XI ROBOTIC HYSTERECTOMY, WITH SALPINGO-OOPHORECTOMY N/A 12/15/2021    Procedure: XI ROBOTIC HYSTERECTOMY,WITH SALPINGO-OOPHORECTOMY;  Surgeon: Carla Aguero MD;  Location: [facility];  Service: OB/GYN;  Laterality: N/A;       FAMILY HISTORY:   Family History   Problem Relation Name Age of Onset    Diabetes Mother Heather Diaz     Hypertension Mother Heather Diaz     Heart failure Mother Heather Diaz         viral?    Heart disease Mother Heather Diaz     Hyperlipidemia Mother Heather Diaz     Alcohol abuse Father Nixon Diaz     Alcohol abuse Maternal Uncle Saúl Avila     Psoriasis Maternal Grandmother Heather Austin     Arthritis Maternal Grandmother Heather Austin     Diabetes Maternal Grandmother Heather Austin     Alcohol abuse Maternal Grandfather Josh Austin     COPD Maternal Grandfather Josh Austin     Cancer Paternal Grandmother Nely Diaz         Lung Cancer    Cancer Paternal Grandfather Lamine Diaz         Pancreatic Cancer    Cancer Neg Hx      Ovarian cancer Neg Hx      Melanoma Neg Hx      Heart attack Neg Hx      Stroke Neg Hx      Colon cancer Neg Hx      Breast cancer Neg Hx      Blindness Neg Hx      Amblyopia Neg Hx      Cataracts Neg Hx      Glaucoma Neg Hx       Macular degeneration Neg Hx      Retinal detachment Neg Hx      Strabismus Neg Hx      Inflammatory bowel disease Neg Hx      Kidney disease Neg Hx      Lupus Neg Hx      Rheum arthritis Neg Hx         SOCIAL HISTORY:   Social History     Socioeconomic History    Marital status:    Occupational History    Occupation: Nurse     Employer: [employer]     Comment: Oncology   Tobacco Use    Smoking status: Never    Smokeless tobacco: Never   Substance and Sexual Activity    Alcohol use: No    Drug use: No    Sexual activity: Yes     Partners: Male     Birth control/protection: None     Comment: Vasectomy   Other Topics Concern    Are you pregnant or think you may be? No    Breast-feeding No     Social Drivers of Health     Financial Resource Strain: Low Risk  (4/4/2024)    Overall Financial Resource Strain (CARDIA)     Difficulty of Paying Living Expenses: Not hard at all   Food Insecurity: No Food Insecurity (4/4/2024)    Hunger Vital Sign     Worried About Running Out of Food in the Last Year: Never true     Ran Out of Food in the Last Year: Never true   Transportation Needs: No Transportation Needs (4/4/2024)    PRAPARE - Transportation     Lack of Transportation (Medical): No     Lack of Transportation (Non-Medical): No   Physical Activity: Unknown (4/4/2024)    Exercise Vital Sign     Days of Exercise per Week: 0 days   Stress: Stress Concern Present (4/4/2024)    Citizen of the [place] [employer] Occupational [employer] - Occupational Stress Questionnaire     Feeling of Stress : Rather much   Housing Stability: Low Risk  (4/4/2024)    Housing Stability Vital Sign     Unable to Pay for Housing in the Last Year: No     Number of Places Lived in the Last Year: 1     Unstable Housing in the Last Year: No       MEDICATIONS:     Current Outpatient Medications:     acetaminophen (TYLENOL) 500 MG tablet, Take 2 tablets (1,000 mg total) by mouth every 8 (eight) hours as needed for Pain., Disp: 60 tablet, Rfl: 0     augmented betamethasone (DIPROLENE) 0.05 % lotion, Apply to scalp nightly, Disp: 60 mL, Rfl: 3    biotin 1 mg Cap, Take by mouth., Disp: , Rfl:     carvediloL (COREG) 3.125 MG tablet, Take 1 tablet (3.125 mg total) by mouth 2 (two) times daily with meals., Disp: 180 tablet, Rfl: 3    fluocinonide (LIDEX) 0.05 % external solution, Thin film to scalp nightly, Disp: 60 mL, Rfl: 5    gabapentin (NEURONTIN) 100 MG capsule, Take 2 capsules (200 mg total) by mouth 3 (three) times daily., Disp: 540 capsule, Rfl: 3    hydrOXYzine HCL (ATARAX) 25 MG tablet, Take 1 tablet (25 mg total) by mouth nightly as needed (Insomnia)., Disp: 30 tablet, Rfl: 3    ibuprofen (ADVIL,MOTRIN) 800 MG tablet, Take 1 tablet (800 mg total) by mouth every 6 (six) hours as needed for Pain., Disp: 30 tablet, Rfl: 1    ketoconazole (NIZORAL) 2 % shampoo, Lather scalp with medicated shampoo once a week - let sit on scalp at least 5 minutes prior to rinsing, Disp: 120 mL, Rfl: 5    LIDOcaine (LIDODERM) 5 %, Place 1 patch onto the skin once daily. Remove & Discard patch within 12 hours or as directed by MD, Disp: 30 patch, Rfl: 0    methocarbamoL (ROBAXIN) 750 MG Tab, Take 1 tablet (750 mg total) by mouth nightly as needed (back spasms)., Disp: 30 tablet, Rfl: 1    minoxidiL (LONITEN) 2.5 MG tablet, TAKE 1/2 TABLET BY MOUTH DAILY, Disp: 45 tablet, Rfl: 2    multivitamin (THERAGRAN) per tablet, Take 1 tablet by mouth once daily., Disp: , Rfl:     naproxen (NAPROSYN) 500 MG tablet, Take 1 tablet (500 mg total) by mouth 2 (two) times daily with meals., Disp: 60 tablet, Rfl: 1    ondansetron (ZOFRAN-ODT) 4 MG TbDL, Take 2 tablets (8 mg total) by mouth every 12 (twelve) hours as needed (nausea)., Disp: 10 tablet, Rfl: 3    pantoprazole (PROTONIX) 40 MG tablet, Take 1 tablet (40 mg total) by mouth once daily., Disp: 90 tablet, Rfl: 3    spironolactone (ALDACTONE) 50 MG tablet, Take 2 tablets (100 mg total) by mouth once daily., Disp: 180 tablet, Rfl: 3     UNABLE TO FIND, RELIEF FACTOR (FISH OIL, TURMERIC, ICARLIN, RESVERATROL), Disp: , Rfl:     ALLERGIES:   Review of patient's allergies indicates:  No Known Allergies     PHYSICAL EXAMINATION:  /84   LMP 12/09/2021   Vitals signs and nursing note have been reviewed.  General: In no acute distress, well developed, well nourished, no diaphoresis  Eyes: EOM full and smooth, no eye redness or discharge  HENT: normocephalic and atraumatic, neck supple, trachea midline, no nasal discharge, no external ear redness or discharge  Cardiovascular: 2+ and symmetric radial and DP pulses bilaterally, no LE edema  Lungs: respirations non-labored, no conversational dyspnea   Abd: non-distended, no rigidity  MSK: no amputation or deformity, no swelling of extremities  Neuro: AAOx3, CN2-12 grossly intact  Skin: No rashes, warm and dry  Psychiatric: cooperative, pleasant, mood and affect appropriate for age    ANKLE: left   The affected ankle is compared to the contralateral ankle.    Observation:    There is no edema, erythema, or ecchymosis.   Shoes reveal a normal wear pattern.  No structural deformities including pes planus/cavus, hallux valgus, or toe deformities.   Negative too-many toes sign.  Normal callus pattern on the feet bilaterally.    Achilles tendon and calcaneal insertion reveals no deformities  No leg or intrinsic foot muscle atrophy.  Squatting reveals symmetric pronation of the bilateral feet.   Able to rise on toes with symmetric supination.    Normal gait without evidence of antalgia.    ROM: (expected degree)   Active dorsiflexion to 20° bilaterally.    Active plantarflexion to 50° bilaterally.    Active ankle inversion to 35° bilaterally.    Active ankle eversion to 15° bilaterally.    Full active flexion/extension of the toes bilaterally.   Heel cords without tightness bilaterally.    Tenderness To Palpation   No tenderness at the ATFL, CFL, PTFL, or deltoid ligaments  No tenderness over the distal  anterior syndesmosis, distal tibia/fibula, fibular head/shaft  No tenderness at medial or lateral malleoli   No tenderness at navicular, cuboid, cuneiforms, talus  + mild tenderness at the medial left calcaneous at the plantar fascia attachment  No tenderness at the inferior right calcaneous at the plantar fascia attachment  No tenderness over the left medial arch along fascial thickening/scarring  No tenderness along the metatarsals or phalanges  No tenderness at the Achilles tendon calcaneal insertion  No tenderness at the tibialis posterior, flexor digitorum longus, flexor hallucis longus   No tenderness at the peroneal tendons  + tenderness over the dorsal lateral midfoot    Strength Testing: (*pain)  Dorsiflexion - 5/5  Platarflexion - 5/5  Resisted Inversion - 5/5  Resisted Eversion - 5/5  Great Toe Extension - 5/5  Great Toe Flexion - 5/5    Special Tests:  Anterior talar drawer - negative and without dimpling  Talar tilt - negative    Heel tap test - negative  Distal tib/fib squeeze test - negative  Bryant squeeze test - negative    Metatarsal squeeze test - negative  Midfoot stress test - negative  Calcaneal squeeze test - negative    No subluxation of the peroneal tendons with resisted eversion.    Structural Exam:  Midfoot joint restrictions right  TB at the left medial calcaneus at the plantar fascia attachment toward the distal foot  CD right sinus tarsi    Key   F= Flexed   E = Extended   R = Rotated   N = Neutral   S = Sidebent   TTA = tissue texture abnormality   L/R/B (last letter) = left/right/bilateral   HTP = fascial herniated trigger point   TB = fascial trigger band   CD = fascial continuum distortion       Vascular/Sensory Exam:  DP and PT pulses intact BL  No skin changes, no abnormal hair distribution  Sensation intact to light touch throughout the saphenous, sural, superficial peroneal, deep peroneal, and tibial nerve distributions  Negative Tinel's test over tarsal tunnel  Capillary  refill intact <2 seconds in all toes bilaterally.      IMAGIN. X-ray obtained 2024 due to bilateral foot pain   2. X-ray images were reviewed personally by me and then directly with patient.  3. FINDINGS: X-ray images obtained demonstrate plantar calcaneal spur bilaterally, no acute fracture or dislocation  4. IMPRESSION: As above.       ASSESSMENT:      ICD-10-CM ICD-9-CM   1. Right foot pain  M79.671 729.5   2. Left foot pain  M79.672 729.5   3. Plantar fasciitis, bilateral  M72.2 728.71   4. History of foot surgery  Z98.890 V15.29   5. Poor body mechanics  R29.898 781.99   6. Localized swelling, mass, or lump of right lower extremity  R22.41 782.2   7. Somatic dysfunction of lower extremity  M99.06 739.6         PLAN:  1-3. Bilateral foot pain/plantar fasciitis - improving  4. History of left foot surgery  5. Poor body mechanics  6. Mass of right foot    - Angel admits to bilateral foot pain, left worse than right beginning in 2019 she attributes to compensation from walking differently due to right hip pain. 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     - She appreciates improvement following last visit but endorses continued symptoms that are increased in proportion to her activity level. She is interested in being evaluated for more OMT today and notes most of her symptoms are now the dorsal lateral midfoot which mirrors symptoms previously on the left that turned out to be a neuroma.     - Symptoms, exam, and imaging are still most consistent with fascial restriction and myofascial tightness secondary to poor body mechanics, history of foot surgery on the left and over compensation. She has done very well with OMT but her work continues to increase symptoms due to extended time on her feet coupled with the anatomic changes that she now has post surgery.    - Based on her description/body language of pain and somatic dysfunction identified on exam, I  discussed osteopathic manipulation as a treatment option today.  She consents to evaluation and treatment.  See below.    - Due to right foot pain and localized area of swelling, an MRI of the right foot has been ordered and scheduled to further assess for neuroma.     - Continue with HEP for plantar fasciitis prescribed at initial visit.     - Continue with Ibuprofen 800mg as needed.      7. Somatic dysfunction of lower extremity region -    - OMT 1-2 regions. Oral consent obtained. Reviewed benefits and potential side effects. OMT indicated today due to signs and symptoms as well as local and remote somatic dysfunction findings and their related neurokinetic, lymphatic, fascial and/or arteriovenous body connections. OMT techniques used: Myofascial Release, Muscle Energy, Fascial Distortion Model, and Articulatory. Treatment was tolerated well. Improvement noted in segmental mobility post-treatment in dysfunctional regions. There were no adverse events and no complications immediately following treatment. Advised plenty of water to help alleviate soreness.      Future planning includes - next steps pending MRI results (US guided injection vs surgical referral), possibly more OMT if helpful and if indicated, diagnostic ultrasound of the foot to further assess the painful palpable fascia/scar tissue, hydrodissection if all else fails    All questions were answered to the best of my ability and all concerns were addressed at this time.    Follow up in 4-6 weeks for above, or sooner if needed.      This note is dictated using the M*Modal Fluency Direct word recognition program. There are word recognition mistakes that are occasionally missed on review.

## 2025-01-14 DIAGNOSIS — M72.2 PLANTAR FASCIITIS, BILATERAL: ICD-10-CM

## 2025-01-14 DIAGNOSIS — L65.9 HAIR LOSS: ICD-10-CM

## 2025-01-14 RX ORDER — IBUPROFEN 800 MG/1
800 TABLET ORAL EVERY 6 HOURS PRN
Qty: 30 TABLET | Refills: 1 | Status: SHIPPED | OUTPATIENT
Start: 2025-01-14

## 2025-01-14 RX ORDER — MINOXIDIL 2.5 MG/1
1.25 TABLET ORAL DAILY
Qty: 45 TABLET | Refills: 3 | Status: SHIPPED | OUTPATIENT
Start: 2025-01-14

## 2025-01-15 DIAGNOSIS — I10 HYPERTENSION, UNSPECIFIED TYPE: ICD-10-CM

## 2025-01-18 ENCOUNTER — PATIENT MESSAGE (OUTPATIENT)
Dept: PRIMARY CARE CLINIC | Facility: CLINIC | Age: 45
End: 2025-01-18
Payer: COMMERCIAL

## 2025-01-21 NOTE — TELEPHONE ENCOUNTER
Pt requesting referral to plastic surgery to have implants removed    Pended in encounter for your review   When Should The Patient Follow-Up For Their Next Full-Body Skin Exam?: 3 Months Quality 137: Melanoma: Continuity Of Care - Recall System: Patient information entered into a recall system that includes: target date for the next exam specified AND a process to follow up with patients regarding missed or unscheduled appointments Detail Level: Detailed Detail Level: Generalized Detail Level: Zone

## 2025-01-25 ENCOUNTER — HOSPITAL ENCOUNTER (OUTPATIENT)
Dept: RADIOLOGY | Facility: HOSPITAL | Age: 45
Discharge: HOME | End: 2025-01-25
Attending: ORTHOPAEDIC SURGERY
Payer: COMMERCIAL

## 2025-01-25 DIAGNOSIS — R22.41 LOCALIZED SWELLING, MASS, OR LUMP OF RIGHT LOWER EXTREMITY: ICD-10-CM

## 2025-01-25 PROCEDURE — 73720 MRI LWR EXTREMITY W/O&W/DYE: CPT | Mod: TC,RT

## 2025-01-25 PROCEDURE — 25500020 PHARM REV CODE 255: Performed by: ORTHOPAEDIC SURGERY

## 2025-01-25 PROCEDURE — A9585 GADOBUTROL INJECTION: HCPCS | Performed by: ORTHOPAEDIC SURGERY

## 2025-01-25 PROCEDURE — 73720 MRI LWR EXTREMITY W/O&W/DYE: CPT | Mod: 26,RT,, | Performed by: RADIOLOGY

## 2025-01-25 RX ORDER — GADOBUTROL 604.72 MG/ML
10 INJECTION INTRAVENOUS
Status: COMPLETED | OUTPATIENT
Start: 2025-01-25 | End: 2025-01-25

## 2025-01-25 RX ADMIN — GADOBUTROL 10 ML: 604.72 INJECTION INTRAVENOUS at 10:01

## 2025-02-04 ENCOUNTER — OFFICE VISIT (OUTPATIENT)
Dept: SPORTS MEDICINE | Facility: CLINIC | Age: 45
End: 2025-02-04
Payer: COMMERCIAL

## 2025-02-04 VITALS
DIASTOLIC BLOOD PRESSURE: 77 MMHG | BODY MASS INDEX: 38.12 KG/M2 | HEART RATE: 71 BPM | SYSTOLIC BLOOD PRESSURE: 114 MMHG | HEIGHT: 65 IN | WEIGHT: 228.81 LBS

## 2025-02-04 DIAGNOSIS — M79.672 LEFT FOOT PAIN: ICD-10-CM

## 2025-02-04 DIAGNOSIS — Z98.890 HISTORY OF FOOT SURGERY: ICD-10-CM

## 2025-02-04 DIAGNOSIS — R29.898 POOR BODY MECHANICS: ICD-10-CM

## 2025-02-04 DIAGNOSIS — M72.2 PLANTAR FASCIITIS, BILATERAL: ICD-10-CM

## 2025-02-04 DIAGNOSIS — M79.671 RIGHT FOOT PAIN: Primary | ICD-10-CM

## 2025-02-04 DIAGNOSIS — M99.06 SOMATIC DYSFUNCTION OF LOWER EXTREMITY: ICD-10-CM

## 2025-02-04 PROCEDURE — 3078F DIAST BP <80 MM HG: CPT | Mod: CPTII,S$GLB,, | Performed by: ORTHOPAEDIC SURGERY

## 2025-02-04 PROCEDURE — 3008F BODY MASS INDEX DOCD: CPT | Mod: CPTII,S$GLB,, | Performed by: ORTHOPAEDIC SURGERY

## 2025-02-04 PROCEDURE — 98925 OSTEOPATH MANJ 1-2 REGIONS: CPT | Mod: S$GLB,,, | Performed by: ORTHOPAEDIC SURGERY

## 2025-02-04 PROCEDURE — 1160F RVW MEDS BY RX/DR IN RCRD: CPT | Mod: CPTII,S$GLB,, | Performed by: ORTHOPAEDIC SURGERY

## 2025-02-04 PROCEDURE — 3074F SYST BP LT 130 MM HG: CPT | Mod: CPTII,S$GLB,, | Performed by: ORTHOPAEDIC SURGERY

## 2025-02-04 PROCEDURE — 99999 PR PBB SHADOW E&M-EST. PATIENT-LVL IV: CPT | Mod: PBBFAC,,, | Performed by: ORTHOPAEDIC SURGERY

## 2025-02-04 PROCEDURE — 1159F MED LIST DOCD IN RCRD: CPT | Mod: CPTII,S$GLB,, | Performed by: ORTHOPAEDIC SURGERY

## 2025-02-04 PROCEDURE — 99214 OFFICE O/P EST MOD 30 MIN: CPT | Mod: 25,S$GLB,, | Performed by: ORTHOPAEDIC SURGERY

## 2025-02-04 NOTE — PROGRESS NOTES
CC: bilateral foot pain    Angel is here today for follow up evaluation of her bilateral foot pain and to review her right foot MRI results. Patient reports her pain is 3/10 today. She denies appreciating a significant change in her pain since her last visit, but does note that she feels immediately improved following OMT. She denies new falls or trauma since her last visit.     Recall from visit on 01/13/2025  Angel is here today for follow up evaluation of her bilateral foot pain. Patient reports her pain is 3/10 today. She has historically appreciated great improvement in her pain with OMT and is interested in being evaluated for more of this today. She also notes an area of swelling affecting her right lateral dorsal midfoot and is concerned about this today. She admits to similar symptoms and exam in the past on the left which ended up being a neuroma that was improved with surgical removal. She denies any new injury or trauma. She admits to the plantar fascia feeling better on both sides today.      PAST MEDICAL HISTORY:   Past Medical History:   Diagnosis Date    Hypertension     PONV (postoperative nausea and vomiting)     Rosacea        PAST SURGICAL HISTORY:   Past Surgical History:   Procedure Laterality Date    AUGMENTATION OF BREAST Bilateral 2005    Breast Augmentation  2005    COSMETIC SURGERY  2005    Breast augmentation    DILATION AND CURETTAGE OF UTERUS N/A 10/19/2019    Procedure: DILATION AND CURETTAGE, UTERUS;  Surgeon: Frances Culp MD;  Location: [facility] OR;  Service: OB/GYN;  Laterality: N/A;  with suction    ENDOSCOPIC PLANTAR FASCIOTOMY Left 6/17/2022    Procedure: FASCIOTOMY, PLANTAR, ENDOSCOPIC;  Surgeon: Mello Montez DPM;  Location: [facility] OR;  Service: Podiatry;  Laterality: Left;  endoscopic plantar fasciotomy [facility]      ROBOT-ASSISTED LAPAROSCOPIC UTERINE MYOMECTOMY N/A 1/7/2020    Procedure: ROBOTIC MYOMECTOMY, UTERUS;  Surgeon: Frances Culp MD;   Location: [facility] OR;  Service: OB/GYN;  Laterality: N/A;    SURGICAL REMOVAL OF FERNANDEZ'S NEUROMA Left 1/5/2023    Procedure: EXCISION, FERNANDEZ'S NEUROMA;  Surgeon: Mello Montez DPM;  Location: [facility] OR;  Service: Podiatry;  Laterality: Left;    XI ROBOTIC HYSTERECTOMY, WITH SALPINGO-OOPHORECTOMY N/A 12/15/2021    Procedure: XI ROBOTIC HYSTERECTOMY,WITH SALPINGO-OOPHORECTOMY;  Surgeon: Carla Aguero MD;  Location: [facility] OR;  Service: OB/GYN;  Laterality: N/A;       FAMILY HISTORY:   Family History   Problem Relation Name Age of Onset    Diabetes Mother Heather Diaz     Hypertension Mother Heather Diaz     Heart failure Mother Heather Diaz         viral?    Heart disease Mother Heather Diaz     Hyperlipidemia Mother Heather Diaz     Alcohol abuse Father Nixon Diaz     Alcohol abuse Maternal Uncle Saúl Austin     Psoriasis Maternal Grandmother Heather Austin     Arthritis Maternal Grandmother Heather Austin     Diabetes Maternal Grandmother Heather Austin     Alcohol abuse Maternal Grandfather Josh Austin     COPD Maternal Grandfather Josh Austin     Cancer Paternal Grandmother Nely Diaz         Lung Cancer    Cancer Paternal Grandfather Lamine Diaz         Pancreatic Cancer    Cancer Neg Hx      Ovarian cancer Neg Hx      Melanoma Neg Hx      Heart attack Neg Hx      Stroke Neg Hx      Colon cancer Neg Hx      Breast cancer Neg Hx      Blindness Neg Hx      Amblyopia Neg Hx      Cataracts Neg Hx      Glaucoma Neg Hx      Macular degeneration Neg Hx      Retinal detachment Neg Hx      Strabismus Neg Hx      Inflammatory bowel disease Neg Hx      Kidney disease Neg Hx      Lupus Neg Hx      Rheum arthritis Neg Hx         SOCIAL HISTORY:   Social History     Socioeconomic History    Marital status:    Occupational History    Occupation: Nurse     Employer: [employer]     Comment: Oncology   Tobacco Use    Smoking status: Never    Smokeless tobacco: Never   Substance and Sexual Activity    Alcohol use: No     Drug use: No    Sexual activity: Yes     Partners: Male     Birth control/protection: None     Comment: Vasectomy   Other Topics Concern    Are you pregnant or think you may be? No    Breast-feeding No     Social Drivers of Health     Financial Resource Strain: Low Risk  (4/4/2024)    Overall Financial Resource Strain (CARDIA)     Difficulty of Paying Living Expenses: Not hard at all   Food Insecurity: No Food Insecurity (4/4/2024)    Hunger Vital Sign     Worried About Running Out of Food in the Last Year: Never true     Ran Out of Food in the Last Year: Never true   Transportation Needs: No Transportation Needs (4/4/2024)    PRAPARE - Transportation     Lack of Transportation (Medical): No     Lack of Transportation (Non-Medical): No   Physical Activity: Unknown (4/4/2024)    Exercise Vital Sign     Days of Exercise per Week: 0 days   Stress: Stress Concern Present (4/4/2024)    [employer] [place] [employer] - Occupational Stress Questionnaire     Feeling of Stress : Rather much   Housing Stability: Low Risk  (4/4/2024)    Housing Stability Vital Sign     Unable to Pay for Housing in the Last Year: No     Number of Places Lived in the Last Year: 1     Unstable Housing in the Last Year: No       MEDICATIONS:     Current Outpatient Medications:     acetaminophen (TYLENOL) 500 MG tablet, Take 2 tablets (1,000 mg total) by mouth every 8 (eight) hours as needed for Pain., Disp: 60 tablet, Rfl: 0    augmented betamethasone (DIPROLENE) 0.05 % lotion, Apply to scalp nightly, Disp: 60 mL, Rfl: 3    biotin 1 mg Cap, Take by mouth., Disp: , Rfl:     carvediloL (COREG) 3.125 MG tablet, Take 1 tablet (3.125 mg total) by mouth 2 (two) times daily with meals., Disp: 180 tablet, Rfl: 3    fluocinonide (LIDEX) 0.05 % external solution, Thin film to scalp nightly, Disp: 60 mL, Rfl: 5    gabapentin (NEURONTIN) 100 MG capsule, Take 2 capsules (200 mg total) by mouth 3 (three) times daily., Disp: 540 capsule, Rfl: 3     "hydrOXYzine HCL (ATARAX) 25 MG tablet, Take 1 tablet (25 mg total) by mouth nightly as needed (Insomnia)., Disp: 30 tablet, Rfl: 3    ibuprofen (ADVIL,MOTRIN) 800 MG tablet, Take 1 tablet (800 mg total) by mouth every 6 (six) hours as needed for Pain., Disp: 30 tablet, Rfl: 1    ketoconazole (NIZORAL) 2 % shampoo, Lather scalp with medicated shampoo once a week - let sit on scalp at least 5 minutes prior to rinsing, Disp: 120 mL, Rfl: 5    LIDOcaine (LIDODERM) 5 %, Place 1 patch onto the skin once daily. Remove & Discard patch within 12 hours or as directed by MD, Disp: 30 patch, Rfl: 0    methocarbamoL (ROBAXIN) 750 MG Tab, Take 1 tablet (750 mg total) by mouth nightly as needed (back spasms)., Disp: 30 tablet, Rfl: 1    minoxidiL (LONITEN) 2.5 MG tablet, TAKE 1/2 TABLET BY MOUTH DAILY, Disp: 45 tablet, Rfl: 3    multivitamin (THERAGRAN) per tablet, Take 1 tablet by mouth once daily., Disp: , Rfl:     naproxen (NAPROSYN) 500 MG tablet, Take 1 tablet (500 mg total) by mouth 2 (two) times daily with meals., Disp: 60 tablet, Rfl: 1    ondansetron (ZOFRAN-ODT) 4 MG TbDL, Take 2 tablets (8 mg total) by mouth every 12 (twelve) hours as needed (nausea)., Disp: 10 tablet, Rfl: 3    pantoprazole (PROTONIX) 40 MG tablet, Take 1 tablet (40 mg total) by mouth once daily., Disp: 90 tablet, Rfl: 3    spironolactone (ALDACTONE) 50 MG tablet, Take 2 tablets (100 mg total) by mouth once daily., Disp: 180 tablet, Rfl: 3    UNABLE TO FIND, RELIEF FACTOR (FISH OIL, TURMERIC, ICARLIN, RESVERATROL), Disp: , Rfl:     ALLERGIES:   Review of patient's allergies indicates:  No Known Allergies     PHYSICAL EXAMINATION:  /77   Pulse 71   Ht 5' 5" (1.651 m)   Wt 103.8 kg (228 lb 13.4 oz)   LMP 12/09/2021   BMI 38.08 kg/m²   Vitals signs and nursing note have been reviewed.  General: In no acute distress, well developed, well nourished, no diaphoresis  Eyes: EOM full and smooth, no eye redness or discharge  HENT: normocephalic and " atraumatic, neck supple, trachea midline, no nasal discharge, no external ear redness or discharge  Cardiovascular: 2+ and symmetric radial and DP pulses bilaterally, no LE edema  Lungs: respirations non-labored, no conversational dyspnea   Abd: non-distended, no rigidity  MSK: no amputation or deformity, no swelling of extremities  Neuro: AAOx3, CN2-12 grossly intact  Skin: No rashes, warm and dry  Psychiatric: cooperative, pleasant, mood and affect appropriate for age    ANKLE: left   The affected ankle is compared to the contralateral ankle.    Observation:    There is no edema, erythema, or ecchymosis.   Shoes reveal a normal wear pattern.  No structural deformities including pes planus/cavus, hallux valgus, or toe deformities.   Negative too-many toes sign.  Normal callus pattern on the feet bilaterally.    Achilles tendon and calcaneal insertion reveals no deformities  No leg or intrinsic foot muscle atrophy.  Squatting reveals symmetric pronation of the bilateral feet.   Able to rise on toes with symmetric supination.    Normal gait without evidence of antalgia.    ROM: (expected degree)   Active dorsiflexion to 20° bilaterally.    Active plantarflexion to 50° bilaterally.    Active ankle inversion to 35° bilaterally.    Active ankle eversion to 15° bilaterally.    Full active flexion/extension of the toes bilaterally.   Heel cords without tightness bilaterally.    Tenderness To Palpation   No tenderness at the ATFL, CFL, PTFL, or deltoid ligaments  No tenderness over the distal anterior syndesmosis, distal tibia/fibula, fibular head/shaft  No tenderness at medial or lateral malleoli   No tenderness at navicular, cuboid, cuneiforms, talus  + tenderness at the medial left calcaneous at the plantar fascia attachment  + tenderness at the inferior right calcaneous at the plantar fascia attachment  + tenderness over the left medial arch along fascial thickening/scarring  No tenderness along the metatarsals or  phalanges  No tenderness at the Achilles tendon calcaneal insertion  No tenderness at the tibialis posterior, flexor digitorum longus, flexor hallucis longus   No tenderness at the peroneal tendons  + tenderness over the dorsal lateral midfoot    Strength Testing: (*pain)  Dorsiflexion - 5/5  Platarflexion - 5/5  Resisted Inversion - 5/5  Resisted Eversion - 5/5  Great Toe Extension - 5/5  Great Toe Flexion - 5/5    Special Tests:  Anterior talar drawer - negative and without dimpling  Talar tilt - negative    Heel tap test - negative  Distal tib/fib squeeze test - negative  Bryant squeeze test - negative    Metatarsal squeeze test - negative  Midfoot stress test - negative  Calcaneal squeeze test - negative    No subluxation of the peroneal tendons with resisted eversion.    Structural Exam:  Midfoot joint restrictions bilaterally  TB at the left medial calcaneus at the plantar fascia attachment toward the distal foot  HTP medial calcaneous left  HTP inferior calcaneous at PF attachment right    Key   F= Flexed   E = Extended   R = Rotated   N = Neutral   S = Sidebent   TTA = tissue texture abnormality   L/R/B (last letter) = left/right/bilateral   HTP = fascial herniated trigger point   TB = fascial trigger band   CD = fascial continuum distortion       Vascular/Sensory Exam:  DP and PT pulses intact BL  No skin changes, no abnormal hair distribution  Sensation intact to light touch throughout the saphenous, sural, superficial peroneal, deep peroneal, and tibial nerve distributions  Negative Tinel's test over tarsal tunnel  Capillary refill intact <2 seconds in all toes bilaterally.      IMAGIN. X-ray obtained 2024 due to bilateral foot pain   2. X-ray images were reviewed personally by me and then directly with patient.  3. FINDINGS: X-ray images obtained demonstrate plantar calcaneal spur bilaterally, no acute fracture or dislocation  4. IMPRESSION: As above.     1. MRI obtained 2025 due to  right foot pain   2. MRI images were reviewed personally by me and then directly with patient.  3. FINDINGS: MRI images obtained demonstrate mild bone marrow edema at the dorsum of the 3rd and 4th metatarsal bases, favoring sequela of degenerative change. There is a small effusion with capsular thickening about the 3rd/4th TMT joints. No discrete soft tissue mass.   4. IMPRESSION: As above.       ASSESSMENT:      ICD-10-CM ICD-9-CM   1. Right foot pain  M79.671 729.5   2. Left foot pain  M79.672 729.5   3. Plantar fasciitis, bilateral  M72.2 728.71   4. History of foot surgery  Z98.890 V15.29   5. Poor body mechanics  R29.898 781.99   6. Somatic dysfunction of lower extremity  M99.06 739.6         PLAN:  1-3. Bilateral foot pain/plantar fasciitis - improving  4. History of left foot surgery  5. Poor body mechanics    - Angel admits to bilateral foot pain, left worse than right beginning in 2019 she attributes to compensation from walking differently due to right hip pain. 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     - She appreciates improvement following last visit but endorses continued symptoms that are increased in proportion to her activity level. She is interested in being evaluated for more OMT today as she does appreciating immediate improvement following.     - MRI obtained 01/25/2025 and images were personally reviewed with the patient. See above for further detail.    - Symptoms, exam, and imaging are still most consistent with fascial restriction and myofascial tightness secondary to poor body mechanics, history of foot surgery on the left and over compensation. She has done very well with OMT but her work continues to increase symptoms due to extended time on her feet coupled with the anatomic changes that she now has post surgery.    - Based on her description/body language of pain and somatic dysfunction identified on exam, I discussed osteopathic manipulation  as a treatment option today.  She consents to evaluation and treatment.  See below.    - Continue with HEP for plantar fasciitis prescribed at initial visit.     - Continue with Ibuprofen 800mg as needed.      6. Somatic dysfunction of lower extremity region -    - OMT 1-2 regions. Oral consent obtained. Reviewed benefits and potential side effects. OMT indicated today due to signs and symptoms as well as local and remote somatic dysfunction findings and their related neurokinetic, lymphatic, fascial and/or arteriovenous body connections. OMT techniques used: Soft Tissue, Myofascial Release, Muscle Energy, Fascial Distortion Model, and Articulatory. Treatment was tolerated well. Improvement noted in segmental mobility post-treatment in dysfunctional regions. There were no adverse events and no complications immediately following treatment. Advised plenty of water to help alleviate soreness.      Future planning includes - possibly more OMT if helpful and if indicated, diagnostic ultrasound of the foot to further assess the painful palpable fascia/scar tissue, hydrodissection if all else fails    All questions were answered to the best of my ability and all concerns were addressed at this time.    Follow up in 4-6 weeks for above, or sooner if needed.      This note is dictated using the M*Modal Fluency Direct word recognition program. There are word recognition mistakes that are occasionally missed on review.

## 2025-02-07 ENCOUNTER — PATIENT MESSAGE (OUTPATIENT)
Dept: DERMATOLOGY | Facility: CLINIC | Age: 45
End: 2025-02-07
Payer: COMMERCIAL

## (undated) DEVICE — BANDAGE ESMARK ELASTIC ST 6X9

## (undated) DEVICE — Device

## (undated) DEVICE — BANDAGE ESMARK ELASTIC ST 4X9

## (undated) DEVICE — SUT VICRYL+ 27 UR-6 VIOL

## (undated) DEVICE — NDL HYPO REG 25G X 1 1/2

## (undated) DEVICE — SOL STRL WATER INJ 1000ML BG

## (undated) DEVICE — GAUZE SPONGE 4X4 12PLY

## (undated) DEVICE — DRAPE ARM DAVINCI XI

## (undated) DEVICE — COVER OVERHEAD SURG LT BLUE

## (undated) DEVICE — SET TRI-LUMEN FILTERED TUBE

## (undated) DEVICE — JELLY SURGILUBE 5GR

## (undated) DEVICE — BLADE SCALP OPHTL BEVEL STR

## (undated) DEVICE — POSITIONER HEAD ADULT

## (undated) DEVICE — PORT AIRSEAL 12/120MM LPI

## (undated) DEVICE — DEVICE ANC SW STAT FOLEY 6-24

## (undated) DEVICE — SUT NDL 90 V-LOC 4-0 VIOLET

## (undated) DEVICE — NDL INSUFFLATION VERRES 120MM

## (undated) DEVICE — INSERT CUSHIONPRONE VIEW LARGE

## (undated) DEVICE — SUT VICRYL 0 27 CT-2

## (undated) DEVICE — SET DECANTER MEDICHOICE

## (undated) DEVICE — IRRIGATOR ENDOSCOPY DISP.

## (undated) DEVICE — GLOVE BIOGEL SKINSENSE PI 6.0

## (undated) DEVICE — DRAPE COLUMN DAVINCI XI

## (undated) DEVICE — SEAL UNIVERSAL 5MM-8MM XI

## (undated) DEVICE — SUT VICRYL PLUS 0 CT1 36IN

## (undated) DEVICE — BLADE SURG #15 CARBON STEEL

## (undated) DEVICE — NDL SPINAL SPINOCAN 22GX3.5

## (undated) DEVICE — SOL NS 1000CC

## (undated) DEVICE — GLOVE BIOGEL SKINSENSE PI 6.5

## (undated) DEVICE — CAST PADDING WET/DRY

## (undated) DEVICE — SOL 9P NACL IRR PIC IL

## (undated) DEVICE — SUT V-LOC 2.0 GS-21 90 DAY

## (undated) DEVICE — SOL ELECTROLUBE ANTI-STIC

## (undated) DEVICE — STOCKINET TUBULAR 1 PLY 6X60IN

## (undated) DEVICE — BANDAGE DERMACEA STRETCH 4X1IN

## (undated) DEVICE — SEE MEDLINE ITEM 156923

## (undated) DEVICE — GLOVE BIOGEL PI MICRO INDIC 8

## (undated) DEVICE — TIP RUMI BLUE DISPOSABLE 5/BX

## (undated) DEVICE — SUT PROLENE 4-0 MONO 18IN

## (undated) DEVICE — TOURNIQUET SB QC DP 18X4IN

## (undated) DEVICE — SOL BETADINE 5%

## (undated) DEVICE — SYR 10CC LUER LOCK

## (undated) DEVICE — ELECTRODE REM PLYHSV RETURN 9

## (undated) DEVICE — PAD PREP CUFFED NS 24X48IN

## (undated) DEVICE — KIT ANTIFOG

## (undated) DEVICE — SYR ONLY LUER LOCK 20CC

## (undated) DEVICE — KIT WING PAD POSITIONING

## (undated) DEVICE — BELLOW CANN HEMOBLAST 1.65GR

## (undated) DEVICE — SYS ENDOBLADE PLNTR FASC REL

## (undated) DEVICE — DRESSING GAUZE XEROFORM 5X9

## (undated) DEVICE — APPLICATOR HEMOBLAST LAPSCP

## (undated) DEVICE — SUT MCRYL PLUS 4-0 PS2 27IN

## (undated) DEVICE — COVER TIP CURVED SCISSORS XI

## (undated) DEVICE — UNDERGLOVE BIOGEL PI SZ 6.5 LF

## (undated) DEVICE — NDL 18GA X1 1/2 REG BEVEL

## (undated) DEVICE — SEE L#120831

## (undated) DEVICE — PORT ACCESS 8MM W/120MM LOW

## (undated) DEVICE — SYS SEE SHARP SCOPE ANTIFOG

## (undated) DEVICE — KIT ENDO CARPEL TUNNAL SINGLE

## (undated) DEVICE — BANDAGE MATRIX HK LOOP 4IN 5YD

## (undated) DEVICE — TIP RUMI KOH-EFFICIENT 3.5

## (undated) DEVICE — SUT V-LOC 180 2-0 GS-22 9IN

## (undated) DEVICE — HANDPIECE MORCELLATOR LINA

## (undated) DEVICE — STRIP STERI REIN CLSR 1/2X2IN

## (undated) DEVICE — DRESSING XEROFORM FOIL PK 1X8

## (undated) DEVICE — SHOE POST-OP VEL CLOS FM W/LG

## (undated) DEVICE — SOL WATER STRL IRR 1000ML

## (undated) DEVICE — SOL PVP-I SCRUB 7.5% 4OZ

## (undated) DEVICE — SET CYSTO IRRIGATION UNIV SPIK

## (undated) DEVICE — WALKER BOOT SHORT UNIV MED

## (undated) DEVICE — SEE MEDLINE ITEM 156953

## (undated) DEVICE — APPLICATOR CHLORAPREP ORN 26ML

## (undated) DEVICE — NDL INSUF ULTRA VERESS 120MM

## (undated) DEVICE — SEE MEDLINE ITEM 157110

## (undated) DEVICE — SOL CLEARIFY VISUALIZATION LAP

## (undated) DEVICE — SUT V-LOC 180 V-20 3-0 12IN

## (undated) DEVICE — KIT PROCEDURE STER INLET CLOSU

## (undated) DEVICE — GLOVE BIOGEL ECLIPSE SZ 8

## (undated) DEVICE — PAD PREP 50/CA

## (undated) DEVICE — PAD ABD 8X10 STERILE

## (undated) DEVICE — COVER PROBE 6X48

## (undated) DEVICE — OBTURATOR BLADELESS 8MM XI CLR